# Patient Record
Sex: FEMALE | Race: WHITE | NOT HISPANIC OR LATINO | Employment: OTHER | URBAN - METROPOLITAN AREA
[De-identification: names, ages, dates, MRNs, and addresses within clinical notes are randomized per-mention and may not be internally consistent; named-entity substitution may affect disease eponyms.]

---

## 2017-01-26 DIAGNOSIS — Z23 NEED FOR VACCINATION: Primary | ICD-10-CM

## 2017-01-26 PROCEDURE — 90732 PPSV23 VACC 2 YRS+ SUBQ/IM: CPT | Performed by: FAMILY MEDICINE

## 2017-01-26 PROCEDURE — G0009 ADMIN PNEUMOCOCCAL VACCINE: HCPCS | Performed by: FAMILY MEDICINE

## 2017-01-28 DIAGNOSIS — E78.5 HYPERLIPIDEMIA: ICD-10-CM

## 2017-01-28 DIAGNOSIS — F41.9 ANXIETY: ICD-10-CM

## 2017-01-30 RX ORDER — ATORVASTATIN CALCIUM 10 MG/1
TABLET, FILM COATED ORAL
Qty: 90 TABLET | Refills: 0 | Status: SHIPPED | OUTPATIENT
Start: 2017-01-30 | End: 2017-04-28 | Stop reason: SDUPTHER

## 2017-01-30 RX ORDER — VENLAFAXINE HYDROCHLORIDE 150 MG/1
CAPSULE, EXTENDED RELEASE ORAL
Qty: 90 CAPSULE | Refills: 0 | Status: SHIPPED | OUTPATIENT
Start: 2017-01-30 | End: 2017-04-10 | Stop reason: SDUPTHER

## 2017-02-01 ENCOUNTER — TELEPHONE (OUTPATIENT)
Dept: PAIN MEDICINE | Facility: CLINIC | Age: 67
End: 2017-02-01

## 2017-02-01 NOTE — TELEPHONE ENCOUNTER
"-02/01/2017 @1726.  Spoke to Mrs. Churchill.  She will be here at 0730 tomorrow morning.          ---- Message from Sukumar Mcwilliams MD sent at 2/1/2017  3:27 PM EST -----  Read the report from Dr. RENE. I will be happy to assess and confirm what he recommended. Tell her that I have 5 minutes to see her because we are overbooked.   Could you put this on a quick note (Telephone)? If she agrees, She can stop by tomorrow at 07:30 AM (preferred) or 1 PM (FOR A VERY QUICK VISIT). Otherwise, let me know and will look at next week's schedule    ----- Message -----     From: Dinah Jorge RN     Sent: 2/1/2017   3:02 PM       To: Sukumar Mcwilliams MD    Mrs. Churchill called. She had bilat lumbar rhizotomy on 10/31/2016.  She had done very well, went  to PT, followed care plan until she fell approx 2 weeks ago.  She landed on her left hip, but has right hip pain since then.  Pain at first was intermittent, now is continuous, lateral right hip.    Yesterday she went to see Dr. Johnson, who did xray of pelvis in his office.  I was able to get the office note which is scanned in 'Media\".  He thinks the pain is SI, and GTB related, not a new injury, and recommended injections.  Dr. RENE's office said they do not do written reports of x-rays, but they would ask Dr. RENE  To fax us his interpretation.    Mrs. Churchill has an appointment 3/21 but does not want to wait that long to see you and be scheduled for procedure.          "

## 2017-02-02 ENCOUNTER — OFFICE VISIT (OUTPATIENT)
Dept: PAIN MEDICINE | Facility: CLINIC | Age: 67
End: 2017-02-02

## 2017-02-02 VITALS
OXYGEN SATURATION: 96 % | WEIGHT: 227 LBS | BODY MASS INDEX: 37.82 KG/M2 | RESPIRATION RATE: 20 BRPM | DIASTOLIC BLOOD PRESSURE: 76 MMHG | SYSTOLIC BLOOD PRESSURE: 111 MMHG | HEART RATE: 87 BPM | HEIGHT: 65 IN | TEMPERATURE: 97.2 F

## 2017-02-02 DIAGNOSIS — F41.9 ANXIETY AND DEPRESSION: ICD-10-CM

## 2017-02-02 DIAGNOSIS — M47.816 SPONDYLOSIS OF LUMBAR REGION WITHOUT MYELOPATHY OR RADICULOPATHY: ICD-10-CM

## 2017-02-02 DIAGNOSIS — G47.33 OBSTRUCTIVE SLEEP APNEA SYNDROME: ICD-10-CM

## 2017-02-02 DIAGNOSIS — M19.012 OSTEOARTHRITIS, LOCALIZED, SHOULDER, LEFT: ICD-10-CM

## 2017-02-02 DIAGNOSIS — M53.3 SACROILIAC JOINT DYSFUNCTION OF RIGHT SIDE: ICD-10-CM

## 2017-02-02 DIAGNOSIS — M70.61 GREATER TROCHANTERIC BURSITIS OF RIGHT HIP: ICD-10-CM

## 2017-02-02 DIAGNOSIS — M17.0 PRIMARY OSTEOARTHRITIS OF BOTH KNEES: ICD-10-CM

## 2017-02-02 DIAGNOSIS — M51.26 DISPLACEMENT OF LUMBAR INTERVERTEBRAL DISC: ICD-10-CM

## 2017-02-02 DIAGNOSIS — M81.0 OSTEOPOROSIS: ICD-10-CM

## 2017-02-02 DIAGNOSIS — M48.061 STENOSIS OF LATERAL RECESS OF LUMBAR SPINE: ICD-10-CM

## 2017-02-02 DIAGNOSIS — E66.01 MORBID OBESITY DUE TO EXCESS CALORIES (HCC): ICD-10-CM

## 2017-02-02 DIAGNOSIS — R53.81 PHYSICAL DECONDITIONING: ICD-10-CM

## 2017-02-02 DIAGNOSIS — M75.82 ROTATOR CUFF TENDONITIS, LEFT: ICD-10-CM

## 2017-02-02 DIAGNOSIS — F32.A ANXIETY AND DEPRESSION: ICD-10-CM

## 2017-02-02 DIAGNOSIS — E66.01 MORBID OBESITY DUE TO EXCESS CALORIES (HCC): Primary | ICD-10-CM

## 2017-02-02 DIAGNOSIS — M76.31 ILIOTIBIAL BAND SYNDROME OF RIGHT SIDE: ICD-10-CM

## 2017-02-02 PROBLEM — M19.019 OSTEOARTHRITIS, LOCALIZED, SHOULDER: Status: ACTIVE | Noted: 2017-02-02

## 2017-02-02 PROBLEM — M75.80 ROTATOR CUFF TENDONITIS: Status: ACTIVE | Noted: 2017-02-02

## 2017-02-02 PROCEDURE — 99214 OFFICE O/P EST MOD 30 MIN: CPT | Performed by: ANESTHESIOLOGY

## 2017-02-02 NOTE — PROGRESS NOTES
"Chief Complaint: \"My lower back pain went away after my rhizotomies.  I continued having some right hip pain. Two weeks ago, I fell and my right hip pain got even worse.   I saw Dr. Leung yesterday, who recommended that I follow up with you for an injection.\"    History of Present Illness: Ms. Laura Churchill is a 66 y.o. Female, who was originally referred by Dr. Richie Fisher in consultation for intractable chronic lower back pain. 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 Her mechanical lower back pain resolved after lumbar medial branch rhizotomies performed on October 31, 2016.  Patient experiences significant pain relief and functional improvement.  Patient continues experiencing right hip pain.  Unfortunately, 2 weeks ago, after getting out of bed she tripped on something and fell injuring her right hip.  Patient underwent orthopedic consultation with Dr. Willett, who recommended a follow with me and possible injection for treatment of her unrelenting pain.    Problem #1: Right hip pain  Pain pattern: Patient complains of constant pain with intermittent exacerbation, described as aching, burning and sharp sensation.   Pain radiation: The pain radiates from her right gluteal region into the right hip and the lateral aspect of her right thigh up to the knee level.    Pain level: 4/10 (right hip), lower back (0/10).   Pain level ranges from 2/10 to 10/10.   Pain increases with standing after sitting for 20-25 minutes, standing longer than 30 minutes, ambulating more than 20 minutes or 1 mile.   Pain decreases with sitting, lying.   Patient reports intermittent tingling in the right lower extremity.  Patient denies any new bladder or bowel problems.     Problem #2: Left shoulder pain  Patient presents with a secondary problem of left shoulder pain.    Pain level: 5/10.    Pain level ranges from " 0/10-10/10  Pain increases with use of the left arm and in the range of motion of the left shoulder.  Pain decreases with rest.  Patient denies any numbness or weakness in the upper extremities.    Laura Churchill has already failed the following measures, including:  Conservative measures: oral analgesics, massage, physical therapy, ice and TENs   Interventional: Two epidural injections by Dr. Guilherme Shaw, last was in June. Patient only experienced relief for about 2-3 weeks after each injection.  Lumbar medial branch rhizotomies with excellent analgesia can functional outcome, as referenced above  Surgical: No previous lumbar spine surgery or right hip surgery.    Laura Churchill underwent neurosurgical consultation with Dr. Fisher on 09/21/2016, and was found not to be a surgical candidate.   Other consultation: Dr. Higuera, as referenced above.  Patient was found not to be a surgical candidate.  Laura Churchill presents with depression and anxiety, engaged in treatment; and obesity.  In terms of current analgesics, Laura Churchill takes: Extra Strength Tylenol.   I have reviewed [facility]IA Report #[id number], consistent to medication reconciliation.    Review of New Diagnostic Studies:   Bilateral hip x-rays 01/31/2017, revealed no significant degenerative changes of the right hip.  Spurs on the right greater trochanter.  Narrowing of the bilateral sacroiliac joint spaces.  Degenerative changes of the lumbosacral spine.  3 pins noted on the left hip.  Report is obtained from Dr. Kalpesh Edwards's office note.    Review of previous diagnostic studies:  MRI LUMBAR SPINE WITHOUT CONTRAST 09/01/2016:  lumbar vertebral bodies are normally aligned. Normal marrow signal. L3-L4 broad-based disc protrusion, slightly greater on the right than on the left. Associated facet and ligamentous hypertrophy reducing the AP dimension of the neural canal to 7 mm.  L4-L5 broad-based disc protrusion producing effacement of the lateral  "recesses but without high-grade stenosis. L5-S1 disc is intact without stenosis or \"lateralization\". The conus medullaris is normal.  X-Ray SPINE LUMBAR FLEXION AND EXTENSION 09/21/2016: Flexion and extension views in the upright position were performed of the lumbar spine. Mild anterolisthesis of L3 on L4 which is slightly more pronounced in flexion imaging than extension imaging. Degenerative changes identified at the L4/L5 and L5/S1 level where there is mild disc space narrowing and some sclerosis of the endplates. Retrolisthesis of L1 on L2. Retrolisthesis is slightly more pronounced in extension imaging than when compared to flexion imaging.  DUAL-ENERGY X-RAY ABSORPTIOMETRY (DXA), 07/12/2016:  The patient's average bone mineral density is within normal limits.    Review of Systems   Musculoskeletal: Positive for arthralgias and gait problem.   Hematological: Bruises/bleeds easily.   Psychiatric/Behavioral: Positive for sleep disturbance. The patient is nervous/anxious (depression).    All other systems reviewed and are negative.     Patient Active Problem List   Diagnosis   • Gastroesophageal reflux disease   • Essential hypertension   • Hyperlipidemia   • Obstructive sleep apnea syndrome   • Osteoarthritis of knee   • Osteoporosis   • Lumbar spondylosis without myelopathy/Lumbar facet arthropathy   • Displacement of lumbar intervertebral disc   • Stenosis of lateral recess of lumbar spine   • Physical deconditioning   • Morbid obesity due to excess calories   • Anxiety and depression   • Sacroiliac joint dysfunction of right side   • Greater trochanteric bursitis of right hip   • Osteoarthritis, localized, shoulder   • Rotator cuff tendonitis   • Iliotibial band syndrome of right side       Past Medical History   Diagnosis Date   • Arthritis    • Back problem    • Chronic pain disorder    • Extremity pain    • Joint pain 2016     lt shoulder   • Low back pain    • Lumbosacral disc disease    • Migraine " "headache    • Osteoarthritis    • Spinal stenosis          Past Surgical History   Procedure Laterality Date   • Oophorectomy Left      one ovary removed age 35   • Hip surgery Left      3 pins   • Cholecystectomy     • Gastric sleeve laparoscopic  2011         Family History   Problem Relation Age of Onset   • Ovarian cancer Maternal Aunt 75   • Alzheimer's disease Mother    • Breast cancer Neg Hx          Social History     Social History   • Marital status:      Spouse name: N/A   • Number of children: N/A   • Years of education: N/A     Social History Main Topics   • Smoking status: Never Smoker   • Smokeless tobacco: Never Used   • Alcohol use Yes      Comment: Rarely   • Drug use: No   • Sexual activity: Not Asked     Other Topics Concern   • None     Social History Narrative        Current Outpatient Prescriptions:   •  aspirin 81 MG EC tablet, Take 81 mg by mouth daily., Disp: , Rfl:   •  atorvastatin (LIPITOR) 10 MG tablet, TAKE 1 TABLET DAILY, Disp: 90 tablet, Rfl: 0  •  diclofenac (VOLTAREN) 75 MG EC tablet, TAKE 1 TABLET TWICE A DAY AS NEEDED, Disp: 180 tablet, Rfl: 0  •  tiZANidine (ZANAFLEX) 4 MG tablet, TAKE 1 TABLET DAILY AS NEEDED FOR MUSCLE SPASMS, Disp: 90 tablet, Rfl: 0  •  venlafaxine XR (EFFEXOR-XR) 150 MG 24 hr capsule, TAKE 1 CAPSULE DAILY, Disp: 90 capsule, Rfl: 0      Allergies   Allergen Reactions   • Codeine    • Hydrocodone-Acetaminophen    • Sulfa Antibiotics          Visit Vitals   • /76 (BP Location: Left arm, Patient Position: Sitting)   • Pulse 87   • Temp 97.2 °F (36.2 °C) (Oral)   • Resp 20   • Ht 65\" (165.1 cm)   • Wt 227 lb (103 kg)   • SpO2 96%   • BMI 37.77 kg/m2      Physical Exam   Neurologic Exam  Constitutional: Patient is oriented to person, place, and time. Vital signs are normal. Patient appears well-developed and well-nourished.   HENT: Head: Normocephalic and atraumatic. Eyes: Conjunctivae and lids are normal. Pupils: Equal, round, reactive to light. "   Neck: Trachea normal. Neck supple. No JVD present.   Pulmonary Respiratory effort: No increased work of breathing or signs of respiratory distress. Auscultation of lungs: Clear to auscultation.   Cardiovascular Auscultation of heart: Normal rate and rhythm, normal S1 and S2, no murmurs. Peripheral vascular exam: Normal. No edema.   Abdomen: The abdomen was soft and nontender. Bowel sounds were normal.   Musculoskeletal   Gait and station: Gait evaluation demonstrated a normal gait.   Examination of the left shoulder reveals decreased range of motion globally.  Palpation of the left supraspinatus tendon reproduces pain.  Significant crepitus upon mobilization of the left shoulder joint.  Rotator cuff strength globally 4 over 5.  Lumbar spine: The range of motion of the lumbar spine is remarkably improved at this time. Lumbar facet joint loading maneuvers are negative.  Carlos and Gaenslen's tests are negative on the left, positive on the right.   Piriformis maneuvers are negative.   Palpation of the bilateral greater trochanter are negative on the left, positive on the right.  Examination of the Iliotibial band: are negative on the left, positive on the right.  Hip joints: The range of motion of the hip joints is full and without pain.    Examination of the right knee reveals significant crepitus and pain with flexion of her 100°.  Neurological:   Patient is alert and oriented to person, place, and time. Speech: speech is normal. Cortical function: Normal mental status.   Cranial nerves: Cranial nerves 2-12 intact.   Reflex Scores:  Right brachioradialis: 2+  Left brachioradialis: 2+  Right biceps: 2+  Left biceps: 2+  Right triceps: 2+  Left triceps: 2+  Right patellar: 2+  Left patellar: 2+  Right achilles: 2+  Left achilles: 2+  Motor strength: 5/5  Motor Tone: normal tone.   Involuntary movements: none.   Superficial/Primitive Reflexes: primitive reflexes were absent.   Right Sneed: absent  Left Sneed:  absent  Right ankle clonus: absent  Left ankle clonus: absent   Spurling sign is negative. Lhermitte sign is negative. Negative long tract signs. Straight leg raising test is negative. Femoral stretch sign is negative.   Sensation: No sensory loss. Sensory exam: intact to light touch, intact pain and temperature sensation, intact vibration sensation and normal proprioception.   Coordination: Normal finger to nose and heel to shin. Normal balance and negative. Romberg's sign negative.   Skin and subcutaneous tissue: Skin is warm and intact. No rash noted. No cyanosis.   Psychiatric:   Judgment and insight: Normal.   Orientation to person, place and time: Normal.   Recent and remote memory: Intact.   Mood and affect: Normal.    ASSESSMENT:   1. Sacroiliac joint dysfunction of right side    2. Greater trochanteric bursitis of right hip    3. Iliotibial band syndrome of right side    4. Osteoarthritis, localized, shoulder, left    5. Rotator cuff tendonitis, left    6. Lumbar spondylosis without myelopathy/Lumbar facet arthropathy    7. Displacement of lumbar intervertebral disc    8. Stenosis of lateral recess of lumbar spine    9. Primary osteoarthritis of both knees    10. Osteoporosis    11. Morbid obesity due to excess calories    12. Obstructive sleep apnea syndrome    13. Physical deconditioning    14. Anxiety and depression        PLAN: I had a lengthy conversation with Ms. Laura CAMACHO Churchill regarding her recurrent chronic pain condition and potential therapeutic options. Patient has failed to obtain pain relief with conservative measures.  Patient has experienced complete relief of her previously severe mechanical lower back pain since lumbar medial branch rhizotomies.  Patient has a history of right sacroiliac joint dysfunction and right greater trochanteric bursitis, which got significantly worse after a recent fall.  In addition, she presents with some surreptitious symptoms from underlying lumbar spinal  stenosis. Her pain is multifactorial, and as a result, I have recommended a multidisciplinary approach to better address her needs.  Therefore, I have proposed the following plan:  1. Interventional pain management measures:   A. For treatment of chronic right sacroiliac joint pain and right greater trochanteric bursitis/IT band syndrome: Patient will be scheduled for diagnostic and therapeutic right sacroiliac joint injection and right greater trochanteric bursa injection with local anesthesia and steroids.  Otherwise, we will proceed with diagnostic and therapeutic right L3-L4 transforaminal epidural steroid injection. We may repeat another epidural depending on patient's outcome.  Patient will follow-up with Dr. Fisher thereafter.  B. For treatment of her chronic left shoulder pain/osteoarthritis, I have discussed the possibility of left glenohumeral joint injection with Monovisc or Synvisc one combined with right supraspinatus tendon sheath injection with steroids.  In the meantime, I will request medical records from Dr. Miller  2. Long-term rehabilitation efforts:  A. Patient will start a comprehensive physical therapy program for water therapy, core strengthening, neurodynamics, ASTYM, E-STIM, myofascial release, cupping and dry needling  B. Start an exercise program such as yoga, water therapy and daily walks for fitness  C. Referral to [facility]  3. Pharmacological measures: Continue diclofenac and tizanidine as currently prescribed  4. The patient has been instructed to contact my office with any questions or difficulties. The patient understands the plan and agrees to proceed accordingly.       Patient Care Team:  Shiva Capone MD as PCP - General  Richie Fisher MD as Surgeon (Neurosurgery)  Sukumar Mcwilliams MD as Consulting Physician (Anesthesiology)       No orders of the defined types were placed in this encounter.          Future Appointments  Date Time  Provider Department Center   3/29/2017 1:45 PM Glenn Clayton MD [facility] None         Sukumar Mcwilliams MD

## 2017-02-06 ENCOUNTER — OUTSIDE FACILITY SERVICE (OUTPATIENT)
Dept: PAIN MEDICINE | Facility: CLINIC | Age: 67
End: 2017-02-06

## 2017-02-06 PROCEDURE — 20610 DRAIN/INJ JOINT/BURSA W/O US: CPT | Performed by: ANESTHESIOLOGY

## 2017-02-06 PROCEDURE — 27096 INJECT SACROILIAC JOINT: CPT | Performed by: ANESTHESIOLOGY

## 2017-02-06 PROCEDURE — 99152 MOD SED SAME PHYS/QHP 5/>YRS: CPT | Performed by: ANESTHESIOLOGY

## 2017-02-20 ENCOUNTER — TELEPHONE (OUTPATIENT)
Dept: NEUROSURGERY | Facility: CLINIC | Age: 67
End: 2017-02-20

## 2017-02-20 NOTE — TELEPHONE ENCOUNTER
Can schedule follow up with me.  If patient has celso incontinence of bladder she needs to go to emergency room

## 2017-02-20 NOTE — TELEPHONE ENCOUNTER
Provider:  Phillip  Caller:   Laura  Phone #:  [phone]  Surgery:  N/a  Surgery Date:  N/a  Last visit:   9/21/16    KIA:         Reason for call:       Pt called stating that she is still having pain, has had rhizotomy and injections from [facility], and these treatments have not helped. Pt states that she is has developed some incontinence, not to the point where she needs to wear depends, but issue is has gradually gotten worse, requests return call

## 2017-02-27 ENCOUNTER — TELEPHONE (OUTPATIENT)
Dept: FAMILY MEDICINE CLINIC | Facility: CLINIC | Age: 67
End: 2017-02-27

## 2017-02-27 RX ORDER — DICLOFENAC SODIUM 75 MG/1
75 TABLET, DELAYED RELEASE ORAL 2 TIMES DAILY
Qty: 180 TABLET | Refills: 0 | Status: SHIPPED | OUTPATIENT
Start: 2017-02-27 | End: 2017-02-27 | Stop reason: SDUPTHER

## 2017-02-27 RX ORDER — DICLOFENAC SODIUM 75 MG/1
75 TABLET, DELAYED RELEASE ORAL 2 TIMES DAILY
Qty: 180 TABLET | Refills: 0 | Status: SHIPPED | OUTPATIENT
Start: 2017-02-27 | End: 2017-07-11 | Stop reason: HOSPADM

## 2017-02-27 NOTE — TELEPHONE ENCOUNTER
----- Message from Josesito Qureshi sent at 2/27/2017 11:34 AM EST -----  Regarding: med refill  Contact: [phone]  diclofenac (VOLTAREN) 75 MG EC tablet [facility]

## 2017-02-27 NOTE — TELEPHONE ENCOUNTER
----- Message from Erna Bright MA sent at 2/27/2017  3:09 PM EST -----  Regarding: RE: Medication Refill  Resent Rx to [facility] mail order.    ----- Message -----     From: Mercedes Jerome MA     Sent: 2/27/2017   2:37 PM       To: Erna Bright MA  Subject: FW: Medication Refill                                ----- Message -----     From: Anupama Diez     Sent: 2/27/2017   1:53 PM       To: Mercedes Jerome MA  Subject: Medication Refill                                PT DICLOFENAC WAS SUPPOSED TO GO TO MAIL ORDER PHARM[facility]Y.

## 2017-03-22 ENCOUNTER — OUTSIDE FACILITY SERVICE (OUTPATIENT)
Dept: PAIN MEDICINE | Facility: CLINIC | Age: 67
End: 2017-03-22

## 2017-03-22 PROCEDURE — 99152 MOD SED SAME PHYS/QHP 5/>YRS: CPT | Performed by: ANESTHESIOLOGY

## 2017-03-22 PROCEDURE — 64483 NJX AA&/STRD TFRM EPI L/S 1: CPT | Performed by: ANESTHESIOLOGY

## 2017-03-30 ENCOUNTER — OFFICE VISIT (OUTPATIENT)
Dept: OBSTETRICS AND GYNECOLOGY | Facility: CLINIC | Age: 67
End: 2017-03-30

## 2017-03-30 VITALS
HEIGHT: 65 IN | BODY MASS INDEX: 35.45 KG/M2 | WEIGHT: 212.8 LBS | DIASTOLIC BLOOD PRESSURE: 80 MMHG | SYSTOLIC BLOOD PRESSURE: 116 MMHG

## 2017-03-30 DIAGNOSIS — N94.11 SUPERFICIAL DYSPAREUNIA: ICD-10-CM

## 2017-03-30 DIAGNOSIS — Z01.419 ENCOUNTER FOR GYNECOLOGICAL EXAMINATION WITHOUT ABNORMAL FINDING: ICD-10-CM

## 2017-03-30 DIAGNOSIS — N95.2 VAGINAL ATROPHY: ICD-10-CM

## 2017-03-30 DIAGNOSIS — Z78.0 MENOPAUSE: Primary | ICD-10-CM

## 2017-03-30 PROCEDURE — 99397 PER PM REEVAL EST PAT 65+ YR: CPT | Performed by: OBSTETRICS & GYNECOLOGY

## 2017-03-30 NOTE — PROGRESS NOTES
"   Chief Complaint   Patient presents with   • Gynecologic Exam     **weight loss**       Laura Churchill is a 66 y.o. year old  presenting to be seen for her annual exam.  This patient has previously had a laparoscopic left salpingo-oophorectomy.  She has had laparoscopic ablation of endometriosis in the past.  She is not on estrogen/progestin therapy.  She has complaints of vaginal dryness and superficial dyspareunia.  She cannot afford estrogen cream    SCREENING TESTS    Year 2012   Age                         PAP    Neg.                     HPV high risk                         Mammogram    benign benign                    LUCRECIA score                         Breast MRI                         Lipids                         Vitamin D                         Colonoscopy                         DEXA  Frax (hip/any)                         Ovarian Screen    normal normal                      Enter the month test was performed.  If month not known, enter \"X'  · Black numbers = normal results  · Red numbers = abnormal results  · Black X = patient reported normal  · Red X - patient reported abnormal      Referred by:    Profession:    Other info:         History   Sexual Activity   • Sexual activity: Yes   • Partners: Male   • Birth control/ protection: Post-menopausal    She would not like to be screened for STD's at today's exam.     She exercises regularly: no.  She wears her seat belt: yes.  She has concerns about domestic violence: no.  She has noticed changes in height: no    GYN screening history:  · Last mammogram: was done on approximately 2016 and the result was: Birads I (Normal)..    No Additional Complaints Reported    The following portions of the patient's history were reviewed and updated as appropriate:vital signs and   She  does not have any pertinent problems on file.  She  has a past " "surgical history that includes Oophorectomy (Left); Hip surgery (Left); Cholecystectomy; and Gastric Sleeve (2011).  Her family history includes Alzheimer's disease in her mother; Bone cancer in her mother; Melanoma in her father; Ovarian cancer (age of onset: 75) in her maternal aunt; Prostate cancer in her father. There is no history of Breast cancer.  She  reports that she has never smoked. She has never used smokeless tobacco. She reports that she drinks alcohol. She reports that she does not use illicit drugs.  Current Outpatient Prescriptions   Medication Sig Dispense Refill   • aspirin 81 MG EC tablet Take 81 mg by mouth daily.     • atorvastatin (LIPITOR) 10 MG tablet TAKE 1 TABLET DAILY 90 tablet 0   • diclofenac (VOLTAREN) 75 MG EC tablet Take 1 tablet by mouth 2 (Two) Times a Day. 180 tablet 0   • Ospemifene 60 MG tablet Take 60 mg by mouth Daily. 90 tablet 3   • tiZANidine (ZANAFLEX) 4 MG tablet TAKE 1 TABLET DAILY AS NEEDED FOR MUSCLE SPASMS 90 tablet 0   • venlafaxine XR (EFFEXOR-XR) 150 MG 24 hr capsule TAKE 1 CAPSULE DAILY 90 capsule 0     No current facility-administered medications for this visit.      She is allergic to codeine; hydrocodone-acetaminophen; and sulfa antibiotics..    Review of Systems  A comprehensive review of systems was negative.  Constitutional: negative for fever, chills, activity change, appetite change, fatigue and unexpected weight change.  Respiratory: negative  Cardiovascular: negative  Gastrointestinal: negative  Genitourinary:negative  Musculoskeletal:negative  Behavioral/Psych: negative       /80  Ht 65\" (165.1 cm)  Wt 212 lb 12.8 oz (96.5 kg)  BMI 35.41 kg/m2    Physical Exam    General:  alert; cooperative; well developed; well nourished  obese - Body mass index is 35.41 kg/(m^2).   Skin:  No suspicious lesions seen   Thyroid: normal to inspection and palpation   Lungs:  clear to auscultation bilaterally   Heart:  regular rate and rhythm, S1, S2 normal, no " murmur, click, rub or gallop   Breasts:  Examined in supine position  Symmetric without masses or skin dimpling  Nipples normal without inversion, lesions or discharge  There are no palpable axillary nodes   Abdomen: soft, non-tender; no masses  no umbilical or inginual hernias are present  no hepato-splenomegaly  obese   Pelvis: Clinical staff was present for exam  External genitalia:  normal appearance of the external genitalia including Bartholin's and [place]le Mill's glands.  Vaginal:  atrophic mucosal changes are present;  Cervix:  normal appearance.  Uterus:  normal size, shape and consistency. anteverted;  Adnexa:  absent, left.  Rectal:  anus visually normal appearing. recto-vaginal exam unremarkable and confirms findings;     Lab Review   No data reviewed    Imaging  Mammogram results- benign         ASSESSMENT  Problems Addressed this Visit        Genitourinary    Menopause - Primary    Vaginal atrophy      Other Visit Diagnoses     Superficial dyspareunia        Relevant Medications    Ospemifene 60 MG tablet    Encounter for gynecological examination without abnormal finding              PLAN    Medications prescribed this encounter:    New Medications Ordered This Visit   Medications   • Ospemifene 60 MG tablet     Sig: Take 60 mg by mouth Daily.     Dispense:  90 tablet     Refill:  3   ·   · Calcium, 600 mg/ Vit. D, 400 IU daily; regular weight-bearing exercise  · Follow up: 12 month(s)  *Please note that portions of this documentation may have been completed with a voice recognition program.  Efforts were made to edit this dictation, but occasional words may have been mistranscribed.       This note was electronically signed.    LJ Clayton MD  March 30, 2017  11:44 AM

## 2017-04-10 DIAGNOSIS — F41.9 ANXIETY: ICD-10-CM

## 2017-04-10 RX ORDER — VENLAFAXINE HYDROCHLORIDE 150 MG/1
CAPSULE, EXTENDED RELEASE ORAL
Qty: 90 CAPSULE | Refills: 0 | Status: ON HOLD | OUTPATIENT
Start: 2017-04-10 | End: 2017-07-09 | Stop reason: SDUPTHER

## 2017-04-28 DIAGNOSIS — E78.5 HYPERLIPIDEMIA: ICD-10-CM

## 2017-04-28 RX ORDER — ATORVASTATIN CALCIUM 10 MG/1
TABLET, FILM COATED ORAL
Qty: 90 TABLET | Refills: 0 | Status: SHIPPED | OUTPATIENT
Start: 2017-04-28 | End: 2017-07-27 | Stop reason: SDUPTHER

## 2017-05-15 ENCOUNTER — OUTSIDE FACILITY SERVICE (OUTPATIENT)
Dept: PAIN MEDICINE | Facility: CLINIC | Age: 67
End: 2017-05-15

## 2017-05-15 PROCEDURE — 64483 NJX AA&/STRD TFRM EPI L/S 1: CPT | Performed by: ANESTHESIOLOGY

## 2017-05-15 PROCEDURE — 99152 MOD SED SAME PHYS/QHP 5/>YRS: CPT | Performed by: ANESTHESIOLOGY

## 2017-06-05 ENCOUNTER — TRANSCRIBE ORDERS (OUTPATIENT)
Dept: ADMINISTRATIVE | Facility: HOSPITAL | Age: 67
End: 2017-06-05

## 2017-06-05 DIAGNOSIS — Z12.31 VISIT FOR SCREENING MAMMOGRAM: Primary | ICD-10-CM

## 2017-06-30 ENCOUNTER — HOSPITAL ENCOUNTER (OUTPATIENT)
Dept: GENERAL RADIOLOGY | Facility: HOSPITAL | Age: 67
Discharge: HOME | End: 2017-06-30
Admitting: ORTHOPAEDIC SURGERY

## 2017-06-30 ENCOUNTER — APPOINTMENT (OUTPATIENT)
Dept: PREADMISSION TESTING | Facility: HOSPITAL | Age: 67
End: 2017-06-30

## 2017-06-30 VITALS — BODY MASS INDEX: 34.27 KG/M2 | WEIGHT: 205.69 LBS | HEIGHT: 65 IN

## 2017-06-30 LAB
ANION GAP SERPL CALCULATED.3IONS-SCNC: 6 MMOL/L (ref 3–11)
BUN BLD-MCNC: 17 MG/DL (ref 9–23)
BUN/CREAT SERPL: 18.9 (ref 7–25)
CALCIUM SPEC-SCNC: 9.3 MG/DL (ref 8.7–10.4)
CHLORIDE SERPL-SCNC: 109 MMOL/L (ref 99–109)
CO2 SERPL-SCNC: 27 MMOL/L (ref 20–31)
CREAT BLD-MCNC: 0.9 MG/DL (ref 0.6–1.3)
DEPRECATED RDW RBC AUTO: 46.6 FL (ref 37–54)
ERYTHROCYTE [DISTWIDTH] IN BLOOD BY AUTOMATED COUNT: 13.1 % (ref 11.3–14.5)
GFR SERPL CREATININE-BSD FRML MDRD: 63 ML/MIN/1.73
GLUCOSE BLD-MCNC: 89 MG/DL (ref 70–100)
HBA1C MFR BLD: 5.4 % (ref 4.8–5.6)
HCT VFR BLD AUTO: 38.8 % (ref 34.5–44)
HGB BLD-MCNC: 12.3 G/DL (ref 11.5–15.5)
MCH RBC QN AUTO: 30.8 PG (ref 27–31)
MCHC RBC AUTO-ENTMCNC: 31.7 G/DL (ref 32–36)
MCV RBC AUTO: 97.2 FL (ref 80–99)
PLATELET # BLD AUTO: 210 10*3/MM3 (ref 150–450)
PMV BLD AUTO: 9.6 FL (ref 6–12)
POTASSIUM BLD-SCNC: 4 MMOL/L (ref 3.5–5.5)
RBC # BLD AUTO: 3.99 10*6/MM3 (ref 3.89–5.14)
SODIUM BLD-SCNC: 142 MMOL/L (ref 132–146)
WBC NRBC COR # BLD: 7.13 10*3/MM3 (ref 3.5–10.8)

## 2017-06-30 PROCEDURE — 36415 COLL VENOUS BLD VENIPUNCTURE: CPT

## 2017-06-30 PROCEDURE — 85027 COMPLETE CBC AUTOMATED: CPT | Performed by: ORTHOPAEDIC SURGERY

## 2017-06-30 PROCEDURE — 71020 HC CHEST PA AND LATERAL: CPT

## 2017-06-30 PROCEDURE — 80048 BASIC METABOLIC PNL TOTAL CA: CPT | Performed by: ORTHOPAEDIC SURGERY

## 2017-06-30 PROCEDURE — 93010 ELECTROCARDIOGRAM REPORT: CPT | Performed by: INTERNAL MEDICINE

## 2017-06-30 PROCEDURE — 93005 ELECTROCARDIOGRAM TRACING: CPT

## 2017-06-30 PROCEDURE — 83036 HEMOGLOBIN GLYCOSYLATED A1C: CPT | Performed by: ORTHOPAEDIC SURGERY

## 2017-07-09 DIAGNOSIS — F41.9 ANXIETY: ICD-10-CM

## 2017-07-10 ENCOUNTER — ANESTHESIA EVENT (OUTPATIENT)
Dept: PERIOP | Facility: HOSPITAL | Age: 67
End: 2017-07-10

## 2017-07-10 ENCOUNTER — APPOINTMENT (OUTPATIENT)
Dept: GENERAL RADIOLOGY | Facility: HOSPITAL | Age: 67
End: 2017-07-10

## 2017-07-10 ENCOUNTER — ANESTHESIA (OUTPATIENT)
Dept: PERIOP | Facility: HOSPITAL | Age: 67
End: 2017-07-10

## 2017-07-10 ENCOUNTER — HOSPITAL ENCOUNTER (INPATIENT)
Facility: HOSPITAL | Age: 67
LOS: 1 days | Discharge: HOME | End: 2017-07-11
Attending: ORTHOPAEDIC SURGERY | Admitting: ORTHOPAEDIC SURGERY

## 2017-07-10 DIAGNOSIS — Z74.09 IMPAIRED MOBILITY AND ADLS: Primary | ICD-10-CM

## 2017-07-10 DIAGNOSIS — Z78.9 IMPAIRED MOBILITY AND ADLS: Primary | ICD-10-CM

## 2017-07-10 PROBLEM — M19.019 PRIMARY LOCALIZED OSTEOARTHROSIS OF SHOULDER REGION: Status: ACTIVE | Noted: 2017-07-10

## 2017-07-10 PROBLEM — Z96.619 STATUS POST TOTAL SHOULDER ARTHROPLASTY: Status: ACTIVE | Noted: 2017-07-10

## 2017-07-10 PROCEDURE — C1713 ANCHOR/SCREW BN/BN,TIS/BN: HCPCS | Performed by: ORTHOPAEDIC SURGERY

## 2017-07-10 PROCEDURE — 25010000003 CEFAZOLIN IN DEXTROSE 2-4 GM/100ML-% SOLUTION: Performed by: ORTHOPAEDIC SURGERY

## 2017-07-10 PROCEDURE — 25010000002 MIDAZOLAM PER 1 MG: Performed by: NURSE ANESTHETIST, CERTIFIED REGISTERED

## 2017-07-10 PROCEDURE — 99222 1ST HOSP IP/OBS MODERATE 55: CPT | Performed by: HOSPITALIST

## 2017-07-10 PROCEDURE — C1776 JOINT DEVICE (IMPLANTABLE): HCPCS | Performed by: ORTHOPAEDIC SURGERY

## 2017-07-10 PROCEDURE — 25010000002 DEXAMETHASONE PER 1 MG: Performed by: NURSE ANESTHETIST, CERTIFIED REGISTERED

## 2017-07-10 PROCEDURE — 25010000002 FENTANYL CITRATE (PF) 100 MCG/2ML SOLUTION: Performed by: NURSE ANESTHETIST, CERTIFIED REGISTERED

## 2017-07-10 PROCEDURE — 0LS40ZZ REPOSITION LEFT UPPER ARM TENDON, OPEN APPROACH: ICD-10-PCS | Performed by: ORTHOPAEDIC SURGERY

## 2017-07-10 PROCEDURE — 25010000002 MEPERIDINE PER 100 MG: Performed by: ANESTHESIOLOGY

## 2017-07-10 PROCEDURE — 73030 X-RAY EXAM OF SHOULDER: CPT

## 2017-07-10 PROCEDURE — 25010000002 NEOSTIGMINE PER 0.5 MG: Performed by: NURSE ANESTHETIST, CERTIFIED REGISTERED

## 2017-07-10 PROCEDURE — 25010000002 ONDANSETRON PER 1 MG: Performed by: NURSE ANESTHETIST, CERTIFIED REGISTERED

## 2017-07-10 PROCEDURE — 0RRK0JZ REPLACEMENT OF LEFT SHOULDER JOINT WITH SYNTHETIC SUBSTITUTE, OPEN APPROACH: ICD-10-PCS | Performed by: ORTHOPAEDIC SURGERY

## 2017-07-10 PROCEDURE — 25010000002 ROPIVACAINE PER 1 MG: Performed by: NURSE ANESTHETIST, CERTIFIED REGISTERED

## 2017-07-10 PROCEDURE — 25010000002 VANCOMYCIN: Performed by: ORTHOPAEDIC SURGERY

## 2017-07-10 PROCEDURE — 25010000002 PROPOFOL 10 MG/ML EMULSION: Performed by: NURSE ANESTHETIST, CERTIFIED REGISTERED

## 2017-07-10 DEVICE — SMARTSET HIGH PERFORMANCE MV MEDIUM VISCOSITY BONE CEMENT 40G
Type: IMPLANTABLE DEVICE | Site: SHOULDER | Status: FUNCTIONAL
Brand: SMARTSET

## 2017-07-10 DEVICE — IMPLANTABLE DEVICE: Type: IMPLANTABLE DEVICE | Site: SHOULDER | Status: FUNCTIONAL

## 2017-07-10 DEVICE — TOTL SHLDR ARTHROPLASTY SYS: Type: IMPLANTABLE DEVICE | Site: SHOULDER | Status: FUNCTIONAL

## 2017-07-10 DEVICE — SCRW EQUINOXE TORQ DEFINE KT SQ: Type: IMPLANTABLE DEVICE | Site: SHOULDER | Status: FUNCTIONAL

## 2017-07-10 DEVICE — STEM HUM PRI EQUINX PF 11MM: Type: IMPLANTABLE DEVICE | Site: SHOULDER | Status: FUNCTIONAL

## 2017-07-10 DEVICE — HD HUM EQUINOXE SHT 41MM: Type: IMPLANTABLE DEVICE | Site: SHOULDER | Status: FUNCTIONAL

## 2017-07-10 DEVICE — PLT HUM EQUINOXE REPLICAT OFFST 4.5: Type: IMPLANTABLE DEVICE | Site: SHOULDER | Status: FUNCTIONAL

## 2017-07-10 RX ORDER — HYDROMORPHONE HYDROCHLORIDE 1 MG/ML
0.5 INJECTION, SOLUTION INTRAMUSCULAR; INTRAVENOUS; SUBCUTANEOUS
Status: DISCONTINUED | OUTPATIENT
Start: 2017-07-10 | End: 2017-07-11 | Stop reason: HOSPADM

## 2017-07-10 RX ORDER — MEPERIDINE HYDROCHLORIDE 25 MG/ML
25 INJECTION INTRAMUSCULAR; INTRAVENOUS; SUBCUTANEOUS ONCE
Status: COMPLETED | OUTPATIENT
Start: 2017-07-10 | End: 2017-07-10

## 2017-07-10 RX ORDER — TIZANIDINE 4 MG/1
4 TABLET ORAL DAILY PRN
Status: DISCONTINUED | OUTPATIENT
Start: 2017-07-10 | End: 2017-07-11 | Stop reason: HOSPADM

## 2017-07-10 RX ORDER — SODIUM CHLORIDE 0.9 % (FLUSH) 0.9 %
1-10 SYRINGE (ML) INJECTION AS NEEDED
Status: DISCONTINUED | OUTPATIENT
Start: 2017-07-10 | End: 2017-07-10 | Stop reason: HOSPADM

## 2017-07-10 RX ORDER — GLYCOPYRROLATE 0.2 MG/ML
INJECTION INTRAMUSCULAR; INTRAVENOUS AS NEEDED
Status: DISCONTINUED | OUTPATIENT
Start: 2017-07-10 | End: 2017-07-10 | Stop reason: SURG

## 2017-07-10 RX ORDER — LIDOCAINE HYDROCHLORIDE 10 MG/ML
0.5 INJECTION, SOLUTION EPIDURAL; INFILTRATION; INTRACAUDAL; PERINEURAL ONCE AS NEEDED
Status: COMPLETED | OUTPATIENT
Start: 2017-07-10 | End: 2017-07-10

## 2017-07-10 RX ORDER — ONDANSETRON 2 MG/ML
4 INJECTION INTRAMUSCULAR; INTRAVENOUS ONCE AS NEEDED
Status: DISCONTINUED | OUTPATIENT
Start: 2017-07-10 | End: 2017-07-10 | Stop reason: HOSPADM

## 2017-07-10 RX ORDER — BUPIVACAINE HYDROCHLORIDE 2.5 MG/ML
INJECTION, SOLUTION EPIDURAL; INFILTRATION; INTRACAUDAL AS NEEDED
Status: DISCONTINUED | OUTPATIENT
Start: 2017-07-10 | End: 2017-07-10 | Stop reason: SURG

## 2017-07-10 RX ORDER — PROPOFOL 10 MG/ML
VIAL (ML) INTRAVENOUS AS NEEDED
Status: DISCONTINUED | OUTPATIENT
Start: 2017-07-10 | End: 2017-07-10 | Stop reason: SURG

## 2017-07-10 RX ORDER — LABETALOL HYDROCHLORIDE 5 MG/ML
INJECTION, SOLUTION INTRAVENOUS AS NEEDED
Status: DISCONTINUED | OUTPATIENT
Start: 2017-07-10 | End: 2017-07-10 | Stop reason: SURG

## 2017-07-10 RX ORDER — FENTANYL CITRATE 50 UG/ML
50 INJECTION, SOLUTION INTRAMUSCULAR; INTRAVENOUS
Status: DISCONTINUED | OUTPATIENT
Start: 2017-07-10 | End: 2017-07-10 | Stop reason: HOSPADM

## 2017-07-10 RX ORDER — OXYCODONE AND ACETAMINOPHEN 10; 325 MG/1; MG/1
1 TABLET ORAL EVERY 4 HOURS PRN
Status: DISCONTINUED | OUTPATIENT
Start: 2017-07-10 | End: 2017-07-11 | Stop reason: HOSPADM

## 2017-07-10 RX ORDER — BISACODYL 5 MG/1
10 TABLET, DELAYED RELEASE ORAL DAILY PRN
Status: DISCONTINUED | OUTPATIENT
Start: 2017-07-10 | End: 2017-07-11 | Stop reason: HOSPADM

## 2017-07-10 RX ORDER — FAMOTIDINE 10 MG/ML
20 INJECTION, SOLUTION INTRAVENOUS ONCE
Status: CANCELLED | OUTPATIENT
Start: 2017-07-10 | End: 2017-07-10

## 2017-07-10 RX ORDER — NALOXONE HCL 0.4 MG/ML
0.1 VIAL (ML) INJECTION
Status: DISCONTINUED | OUTPATIENT
Start: 2017-07-10 | End: 2017-07-11 | Stop reason: HOSPADM

## 2017-07-10 RX ORDER — ROPIVACAINE HYDROCHLORIDE 2 MG/ML
6 INJECTION, SOLUTION EPIDURAL; INFILTRATION CONTINUOUS
Status: DISCONTINUED | OUTPATIENT
Start: 2017-07-10 | End: 2017-07-11 | Stop reason: HOSPADM

## 2017-07-10 RX ORDER — MIDAZOLAM HYDROCHLORIDE 1 MG/ML
INJECTION INTRAMUSCULAR; INTRAVENOUS AS NEEDED
Status: DISCONTINUED | OUTPATIENT
Start: 2017-07-10 | End: 2017-07-10 | Stop reason: SURG

## 2017-07-10 RX ORDER — ATRACURIUM BESYLATE 10 MG/ML
INJECTION, SOLUTION INTRAVENOUS AS NEEDED
Status: DISCONTINUED | OUTPATIENT
Start: 2017-07-10 | End: 2017-07-10 | Stop reason: SURG

## 2017-07-10 RX ORDER — SODIUM CHLORIDE 450 MG/100ML
50 INJECTION, SOLUTION INTRAVENOUS CONTINUOUS
Status: DISCONTINUED | OUTPATIENT
Start: 2017-07-10 | End: 2017-07-11 | Stop reason: HOSPADM

## 2017-07-10 RX ORDER — MAGNESIUM HYDROXIDE 1200 MG/15ML
LIQUID ORAL AS NEEDED
Status: DISCONTINUED | OUTPATIENT
Start: 2017-07-10 | End: 2017-07-10 | Stop reason: HOSPADM

## 2017-07-10 RX ORDER — FAMOTIDINE 20 MG/1
20 TABLET, FILM COATED ORAL ONCE
Status: COMPLETED | OUTPATIENT
Start: 2017-07-10 | End: 2017-07-10

## 2017-07-10 RX ORDER — ONDANSETRON 2 MG/ML
4 INJECTION INTRAMUSCULAR; INTRAVENOUS EVERY 6 HOURS PRN
Status: DISCONTINUED | OUTPATIENT
Start: 2017-07-10 | End: 2017-07-11 | Stop reason: HOSPADM

## 2017-07-10 RX ORDER — SODIUM CHLORIDE, SODIUM LACTATE, POTASSIUM CHLORIDE, CALCIUM CHLORIDE 600; 310; 30; 20 MG/100ML; MG/100ML; MG/100ML; MG/100ML
9 INJECTION, SOLUTION INTRAVENOUS CONTINUOUS
Status: DISCONTINUED | OUTPATIENT
Start: 2017-07-10 | End: 2017-07-10 | Stop reason: SDUPTHER

## 2017-07-10 RX ORDER — ONDANSETRON 2 MG/ML
INJECTION INTRAMUSCULAR; INTRAVENOUS AS NEEDED
Status: DISCONTINUED | OUTPATIENT
Start: 2017-07-10 | End: 2017-07-10 | Stop reason: SURG

## 2017-07-10 RX ORDER — ATORVASTATIN CALCIUM 10 MG/1
10 TABLET, FILM COATED ORAL DAILY
Status: DISCONTINUED | OUTPATIENT
Start: 2017-07-10 | End: 2017-07-11 | Stop reason: HOSPADM

## 2017-07-10 RX ORDER — FENTANYL CITRATE 50 UG/ML
INJECTION, SOLUTION INTRAMUSCULAR; INTRAVENOUS AS NEEDED
Status: DISCONTINUED | OUTPATIENT
Start: 2017-07-10 | End: 2017-07-10 | Stop reason: SURG

## 2017-07-10 RX ORDER — SENNA AND DOCUSATE SODIUM 50; 8.6 MG/1; MG/1
2 TABLET, FILM COATED ORAL 2 TIMES DAILY
Status: DISCONTINUED | OUTPATIENT
Start: 2017-07-10 | End: 2017-07-11 | Stop reason: HOSPADM

## 2017-07-10 RX ORDER — LIDOCAINE HYDROCHLORIDE 10 MG/ML
INJECTION, SOLUTION INFILTRATION; PERINEURAL AS NEEDED
Status: DISCONTINUED | OUTPATIENT
Start: 2017-07-10 | End: 2017-07-10 | Stop reason: SURG

## 2017-07-10 RX ORDER — VENLAFAXINE HYDROCHLORIDE 75 MG/1
150 CAPSULE, EXTENDED RELEASE ORAL
Status: DISCONTINUED | OUTPATIENT
Start: 2017-07-11 | End: 2017-07-11 | Stop reason: HOSPADM

## 2017-07-10 RX ORDER — PROPOFOL 10 MG/ML
VIAL (ML) INTRAVENOUS CONTINUOUS PRN
Status: DISCONTINUED | OUTPATIENT
Start: 2017-07-10 | End: 2017-07-10 | Stop reason: SURG

## 2017-07-10 RX ORDER — DEXAMETHASONE SODIUM PHOSPHATE 4 MG/ML
INJECTION, SOLUTION INTRA-ARTICULAR; INTRALESIONAL; INTRAMUSCULAR; INTRAVENOUS; SOFT TISSUE AS NEEDED
Status: DISCONTINUED | OUTPATIENT
Start: 2017-07-10 | End: 2017-07-10 | Stop reason: SURG

## 2017-07-10 RX ORDER — CEFAZOLIN SODIUM 2 G/100ML
2 INJECTION, SOLUTION INTRAVENOUS ONCE
Status: COMPLETED | OUTPATIENT
Start: 2017-07-10 | End: 2017-07-10

## 2017-07-10 RX ORDER — VENLAFAXINE HYDROCHLORIDE 150 MG/1
CAPSULE, EXTENDED RELEASE ORAL
Qty: 90 CAPSULE | Refills: 0 | Status: SHIPPED | OUTPATIENT
Start: 2017-07-10 | End: 2017-10-08 | Stop reason: SDUPTHER

## 2017-07-10 RX ORDER — DOCUSATE SODIUM 100 MG/1
100 CAPSULE, LIQUID FILLED ORAL 2 TIMES DAILY PRN
Status: DISCONTINUED | OUTPATIENT
Start: 2017-07-10 | End: 2017-07-11 | Stop reason: HOSPADM

## 2017-07-10 RX ORDER — ONDANSETRON 4 MG/1
4 TABLET, FILM COATED ORAL EVERY 6 HOURS PRN
Status: DISCONTINUED | OUTPATIENT
Start: 2017-07-10 | End: 2017-07-11 | Stop reason: HOSPADM

## 2017-07-10 RX ADMIN — PROPOFOL 122 MCG/KG/MIN: 10 INJECTION, EMULSION INTRAVENOUS at 11:56

## 2017-07-10 RX ADMIN — PROPOFOL 150 MG: 10 INJECTION, EMULSION INTRAVENOUS at 11:56

## 2017-07-10 RX ADMIN — Medication 2 TABLET: at 17:35

## 2017-07-10 RX ADMIN — ATRACURIUM BESYLATE 40 MG: 10 INJECTION, SOLUTION INTRAVENOUS at 11:56

## 2017-07-10 RX ADMIN — LIDOCAINE HYDROCHLORIDE 0.5 ML: 10 INJECTION, SOLUTION EPIDURAL; INFILTRATION; INTRACAUDAL; PERINEURAL at 09:02

## 2017-07-10 RX ADMIN — PROPOFOL 50 MG: 10 INJECTION, EMULSION INTRAVENOUS at 12:02

## 2017-07-10 RX ADMIN — DEXAMETHASONE SODIUM PHOSPHATE 8 MG: 4 INJECTION, SOLUTION INTRAMUSCULAR; INTRAVENOUS at 12:12

## 2017-07-10 RX ADMIN — LIDOCAINE HYDROCHLORIDE 50 MG: 10 INJECTION, SOLUTION INFILTRATION; PERINEURAL at 11:56

## 2017-07-10 RX ADMIN — SODIUM CHLORIDE, POTASSIUM CHLORIDE, SODIUM LACTATE AND CALCIUM CHLORIDE: 600; 310; 30; 20 INJECTION, SOLUTION INTRAVENOUS at 13:30

## 2017-07-10 RX ADMIN — VANCOMYCIN HYDROCHLORIDE 1500 MG: 1 INJECTION, POWDER, LYOPHILIZED, FOR SOLUTION INTRAVENOUS at 21:13

## 2017-07-10 RX ADMIN — BUPIVACAINE HYDROCHLORIDE 15 ML: 2.5 INJECTION, SOLUTION EPIDURAL; INFILTRATION; INTRACAUDAL; PERINEURAL at 09:30

## 2017-07-10 RX ADMIN — Medication 6 ML/HR: at 13:48

## 2017-07-10 RX ADMIN — LABETALOL HYDROCHLORIDE 5 MG: 5 INJECTION, SOLUTION INTRAVENOUS at 12:34

## 2017-07-10 RX ADMIN — OXYCODONE HYDROCHLORIDE AND ACETAMINOPHEN 1 TABLET: 10; 325 TABLET ORAL at 21:55

## 2017-07-10 RX ADMIN — MEPERIDINE HYDROCHLORIDE 25 MG: 25 INJECTION, SOLUTION INTRAMUSCULAR; INTRAVENOUS; SUBCUTANEOUS at 14:50

## 2017-07-10 RX ADMIN — Medication 3 MG: at 13:46

## 2017-07-10 RX ADMIN — FENTANYL CITRATE 100 MCG: 50 INJECTION, SOLUTION INTRAMUSCULAR; INTRAVENOUS at 09:23

## 2017-07-10 RX ADMIN — MIDAZOLAM HYDROCHLORIDE 2 MG: 1 INJECTION, SOLUTION INTRAMUSCULAR; INTRAVENOUS at 09:22

## 2017-07-10 RX ADMIN — ROBINUL 0.4 MG: 0.2 INJECTION INTRAMUSCULAR; INTRAVENOUS at 13:46

## 2017-07-10 RX ADMIN — FAMOTIDINE 20 MG: 20 TABLET ORAL at 09:01

## 2017-07-10 RX ADMIN — ATORVASTATIN CALCIUM 10 MG: 10 TABLET, FILM COATED ORAL at 17:35

## 2017-07-10 RX ADMIN — SODIUM CHLORIDE, POTASSIUM CHLORIDE, SODIUM LACTATE AND CALCIUM CHLORIDE 9 ML/HR: 600; 310; 30; 20 INJECTION, SOLUTION INTRAVENOUS at 09:02

## 2017-07-10 RX ADMIN — ONDANSETRON 4 MG: 2 INJECTION INTRAMUSCULAR; INTRAVENOUS at 13:46

## 2017-07-10 RX ADMIN — LABETALOL HYDROCHLORIDE 5 MG: 5 INJECTION, SOLUTION INTRAVENOUS at 12:45

## 2017-07-10 RX ADMIN — SODIUM CHLORIDE 50 ML/HR: 4.5 INJECTION, SOLUTION INTRAVENOUS at 16:26

## 2017-07-10 RX ADMIN — CEFAZOLIN SODIUM 2 G: 2 INJECTION, SOLUTION INTRAVENOUS at 11:24

## 2017-07-10 RX ADMIN — SODIUM CHLORIDE, POTASSIUM CHLORIDE, SODIUM LACTATE AND CALCIUM CHLORIDE: 600; 310; 30; 20 INJECTION, SOLUTION INTRAVENOUS at 11:50

## 2017-07-10 NOTE — OP NOTE
DATE OF OPERATION: 07/10/17  PREOPERATIVE DIAGNOSIS: Left shoulder degenerative joint disease with pain and limitation of function and motion.    POSTOPERATIVE DIAGNOSES:  1. Left shoulder degenerative joint disease with pain and limitation of function and motion.    2. Biceps tenosynovitis.    PROCEDURES PERFORMED:  1. Left total shoulder arthroplasty.    2. Left biceps tenodesis.    SURGEON: Almas Miller MD  ASSISTANTS:  1. Robbin Benton MD, PGY-6 Sports Fellow  2. Lauri Wang MD, PGY-6 Sports Fellow  3. Louis Page MD, PGY-5   ANESTHESIA: General plus block.    ESTIMATED BLOOD LOSS:150mL.    COMPLICATIONS: None.    DISPOSITION: Recovery room in stable condition.    IMPLANTS: [employer] Equinoxe total shoulder system, 11 mm stem press-fit, 4.5 replicator plate, 41 short humeral head, and medium alpha cage glenoid peripherally cemented.    INDICATIONS: This is a 66-year-old female with left shoulder pain and limited function and motion secondary to DJD. They have failed conservative treatment and after a discussion of risks, benefits, and alternatives, wished to proceed with shoulder arthroplasty.  DESCRIPTION OF PROCEDURE: On the day of surgery, she identified the left shoulder as the correct operative extremity. This was initialed by the surgeon with the patients's acknowledgment. The patient underwent placement of an interscalene block and was taken to the operating room and placed in the supine position. Upon induction of adequate anesthesia, the patient was brought up to the beach chair position and the shoulder and upper extremity were prepped and draped in the usual sterile fashion. Timeout confirmed the correct patient and operative extremity as well as that antibiotics were on board. A standard deltopectoral approach to the shoulder was carried out. It was carried sharply through the skin and subcutaneous tissue. Medial and lateral flaps were developed over the deltopectoral fascia. The cephalic vein  was identified and mobilized medially with the deltoid. The subdeltoid and subpectoral spaces were mobilized and a blunt retractor was placed deep to this. The clavipectoral fascia was opened on the lateral edge of the conjoined tendon and the retractor was moved deep to this. The leading edge of the pectoralis was released exposing the long head of the biceps. This was tenosynovitic. It was tenodesed to the pectoralis and released proximal to this. The clavipectoral fascia was opened on the lateral edge of conjoined tendon and the retractor moved deep to this. The 3 sisters were identified and coagulated. A subscapularis tenotomy was performed 1 cm medial to the lesser tuberosity and rotator interval was released to the glenoid exposing the humeral head. The inferior capsule was released directly off the humerus to allow greater than 90° of external rotation. A large inferior osteophyte was present which was removed with rongeur. The anatomic neck was exposed and the humeral head osteotomy was performed in approximately 30° of retroversion. The remainder of the osteophytes were removed. The canal was then entered, reamed, and broached. The final stem impacted in in approximately 30° of retroversion. A head protector was placed. The humerus was subluxed posteriorly. The glenoid exposed. Circumferential labral excision and capsular release were performed. A 270° mobilization of the subscapularis was carried out as well.  A centering hole was drilled. The glenoid was gently reamed and peripheral holes were drilled and trialing was carried out. The appropriate size was chosen. Thrombin-soaked Gelfoam was inserted into the holes to obtain hemostasis. Once the cement was prepared, Gelfoam was removed. Cement was inserted into the 3 peripheral holes and pressurized twice prior to impaction of the caged glenoid. Excellent fit was achieved with no rocking or instability. No excess cement was identified. The humerus was  carefully subluxed back anteriorly. A 4.5 replicator plate was chosen and trialing was carried out. The appropriate head size and position were chosen and allowed approximately 50% posterior subluxation with spontaneous reduction, 25% inferior subluxation with spontaneous reduction, and full passive range of motion. The joint was copiously irrigated with orthopedic irrigation mixed with Betadine after the final implants were assembled and locked into place. The shoulder was reduced. The subscapularis was meticulously repaired with #2 FiberWire as was the rotator interval. This was stable to approximately 30° of external rotation, but will be limited to 30° in the perioperative period. The deltopectoral interval was approximated with 0 Vicryl, the subcutaneous tissue with 2-0 Vicryl, and the skin with Monocryl and Dermabond. A sterile dressing was placed. Anesthesia was reversed and the patient was taken to the recovery room in stable condition. All instrument, needle, and sponge counts were correct.      Almas Miller MD*

## 2017-07-10 NOTE — ANESTHESIA PREPROCEDURE EVALUATION
Anesthesia Evaluation     Patient summary reviewed and Nursing notes reviewed   history of anesthetic complications: PONV         Airway   Mallampati: II  TM distance: >3 FB  Neck ROM: full  no difficulty expected  Dental - normal exam     Pulmonary - normal exam   (+) sleep apnea,   Cardiovascular - normal exam    (+) hypertension, hyperlipidemia      Neuro/Psych- negative ROS  GI/Hepatic/Renal/Endo    (+) morbid obesity, GERD,     Musculoskeletal     Abdominal  - normal exam    Bowel sounds: normal.   Substance History - negative use     OB/GYN negative ob/gyn ROS         Other   (+) arthritis                                     Anesthesia Plan    ASA 3     general   (Peripheral nerve block for post op pain relief)  intravenous induction   Anesthetic plan and risks discussed with patient.    Plan discussed with CRNA.

## 2017-07-10 NOTE — BRIEF OP NOTE
TOTAL SHOULDER ARTHROPLASTY  Procedure Note    Laura Churchill  7/10/2017    Pre-op Diagnosis:   * No pre-op diagnosis entered *    Post-op Diagnosis:     Post-Op Diagnosis Codes:     * Primary localized osteoarthrosis of shoulder region, left [M19.012]     * Left bicipital tenosynovitis [M75.22]    Procedure/CPT® Codes:  HI RECONSTR TOTAL SHOULDER IMPLANT [61819]  HI REPAIR BICEPS LONG TENDON [30092]    Procedure(s):  LEFT TOTAL SHOULDER ARTHROPLASTY     Surgeon(s):  MD Robbin Zapien MD, PGY-6 Sports Fellow  Lauri Wang MD, PGY-6 Sports Fellow  Louis Page MD, PGY-5     Anesthesia: General with Block    Staff:   Circulator: Susan Guan RN; Meron Caballero RN  Scrub Person: Gelacio Del Rosario  Vendor Representative: Giovani Carroll  Nursing Assistant: Quentin Nguyen    Estimated Blood Loss: *No blood loss documented*  Urine Voided: * No values recorded between 7/10/2017 11:50 AM and 7/10/2017  1:54 PM *    Specimens:                * No specimens in log *      Drains:           Findings: per dictation    Complications: none      Almas Miller MD     Date: 7/10/2017  Time: 1:54 PM

## 2017-07-10 NOTE — NURSING NOTE
Acute Pain Service:  On-Q teaching completed with patient and spouse, Hollis.  Video demonstration, handout and bracelet provided with [facility] on call central phone number.  Instructed to call with any questions or concerns.  Hollis verbalized understanding.  Service will continue to follow until catheter DC'd.  Please contact Hollis at [phone] if needed.

## 2017-07-10 NOTE — H&P
Pre-Op H&P    Chief complaint: Left shoulder pain    HPI:    Patient is a 66 y.o.female presents with osteoarthritis of left shoulder and here today for left total shoulder arthroplasty, possible reverse     Review of Systems:  General ROS: negative for chills, fever or skin lesions;  No changes since last office visit  Cardiovascular ROS: no chest pain or dyspnea on exertion  Respiratory ROS: no cough, shortness of breath, or wheezing    Allergies:   Allergies   Allergen Reactions   • Codeine Hives   • Hydrocodone-Acetaminophen Hives   • Sulfa Antibiotics Itching       Home Meds    Prescriptions Prior to Admission   Medication Sig Dispense Refill Last Dose   • atorvastatin (LIPITOR) 10 MG tablet TAKE 1 TABLET DAILY 90 tablet 0 7/9/2017   • diclofenac (VOLTAREN) 75 MG EC tablet Take 1 tablet by mouth 2 (Two) Times a Day. (Patient taking differently: Take 75 mg by mouth Daily.) 180 tablet 0 7/9/2017   • Ospemifene 60 MG tablet Take 60 mg by mouth Daily. 90 tablet 3 7/9/2017   • tiZANidine (ZANAFLEX) 4 MG tablet TAKE 1 TABLET DAILY AS NEEDED FOR MUSCLE SPASMS 90 tablet 0 Past Month   • aspirin 81 MG EC tablet Take 81 mg by mouth daily.   2 WEEKS       PMH:   Past Medical History:   Diagnosis Date   • Anxiety    • Atrophic vaginitis    • Back problem    • Chronic pain disorder    • Extremity pain    • Hypertension    • Joint pain 2016    lt shoulder   • Low back pain    • Lumbar stenosis     L4-5    • Lumbosacral disc disease    • Menopause    • Migraine headache    • Mixed hyperlipidemia    • Muscle spasm    • Obesity due to excess calories    • MOOKIE (obstructive sleep apnea)     NO CPAP USE   • Osteoarthritis    • PONV (postoperative nausea and vomiting)    • Spinal stenosis    • Wears glasses      PSH:    Past Surgical History:   Procedure Laterality Date   • CHOLECYSTECTOMY     • COLONOSCOPY     • GASTRIC SLEEVE LAPAROSCOPIC  2011   • HIP SURGERY Left     3 pins   • LAPAROSCOPIC GASTRIC BANDING     • OOPHORECTOMY  "Left     one ovary removed age 35   • STOMACH SURGERY      REMOVED LAP BAND       Immunization History:  Influenza: yes 2016  Pneumococcal: yes 2017  Tetanus: no    Social History:   Tobacco:   History   Smoking Status   • Never Smoker   Smokeless Tobacco   • Never Used      Alcohol:   History   Alcohol Use   • Yes     Comment: Rarely       Physical Exam:/89 (BP Location: Right arm, Patient Position: Lying)  Pulse 77  Temp 97.2 °F (36.2 °C) (Temporal Artery )   Resp 20  Ht 65\" (165.1 cm)  Wt 205 lb 11 oz (93.3 kg)  SpO2 95%  BMI 34.23 kg/m2    General Appearance:    Drowsy (post sedation for block), cooperative, no distress, appears stated age   Head:    Normocephalic, without obvious abnormality, atraumatic   Lungs:     Clear to auscultation bilaterally, respirations unlabored    Heart:   Regular rate and rhythm, S1 and S2 normal, no murmur, rub    or gallop    Abdomen:    Soft, non-tender.  +bowel sounds   Breast Exam:    deferred   Genitalia:    deferred   Extremities:   Extremities normal, atraumatic, no cyanosis or edema   Skin:   Skin color, texture, turgor normal, no rashes or lesions   Neurologic:   Grossly intact   Results Review  I reviewed the patient's new clinical results.    Cancer Staging (if applicable)  Cancer Patient: __ yes _x_no __unknown; If yes, clinical stage T:__ N:__M:__, stage group or __N/A    Impression: Osteoarthritis of left shoulder    Plan: Left total shoulder arthroplasty, possible reverse    Destini Wiley, APRN 7/10/2017 9:27 AM  "

## 2017-07-10 NOTE — ANESTHESIA PROCEDURE NOTES
Peripheral Block    Patient location during procedure: pre-op  Start time: 7/10/2017 9:20 AM  Stop time: 7/10/2017 9:31 AM  Reason for block: at surgeon's request and post-op pain management  Performed by  CRNA: YUMIKO TORRES  Assisted by: JUSTIN HAMM  Preanesthetic Checklist  Completed: patient identified, site marked, surgical consent, pre-op evaluation, timeout performed, IV checked, risks and benefits discussed and monitors and equipment checked  Peripheral Block Prep:  Sterile barriers:cap, gloves, mask and sterile barriers  Prep: ChloraPrep  Patient monitoring: blood pressure monitoring, continuous pulse oximetry and EKG  Peripheral Procedure  Sedation:yes  Guidance:ultrasound guided  Images:still images obtained    Laterality:left  Block Type:interscalene  Injection Technique:catheter  Needle Type:Tuohy  Needle Gauge:18 G  Catheter Size:20 G (20g)ULTRASOUND INTERPRETATION. Using ultrasound guidance a gauge needle was placed in close proximity to the brachial plexus nerve, at which point, under ultrasound guidance anesthetic was injected in the area of the nerve and spread of the anesthesia was seen on ultrasound in close proximity thereto.  There were no abnormalities seen on ultrasound; a digital image was taken; and the patient tolerated the procedure with no complications.   Medications  Local Injected:bupivacaine 0.25% Local Amount Injected:15mL  Post Assessment  Injection Assessment: negative aspiration for heme, no paresthesia on injection and incremental injection  Patient Tolerance:comfortable throughout block  Complications:no  Additional Notes  Procedure:                Catheter at skin-6                                      Anesthesia was provide by IV Sedation( see meds)      The pt was placed in semifowlers position with a slight tilt of the thorax contralateral to the insertion site.  The Insertion Site was prepped and draped in sterile fashion.  The skin was anesthetized with Lidocaine 1%  1ml injection utilizing a 25g needle.  Utilizing ultrasound guidance, a BBraun 2 inch 18 g Contiplex echogenic touhy needle was advanced in-plane.  Hydro dissection of tissue was achieved with Normal saline. Major vessels(carotid and Internal Jugular) where visualized as the brachial plexus was approached at the approximate level of C-7/ T-1.  Cervical 5 and Branches of Cervical 6 nerve roots where visualized and the needle tip was placed posterior at the level of C-6 roots.  LA spread was visualized and injection was made incrementally every 5 mls with aspiration. Injection pressure was normal or little, there was no intraneural injection, no vascular injection.      The BBraun 20 g wire stylet  catheter was then placed under US guidance on the posterior aspect of the Brachial Plexus.  Location of catheter was confirmed with NS injection visualized with US . The tuohy was then removed and the skin was sealed with Skin AFix at catheter insertion site.  Skin was prepped with mastisol and the labeled catheter  was secured with steristrips and a CHG tegaderm. Thank You.

## 2017-07-10 NOTE — ANESTHESIA PROCEDURE NOTES
Airway  Urgency: elective    Airway not difficult    General Information and Staff    Patient location during procedure: OR  CRNA: YUMIKO TORRES    Indications and Patient Condition  Indications for airway management: airway protection    Preoxygenated: yes  MILS not maintained throughout  Mask difficulty assessment: 1 - vent by mask    Final Airway Details  Final airway type: endotracheal airway      Successful airway: ETT  Cuffed: yes   Successful intubation technique: direct laryngoscopy  Endotracheal tube insertion site: oral  Blade: Caceres  Blade size: #2  ETT size: 7.0 mm  Cormack-Lehane Classification: grade I - full view of glottis  Placement verified by: chest auscultation and capnometry   Measured from: lips  ETT to lips (cm): 20  Number of attempts at approach: 1    Additional Comments  Negative epigastric sounds, Breath sound equal bilaterally with symmetric chest rise and fall.  Teeth intact, atraumatic

## 2017-07-10 NOTE — PLAN OF CARE
Problem: Pain, Acute (Adult)  Goal: Identify Related Risk Factors and Signs and Symptoms  Outcome: Outcome(s) achieved Date Met:  07/10/17    07/10/17 1556   Pain, Acute   Related Risk Factors (Acute Pain) knowledge deficit;patient perception;surgery   Signs and Symptoms (Acute Pain) BADLs/IADLs reluctance/inability to perform;fatigue/weakness;questions meaning of pain;moaning;sleep pattern alteration;verbalization of pain descriptors       Goal: Acceptable Pain Control/Comfort Level  Outcome: Ongoing (interventions implemented as appropriate)    07/10/17 1556   Pain, Acute (Adult)   Acceptable Pain Control/Comfort Level making progress toward outcome         Problem: Fall Risk (Adult)  Goal: Identify Related Risk Factors and Signs and Symptoms  Outcome: Outcome(s) achieved Date Met:  07/10/17    07/10/17 1556   Fall Risk   Fall Risk: Related Risk Factors age-related changes;history of falls;sensory deficits       Goal: Absence of Falls    07/10/17 1556   Fall Risk (Adult)   Absence of Falls making progress toward outcome

## 2017-07-10 NOTE — ANESTHESIA POSTPROCEDURE EVALUATION
Patient: Laura Churchill    Procedure Summary     Date Anesthesia Start Anesthesia Stop Room / Location    07/10/17 1150 1405 [facility] OR 14 / [facility]       Procedure Diagnosis Surgeon Provider    LEFT TOTAL SHOULDER ARTHROPLASTY  (Left Shoulder) Primary localized osteoarthrosis of shoulder region, left; Left bicipital tenosynovitis MD Aurelio Zapien MD          Anesthesia Type: general  Last vitals  BP      Temp      Pulse     Resp      SpO2        Post Anesthesia Care and Evaluation    Patient location during evaluation: PACU  Patient participation: complete - patient participated  Level of consciousness: sleepy but conscious  Pain score: 0  Pain management: adequate  Airway patency: patent  Anesthetic complications: No anesthetic complications  PONV Status: none  Cardiovascular status: hemodynamically stable and acceptable  Respiratory status: nonlabored ventilation, acceptable, nasal cannula and oral airway  Hydration status: acceptable

## 2017-07-10 NOTE — CONSULTS
Hospital Medicine Consult    Date of Admission: 7/10/2017  Date of Consult: 07/10/17    Primary Care Physician: Shiva Capone MD  Referring Physician: Almas Miller MD    Chief complaint/Reason for consultation: medical management    Subjective     History of Present Illness  65 yo F with hx of HTN, HLD, anxiety, MOOKIE not on CPAP, osteoarthritis, and chronic pain presents for scheduled left total shoulder arthroplasty with Dr. Miller. Patient seen after surgery, doing well, ambulating slowly back from the restroom. Nerve catheter is helping with pain, has no complaints at this time. No SOA, chest pain, nausea, abdominal pain.    Review of Systems   Otherwise complete ROS is negative except as mentioned above.    Past Medical History:  has a past medical history of Anxiety; Atrophic vaginitis; Back problem; Chronic pain disorder; Extremity pain; Hypertension; Joint pain (2016); Low back pain; Lumbar stenosis; Lumbosacral disc disease; Menopause; Migraine headache; Mixed hyperlipidemia; Muscle spasm; Obesity due to excess calories; MOOKIE (obstructive sleep apnea); Osteoarthritis; PONV (postoperative nausea and vomiting); Spinal stenosis; and Wears glasses.    Past Surgical History:  has a past surgical history that includes Oophorectomy (Left); Hip surgery (Left); Cholecystectomy; Gastric Sleeve (2011); Colonoscopy; Laparoscopic gastric banding; and Stomach surgery.    Family History: family history includes Alzheimer's disease in her mother; Bone cancer in her mother; Melanoma in her father; Ovarian cancer (age of onset: 75) in her maternal aunt; Prostate cancer in her father. There is no history of Breast cancer.    Social History:  reports that she has never smoked. She has never used smokeless tobacco. She reports that she drinks alcohol. She reports that she does not use illicit drugs.    Medications: Scheduled Meds:  atorvastatin 10 mg Oral Daily   sennosides-docusate sodium 2 tablet Oral BID   vancomycin 1,500  mg Intravenous Once   vancomycin 15 mg/kg Intravenous Once   [START ON 7/11/2017] venlafaxine  mg Oral Daily With Breakfast     Continuous Infusions:  ropivacaine 6 mL/hr Last Rate: 6 mL/hr (07/10/17 1348)   sodium chloride 50 mL/hr      PRN Meds:.bisacodyl  •  docusate sodium  •  HYDROmorphone **AND** naloxone  •  magnesium hydroxide  •  ondansetron **OR** ondansetron  •  oxyCODONE-acetaminophen  •  tiZANidine  Allergies   Allergen Reactions   • Codeine Hives   • Hydrocodone-Acetaminophen Hives   • Sulfa Antibiotics Itching       Objective    Physical Exam:   Vital Signs have been reviewed  Physical Exam  Temp:  [97.2 °F (36.2 °C)-98.2 °F (36.8 °C)] 98.2 °F (36.8 °C)  Heart Rate:  [50-77] 50  Resp:  [14-20] 16  BP: (132-179)/(60-89) 145/84  Constitutional: no acute distress, awake, alert  Eyes: PERRLA, sclerae anicteric, no conjunctival injection  Neck: supple, no thyromegaly, trachea midline  Respiratory: Clear to auscultation bilaterally, nonlabored respirations   Cardiovascular: RRR, no murmurs, rubs, or gallops, palpable pedal pulses bilaterally  Gastrointestinal: Positive bowel sounds, soft, nontender, nondistended  Musculoskeletal: No bilateral ankle edema, no clubbing or cyanosis to bilateral lower extremities; LUE in sling  Psychiatric: oriented x 3, appropriate affect, cooperative  Neurologic: Strength symmetric in all extremities, Cranial Nerves grossly intact to confrontation       Results Reviewed:  I have personally reviewed current lab, radiology, and data and agree with results.                Principal Problem:    Primary localized osteoarthrosis of shoulder region  Active Problems:    Status post total left shoulder arthroplasty        Assessment/Plan   Assessment & Plan  67 yo F with hx of HTN, HLD, MOOKIE not on CPAP, osteoarthritis, and chronic pain presents for scheduled left total shoulder arthroplasty.     PLAN:  --Resume home meds.   --Pain management per Dr. Miller.  --PT/OT.  --Plan d/c  home in AM if doing well.     I discussed the patients findings and my recommendations with: patient,     Adriana Stringer MD  07/10/17  4:21 PM    Consults

## 2017-07-11 VITALS
BODY MASS INDEX: 34.27 KG/M2 | HEART RATE: 94 BPM | HEIGHT: 65 IN | OXYGEN SATURATION: 95 % | RESPIRATION RATE: 16 BRPM | WEIGHT: 205.69 LBS | DIASTOLIC BLOOD PRESSURE: 75 MMHG | SYSTOLIC BLOOD PRESSURE: 113 MMHG | TEMPERATURE: 98.6 F

## 2017-07-11 LAB
ANION GAP SERPL CALCULATED.3IONS-SCNC: 5 MMOL/L (ref 3–11)
BASOPHILS # BLD AUTO: 0.01 10*3/MM3 (ref 0–0.2)
BASOPHILS NFR BLD AUTO: 0.1 % (ref 0–1)
BUN BLD-MCNC: 16 MG/DL (ref 9–23)
BUN/CREAT SERPL: 20 (ref 7–25)
CALCIUM SPEC-SCNC: 8.9 MG/DL (ref 8.7–10.4)
CHLORIDE SERPL-SCNC: 110 MMOL/L (ref 99–109)
CO2 SERPL-SCNC: 25 MMOL/L (ref 20–31)
CREAT BLD-MCNC: 0.8 MG/DL (ref 0.6–1.3)
DEPRECATED RDW RBC AUTO: 47.5 FL (ref 37–54)
EOSINOPHIL # BLD AUTO: 0.01 10*3/MM3 (ref 0–0.3)
EOSINOPHIL NFR BLD AUTO: 0.1 % (ref 0–3)
ERYTHROCYTE [DISTWIDTH] IN BLOOD BY AUTOMATED COUNT: 13 % (ref 11.3–14.5)
GFR SERPL CREATININE-BSD FRML MDRD: 72 ML/MIN/1.73
GLUCOSE BLD-MCNC: 122 MG/DL (ref 70–100)
HCT VFR BLD AUTO: 36.2 % (ref 34.5–44)
HGB BLD-MCNC: 11.1 G/DL (ref 11.5–15.5)
IMM GRANULOCYTES # BLD: 0.03 10*3/MM3 (ref 0–0.03)
IMM GRANULOCYTES NFR BLD: 0.2 % (ref 0–0.6)
LYMPHOCYTES # BLD AUTO: 1.32 10*3/MM3 (ref 0.6–4.8)
LYMPHOCYTES NFR BLD AUTO: 10.1 % (ref 24–44)
MCH RBC QN AUTO: 30.2 PG (ref 27–31)
MCHC RBC AUTO-ENTMCNC: 30.7 G/DL (ref 32–36)
MCV RBC AUTO: 98.6 FL (ref 80–99)
MONOCYTES # BLD AUTO: 1.39 10*3/MM3 (ref 0–1)
MONOCYTES NFR BLD AUTO: 10.7 % (ref 0–12)
NEUTROPHILS # BLD AUTO: 10.29 10*3/MM3 (ref 1.5–8.3)
NEUTROPHILS NFR BLD AUTO: 78.8 % (ref 41–71)
PLATELET # BLD AUTO: 217 10*3/MM3 (ref 150–450)
PMV BLD AUTO: 10 FL (ref 6–12)
POTASSIUM BLD-SCNC: 4.1 MMOL/L (ref 3.5–5.5)
RBC # BLD AUTO: 3.67 10*6/MM3 (ref 3.89–5.14)
SODIUM BLD-SCNC: 140 MMOL/L (ref 132–146)
WBC NRBC COR # BLD: 13.05 10*3/MM3 (ref 3.5–10.8)

## 2017-07-11 PROCEDURE — 85025 COMPLETE CBC W/AUTO DIFF WBC: CPT | Performed by: ORTHOPAEDIC SURGERY

## 2017-07-11 PROCEDURE — 80048 BASIC METABOLIC PNL TOTAL CA: CPT | Performed by: ORTHOPAEDIC SURGERY

## 2017-07-11 PROCEDURE — 94799 UNLISTED PULMONARY SVC/PX: CPT

## 2017-07-11 PROCEDURE — 97166 OT EVAL MOD COMPLEX 45 MIN: CPT | Performed by: OCCUPATIONAL THERAPIST

## 2017-07-11 PROCEDURE — 97530 THERAPEUTIC ACTIVITIES: CPT | Performed by: OCCUPATIONAL THERAPIST

## 2017-07-11 PROCEDURE — 99239 HOSP IP/OBS DSCHRG MGMT >30: CPT | Performed by: NURSE PRACTITIONER

## 2017-07-11 RX ORDER — PSEUDOEPHEDRINE HCL 30 MG
100 TABLET ORAL 2 TIMES DAILY
Refills: 0
Start: 2017-07-11 | End: 2018-04-05

## 2017-07-11 RX ORDER — OXYCODONE AND ACETAMINOPHEN 10; 325 MG/1; MG/1
1 TABLET ORAL EVERY 4 HOURS PRN
Qty: 50 TABLET | Refills: 0 | Status: SHIPPED | OUTPATIENT
Start: 2017-07-11 | End: 2017-07-20

## 2017-07-11 RX ADMIN — OXYCODONE HYDROCHLORIDE AND ACETAMINOPHEN 1 TABLET: 10; 325 TABLET ORAL at 04:49

## 2017-07-11 RX ADMIN — TIZANIDINE 4 MG: 4 TABLET ORAL at 01:00

## 2017-07-11 RX ADMIN — OXYCODONE HYDROCHLORIDE AND ACETAMINOPHEN 1 TABLET: 10; 325 TABLET ORAL at 11:01

## 2017-07-11 RX ADMIN — SODIUM CHLORIDE 500 ML: 9 INJECTION, SOLUTION INTRAVENOUS at 09:10

## 2017-07-11 RX ADMIN — Medication 2 TABLET: at 09:08

## 2017-07-11 RX ADMIN — ATORVASTATIN CALCIUM 10 MG: 10 TABLET, FILM COATED ORAL at 09:08

## 2017-07-11 RX ADMIN — VENLAFAXINE HYDROCHLORIDE 150 MG: 75 CAPSULE, EXTENDED RELEASE ORAL at 09:08

## 2017-07-11 NOTE — DISCHARGE INSTRUCTIONS
YOU HAVE AN ON-Q PUMP AT 6 ML/HR FOR USE AT HOME.  IF YOU SHOULD HAVE ANY QUESTIONS REGARDING YOUR PUMP OR CATHETER, PLEASE CALL ANESTHESIA AT THE NUMBER LISTED ON YOUR BLUE WRIST BAND.

## 2017-07-11 NOTE — PLAN OF CARE
Problem: Patient Care Overview (Adult)  Goal: Plan of Care Review  Outcome: Outcome(s) achieved Date Met:  07/11/17 07/11/17 1126   Coping/Psychosocial Response Interventions   Plan Of Care Reviewed With spouse;patient   Outcome Evaluation   Outcome Summary/Follow up Plan Pt with deficits in self-care s/p TSA; plan to d/c home with family and assist. Family education completed & pt/family with appropriate teach back and understanding with donning/doffing sling, shoulder precautions and HEP.          Problem: Inpatient Occupational Therapy  Goal: Patient Education Goal LTG- OT  Outcome: Outcome(s) achieved Date Met:  07/11/17 07/11/17 1126   Patient Education OT LTG   Patient Education OT LTG, Time to Achieve 2 days   Patient Education OT LTG, Education Type HEP;precautions per surgeon;brace use/care;positioning;wilber/doff brace   Patient Education OT LTG, Education Understanding demonstrates adequately;verbalizes understanding   Patient Education OT LTG Outcome goal met

## 2017-07-11 NOTE — PROGRESS NOTES
ALAN Baez    Acute pain service Inpatient Progress Note    Patient Name: Laura Churchill  :  1950  MRN:  [id number]        Acute Pain  Service Inpatient Progress Note:    Analgesia:Fair  Pain Score:5/10  LOC: alert and awake  Resp Status: room air  Cardiac: VS stable  Side Effects:None  Catheter Site:clean and dressing intact  Cath type: peripheral nerve cath with ON Q  Infusion rate: 6ml/hr  Catheter Plan:Catheter to remain Insitu and Infusion rate changed to  Comments: 2 hour bolus, pt will turn down at 0900

## 2017-07-11 NOTE — PROGRESS NOTES
Orthopedic Daily Progress Note      CC: s/p left anatomic total shoulder arthroplasty    Pain well controlled  General: no fevers, chills  Abdomen: denies nausea, vomiting, or diarrhea    No other complaints    Physical Exam:  I have reviewed the vital signs.  Temp:  [96.9 °F (36.1 °C)-98.2 °F (36.8 °C)] 97.3 °F (36.3 °C)  Heart Rate:  [] 94  Resp:  [14-20] 16  BP: ()/(60-89) 95/61    Objective  General Appearance:    Alert, cooperative, no distress  Extremities: No clubbing, cyanosis, or edema to lower extremities, distal motor function preserved despite nerve block / on-Q pump  Pulses:  2+ in distal surgical extremity  Skin: Dressing Clean/dry/intact      Results Review:    I have reviewed the labs, radiology results and diagnostic studies: AF, mildly hypotensive, no tachycardia      Results from last 7 days  Lab Units 07/11/17  0538   WBC 10*3/mm3 13.05*   HEMOGLOBIN g/dL 11.1*   PLATELETS 10*3/mm3 217       Results from last 7 days  Lab Units 07/11/17  0538   SODIUM mmol/L 140   POTASSIUM mmol/L 4.1   CO2 mmol/L 25.0   CREATININE mg/dL 0.80   GLUCOSE mg/dL 122*       I have reviewed the medications.    Assessment/Problem List  POD# 1 S/p left anatomic total shoulder arthroplasty    Plan  Continue PT today.  Anticipate discharge home today pending progress.      Discharge Planning: I expect patient to be discharged to 1 in 2 days.    Louis Page MD  07/11/17  8:08 AM

## 2017-07-11 NOTE — THERAPY DISCHARGE NOTE
Acute Care - Occupational Therapy Initial Eval/Discharge  [facility]     Patient Name: Laura Churchill  : 1950  MRN: [id number]  Today's Date: 2017  Onset of Illness/Injury or Date of Surgery Date: 07/10/17  Date of Referral to OT: 17  Referring Physician: MD Paul      Admit Date: 7/10/2017       ICD-10-CM ICD-9-CM   1. Impaired mobility and ADLs Z74.09 799.89     Patient Active Problem List   Diagnosis   • Gastroesophageal reflux disease   • Essential hypertension   • Hyperlipidemia   • Obstructive sleep apnea syndrome   • Osteoarthritis of knee   • Osteoporosis   • Lumbar spondylosis without myelopathy/Lumbar facet arthropathy   • Displacement of lumbar intervertebral disc   • Stenosis of lateral recess of lumbar spine   • Physical deconditioning   • Morbid obesity due to excess calories   • Anxiety and depression   • Sacroiliac joint dysfunction of right side   • Greater trochanteric bursitis of right hip   • Osteoarthritis, localized, shoulder   • Rotator cuff tendonitis   • Iliotibial band syndrome of right side   • Spinal stenosis   • Osteoarthritis   • Obesity due to excess calories   • Migraine headache   • Menopause   • Mixed hyperlipidemia   • Lumbosacral disc disease   • Low back pain   • Joint pain   • Extremity pain   • Chronic pain disorder   • Back problem   • Vaginal atrophy   • Primary localized osteoarthrosis of shoulder region   • Status post total left shoulder arthroplasty     Past Medical History:   Diagnosis Date   • Anxiety    • Atrophic vaginitis    • Back problem    • Chronic pain disorder    • Extremity pain    • Hypertension    • Joint pain 2016    lt shoulder   • Low back pain    • Lumbar stenosis     L4-5    • Lumbosacral disc disease    • Menopause    • Migraine headache    • Mixed hyperlipidemia    • Muscle spasm    • Obesity due to excess calories    • MOOKIE (obstructive sleep apnea)     NO CPAP USE   • Osteoarthritis    • PONV (postoperative nausea and  vomiting)    • Spinal stenosis    • Wears glasses      Past Surgical History:   Procedure Laterality Date   • CHOLECYSTECTOMY     • COLONOSCOPY     • GASTRIC SLEEVE LAPAROSCOPIC  2011   • HIP SURGERY Left     3 pins   • LAPAROSCOPIC GASTRIC BANDING     • OOPHORECTOMY Left     one ovary removed age 35   • DC RECONSTR TOTAL SHOULDER IMPLANT Left 7/10/2017    Procedure: LEFT TOTAL SHOULDER ARTHROPLASTY ;  Surgeon: Almas Miller MD;  Location: [facility];  Service: Orthopedics   • STOMACH SURGERY      REMOVED LAP BAND          OT ASSESSMENT FLOWSHEET (last 72 hours)      OT Evaluation       07/11/17 1522 07/11/17 1126 07/11/17 1104 07/11/17 1056 07/10/17 1547    Rehab Evaluation    Document Type  evaluation;discharge summary;therapy note (daily note)  -ST       Subjective Information  no complaints;agree to therapy  -ST       Patient Effort, Rehab Treatment  good  -ST       Symptoms Noted During/After Treatment  increased pain  -ST       Symptoms Noted Comment  RN aware; pre-medicated   OT required to return at 1126 d/t low BP (RN gave bolus)  -ST       General Information    Patient Profile Review  yes  -ST       Onset of Illness/Injury or Date of Surgery Date  07/10/17  -ST       Referring Physician  MD Paul  -ST       General Observations  supine upon arrival; nerve cath intact; sling L UE  -ST       Pertinent History Of Current Problem  pt presents for sx management of L shoulder pain and dysfunction impacting ADL performance; s/p L TSA  -ST       Precautions/Limitations  other (see comments)   sling; shoulder ROM precautions, interscalene cath  -ST       Prior Level of Function  independent:;all household mobility;community mobility;feeding;grooming;min assist:;dressing;bathing   difficulty with UBD/UBB d/t pain/decreased ROM  -ST       Equipment Currently Used at Home  none  -ST none  -RS      Plans/Goals Discussed With  patient and family;agreed upon  -ST       Risks Reviewed  patient and  family:;LOB;nausea/vomiting;dizziness;increased discomfort;change in vital signs  -ST       Benefits Reviewed  patient and family:;improve function;increase independence;increase strength;increase balance;decrease pain;increase knowledge  -ST       Barriers to Rehab  previous functional deficit  -ST       Living Environment    Lives With  spouse  -ST spouse  -RS      Living Arrangements  house  -ST house  -RS      Home Accessibility  bed and bath are not on the first floor;tub/shower is not walk in  -ST bed and bath on same level;stairs to enter home  -RS      Number of Stairs to Enter Home  2  -ST 2  -RS      Living Environment Comment  someone available 24/7 to assist   -ST       Clinical Impression    Date of Referral to OT  07/11/17  -       OT Diagnosis  impaired ADLs, OT to complete: PROM FE no limits, ER to 30, ER to chest, AROM scapular retractions, hand, wrist, elbow  -ST       Prognosis  good  -ST       Patient/Family Goals Statement  return home, decrease pain  -ST       Criteria for Skilled Therapeutic Interventions Met  yes  -ST       Rehab Potential  good, to achieve stated therapy goals  -ST       Therapy Frequency  evaluation only  -ST       Anticipated Equipment Needs At Discharge  --   TBD  -ST       Anticipated Discharge Disposition home with assist  -ST home with assist  -ST       Functional Level Prior    Ambulation    0-->independent  -RS 0-->independent  -MK    Transferring    0-->independent  -RS 0-->independent  -MK    Toileting    0-->independent  -RS 0-->independent  -MK    Bathing    0-->independent  -RS 0-->independent  -MK    Dressing    0-->independent  -RS 0-->independent  -MK    Eating    0-->independent  -RS 0-->independent  -MK    Communication    0-->understands/communicates without difficulty  -RS 0-->understands/communicates without difficulty  -MK    Swallowing    0-->swallows foods/liquids without difficulty  -RS 0-->swallows foods/liquids without difficulty  -MK    Pain  Assessment    Pain Assessment  0-10  -ST       Pain Score  7  -ST       Post Pain Score  9  -ST       Pain Type  Acute pain  -ST       Pain Location  Shoulder  -ST       Pain Orientation  Left  -ST       Pain Intervention(s)  --   pre-med; nerve cath  -       Vision Assessment/Intervention    Visual Impairment  WFL with corrective lenses  -       Cognitive Assessment/Intervention    Current Cognitive/Communication Assessment  functional  -ST       Orientation Status  oriented x 4  -ST       Follows Commands/Answers Questions  100% of the time  -       Personal Safety  WNL/WFL  -       Personal Safety Interventions  fall prevention program maintained;gait belt;nonskid shoes/slippers when out of bed  -ST       ROM (Range of Motion)    General ROM Detail  R shoulder WFL; L UE n/t d/t sx precautions  -ST       MMT (Manual Muscle Testing)    General MMT Assessment Detail  R shoulder WFL; LUE n/t d/t sx precautions  -ST       Bed Mobility, Assessment/Treatment    Bed Mob, Supine to Sit, [place]  minimum assist (75% patient effort)  -       Transfer Assessment/Treatment    Transfers, Sit-Stand [place]  contact guard assist  -ST       Transfers, Stand-Sit [place]  contact guard assist  -       Toilet Transfer, [place]  contact guard assist  -       Functional Mobility    Functional Mobility- Ind. Level  supervision required  -       Functional Mobility-Distance (Feet)  12  -ST       Functional Mobility- Comment  from bed to bathroom/commode and back to bed   -       Upper Body Bathing Assessment/Training    UB Bathing Assess/Train, Comment  educated on axillary care   -       Upper Body Dressing Assessment/Training    UB Dressing Assess/Train, Clothing Type  doffing:;donning:;hospital gown;pull over;t-shirt   shirt with buttons down 1/2; zipper front duster style gown  -       UB Dressing Assess/Train, Position  sitting  -ST       UB Dressing Assess/Train, [place]  moderate  assist (50% patient effort)  -ST       UB Dressing Assess/Train, Impairments  --   shoulder ROM precautions  -ST       UB Dressing Assess/Train, Comment  OT completed donning/doffing sling trial 1st then pt's  for carry over at home-thalia's good understanding with adjustment and fit; pt's  assisted OT with donning pt's shirt for education  -ST       Toileting Assessment/Training    Toileting Assess/Train, Position  sitting  -       Toileting Assess/Train, Indepen Level  supervision required  -       Grooming Assessment/Training    Grooming Assess/Train, Comment  SUA for hand hygiene SS   -ST       Therapy Exercises    Left Upper Extremity  --   AROM scapular retractions, digit, wrist, elbow F/E, PROM....  -ST       LUE Resistance  --   IR to chest, ER to 30 and FE to ~110 d/t pain...  -ST       Bilateral Upper Extremity  --   OT completed 1 set, pt's  completed 2nd set for carry  -ST       BUE Resistance  --   over at home; thalia's good understanding and technique   -       Sensory Assessment/Intervention    Light Touch  LUE  -ST       LUE Light Touch  mild impairment   d/t nerve cath  -ST       Positioning and Restraints    Pre-Treatment Position  in bed  -ST       Post Treatment Position  bed  -ST       In Bed  notified nsg;supine;call light within reach;encouraged to call for assist;with family/caregiver   sx arm in sling and supported by pillow  -ST         07/10/17 0859                General Information    Equipment Currently Used at Home none  -MY        Living Environment    Lives With spouse  -MY        Living Arrangements house  -MY        Home Accessibility no concerns  -MY        Number of Stairs to Enter Home 2  -MY        Type of Financial/Environmental Concern none  -MY        Transportation Available car;family or friend will provide  -MY        Functional Level Prior    Ambulation 0-->independent  -MY        Transferring 0-->independent  -MY        Toileting 0-->independent   -MY        Bathing 0-->independent  -MY        Dressing 0-->independent  -MY        Eating 0-->independent  -MY        Communication 0-->understands/communicates without difficulty  -MY        Swallowing 0-->swallows foods/liquids without difficulty  -MY          User Key  (r) = Recorded By, (t) = Taken By, (c) = Cosigned By    Initials Name Effective Dates    ST Denita Martinez, OTR 02/20/17 -     RS Joie Wang V 05/02/16 -     MY Shira Pickering, BOB 06/16/16 -     ALEX Mott, BOB 06/16/16 -           Occupational Therapy Education     Title: PT OT SLP Therapies (Done)     Topic: Occupational Therapy (Done)     Point: ADL training (Done)    Description: Instruct learner(s) on proper safety adaptation and remediation techniques during self care or transfers.   Instruct in proper use of assistive devices.    Learning Progress Summary    Learner Readiness Method Response Comment Documented by Status   Patient Acceptance E,VITALY RIVERA,H RIDGE ENRIQUE role of OT, HEP per MD protocol, shoulder precautions, axillary care, sling wear schedule, donning/doffing, adjusting, activity mods ST 07/11/17 1518 Done   Family Acceptance EMIGUEL DH RIDGE ENRIQUE role of OT, HEP per MD protocol, shoulder precautions, axillary care, sling wear schedule, donning/doffing, adjusting, activity mods ST 07/11/17 1518 Done               Point: Home exercise program (Done)    Description: Instruct learner(s) on appropriate technique for monitoring, assisting and/or progressing therapeutic exercises/activities.    Learning Progress Summary    Learner Readiness Method Response Comment Documented by Status   Patient Acceptance E,TBVITALY,H RIDGE ENRIQUE role of OT, HEP per MD protocol, shoulder precautions, axillary care, sling wear schedule, donning/doffing, adjusting, activity mods ST 07/11/17 1518 Done   Family Acceptance E,VITALY RIVERA,H KLAUSDU role of OT, HEP per MD protocol, shoulder precautions, axillary care, sling wear schedule, donning/doffing, adjusting, activity  mods ST 07/11/17 1518 Done               Point: Precautions (Done)    Description: Instruct learner(s) on prescribed precautions during self-care and functional transfers.    Learning Progress Summary    Learner Readiness Method Response Comment Documented by Status   Patient Acceptance E,TB,D,H VU,DU role of OT, HEP per MD protocol, shoulder precautions, axillary care, sling wear schedule, donning/doffing, adjusting, activity mods ST 07/11/17 1518 Done   Family Acceptance E,TB,D,H VU,DU role of OT, HEP per MD protocol, shoulder precautions, axillary care, sling wear schedule, donning/doffing, adjusting, activity mods ST 07/11/17 1518 Done               Point: Body mechanics (Done)    Description: Instruct learner(s) on proper positioning and spine alignment during self-care, functional mobility activities and/or exercises.    Learning Progress Summary    Learner Readiness Method Response Comment Documented by Status   Patient Acceptance E,TB,D,H VU,DU role of OT, HEP per MD protocol, shoulder precautions, axillary care, sling wear schedule, donning/doffing, adjusting, activity mods ST 07/11/17 1518 Done   Family Acceptance E,TB,D,H VU,DU role of OT, HEP per MD protocol, shoulder precautions, axillary care, sling wear schedule, donning/doffing, adjusting, activity mods ST 07/11/17 1518 Done                      User Key     Initials Effective Dates Name Provider Type Discipline    ST 02/20/17 -  Denita Martinez OTR Occupational Therapist OT                OT Recommendation and Plan  Anticipated Equipment Needs At Discharge:  (TBD)  Anticipated Discharge Disposition: home with assist  Therapy Frequency: evaluation only  Plan of Care Review  Plan Of Care Reviewed With: spouse, patient  Outcome Summary/Follow up Plan: Pt with deficits in self-care s/p TSA; plan to d/c home with family and assist. Family education completed & pt/family with appropriate teach back and understanding with donning/doffing sling, shoulder  precautions and HEP.            OT Goals       07/11/17 1126          Patient Education OT LTG    Patient Education OT LTG, Time to Achieve 2 days  -      Patient Education OT LTG, Education Type HEP;precautions per surgeon;brace use/care;positioning;wilber/doff brace  -      Patient Education OT LTG, Education Understanding demonstrates adequately;verbalizes understanding  -      Patient Education OT LTG Outcome goal met  -ST        User Key  (r) = Recorded By, (t) = Taken By, (c) = Cosigned By    Initials Name Provider Type    RENALDO Pina Occupational Therapist                Outcome Measures       07/11/17 1126          How much help from another is currently needed...    Putting on and taking off regular lower body clothing? 2  -ST      Bathing (including washing, rinsing, and drying) 3  -ST      Toileting (which includes using toilet bed pan or urinal) 3  -ST      Putting on and taking off regular upper body clothing 2  -ST      Taking care of personal grooming (such as brushing teeth) 3  -ST      Eating meals 3  -ST      Score 16  -ST      Functional Assessment    Outcome Measure Options AM-PAC 6 Clicks Daily Activity (OT)  -        User Key  (r) = Recorded By, (t) = Taken By, (c) = Cosigned By    Initials Name Provider Type    RENALDO Pina Occupational Therapist          Time Calculation:         Time Calculation- OT       07/11/17 1522          Time Calculation- OT    OT Start Time 1126  -ST      Total Timed Code Minutes- OT 35 minute(s)  -      OT Received On 07/11/17  -        User Key  (r) = Recorded By, (t) = Taken By, (c) = Cosigned By    Initials Name Provider Type    RENALDO Pina Occupational Therapist          Therapy Charges for Today     Code Description Service Date Service Provider Modifiers Qty    [id number]  OT THERAPEUTIC ACT EA 15 MIN 7/11/2017 RENALDO Wise GO 2    [id number]  OT EVAL MOD COMPLEXITY 3 7/11/2017 Denita Martinez  OTR GO 1               OT Discharge Summary  Anticipated Discharge Disposition: home with assist  Reason for Discharge: Discharge from facility  Outcomes Achieved: Refer to plan of care for updates on goals achieved  Discharge Destination: Home with assist    Denita Martinez OTR  7/11/2017

## 2017-07-11 NOTE — PROGRESS NOTES
Discharge Planning Assessment  [facility]     Patient Name: Laura Churchill  MRN: [id number]  Today's Date: 7/11/2017    Admit Date: 7/10/2017          Discharge Needs Assessment       07/11/17 1104    Living Environment    Lives With spouse    Living Arrangements house    Home Accessibility bed and bath on same level;stairs to enter home    Number of Stairs to Enter Home 2    Living Environment    Quality Of Family Relationships supportive    Able to Return to Prior Living Arrangements yes    Living Arrangement Comments Ms. Churchill lives with her  in a one story home in [facility].  She stated that her  can assist her at home as needed.    Discharge Needs Assessment    Readmission Within The Last 30 Days no previous admission in last 30 days    Equipment Currently Used at Home none    Equipment Needed After Discharge commode    Discharge Disposition home or self-care    Discharge Contact Information if Applicable  [place], yeimi [phone]            Discharge Plan       07/11/17 1108    Case Management/Social Work Plan    Plan Home    Patient/Family In Agreement With Plan yes    Additional Comments Met with Ms. Churchill at the bedside to discuss discharge planning.  Ms. Churchill's  will be assisting her at home after discharge.  Ms. Churchill requested a bedside commode for home and had no preference for provider.  [facility] and they will be delivering the bsw to the hospital room prior to discharge.  No home health in the home.  Discharge plan is home with family assistance.  Ms. Churchill's  will be transporting.  CM will continue to follow.        Discharge Placement     No information found        Expected Discharge Date and Time     Expected Discharge Date Expected Discharge Time    Jul 11, 2017               Demographic Summary       07/11/17 1054    Referral Information    Admission Type inpatient    Arrived From home or self-care    Reason For Consult discharge  planning    Record Reviewed clinical discipline documentation;history and physical;medical record    Contact Information    Permission Granted to Share Information With     Primary Care Physician Information    Name Juvencio Liborio PICHARDO            Functional Status       07/11/17 1056    Functional Status Prior    Ambulation 0-->independent    Transferring 0-->independent    Toileting 0-->independent    Bathing 0-->independent    Dressing 0-->independent    Eating 0-->independent    Communication 0-->understands/communicates without difficulty    Swallowing 0-->swallows foods/liquids without difficulty    IADL    Medications independent    Meal Preparation independent    Housekeeping independent    Laundry independent    Shopping independent    Oral Care independent    Activity Tolerance    Usual Activity Tolerance good    Cognitive/Perceptual/Developmental    Current Mental Status/Cognitive Functioning no deficits noted    Recent Changes in Mental Status/Cognitive Functioning no changes    Employment/Financial    Employment/Finance Comments Mr. Churchill has prescription drug coverage with [facility].  Verified insurance with patient.  She fills scripts at [facility] or [facility]            Psychosocial     None            Abuse/Neglect     None            Legal       07/11/17 1104    Legal    Legal Comments No advance directives.            Substance Abuse       07/11/17 1104    Substance Use, Patient    Substance Use current alcohol use    Reported Level Of Alcohol Use social    Problems Related to Alcohol Use no            Patient Forms     None          Joie Wang

## 2017-07-11 NOTE — DISCHARGE SUMMARY
[facility] Medicine Services  DISCHARGE SUMMARY       Date of Admission: 7/10/2017  Date of Discharge:  7/11/2017  Primary Care Physician: Shiva Capone MD  Consulting Physician(s)     Provider Relationship    Jimy Fuentes MD Consulting Physician          Discharge Diagnoses:  Active Hospital Problems (** Indicates Principal Problem)    Diagnosis Date Noted   • **Primary localized osteoarthrosis of shoulder region [M19.019] 07/10/2017   • Status post total left shoulder arthroplasty [Z96.619] 07/10/2017      Resolved Hospital Problems    Diagnosis Date Noted Date Resolved   No resolved problems to display.       Presenting Problem/History of Present Illness  Primary localized osteoarthrosis of shoulder region, left [M19.012]     Chief Complaint on Day of Discharge: left shoulder pain    History of Present Illness on Day of Discharge:   Patient is sitting up in bed without any new complaints or issues.   at bedside.  Pain well controlled with nerve catheter and PRN pain medications.  BP 88/59 this morning and patient was asymptomatic.  She received 500 ml NS bolus and /75.  Patient feels ready to go home after she works with PT.  Requesting a bedside commode for home.    Hospital Course  Patient is a 66 y.o. female with past medical history of hypertension, hyperlipidemia, anxiety, MOOKIE not on CPAP, osteoarthritis, and chronic pain.  She presented to [facility] for scheduled left total shoulder arthroplasty with Dr. Miller on 7/10/17.  Patient tolerated procedure well without any issues.  Pain is well-controlled with nerve catheter and when necessary pain medication.  Patient has been seen by Dr. Miller today and he has cleared her for discharge home.  Patient is ambulating in room without difficulties.  She will be discharged home today after being seen by physical therapy.  She will go home with nerve catheter and a prescription for Percocet.  Patient instructed to wear  "sling at all times except when showering.  She is able to shower after nerve catheter has been removed.  Discharge plans and instructions were reviewed with patient and  and they both verbalize understanding.  Patient will follow-up with Dr. Miller in 1 week.    Procedures Performed  Procedure(s):  LEFT TOTAL SHOULDER ARTHROPLASTY        Consults:   Consults     Date and Time Order Name Status Description    7/10/2017 1529 Inpatient Consult to Hospitalist Completed         Pertinent Test Results:    Results from last 7 days  Lab Units 07/11/17  0538   WBC 10*3/mm3 13.05*   HEMOGLOBIN g/dL 11.1*   HEMATOCRIT % 36.2   PLATELETS 10*3/mm3 217       Results from last 7 days  Lab Units 07/11/17  0538   SODIUM mmol/L 140   POTASSIUM mmol/L 4.1   CHLORIDE mmol/L 110*   CO2 mmol/L 25.0   BUN mg/dL 16   CREATININE mg/dL 0.80   GLUCOSE mg/dL 122*   CALCIUM mg/dL 8.9     Imaging Results (last 72 hours)     Procedure Component Value Units Date/Time    XR Shoulder 2+ View Left [[id number]] Updated:  07/10/17 1437        Condition on Discharge:  stable    Physical Exam on Discharge:/75 Comment: nurse notified  Pulse 94  Temp 98.6 °F (37 °C) (Temporal Artery )   Resp 16  Ht 65\" (165.1 cm)  Wt 205 lb 11 oz (93.3 kg)  SpO2 95%  BMI 34.23 kg/m2  Physical Exam  Gen-no acute distress  CV-RRR, S1 S2 normal, no m/r/g  Resp-CTAB, no wheezes  Abd-soft, NT, ND, +BS  Ext-no edema, left arm in sling and nerve catheter in place  Skin-Dressing on left shoulder c/d/i  Neuro-A&Ox3, no focal deficits  Psych-appropriate mood    Discharge Disposition  Home or Self Care    Discharge Medications   Laura Churchill   Home Medication Instructions TREVOR:[id number]    Printed on:07/11/17 6564   Medication Information                      aspirin 81 MG EC tablet  Take 81 mg by mouth daily.             atorvastatin (LIPITOR) 10 MG tablet  TAKE 1 TABLET DAILY             docusate sodium 100 MG capsule  Take 100 mg by mouth 2 (Two) Times a " Day.             oxyCODONE-acetaminophen (PERCOCET)  MG per tablet  Take 1 tablet by mouth Every 4 (Four) Hours As Needed (pain) for up to 9 days.             tiZANidine (ZANAFLEX) 4 MG tablet  TAKE 1 TABLET DAILY AS NEEDED FOR MUSCLE SPASMS             venlafaxine XR (EFFEXOR-XR) 150 MG 24 hr capsule  TAKE 1 CAPSULE DAILY                 Discharge Diet:   Diet Instructions     Diet: Regular; Thin Liquids, No Restrictions       Discharge Diet:  Regular   Fluid Consistency:  Thin Liquids, No Restrictions               REGULAR DIET                  Discharge Care Plan / Instructions:    Activity at Discharge:   Activity Instructions     Activity as Tolerated               Additional Activity Instructions:      YOU SHOULD WEAR SLING AT ALL TIMES EXCEPT WHEN SHOWERING                  Follow-up Appointments  Future Appointments  Date Time Provider Department Center   4/5/2018 10:30 AM Glenn Clayton MD [facility] None     Additional Instructions for the Follow-ups that You Need to Schedule     Discharge Follow-Up With Specified Provider    As directed    To:  Dr. Miller   Follow Up Details:  7/18/17       Discharge Follow-up with PCP    As directed    Follow Up Details:  within 1 week for hospital follow up       Discharge Follow-up with Specified Provider    As directed    Follow Up:  1 Week   Has the patient’s follow-up appointment been scheduled and documented in the discharge navigator?:  Yes, documented in the appointment section                 Test Results Pending at Discharge       REGINO Cline 07/11/17 9:43 AM    Time: Discharge 35 min    Please note that portions of this note may have been completed with a voice recognition program. Efforts were made to edit the dictations, but occasionally words are mistranscribed.

## 2017-07-12 NOTE — PROGRESS NOTES
[place]    Nerve Cath Post Op Call    Patient Name: Laura Churchill  :  1950  MRN:  [id number]  Date of Discharge: 2017    Treatment Plan      Patient called no voicemail available, will continue to follow up

## 2017-07-13 ENCOUNTER — TRANSITIONAL CARE MANAGEMENT TELEPHONE ENCOUNTER (OUTPATIENT)
Dept: FAMILY MEDICINE CLINIC | Facility: CLINIC | Age: 67
End: 2017-07-13

## 2017-07-13 ENCOUNTER — APPOINTMENT (OUTPATIENT)
Dept: MAMMOGRAPHY | Facility: HOSPITAL | Age: 67
End: 2017-07-13
Attending: OBSTETRICS & GYNECOLOGY

## 2017-07-13 NOTE — PROGRESS NOTES
ALAN [facility]ae[facility]    Nerve Cath Post Op Call    Patient Name: Laura Churchill  :  1950  MRN:  [id number]  Date of Discharge: 2017    Nerve Cath Post Op Call:    Catheter Plan:Patient called/No answer/Message left to call [facility] pain service for any questions or complaints

## 2017-07-14 PROBLEM — F32.A DEPRESSION: Status: ACTIVE | Noted: 2017-07-14

## 2017-07-14 PROBLEM — M54.30 SCIATICA: Status: ACTIVE | Noted: 2017-07-14

## 2017-07-14 PROBLEM — F41.9 ANXIETY: Status: ACTIVE | Noted: 2017-07-14

## 2017-07-14 NOTE — PROGRESS NOTES
AL[facility] Bae[facility]    Nerve Cath Post Op Call    Patient Name: Laura Churchill  :  1950  MRN:  [id number]  Date of Discharge: 2017    Nerve Cath Post Op Call:    Catheter Plan:Patient/Family member report nerve catheter previously discontinued, tip intact

## 2017-07-17 ENCOUNTER — OFFICE VISIT (OUTPATIENT)
Dept: FAMILY MEDICINE CLINIC | Facility: CLINIC | Age: 67
End: 2017-07-17

## 2017-07-17 VITALS
OXYGEN SATURATION: 96 % | SYSTOLIC BLOOD PRESSURE: 128 MMHG | HEIGHT: 65 IN | BODY MASS INDEX: 33.82 KG/M2 | HEART RATE: 86 BPM | DIASTOLIC BLOOD PRESSURE: 78 MMHG | WEIGHT: 203 LBS | TEMPERATURE: 99.1 F

## 2017-07-17 DIAGNOSIS — Z96.612 STATUS POST TOTAL SHOULDER ARTHROPLASTY, LEFT: Primary | ICD-10-CM

## 2017-07-17 PROCEDURE — 99496 TRANSJ CARE MGMT HIGH F2F 7D: CPT | Performed by: FAMILY MEDICINE

## 2017-07-17 NOTE — PROGRESS NOTES
Transitional Care Follow Up Visit  Subjective     Laura Churchill is a 66 y.o. female who presents for a transitional care management visit.    Within 48 business hours after discharge our office contacted her via telephone to coordinate her care and needs.      I reviewed and discussed the details of that call along with the discharge summary, hospital problems, inpatient lab results, inpatient diagnostic studies, and consultation reports with Laura.    Date of TCM Phone Call 7/13/2017   [facility]   Date of Admission 7/10/2017   Date of Discharge 7/11/2017   Discharge Disposition Home or Self Care       History of Present Illness   Course During Hospital Stay:   Patient is a 66 y.o. female with past medical history of hypertension, hyperlipidemia, anxiety, MOOKIE not on CPAP, osteoarthritis, and chronic pain. She presented to [facility] for scheduled left total shoulder arthroplasty with Dr. Miller on 7/10/17. Patient tolerated procedure well without any issues. Pain is well-controlled with nerve catheter and when necessary pain medication. Patient has been seen by Dr. Miller today and he has cleared her for discharge home. Patient is ambulating in room without difficulties. She will be discharged home today after being seen by physical therapy. She will go home with nerve catheter and a prescription for Percocet. Patient instructed to wear sling at all times except when showering. She is able to shower after nerve catheter has been removed. Discharge plans and instructions were reviewed with patient and  and they both verbalize understanding. Patient will follow-up with Dr. Miller in 1 week.   The following portions of the patient's history were reviewed and updated as appropriate: allergies, current medications, past family history, past medical history, past social history, past surgical history and problem list.    Review of Systems    Objective   Physical Exam   Constitutional: She appears  well-developed and well-nourished.   Cardiovascular: Normal rate, regular rhythm and normal heart sounds.    Pulmonary/Chest: Effort normal and breath sounds normal.   Musculoskeletal:   Examination of the left upper extremity reveals no edema.  Distal pulses are intact.  She has normal movement and sensation of the left hand.   Nursing note and vitals reviewed.      Assessment/Plan   Problems Addressed this Visit        Other    Status post total left shoulder arthroplasty - Primary                Drink plenty fluids.    Continue medications as doing.    See Dr Miller tomorrow as scheduled.She is advised to discuss with him her current analgesic.  Oxycodone is causing her to feel some nausea and disorientation.  I recommended cutting the current tablet in half until she sees him about this.    Follow up as needed.                  Scribed for Dr Shiva Capone by Sol Mcmahon CMA.            I, Shiva Capone MD, personally performed the services described in this documentation, as scribed by Sol Mcmahon in my presence, and is both accurate and complete.

## 2017-07-27 DIAGNOSIS — E78.5 HYPERLIPIDEMIA: ICD-10-CM

## 2017-07-27 RX ORDER — ATORVASTATIN CALCIUM 10 MG/1
TABLET, FILM COATED ORAL
Qty: 90 TABLET | Refills: 1 | Status: SHIPPED | OUTPATIENT
Start: 2017-07-27 | End: 2018-01-23 | Stop reason: SDUPTHER

## 2017-09-05 ENCOUNTER — OFFICE VISIT (OUTPATIENT)
Dept: FAMILY MEDICINE CLINIC | Facility: CLINIC | Age: 67
End: 2017-09-05

## 2017-09-05 VITALS
OXYGEN SATURATION: 97 % | BODY MASS INDEX: 33.99 KG/M2 | SYSTOLIC BLOOD PRESSURE: 129 MMHG | WEIGHT: 204 LBS | HEIGHT: 65 IN | DIASTOLIC BLOOD PRESSURE: 80 MMHG | TEMPERATURE: 98.1 F | HEART RATE: 74 BPM

## 2017-09-05 DIAGNOSIS — L23.7 POISON IVY DERMATITIS: Primary | ICD-10-CM

## 2017-09-05 PROBLEM — F32.A DEPRESSION: Status: RESOLVED | Noted: 2017-07-14 | Resolved: 2017-09-05

## 2017-09-05 PROBLEM — F41.9 ANXIETY: Status: RESOLVED | Noted: 2017-07-14 | Resolved: 2017-09-05

## 2017-09-05 PROCEDURE — 99213 OFFICE O/P EST LOW 20 MIN: CPT | Performed by: NURSE PRACTITIONER

## 2017-09-05 RX ORDER — PREDNISONE 10 MG/1
TABLET ORAL
Qty: 33 TABLET | Refills: 0 | Status: SHIPPED | OUTPATIENT
Start: 2017-09-05 | End: 2017-11-13

## 2017-09-05 RX ORDER — METHYLPREDNISOLONE 4 MG/1
TABLET ORAL
COMMUNITY
Start: 2017-09-01 | End: 2017-11-13

## 2017-09-05 RX ORDER — HYDROXYZINE HYDROCHLORIDE 25 MG/1
25 TABLET, FILM COATED ORAL 3 TIMES DAILY PRN
Qty: 60 TABLET | Refills: 0 | Status: SHIPPED | OUTPATIENT
Start: 2017-09-05 | End: 2017-11-13

## 2017-09-05 NOTE — PROGRESS NOTES
Subjective   Laura Churchill is a 66 y.o. female.     History of Present Illness Patient is here with resistant poison ivy. Initially began to itch last week. Seen at her sister's office who is a pediatrician and diagnosed with poison ivy. Treated with cortisone shot and medrol dose pack. This has not helped. She has used multiple OTC meds without relief. She is wearing gloves to keep from itching. Her  does not have any lesions.  She does have 3 dogs and 1 cat. No other symptoms.    The following portions of the patient's history were reviewed and updated as appropriate: allergies, current medications, past family history, past medical history, past social history, past surgical history and problem list.    Review of Systems   Constitutional: Negative for fatigue, fever and unexpected weight change.   HENT: Negative for congestion, hearing loss, nosebleeds, rhinorrhea, sore throat, trouble swallowing and voice change.    Eyes: Negative for pain, discharge, redness and visual disturbance.   Respiratory: Negative for cough, chest tightness, shortness of breath and wheezing.    Cardiovascular: Negative for chest pain, palpitations and leg swelling.   Gastrointestinal: Negative for abdominal distention, abdominal pain, anal bleeding, blood in stool, constipation, diarrhea, nausea and vomiting.   Endocrine: Negative for cold intolerance, heat intolerance, polydipsia, polyphagia and polyuria.   Genitourinary: Negative for dysuria, flank pain, frequency and hematuria.   Musculoskeletal: Negative for arthralgias, gait problem, joint swelling and myalgias.   Skin: Negative for color change and rash.   Neurological: Negative for dizziness, tremors, seizures, syncope, speech difficulty, weakness, numbness and headaches.   Hematological: Negative.    Psychiatric/Behavioral: Negative.        Objective   Physical Exam   Constitutional: She is oriented to person, place, and time. She appears well-developed and  well-nourished. No distress.   HENT:   Head: Normocephalic and atraumatic.   Right Ear: External ear normal.   Left Ear: External ear normal.   Nose: Nose normal.   Eyes: Conjunctivae are normal. Pupils are equal, round, and reactive to light. Right eye exhibits no discharge. Left eye exhibits no discharge. No scleral icterus.   Neck: Neck supple.   Cardiovascular: Normal rate and regular rhythm.    Pulmonary/Chest: Effort normal.   Musculoskeletal: She exhibits no edema or deformity.   Neurological: She is alert and oriented to person, place, and time. She exhibits normal muscle tone. Coordination normal.   Skin: Skin is warm and dry. Rash noted.   Arms, legs, chest with tiny vesicles, some scabbed on erythematous base. Some in lines and clusters.   Psychiatric: She has a normal mood and affect. Her behavior is normal. Judgment and thought content normal.   Nursing note and vitals reviewed.      Assessment/Plan   Laura was seen today for rash and poison ivy.    Diagnoses and all orders for this visit:    Poison ivy dermatitis  -     hydrOXYzine (ATARAX) 25 MG tablet; Take 1 tablet by mouth 3 (Three) Times a Day As Needed for Itching.  -     predniSONE (DELTASONE) 10 MG tablet; 2 tabs tid x 2d, 2 tabs bid x2 d, 1 tab tid x 2d, 1 tab bid x2 d, 1 tab daily x2 d, 1/2 tab daily x 2 d      Continue OTC steroid cream. Follow up symptoms persist or worsen.

## 2017-09-26 ENCOUNTER — HOSPITAL ENCOUNTER (OUTPATIENT)
Dept: MAMMOGRAPHY | Facility: HOSPITAL | Age: 67
Discharge: HOME | End: 2017-09-26
Attending: OBSTETRICS & GYNECOLOGY | Admitting: OBSTETRICS & GYNECOLOGY

## 2017-09-26 DIAGNOSIS — Z12.31 VISIT FOR SCREENING MAMMOGRAM: ICD-10-CM

## 2017-09-26 PROCEDURE — 77063 BREAST TOMOSYNTHESIS BI: CPT

## 2017-09-26 PROCEDURE — G0202 SCR MAMMO BI INCL CAD: HCPCS

## 2017-09-28 PROCEDURE — G0202 SCR MAMMO BI INCL CAD: HCPCS | Performed by: RADIOLOGY

## 2017-09-28 PROCEDURE — 77063 BREAST TOMOSYNTHESIS BI: CPT | Performed by: RADIOLOGY

## 2017-10-06 RX ORDER — DICLOFENAC SODIUM 75 MG/1
TABLET, DELAYED RELEASE ORAL
Qty: 180 TABLET | Refills: 0 | Status: SHIPPED | OUTPATIENT
Start: 2017-10-06 | End: 2018-05-15 | Stop reason: SDUPTHER

## 2017-10-08 DIAGNOSIS — F41.9 ANXIETY: ICD-10-CM

## 2017-10-09 RX ORDER — VENLAFAXINE HYDROCHLORIDE 150 MG/1
CAPSULE, EXTENDED RELEASE ORAL
Qty: 90 CAPSULE | Refills: 2 | Status: SHIPPED | OUTPATIENT
Start: 2017-10-09 | End: 2018-06-21 | Stop reason: SDUPTHER

## 2017-10-23 ENCOUNTER — FLU SHOT (OUTPATIENT)
Dept: FAMILY MEDICINE CLINIC | Facility: CLINIC | Age: 67
End: 2017-10-23

## 2017-10-23 DIAGNOSIS — Z23 NEED FOR IMMUNIZATION AGAINST INFLUENZA: ICD-10-CM

## 2017-10-23 PROCEDURE — 90662 IIV NO PRSV INCREASED AG IM: CPT | Performed by: FAMILY MEDICINE

## 2017-10-23 PROCEDURE — G0008 ADMIN INFLUENZA VIRUS VAC: HCPCS | Performed by: FAMILY MEDICINE

## 2017-11-13 ENCOUNTER — OFFICE VISIT (OUTPATIENT)
Dept: FAMILY MEDICINE CLINIC | Facility: CLINIC | Age: 67
End: 2017-11-13

## 2017-11-13 VITALS
TEMPERATURE: 99.1 F | SYSTOLIC BLOOD PRESSURE: 152 MMHG | WEIGHT: 211 LBS | HEIGHT: 65 IN | HEART RATE: 76 BPM | BODY MASS INDEX: 35.16 KG/M2 | DIASTOLIC BLOOD PRESSURE: 88 MMHG | OXYGEN SATURATION: 99 %

## 2017-11-13 DIAGNOSIS — H81.13 BENIGN PAROXYSMAL POSITIONAL VERTIGO DUE TO BILATERAL VESTIBULAR DISORDER: Primary | ICD-10-CM

## 2017-11-13 DIAGNOSIS — I10 ESSENTIAL HYPERTENSION: ICD-10-CM

## 2017-11-13 PROCEDURE — 99213 OFFICE O/P EST LOW 20 MIN: CPT | Performed by: FAMILY MEDICINE

## 2017-11-13 RX ORDER — MECLIZINE HCL 12.5 MG/1
12.5 TABLET ORAL 3 TIMES DAILY PRN
Qty: 30 TABLET | Refills: 1 | Status: SHIPPED | OUTPATIENT
Start: 2017-11-13 | End: 2017-12-06 | Stop reason: SDUPTHER

## 2017-11-13 RX ORDER — LOSARTAN POTASSIUM 25 MG/1
25 TABLET ORAL DAILY
Qty: 30 TABLET | Refills: 2 | Status: SHIPPED | OUTPATIENT
Start: 2017-11-13 | End: 2017-12-06 | Stop reason: SDUPTHER

## 2017-11-13 NOTE — PROGRESS NOTES
"Subjective   Laura Churchill is a 67 y.o. female.     Dizziness   This is a new problem. The current episode started 1 to 4 weeks ago (about 2 weeks). The problem occurs intermittently (worse with looking up). The problem has been unchanged. Pertinent negatives include no chest pain, chills, fever or vertigo. The symptoms are aggravated by bending (looking up). She has tried position changes for the symptoms. The treatment provided mild relief.                The following portions of the patient's history were reviewed and updated as appropriate: allergies, current medications, past social history and problem list.    Review of Systems   Constitutional: Negative for chills and fever.   HENT: Positive for sneezing.    Respiratory: Negative for shortness of breath.    Cardiovascular: Negative for chest pain.   Neurological: Positive for dizziness. Negative for vertigo.       Objective   /88  Pulse 76  Temp 99.1 °F (37.3 °C)  Ht 65\" (165.1 cm)  Wt 211 lb (95.7 kg)  SpO2 99%  BMI 35.11 kg/m2  Physical Exam   Constitutional: She is oriented to person, place, and time. She appears well-developed and well-nourished.   HENT:   Right Ear: External ear normal.   Left Ear: External ear normal.   Mouth/Throat: Oropharynx is clear and moist.   Eyes:   There is an increase in nystagmus on lateral gaze with head tilt.   Cardiovascular: Normal rate, regular rhythm and normal heart sounds.    Pulmonary/Chest: Effort normal and breath sounds normal.   Neurological: She is alert and oriented to person, place, and time. She has normal reflexes. No cranial nerve deficit.   Nursing note and vitals reviewed.      Assessment/Plan   Problem List Items Addressed This Visit        Cardiovascular and Mediastinum    Essential hypertension      Other Visit Diagnoses     Benign paroxysmal positional vertigo due to bilateral vestibular disorder    -  Primary              Drink plenty fluids.    Rx for Meclizine 12.5 mg three times a " day as needed. #30+1.  Rx for Losartan 25 mg daily #30+5.    Follow up 4 weeks.            Scribed for Dr Shiav Capone by Sol Mcmahon CMA.          I, Shiva Capone MD, personally performed the services described in this documentation, as scribed by Sol Mcmahon in my presence, and is both accurate and complete.

## 2017-12-06 ENCOUNTER — OFFICE VISIT (OUTPATIENT)
Dept: FAMILY MEDICINE CLINIC | Facility: CLINIC | Age: 67
End: 2017-12-06

## 2017-12-06 VITALS
BODY MASS INDEX: 34.82 KG/M2 | TEMPERATURE: 98.1 F | HEART RATE: 102 BPM | WEIGHT: 209 LBS | SYSTOLIC BLOOD PRESSURE: 108 MMHG | DIASTOLIC BLOOD PRESSURE: 60 MMHG | HEIGHT: 65 IN | OXYGEN SATURATION: 94 %

## 2017-12-06 DIAGNOSIS — H81.13 BENIGN PAROXYSMAL POSITIONAL VERTIGO DUE TO BILATERAL VESTIBULAR DISORDER: ICD-10-CM

## 2017-12-06 DIAGNOSIS — I10 ESSENTIAL HYPERTENSION: Primary | ICD-10-CM

## 2017-12-06 PROCEDURE — 99213 OFFICE O/P EST LOW 20 MIN: CPT | Performed by: FAMILY MEDICINE

## 2017-12-06 RX ORDER — LOSARTAN POTASSIUM 25 MG/1
25 TABLET ORAL DAILY
Qty: 90 TABLET | Refills: 1 | Status: SHIPPED | OUTPATIENT
Start: 2017-12-06 | End: 2017-12-08 | Stop reason: SDUPTHER

## 2017-12-06 RX ORDER — MECLIZINE HCL 12.5 MG/1
12.5 TABLET ORAL DAILY
Qty: 90 TABLET | Refills: 0 | Status: SHIPPED | OUTPATIENT
Start: 2017-12-06 | End: 2018-04-05

## 2017-12-06 NOTE — PROGRESS NOTES
"Subjective   Laura Churchill is a 67 y.o. female.     Hypertension   This is a new problem. The current episode started more than 1 month ago. The problem is unchanged. The problem is controlled. Pertinent negatives include no anxiety, blurred vision, chest pain, headaches, malaise/fatigue, neck pain, orthopnea, palpitations, peripheral edema, PND, shortness of breath or sweats. There are no associated agents to hypertension. Risk factors for coronary artery disease include dyslipidemia and obesity. Treatments tried: Taking Losartan 25 mg daily. Compliance problems include exercise.       Also states the dizziness is some better but not completely.  Taking the Meclizine as needed.      The following portions of the patient's history were reviewed and updated as appropriate: allergies, current medications, past social history and problem list.    Review of Systems   Constitutional: Negative for malaise/fatigue.   Eyes: Negative for blurred vision.   Respiratory: Negative for shortness of breath.    Cardiovascular: Negative for chest pain, palpitations, orthopnea and PND.   Musculoskeletal: Negative for neck pain.   Neurological: Positive for dizziness (spinning). Negative for headaches.       Objective   /60  Pulse 102  Temp 98.1 °F (36.7 °C)  Ht 165.1 cm (65\")  Wt 94.8 kg (209 lb)  SpO2 94%  BMI 34.78 kg/m2  Physical Exam   Constitutional: She is oriented to person, place, and time. She appears well-developed and well-nourished.   Eyes:   Positional increase in nystagmus with head tilt   Cardiovascular: Normal rate and regular rhythm.    Pulmonary/Chest: Effort normal and breath sounds normal.   Neurological: She is alert and oriented to person, place, and time. She has normal reflexes. No cranial nerve deficit.   Nursing note and vitals reviewed.      Assessment/Plan   Problem List Items Addressed This Visit        Cardiovascular and Mediastinum    Essential hypertension - Primary      Other Visit " Diagnoses     Benign paroxysmal positional vertigo due to bilateral vestibular disorder        Relevant Medications    meclizine (ANTIVERT) 12.5 MG tablet              Drink plenty fluids.    Use the Meclizine each evening at bedtime for 30 days. #90+0.    Continue other medications as doing.    Refill Losartan 25 mg daily #90+1.    Follow up in 6 months. Sooner if needed.            Scribed for Dr Shiva Capone by Sol Mcmahon CMA.          I, Shiva Capone MD, personally performed the services described in this documentation, as scribed by Sol Mcmahon in my presence, and is both accurate and complete.

## 2017-12-08 DIAGNOSIS — I10 ESSENTIAL HYPERTENSION: ICD-10-CM

## 2017-12-08 RX ORDER — LOSARTAN POTASSIUM 25 MG/1
25 TABLET ORAL DAILY
Qty: 30 TABLET | Refills: 0 | Status: SHIPPED | OUTPATIENT
Start: 2017-12-08 | End: 2018-06-21 | Stop reason: SDUPTHER

## 2017-12-22 ENCOUNTER — OFFICE VISIT (OUTPATIENT)
Dept: FAMILY MEDICINE CLINIC | Facility: CLINIC | Age: 67
End: 2017-12-22

## 2017-12-22 VITALS
HEART RATE: 86 BPM | HEIGHT: 65 IN | TEMPERATURE: 98 F | BODY MASS INDEX: 35.65 KG/M2 | OXYGEN SATURATION: 97 % | WEIGHT: 214 LBS

## 2017-12-22 DIAGNOSIS — J06.9 UPPER RESPIRATORY TRACT INFECTION, UNSPECIFIED TYPE: Primary | ICD-10-CM

## 2017-12-22 LAB
EXPIRATION DATE: NORMAL
FLUAV AG NPH QL: NEGATIVE
FLUBV AG NPH QL: NEGATIVE
INTERNAL CONTROL: NORMAL
Lab: NORMAL

## 2017-12-22 PROCEDURE — 99213 OFFICE O/P EST LOW 20 MIN: CPT | Performed by: NURSE PRACTITIONER

## 2017-12-22 PROCEDURE — 87804 INFLUENZA ASSAY W/OPTIC: CPT | Performed by: NURSE PRACTITIONER

## 2017-12-22 RX ORDER — BENZONATATE 100 MG/1
100 CAPSULE ORAL 3 TIMES DAILY PRN
Qty: 30 CAPSULE | Refills: 0 | Status: SHIPPED | OUTPATIENT
Start: 2017-12-22 | End: 2018-01-01

## 2017-12-22 NOTE — PROGRESS NOTES
"Subjective   Laura Churchill is a 67 y.o. female.   Chief Complaint   Patient presents with   • Cough    Acute Visit Only.   Patient has been on vacation in [place] - has been home x 2 days.   Cough   The current episode started in the past 7 days. The problem has been unchanged. The cough is productive of sputum (thick green sputum ). Associated symptoms include chills, headaches, myalgias, nasal congestion, postnasal drip and rhinorrhea. Pertinent negatives include no chest pain, ear pain, fever, heartburn, hemoptysis, rash, sore throat, shortness of breath, sweats, weight loss or wheezing. Associated symptoms comments: Sneezing  . The symptoms are aggravated by lying down. Risk factors for lung disease include travel. Treatments tried: tylenol and ibuprofen, tessalon perles  The treatment provided no relief. There is no history of asthma, bronchiectasis, bronchitis, COPD, emphysema, environmental allergies or pneumonia.        The following portions of the patient's history were reviewed and updated as appropriate: allergies, current medications, past family history, past medical history, past social history, past surgical history and problem list.    Review of Systems   Constitutional: Positive for chills. Negative for fever and weight loss.   HENT: Positive for postnasal drip and rhinorrhea. Negative for ear pain and sore throat.    Respiratory: Positive for cough. Negative for hemoptysis, shortness of breath and wheezing.    Cardiovascular: Negative for chest pain.   Gastrointestinal: Negative for heartburn.   Musculoskeletal: Positive for myalgias.   Skin: Negative for rash.   Allergic/Immunologic: Negative for environmental allergies.   Neurological: Positive for headaches.       Objective   Allergies   Allergen Reactions   • Codeine Hives   • Hydrocodone-Acetaminophen Hives     And itching     • Sulfa Antibiotics Itching     Visit Vitals   • Pulse 86   • Temp 98 °F (36.7 °C) (Oral)   • Ht 165.1 cm (65\")   • " Wt 97.1 kg (214 lb)   • SpO2 97%   • BMI 35.61 kg/m2       Physical Exam   Constitutional: Vital signs are normal. She appears well-developed and well-nourished. She is cooperative. She appears ill.   HENT:   Right Ear: Hearing, tympanic membrane, external ear and ear canal normal.   Left Ear: Hearing, tympanic membrane, external ear and ear canal normal.   Nose: Rhinorrhea present. Right sinus exhibits no maxillary sinus tenderness and no frontal sinus tenderness. Left sinus exhibits no maxillary sinus tenderness and no frontal sinus tenderness.   Mouth/Throat: Uvula is midline and mucous membranes are normal. No uvula swelling. Posterior oropharyngeal erythema present. No oropharyngeal exudate or posterior oropharyngeal edema.   Clear nasal drainage.   Eyes: Pupils are equal, round, and reactive to light. Right eye exhibits no discharge. Left eye exhibits no discharge.   Cardiovascular: Normal rate and regular rhythm.    Pulmonary/Chest: Effort normal and breath sounds normal.   Abdominal: Soft. Bowel sounds are normal.   Neurological: She is alert.   Skin: Skin is warm, dry and intact.   Vitals reviewed.      Assessment/Plan   Laura was seen today for cough.    Diagnoses and all orders for this visit:    Upper respiratory tract infection, unspecified type  -     POCT Influenza A/B  -     Chlorcyclizine-Pseudoephed (STAHIST AD) 25-60 MG tablet; Take 1 tablet by mouth Every 12 (Twelve) Hours As Needed (congestion) for up to 21 days.  -     benzonatate (TESSALON PERLES) 100 MG capsule; Take 1 capsule by mouth 3 (Three) Times a Day As Needed for Cough for up to 10 days.    Patient Negative for Influenza A/B  See patient instructions for URI.     Follow up with Dr. Capone if you fail to improve or you worsen.          Ileana Stark, APRN

## 2017-12-22 NOTE — PATIENT INSTRUCTIONS
"Upper Respiratory Infection, Adult  Most upper respiratory infections (URIs) are a viral infection of the air passages leading to the lungs. A URI affects the nose, throat, and upper air passages. The most common type of URI is nasopharyngitis and is typically referred to as \"the common cold.\"  URIs run their course and usually go away on their own. Most of the time, a URI does not require medical attention, but sometimes a bacterial infection in the upper airways can follow a viral infection. This is called a secondary infection. Sinus and middle ear infections are common types of secondary upper respiratory infections.  Bacterial pneumonia can also complicate a URI. A URI can worsen asthma and chronic obstructive pulmonary disease (COPD). Sometimes, these complications can require emergency medical care and may be life threatening.   CAUSES  Almost all URIs are caused by viruses. A virus is a type of germ and can spread from one person to another.   RISKS FACTORS  You may be at risk for a URI if:   · You smoke.    · You have chronic heart or lung disease.  · You have a weakened defense (immune) system.    · You are very young or very old.    · You have nasal allergies or asthma.  · You work in crowded or poorly ventilated areas.  · You work in health care facilities or schools.  SIGNS AND SYMPTOMS   Symptoms typically develop 2-3 days after you come in contact with a cold virus. Most viral URIs last 7-10 days. However, viral URIs from the influenza virus (flu virus) can last 14-18 days and are typically more severe. Symptoms may include:   · Runny or stuffy (congested) nose.    · Sneezing.    · Cough.    · Sore throat.    · Headache.    · Fatigue.    · Fever.    · Loss of appetite.    · Pain in your forehead, behind your eyes, and over your cheekbones (sinus pain).  · Muscle aches.    DIAGNOSIS   Your health care provider may diagnose a URI by:  · Physical exam.  · Tests to check that your symptoms are not due to " another condition such as:  ¨ Strep throat.  ¨ Sinusitis.  ¨ Pneumonia.  ¨ Asthma.  TREATMENT   A URI goes away on its own with time. It cannot be cured with medicines, but medicines may be prescribed or recommended to relieve symptoms. Medicines may help:  · Reduce your fever.  · Reduce your cough.  · Relieve nasal congestion.  HOME CARE INSTRUCTIONS   · Take medicines only as directed by your health care provider.    · Gargle warm saltwater or take cough drops to comfort your throat as directed by your health care provider.  · Use a warm mist humidifier or inhale steam from a shower to increase air moisture. This may make it easier to breathe.  · Drink enough fluid to keep your urine clear or pale yellow.    · Eat soups and other clear broths and maintain good nutrition.    · Rest as needed.    · Return to work when your temperature has returned to normal or as your health care provider advises. You may need to stay home longer to avoid infecting others. You can also use a face mask and careful hand washing to prevent spread of the virus.  · Increase the usage of your inhaler if you have asthma.    · Do not use any tobacco products, including cigarettes, chewing tobacco, or electronic cigarettes. If you need help quitting, ask your health care provider.  PREVENTION   The best way to protect yourself from getting a cold is to practice good hygiene.   · Avoid oral or hand contact with people with cold symptoms.    · Wash your hands often if contact occurs.    There is no clear evidence that vitamin C, vitamin E, echinacea, or exercise reduces the chance of developing a cold. However, it is always recommended to get plenty of rest, exercise, and practice good nutrition.   SEEK MEDICAL CARE IF:   · You are getting worse rather than better.    · Your symptoms are not controlled by medicine.    · You have chills.  · You have worsening shortness of breath.  · You have brown or red mucus.  · You have yellow or brown nasal  discharge.  · You have pain in your face, especially when you bend forward.  · You have a fever.  · You have swollen neck glands.  · You have pain while swallowing.  · You have white areas in the back of your throat.  SEEK IMMEDIATE MEDICAL CARE IF:   · You have severe or persistent:    Headache.    Ear pain.    Sinus pain.    Chest pain.  · You have chronic lung disease and any of the following:    Wheezing.    Prolonged cough.    Coughing up blood.    A change in your usual mucus.  · You have a stiff neck.  · You have changes in your:    Vision.    Hearing.    Thinking.    Mood.  MAKE SURE YOU:   · Understand these instructions.  · Will watch your condition.  · Will get help right away if you are not doing well or get worse.     This information is not intended to replace advice given to you by your health care provider. Make sure you discuss any questions you have with your health care provider.     Document Released: 06/13/2002 Document Revised: 05/03/2016 Document Reviewed: 03/25/2015  [employer] Patient Education ©2017 [employer]

## 2018-01-23 DIAGNOSIS — E78.5 HYPERLIPIDEMIA: ICD-10-CM

## 2018-01-23 RX ORDER — ATORVASTATIN CALCIUM 10 MG/1
TABLET, FILM COATED ORAL
Qty: 90 TABLET | Refills: 0 | Status: SHIPPED | OUTPATIENT
Start: 2018-01-23 | End: 2018-04-23 | Stop reason: SDUPTHER

## 2018-04-05 ENCOUNTER — OFFICE VISIT (OUTPATIENT)
Dept: OBSTETRICS AND GYNECOLOGY | Facility: CLINIC | Age: 68
End: 2018-04-05

## 2018-04-05 VITALS
WEIGHT: 216.6 LBS | HEIGHT: 65 IN | DIASTOLIC BLOOD PRESSURE: 80 MMHG | SYSTOLIC BLOOD PRESSURE: 120 MMHG | BODY MASS INDEX: 36.09 KG/M2

## 2018-04-05 DIAGNOSIS — N95.2 VAGINAL ATROPHY: ICD-10-CM

## 2018-04-05 DIAGNOSIS — Z78.0 MENOPAUSE: Primary | ICD-10-CM

## 2018-04-05 DIAGNOSIS — Z01.419 ENCOUNTER FOR GYNECOLOGICAL EXAMINATION WITHOUT ABNORMAL FINDING: ICD-10-CM

## 2018-04-05 PROCEDURE — 99397 PER PM REEVAL EST PAT 65+ YR: CPT | Performed by: OBSTETRICS & GYNECOLOGY

## 2018-04-05 NOTE — PROGRESS NOTES
Chief Complaint   Patient presents with   • Gynecologic Exam       Laura Churchill is a 67 y.o. year old  presenting to be seen for her annual exam.This patient is menopausal and has no postmenopausal bleeding.  She denies menopausal symptoms.  She has previously had a left oophorectomy.  She has had a cholecystectomy.  She had a lap band procedure which was then taken down and she had a gastric sleeve procedure.    SCREENING TESTS    Year 2012   Age                         PAP    Neg.                     HPV high risk                         Mammogram      benign                   LUCRECIA score                         Breast MRI                         Lipids                         Vitamin D                         Colonoscopy                         DEXA  Frax (hip/any)     normal                    Ovarian Screen     normal normal                       She exercises regularly: yes.  She wears her seat belt: yes.  She has concerns about domestic violence: no.  She has noticed changes in height: no    GYN screening history:  · Last mammogram: was done on approximately 2017 and the result was: Birads I (Normal).  · Last DEXA: was done on approximately 2016 and the results were: normal.    No Additional Complaints Reported    The following portions of the patient's history were reviewed and updated as appropriate:vital signs and   She  does not have any pertinent problems on file.  She  has a past surgical history that includes Oophorectomy (Left); Hip surgery (Left); Cholecystectomy; Gastric Sleeve (); Colonoscopy; Laparoscopic gastric banding; Stomach surgery; and pr reconstr total shoulder implant (Left, 7/10/2017).  Her family history includes Alzheimer's disease in her mother; Bone cancer in her mother; Diabetes in her father; Heart failure in her father; Melanoma in her father; Ovarian  "cancer (age of onset: 75) in her maternal aunt; Prostate cancer in her father.  She  reports that she has never smoked. She has never used smokeless tobacco. She reports that she drinks alcohol. She reports that she does not use drugs.  Current Outpatient Prescriptions   Medication Sig Dispense Refill   • aspirin 81 MG EC tablet Take 81 mg by mouth daily.     • atorvastatin (LIPITOR) 10 MG tablet TAKE 1 TABLET DAILY 90 tablet 0   • diclofenac (VOLTAREN) 75 MG EC tablet TAKE 1 TABLET TWICE A  tablet 0   • losartan (COZAAR) 25 MG tablet Take 1 tablet by mouth Daily. 30 tablet 0   • venlafaxine XR (EFFEXOR-XR) 150 MG 24 hr capsule TAKE 1 CAPSULE DAILY 90 capsule 2     No current facility-administered medications for this visit.      She is allergic to codeine; hydrocodone-acetaminophen; and sulfa antibiotics..    Review of Systems  A comprehensive review of systems was taken.  Constitutional: negative for fever, chills, activity change, appetite change, fatigue and unexpected weight change.  Respiratory: negative  Cardiovascular: negative  Gastrointestinal: negative  Genitourinary:negative  Musculoskeletal:negative  Behavioral/Psych: negative       /80   Ht 165.1 cm (65\")   Wt 98.2 kg (216 lb 9.6 oz)   BMI 36.04 kg/m²     Physical Exam    General:  alert; cooperative; well developed; well nourished  obese - Body mass index is 36.04 kg/m².   Skin:  No suspicious lesions seen   Thyroid: normal to inspection and palpation   Lungs:  clear to auscultation bilaterally   Heart:  regular rate and rhythm, S1, S2 normal, no murmur, click, rub or gallop   Breasts:  Examined in supine position  Symmetric without masses or skin dimpling  Nipples normal without inversion, lesions or discharge  There are no palpable axillary nodes   Abdomen: soft, non-tender; no masses  no umbilical or inginual hernias are present  no hepato-splenomegaly   Pelvis: Clinical staff was present for exam  External genitalia:  normal " appearance of the external genitalia including Bartholin's and [place]'s glands.  Vaginal:  atrophic mucosal changes are present;  Cervix:  normal appearance.  Uterus:  normal size, shape and consistency. anteverted;  Adnexa:  Absent left adnexa, no masses on the right  Rectal:  anus visually normal appearing. recto-vaginal exam unremarkable and confirms findings;     Lab Review   No data reviewed    Imaging  Mammogram results- Birads I, and DEXA- normal         ASSESSMENT  Problems Addressed this Visit        Genitourinary    Menopause - Primary    Vaginal atrophy      Other Visit Diagnoses     Encounter for gynecological examination without abnormal finding        Relevant Orders    Liquid-based Pap Smear, Screening          PLAN    · Medications prescribed this encounter:  No orders of the defined types were placed in this encounter.  · Monthly self breast assessment and annual breast imaging  · Calcium, 600 mg/ Vit. D, 400 IU daily; regular weight-bearing exercise  · Follow up: 12 month(s)  *Please note that portions of this documentation may have been completed with a voice recognition program.  Efforts were made to edit this dictation, but occasional words may have been mistranscribed.       This note was electronically signed.    LJ Clayton MD  April 5, 2018  10:55 AM

## 2018-04-23 DIAGNOSIS — E78.5 HYPERLIPIDEMIA: ICD-10-CM

## 2018-04-23 RX ORDER — ATORVASTATIN CALCIUM 10 MG/1
TABLET, FILM COATED ORAL
Qty: 90 TABLET | Refills: 0 | Status: SHIPPED | OUTPATIENT
Start: 2018-04-23 | End: 2018-06-21 | Stop reason: SDUPTHER

## 2018-05-15 DIAGNOSIS — I10 ESSENTIAL HYPERTENSION: ICD-10-CM

## 2018-05-16 RX ORDER — DICLOFENAC SODIUM 75 MG/1
TABLET, DELAYED RELEASE ORAL
Qty: 180 TABLET | Refills: 0 | Status: SHIPPED | OUTPATIENT
Start: 2018-05-16 | End: 2018-06-21 | Stop reason: SDUPTHER

## 2018-05-16 RX ORDER — LOSARTAN POTASSIUM 25 MG/1
TABLET ORAL
Qty: 90 TABLET | Refills: 1 | Status: SHIPPED | OUTPATIENT
Start: 2018-05-16 | End: 2018-06-06 | Stop reason: SDUPTHER

## 2018-06-06 ENCOUNTER — OFFICE VISIT (OUTPATIENT)
Dept: FAMILY MEDICINE CLINIC | Facility: CLINIC | Age: 68
End: 2018-06-06

## 2018-06-06 ENCOUNTER — TELEPHONE (OUTPATIENT)
Dept: FAMILY MEDICINE CLINIC | Facility: CLINIC | Age: 68
End: 2018-06-06

## 2018-06-06 VITALS
WEIGHT: 217 LBS | BODY MASS INDEX: 36.15 KG/M2 | DIASTOLIC BLOOD PRESSURE: 64 MMHG | HEIGHT: 65 IN | HEART RATE: 91 BPM | SYSTOLIC BLOOD PRESSURE: 106 MMHG | OXYGEN SATURATION: 97 % | RESPIRATION RATE: 16 BRPM | TEMPERATURE: 97.5 F

## 2018-06-06 DIAGNOSIS — L23.9 ALLERGIC CONTACT DERMATITIS, UNSPECIFIED TRIGGER: ICD-10-CM

## 2018-06-06 DIAGNOSIS — L23.9 ALLERGIC CONTACT DERMATITIS, UNSPECIFIED TRIGGER: Primary | ICD-10-CM

## 2018-06-06 PROCEDURE — 99213 OFFICE O/P EST LOW 20 MIN: CPT | Performed by: FAMILY MEDICINE

## 2018-06-06 RX ORDER — BETAMETHASONE DIPROPIONATE 0.5 MG/G
CREAM TOPICAL 2 TIMES DAILY
Qty: 45 G | Refills: 0 | Status: SHIPPED | OUTPATIENT
Start: 2018-06-06 | End: 2018-06-11 | Stop reason: SDUPTHER

## 2018-06-06 RX ORDER — BETAMETHASONE DIPROPIONATE 0.5 MG/G
CREAM TOPICAL 2 TIMES DAILY
Qty: 45 G | Refills: 0 | Status: SHIPPED | OUTPATIENT
Start: 2018-06-06 | End: 2018-06-06

## 2018-06-06 RX ORDER — METHYLPREDNISOLONE 4 MG/1
TABLET ORAL
COMMUNITY
Start: 2018-05-29 | End: 2018-06-06

## 2018-06-06 NOTE — TELEPHONE ENCOUNTER
Changes have been made.     ----- Message from Lety Campa sent at 6/6/2018  5:54 PM EDT -----  Regarding: CHANGE RX TO GO TO [facility]  Contact: [phone]  CHANGE betamethasone dipropionate (DIPROLENE) 0.05 % cream   TO GO TO [facility] INSTEAD OF [facility]

## 2018-06-06 NOTE — PROGRESS NOTES
"Subjective   Laura Churchill is a 67 y.o. female.     History of Present Illness   The patient presents with c/o rash on the right knee.    States she first noticed this yesterday some swelling and red bumps on the right knee. States she was out side yesterday weed eating.  Denies any chest pain or shortness of breath.    The following portions of the patient's history were reviewed and updated as appropriate: allergies, current medications, past social history and problem list.    Review of Systems   Respiratory: Negative for shortness of breath.    Cardiovascular: Negative for chest pain.       Objective   /64   Pulse 91   Temp 97.5 °F (36.4 °C)   Resp 16   Ht 165.1 cm (65\")   Wt 98.4 kg (217 lb)   LMP  (LMP Unknown)   SpO2 97%   BMI 36.11 kg/m²   Physical Exam   Constitutional: She appears well-developed and well-nourished.   Skin: Rash (right knee) noted.   Nursing note and vitals reviewed.      Assessment/Plan   Problem List Items Addressed This Visit     None      Visit Diagnoses     Allergic contact dermatitis, unspecified trigger    -  Primary              Drink plenty fluids.    Rx for Betamethasone Dipropionate 0.05% cream to apply twice daily #45 GM +0.    Follow up as needed.                    Scribed for Dr Shiva Capone by Sol Mcmahon CMA.          I, Shiva Capone MD, personally performed the services described in this documentation, as scribed by Sol Mcmahon in my presence, and is both accurate and complete.      "

## 2018-06-11 DIAGNOSIS — L23.9 ALLERGIC CONTACT DERMATITIS, UNSPECIFIED TRIGGER: ICD-10-CM

## 2018-06-11 RX ORDER — BETAMETHASONE DIPROPIONATE 0.5 MG/G
CREAM TOPICAL 2 TIMES DAILY
Qty: 45 G | Refills: 2 | Status: SHIPPED | OUTPATIENT
Start: 2018-06-11 | End: 2018-08-07

## 2018-06-21 DIAGNOSIS — F41.9 ANXIETY: ICD-10-CM

## 2018-06-21 DIAGNOSIS — E78.00 PURE HYPERCHOLESTEROLEMIA: ICD-10-CM

## 2018-06-21 DIAGNOSIS — E78.5 HYPERLIPIDEMIA: ICD-10-CM

## 2018-06-21 DIAGNOSIS — I10 ESSENTIAL HYPERTENSION: Primary | ICD-10-CM

## 2018-06-21 DIAGNOSIS — M15.9 PRIMARY OSTEOARTHRITIS INVOLVING MULTIPLE JOINTS: ICD-10-CM

## 2018-06-21 RX ORDER — ATORVASTATIN CALCIUM 10 MG/1
10 TABLET, FILM COATED ORAL DAILY
Qty: 90 TABLET | Refills: 3 | Status: SHIPPED | OUTPATIENT
Start: 2018-06-21 | End: 2019-06-17 | Stop reason: SDUPTHER

## 2018-06-21 RX ORDER — VENLAFAXINE HYDROCHLORIDE 150 MG/1
CAPSULE, EXTENDED RELEASE ORAL
Qty: 90 CAPSULE | Refills: 2 | Status: CANCELLED | OUTPATIENT
Start: 2018-06-21

## 2018-06-21 RX ORDER — LOSARTAN POTASSIUM 25 MG/1
25 TABLET ORAL DAILY
Qty: 90 TABLET | Refills: 3 | Status: SHIPPED | OUTPATIENT
Start: 2018-06-21 | End: 2018-12-14 | Stop reason: SDUPTHER

## 2018-06-21 RX ORDER — VENLAFAXINE HYDROCHLORIDE 150 MG/1
150 CAPSULE, EXTENDED RELEASE ORAL DAILY
Qty: 90 CAPSULE | Refills: 3 | Status: SHIPPED | OUTPATIENT
Start: 2018-06-21 | End: 2019-06-17 | Stop reason: SDUPTHER

## 2018-06-21 RX ORDER — DICLOFENAC SODIUM 75 MG/1
75 TABLET, DELAYED RELEASE ORAL 2 TIMES DAILY
Qty: 180 TABLET | Refills: 3 | Status: SHIPPED | OUTPATIENT
Start: 2018-06-21 | End: 2018-11-21 | Stop reason: HOSPADM

## 2018-06-21 RX ORDER — ATORVASTATIN CALCIUM 10 MG/1
TABLET, FILM COATED ORAL
Qty: 90 TABLET | Refills: 0 | Status: CANCELLED | OUTPATIENT
Start: 2018-06-21

## 2018-06-21 NOTE — TELEPHONE ENCOUNTER
Please send to [facility]    losartan (COZAAR) 25 MG tablet       diclofenac (VOLTAREN) 75 MG EC tablet

## 2018-06-25 ENCOUNTER — TELEPHONE (OUTPATIENT)
Dept: PAIN MEDICINE | Facility: CLINIC | Age: 68
End: 2018-06-25

## 2018-08-02 NOTE — PROGRESS NOTES
"Chief Complaint: \"The lower back pain went away after my rhizotomies. The pain came back 6 weeks ago.\"    History of Present Illness: Ms. Laura Churchill is a 67 y.o. female who was originally referred by Dr. Richie Fisher in consultation for intractable chronic lower back pain. 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 Her mechanical lower back pain resolved after lumbar medial branch rhizotomies performed on October 31, 2016. Patient experienced significant pain relief and functional improvement. She was last seen on 05/15/2017, when she underwent right L3-L4 transforaminal epidural steroid injection, from which she experienced 100% relief for approximatly 1 year. Patient did attend PT after procedure. Patient called on 06/25/2018 to make an appointment for evaluation of recurrent lower back pain. She couldn't come in sooner for evaluation because she has been out of town.    Problem #1: Lower back and right hip pain  Patient continues experiencing right hip pain.  Unfortunately, several weeks ago, after getting out of bed she tripped on something and fell injuring her right hip. Patient underwent orthopedic consultation with Dr. Leung, who recommended injections for treatment of her unrelenting pain.    Pain pattern: Patient complains of constant pain with intermittent exacerbation, described as electric shocks, aching, burning and sharp sensation.   Pain radiation: The pain radiates from the lower back into the right hip and the lateral aspect of her right thigh up to the knee level.    Pain level: 7/10 (right hip), 7/10 lower back   Pain level ranges from 2/10 to 10/10.   Pain increases with standing after sitting for 20-25 minutes, standing longer than 30 minutes, ambulating more than 20 minutes or 1 mile.   Pain decreases with sitting, lying down.   Patient denies numbness or weakness in her lower " extremities.  Patient denies any new bladder or bowel problems.   Patient reports difficulties with her balance    Problem #2: Left shoulder pain: resolved after left shoulder replacement surgery    Review of previous therapies and additional medical records:  Laura Churchill has already failed the following measures, including:  Conservative measures: oral analgesics, massage, physical therapy, ice and TENs   Interventional: Two epidural injections by Dr. Guilherme Shaw, last was in June. Patient only experienced relief for about 2-3 weeks after each injection.  Lumbar medial branch rhizotomies with excellent analgesic and functional outcome, as referenced above  Surgical: No previous lumbar spine surgery or right hip surgery.    Laura Churchill underwent neurosurgical consultation with Dr. Fisher on 09/21/2016, and was found not to be a surgical candidate.   Other consultation: Dr. Higuera, as referenced above.  Patient was found not to be a surgical candidate.  Laura Churchill presents with depression and anxiety, engaged in treatment; and obesity.  In terms of current analgesics, Laura Churchill takes: Extra Strength Tylenol.   I have reviewed [facility] Report #[id number], consistent to medication reconciliation.    Review of Diagnostic Studies:   Bilateral hip x-rays 01/31/2017, revealed no significant degenerative changes of the right hip.  Spurs on the right greater trochanter.  Narrowing of the bilateral sacroiliac joint spaces.  Degenerative changes of the lumbosacral spine.  3 pins noted on the left hip.  Report was obtained from Dr. Leung's office note.  MRI LUMBAR SPINE WITHOUT CONTRAST 09/01/2016:  lumbar vertebral bodies are normally aligned. Normal marrow signal. L3-L4 broad-based disc protrusion, slightly greater on the right than on the left. Associated facet and ligamentous hypertrophy reducing the AP dimension of the neural canal to 7 mm.  L4-L5 broad-based disc protrusion producing effacement of  "the lateral recesses but without high-grade stenosis. L5-S1 disc is intact without stenosis or \"lateralization\". The conus medullaris is normal.  X-Ray SPINE LUMBAR FLEXION AND EXTENSION 09/21/2016: Flexion and extension views in the upright position were performed of the lumbar spine. Mild anterolisthesis of L3 on L4 which is slightly more pronounced in flexion imaging than extension imaging. Degenerative changes identified at the L4/L5 and L5/S1 level where there is mild disc space narrowing and some sclerosis of the endplates. Retrolisthesis of L1 on L2. Retrolisthesis is slightly more pronounced in extension imaging than when compared to flexion imaging.  DUAL-ENERGY X-RAY ABSORPTIOMETRY (DXA), 07/12/2016:  The patient's average bone mineral density is within normal limits.    Review of Systems   Musculoskeletal: Positive for arthralgias and gait problem.   Hematological: Bruises/bleeds easily.   Psychiatric/Behavioral: Positive for sleep disturbance. The patient is nervous/anxious (depression).    All other systems reviewed and are negative.     Patient Active Problem List   Diagnosis   • Essential hypertension   • Hyperlipidemia   • Obstructive sleep apnea syndrome   • Osteoarthritis of knee   • Osteoporosis   • Lumbar spondylosis without myelopathy/Lumbar facet arthropathy   • Displacement of lumbar intervertebral disc   • Stenosis of lateral recess of lumbar spine   • Physical deconditioning   • Morbid obesity due to excess calories (CMS/HCC)   • Anxiety and depression   • Sacroiliac joint dysfunction of right side   • Greater trochanteric bursitis of right hip   • Iliotibial band syndrome of right side   • Spinal stenosis   • Osteoarthritis   • Obesity due to excess calories   • Menopause   • Mixed hyperlipidemia   • Lumbosacral disc disease   • Low back pain   • Joint pain   • Extremity pain   • Chronic pain disorder   • Back problem   • Vaginal atrophy   • Primary localized osteoarthrosis of shoulder " region   • Status post total left shoulder arthroplasty   • Sciatica       Past Medical History:   Diagnosis Date   • Anxiety    • Atrophic vaginitis    • Back problem    • Chronic pain disorder    • Extremity pain    • Hypertension    • Immunization due    • Joint pain 2016    lt shoulder   • Knee pain, right    • Low back pain    • Lumbar stenosis     L4-5    • Lumbosacral disc disease    • Menopause    • Migraine headache    • Mixed hyperlipidemia    • Muscle spasm    • Obesity due to excess calories    • MOOKIE (obstructive sleep apnea)     NO CPAP USE   • Osteoarthritis    • PONV (postoperative nausea and vomiting)    • Spinal stenosis    • Tendinitis of right shoulder    • Wears glasses          Past Surgical History:   Procedure Laterality Date   • CHOLECYSTECTOMY     • COLONOSCOPY     • GASTRIC SLEEVE LAPAROSCOPIC  2011   • HIP SURGERY Left     3 pins   • LAPAROSCOPIC GASTRIC BANDING     • OOPHORECTOMY Left     one ovary removed age 35   • NE RECONSTR TOTAL SHOULDER IMPLANT Left 7/10/2017    Procedure: LEFT TOTAL SHOULDER ARTHROPLASTY ;  Surgeon: Almas Miller MD;  Location: [facility];  Service: Orthopedics   • STOMACH SURGERY      REMOVED LAP BAND         Family History   Problem Relation Age of Onset   • Ovarian cancer Maternal Aunt 75   • Alzheimer's disease Mother    • Bone cancer Mother    • Melanoma Father    • Prostate cancer Father    • Diabetes Father    • Heart failure Father         congestive   • Breast cancer Neg Hx          Social History     Social History   • Marital status:      Social History Main Topics   • Smoking status: Never Smoker   • Smokeless tobacco: Never Used   • Alcohol use Yes      Comment: Rarely   • Drug use: No   • Sexual activity: Yes     Partners: Male     Birth control/ protection: Post-menopausal     Other Topics Concern   • Not on file        Current Outpatient Prescriptions:   •  aspirin 81 MG EC tablet, Take 81 mg by mouth daily., Disp: , Rfl:   •   "atorvastatin (LIPITOR) 10 MG tablet, Take 1 tablet by mouth Daily., Disp: 90 tablet, Rfl: 3  •  diclofenac (VOLTAREN) 75 MG EC tablet, Take 1 tablet by mouth 2 (Two) Times a Day., Disp: 180 tablet, Rfl: 3  •  losartan (COZAAR) 25 MG tablet, Take 1 tablet by mouth Daily., Disp: 90 tablet, Rfl: 3  •  venlafaxine XR (EFFEXOR-XR) 150 MG 24 hr capsule, Take 1 capsule by mouth Daily., Disp: 90 capsule, Rfl: 3      Allergies   Allergen Reactions   • Codeine Hives   • Hydrocodone-Acetaminophen Hives     And itching     • Sulfa Antibiotics Itching         /82   Pulse 72   Temp 97.6 °F (36.4 °C) (Temporal Artery )   Resp 18   Ht 165.1 cm (65\")   Wt 99.3 kg (219 lb)   LMP  (LMP Unknown)   SpO2 98%   BMI 36.44 kg/m²      Physical Exam   Neurologic Exam  Constitutional: Patient is oriented to person, place, and time. Vital signs are normal. Patient appears well-developed and well-nourished.   HENT: Head: Normocephalic and atraumatic. Eyes: Conjunctivae and lids are normal. Pupils: Equal, round, reactive to light.   Neck: Trachea normal. Neck supple. No JVD present.   Pulmonary Respiratory effort: No increased work of breathing or signs of respiratory distress. Auscultation of lungs: Clear to auscultation.   Cardiovascular Auscultation of heart: Normal rate and rhythm, normal S1 and S2, no murmurs. Peripheral vascular exam: Normal. No edema.   Abdomen: The abdomen was soft and nontender. Bowel sounds were normal.   Musculoskeletal   Gait and station: Gait evaluation demonstrated a normal gait.   Examination of the left shoulder reveals decreased range of motion globally.  Palpation of the left supraspinatus tendon reproduces pain.  Significant crepitus upon mobilization of the left shoulder joint.  Rotator cuff strength globally 4 over 5.  Lumbar spine: The range of motion of the lumbar spine is remarkably improved at this time. Lumbar facet joint loading maneuvers are negative.  Carlos and Gaenslen's tests are " negative on the left, positive on the right.   Piriformis maneuvers are negative.   Palpation of the bilateral greater trochanter are negative on the left, positive on the right.  Examination of the Iliotibial band: are negative on the left, positive on the right.  Hip joints: The range of motion of the hip joints is full and without pain.    Examination of the right knee reveals significant crepitus and pain with flexion of her 100°.  Neurological:   Patient is alert and oriented to person, place, and time. Speech: speech is normal. Cortical function: Normal mental status.   Cranial nerves: Cranial nerves 2-12 intact.   Reflex Scores:  Right brachioradialis: 2+  Left brachioradialis: 2+  Right biceps: 2+  Left biceps: 2+  Right triceps: 2+  Left triceps: 2+  Right patellar: 2+  Left patellar: 2+  Right achilles: 2+  Left achilles: 2+  Motor strength: 5/5  Motor Tone: normal tone.   Involuntary movements: none.   Superficial/Primitive Reflexes: primitive reflexes were absent.   Right Sneed: absent  Left Sneed: absent  Right ankle clonus: absent  Left ankle clonus: absent   Spurling sign is negative. Lhermitte sign is negative. Negative long tract signs. Straight leg raising test is negative. Femoral stretch sign is negative.   Sensation: No sensory loss. Sensory exam: intact to light touch, intact pain and temperature sensation, intact vibration sensation and normal proprioception.   Coordination: Normal finger to nose and heel to shin. Normal balance and negative. Romberg's sign negative.   Skin and subcutaneous tissue: Skin is warm and intact. No rash noted. No cyanosis.   Psychiatric:   Judgment and insight: Normal.   Orientation to person, place and time: Normal.   Recent and remote memory: Intact.   Mood and affect: Normal.    ASSESSMENT:   1. Lumbar spondylosis without myelopathy/Lumbar facet arthropathy    2. Displacement of lumbar intervertebral disc    3. Stenosis of lateral recess of lumbar spine    4.  Sacroiliac joint dysfunction of right side    5. Greater trochanteric bursitis of right hip    6. Iliotibial band syndrome of right side    7. Primary osteoarthritis of both knees    8. Osteoporosis, unspecified osteoporosis type, unspecified pathological fracture presence    9. Morbid obesity due to excess calories (CMS/HCC)    10. Obstructive sleep apnea syndrome    11. Anxiety and depression    12. Physical deconditioning      PLAN: I had a lengthy conversation with Ms. Laura Churchill regarding her recurrent chronic pain condition and potential therapeutic options. Patient has failed to obtain pain relief with conservative measures. Patient has experienced complete relief of her previously severe mechanical lower back pain since lumbar medial branch rhizotomies. Her lower back pain is multifactorial, and as a result, I have recommended to continue a multidisciplinary approach to better address her needs. Therefore, I have proposed the following plan:  1. Interventional pain management measures: Patient will be scheduled for one set of diagnostic bilateral lumbar medial branch blocks at L3, L4, L5; for bilateral lumbar facet joints at L4-L5, L5-S1 to confirm the origin of chronic refractory mechanical lower back pain. If patient experiences more than 80% relief along with significant improvement the range of motion of the lumbar spine, then, patient will be scheduled for bilateral  lumbar medial branch rhizotomies. Otherwise, we may repeat right L3-L4 transforaminal epidural steroid injection. Patient will follow-up with Dr. Fisher thereafter.  2. Long-term rehabilitation efforts:  A. Patient will start a comprehensive physical therapy program for water therapy, core strengthening, neurodynamics, ASTYM, E-STIM, myofascial release, cupping and dry needling  B. Start an exercise program such as yoga, water therapy and daily walks for fitness  C. Referral to [facility]  3.   Pharmacological measures: Continue diclofenac and tizanidine as currently prescribed  4. The patient has been instructed to contact my office with any questions or difficulties. The patient understands the plan and agrees to proceed accordingly.       Patient Care Team:  Shiva Capone MD as PCP - General  Richie Fisher MD as Surgeon (Neurosurgery)  Sukumar Mcwilliams MD as Consulting Physician (Anesthesiology)  Glenn Clayton MD as Consulting Physician (Gynecology)       No orders of the defined types were placed in this encounter.          Future Appointments  Date Time Provider Department Center   4/9/2019 10:30 AM Glenn Clayton MD [facility] None         Sukumar Mcwilliams MD

## 2018-08-06 ENCOUNTER — TELEPHONE (OUTPATIENT)
Dept: PAIN MEDICINE | Facility: CLINIC | Age: 68
End: 2018-08-06

## 2018-08-07 ENCOUNTER — OFFICE VISIT (OUTPATIENT)
Dept: PAIN MEDICINE | Facility: CLINIC | Age: 68
End: 2018-08-07

## 2018-08-07 VITALS
HEART RATE: 72 BPM | WEIGHT: 219 LBS | RESPIRATION RATE: 18 BRPM | BODY MASS INDEX: 36.49 KG/M2 | TEMPERATURE: 97.6 F | DIASTOLIC BLOOD PRESSURE: 82 MMHG | OXYGEN SATURATION: 98 % | HEIGHT: 65 IN | SYSTOLIC BLOOD PRESSURE: 120 MMHG

## 2018-08-07 DIAGNOSIS — E66.01 MORBID OBESITY DUE TO EXCESS CALORIES (HCC): ICD-10-CM

## 2018-08-07 DIAGNOSIS — G47.33 OBSTRUCTIVE SLEEP APNEA SYNDROME: ICD-10-CM

## 2018-08-07 DIAGNOSIS — M53.3 SACROILIAC JOINT DYSFUNCTION OF RIGHT SIDE: ICD-10-CM

## 2018-08-07 DIAGNOSIS — M70.61 GREATER TROCHANTERIC BURSITIS OF RIGHT HIP: ICD-10-CM

## 2018-08-07 DIAGNOSIS — F41.9 ANXIETY AND DEPRESSION: ICD-10-CM

## 2018-08-07 DIAGNOSIS — M76.31 ILIOTIBIAL BAND SYNDROME OF RIGHT SIDE: ICD-10-CM

## 2018-08-07 DIAGNOSIS — M48.061 STENOSIS OF LATERAL RECESS OF LUMBAR SPINE: ICD-10-CM

## 2018-08-07 DIAGNOSIS — E66.01 MORBID OBESITY DUE TO EXCESS CALORIES (HCC): Primary | ICD-10-CM

## 2018-08-07 DIAGNOSIS — M81.0 OSTEOPOROSIS, UNSPECIFIED OSTEOPOROSIS TYPE, UNSPECIFIED PATHOLOGICAL FRACTURE PRESENCE: ICD-10-CM

## 2018-08-07 DIAGNOSIS — M17.0 PRIMARY OSTEOARTHRITIS OF BOTH KNEES: ICD-10-CM

## 2018-08-07 DIAGNOSIS — M47.816 SPONDYLOSIS OF LUMBAR REGION WITHOUT MYELOPATHY OR RADICULOPATHY: ICD-10-CM

## 2018-08-07 DIAGNOSIS — F32.A ANXIETY AND DEPRESSION: ICD-10-CM

## 2018-08-07 DIAGNOSIS — R53.81 PHYSICAL DECONDITIONING: ICD-10-CM

## 2018-08-07 DIAGNOSIS — M51.26 DISPLACEMENT OF LUMBAR INTERVERTEBRAL DISC: ICD-10-CM

## 2018-08-07 PROBLEM — M19.019 OSTEOARTHRITIS, LOCALIZED, SHOULDER: Status: RESOLVED | Noted: 2017-02-02 | Resolved: 2018-08-07

## 2018-08-07 PROBLEM — M75.80 ROTATOR CUFF TENDONITIS: Status: RESOLVED | Noted: 2017-02-02 | Resolved: 2018-08-07

## 2018-08-07 PROCEDURE — 99213 OFFICE O/P EST LOW 20 MIN: CPT | Performed by: ANESTHESIOLOGY

## 2018-08-15 ENCOUNTER — OUTSIDE FACILITY SERVICE (OUTPATIENT)
Dept: PAIN MEDICINE | Facility: CLINIC | Age: 68
End: 2018-08-15

## 2018-08-15 PROCEDURE — 99152 MOD SED SAME PHYS/QHP 5/>YRS: CPT | Performed by: ANESTHESIOLOGY

## 2018-08-15 PROCEDURE — 64494 INJ PARAVERT F JNT L/S 2 LEV: CPT | Performed by: ANESTHESIOLOGY

## 2018-08-15 PROCEDURE — 64493 INJ PARAVERT F JNT L/S 1 LEV: CPT | Performed by: ANESTHESIOLOGY

## 2018-08-16 ENCOUNTER — TELEPHONE (OUTPATIENT)
Dept: PAIN MEDICINE | Facility: CLINIC | Age: 68
End: 2018-08-16

## 2018-08-16 NOTE — TELEPHONE ENCOUNTER
Patient had 1st set dx bilateral lumbar medial branch blocks on 08/15/2018. Patient reports that she had 100% pain relief until about 7 pm. She is scheduled to return for 2nd set of dx bilateral medial branch blocks on 08/27/2018.

## 2018-08-16 NOTE — TELEPHONE ENCOUNTER
Patient had 1st set dx bilateral lumbar medial branch blocks on 08/15/2018. Called to f/u with patient post procedure. Reached voicemail. Left message for return call.

## 2018-08-27 ENCOUNTER — OUTSIDE FACILITY SERVICE (OUTPATIENT)
Dept: PAIN MEDICINE | Facility: CLINIC | Age: 68
End: 2018-08-27

## 2018-08-27 PROCEDURE — 64636 DESTROY L/S FACET JNT ADDL: CPT | Performed by: ANESTHESIOLOGY

## 2018-08-27 PROCEDURE — 99152 MOD SED SAME PHYS/QHP 5/>YRS: CPT | Performed by: ANESTHESIOLOGY

## 2018-08-27 PROCEDURE — 64635 DESTROY LUMB/SAC FACET JNT: CPT | Performed by: ANESTHESIOLOGY

## 2018-10-03 ENCOUNTER — OFFICE VISIT (OUTPATIENT)
Dept: FAMILY MEDICINE CLINIC | Facility: CLINIC | Age: 68
End: 2018-10-03

## 2018-10-03 VITALS
DIASTOLIC BLOOD PRESSURE: 76 MMHG | RESPIRATION RATE: 20 BRPM | HEART RATE: 87 BPM | HEIGHT: 65 IN | OXYGEN SATURATION: 97 % | BODY MASS INDEX: 36.65 KG/M2 | TEMPERATURE: 98.8 F | SYSTOLIC BLOOD PRESSURE: 112 MMHG | WEIGHT: 220 LBS

## 2018-10-03 DIAGNOSIS — M51.26 DISPLACEMENT OF LUMBAR INTERVERTEBRAL DISC: ICD-10-CM

## 2018-10-03 DIAGNOSIS — Z23 NEED FOR IMMUNIZATION AGAINST INFLUENZA: ICD-10-CM

## 2018-10-03 DIAGNOSIS — M47.816 SPONDYLOSIS OF LUMBAR REGION WITHOUT MYELOPATHY OR RADICULOPATHY: Primary | ICD-10-CM

## 2018-10-03 PROCEDURE — G0008 ADMIN INFLUENZA VIRUS VAC: HCPCS | Performed by: FAMILY MEDICINE

## 2018-10-03 PROCEDURE — 99214 OFFICE O/P EST MOD 30 MIN: CPT | Performed by: FAMILY MEDICINE

## 2018-10-03 PROCEDURE — 90662 IIV NO PRSV INCREASED AG IM: CPT | Performed by: FAMILY MEDICINE

## 2018-10-03 RX ORDER — IPRATROPIUM BROMIDE 42 UG/1
1 SPRAY, METERED NASAL AS NEEDED
COMMUNITY
End: 2018-12-27

## 2018-10-03 RX ORDER — GABAPENTIN 300 MG/1
300 CAPSULE ORAL DAILY
COMMUNITY
End: 2018-10-22

## 2018-10-03 RX ORDER — TIZANIDINE 4 MG/1
4 TABLET ORAL 2 TIMES DAILY
Qty: 180 TABLET | Refills: 0 | Status: SHIPPED | OUTPATIENT
Start: 2018-10-03 | End: 2018-11-21 | Stop reason: HOSPADM

## 2018-10-03 NOTE — PROGRESS NOTES
Subjective   Laura Churchill is a 67 y.o. female.     Neurologic Problem   The patient's primary symptoms include focal sensory loss, focal weakness, a loss of balance and weakness. The patient's pertinent negatives include no altered mental status, clumsiness, memory loss, near-syncope, slurred speech, syncope or visual change. This is a chronic problem. The current episode started more than 1 month ago. The neurological problem developed gradually. The problem has been rapidly worsening since onset. There was right-sided and lower extremity focality noted. Associated symptoms include back pain. Pertinent negatives include no abdominal pain, auditory change, aura, bladder incontinence, bowel incontinence, chest pain, confusion, diaphoresis, dizziness, fatigue, fever, headaches, light-headedness, neck pain, palpitations, shortness of breath, vertigo or vomiting. Past treatments include acetaminophen and position change. The treatment provided no relief.      Seeing Dr Mcwilliams and states the dry needling is not helping.  Wanting a referral ti Dr Kwong and an MRI of the lumbar spine.    The following portions of the patient's history were reviewed and updated as appropriate: allergies, current medications, past social history and problem list.    Review of Systems   Constitutional: Negative for diaphoresis, fatigue and fever.   Respiratory: Negative for shortness of breath.    Cardiovascular: Negative for chest pain, palpitations and near-syncope.   Gastrointestinal: Negative for abdominal pain, bowel incontinence and vomiting.   Genitourinary: Negative for bladder incontinence.   Musculoskeletal: Positive for back pain. Negative for neck pain.   Neurological: Positive for focal weakness, weakness and loss of balance. Negative for dizziness, vertigo, syncope, light-headedness and headaches.   Psychiatric/Behavioral: Negative for confusion and memory loss.       Objective   /76   Pulse 87   Temp 98.8 °F (37.1  "°C) (Tympanic)   Resp 20   Ht 165.1 cm (65\")   Wt 99.8 kg (220 lb)   LMP  (LMP Unknown)   SpO2 97%   BMI 36.61 kg/m²   Physical Exam   Constitutional: She appears well-developed and well-nourished.   Cardiovascular: Normal rate and regular rhythm.    Pulmonary/Chest: Effort normal and breath sounds normal.   Neurological:   The patient is alert and oriented.  She walks with a stooped gait.  There is some pain with straight leg raising on both sides.  Her deep tendon reflexes are diminished at the knees.   Nursing note and vitals reviewed.      Assessment/Plan   Problem List Items Addressed This Visit        Musculoskeletal and Integument    Lumbar spondylosis without myelopathy/Lumbar facet arthropathy - Primary    Displacement of lumbar intervertebral disc      Other Visit Diagnoses     Need for immunization against influenza            Asian is been managed for her lumbar spondylosis with myelopathy.  She has a history of a displaced lumbar disc.  She is been treated with the previous rhizotomy and has failed to obtain any relief with that.  Therefore we will refer her to neurosurgery.  We will set her up for an MRI in order to verify her need.  We also need to send Poppy's note from August 2018.  He has been providing her pain management.      Drink plenty fluids.    Continue medications as doing.    Refer to Dr Kwong with neurosurgery.  Check an MRI of the Lumbar spine.  Given a high dose flu vaccine.  I also recommended that she obtain a new shingles vaccine through the pharmacy.    Follow up as needed.                Scribed for Dr Shiva Capone by Sol Mcmahon CMA.          I, Shiva Capone MD, personally performed the services described in this documentation, as scribed by Sol Mcmahon in my presence, and is both accurate and complete.    "

## 2018-10-10 ENCOUNTER — HOSPITAL ENCOUNTER (OUTPATIENT)
Dept: MRI IMAGING | Facility: HOSPITAL | Age: 68
Discharge: HOME | End: 2018-10-10
Attending: FAMILY MEDICINE | Admitting: FAMILY MEDICINE

## 2018-10-10 DIAGNOSIS — M51.26 DISPLACEMENT OF LUMBAR INTERVERTEBRAL DISC: ICD-10-CM

## 2018-10-10 DIAGNOSIS — M47.816 SPONDYLOSIS OF LUMBAR REGION WITHOUT MYELOPATHY OR RADICULOPATHY: ICD-10-CM

## 2018-10-10 PROCEDURE — 72148 MRI LUMBAR SPINE W/O DYE: CPT

## 2018-10-22 ENCOUNTER — OFFICE VISIT (OUTPATIENT)
Dept: NEUROSURGERY | Facility: CLINIC | Age: 68
End: 2018-10-22

## 2018-10-22 ENCOUNTER — HOSPITAL ENCOUNTER (OUTPATIENT)
Dept: GENERAL RADIOLOGY | Facility: HOSPITAL | Age: 68
Discharge: HOME | End: 2018-10-22
Attending: NEUROLOGICAL SURGERY | Admitting: NEUROLOGICAL SURGERY

## 2018-10-22 VITALS
WEIGHT: 224.6 LBS | TEMPERATURE: 97.1 F | HEIGHT: 65 IN | DIASTOLIC BLOOD PRESSURE: 72 MMHG | SYSTOLIC BLOOD PRESSURE: 136 MMHG | BODY MASS INDEX: 37.42 KG/M2

## 2018-10-22 DIAGNOSIS — M48.062 SPINAL STENOSIS, LUMBAR REGION, WITH NEUROGENIC CLAUDICATION: ICD-10-CM

## 2018-10-22 DIAGNOSIS — F41.9 ANXIETY AND DEPRESSION: ICD-10-CM

## 2018-10-22 DIAGNOSIS — E66.01 CLASS 2 SEVERE OBESITY DUE TO EXCESS CALORIES WITH SERIOUS COMORBIDITY AND BODY MASS INDEX (BMI) OF 37.0 TO 37.9 IN ADULT (HCC): Primary | ICD-10-CM

## 2018-10-22 DIAGNOSIS — F32.A ANXIETY AND DEPRESSION: ICD-10-CM

## 2018-10-22 PROCEDURE — 72114 X-RAY EXAM L-S SPINE BENDING: CPT

## 2018-10-22 PROCEDURE — 99214 OFFICE O/P EST MOD 30 MIN: CPT | Performed by: NEUROLOGICAL SURGERY

## 2018-10-22 NOTE — PROGRESS NOTES
Subjective     Chief Complaint: Low back pain, bilateral, right greater than left leg pain    Patient ID: Laura Churchill is a 68 y.o. female is here today for follow-up.    Back Pain   This is a chronic problem. The current episode started more than 1 year ago. The problem occurs constantly. The problem has been gradually worsening since onset. The pain is present in the gluteal, lumbar spine and sacro-iliac. The quality of the pain is described as aching. The pain radiates to the right foot and left foot. The pain is at a severity of 7/10. The pain is moderate. The pain is worse during the day. The symptoms are aggravated by coughing and position. Stiffness is present in the morning. Associated symptoms include leg pain and numbness (RLE). Pertinent negatives include no abdominal pain, bladder incontinence, bowel incontinence, chest pain, dysuria, fever, headaches or weakness. Risk factors include obesity. She has tried analgesics and home exercises for the symptoms. The treatment provided no relief.   Leg Pain    Associated symptoms include numbness (RLE).   Hip Pain    Associated symptoms include numbness (RLE).       This is a 68-year-old woman who saw Dr. Graham in 2016 for axial low back pain.  He referred her for pain management and physical therapy.  She really presents today to discuss worsening low back pain and radiating pain into her legs, right greater than left.  She is still in pain management and still undergoing physical therapy.  Her symptoms are not relieved with either of these treatment modalities.    The following portions of the patient's history were reviewed and updated as appropriate: allergies, current medications, past family history, past medical history, past social history, past surgical history and problem list.    Family history:   Family History   Problem Relation Age of Onset   • Ovarian cancer Maternal Aunt 75   • Alzheimer's disease Mother    • Bone cancer Mother    • Melanoma  Father    • Prostate cancer Father    • Diabetes Father    • Heart failure Father         congestive   • Breast cancer Neg Hx        Social history:   Social History     Social History   • Marital status:      Spouse name: N/A   • Number of children: N/A   • Years of education: N/A     Occupational History   • Not on file.     Social History Main Topics   • Smoking status: Never Smoker   • Smokeless tobacco: Never Used   • Alcohol use Yes      Comment: Rarely   • Drug use: No   • Sexual activity: Yes     Partners: Male     Birth control/ protection: Post-menopausal     Other Topics Concern   • Not on file     Social History Narrative   • No narrative on file       Review of Systems   Constitutional: Positive for diaphoresis. Negative for activity change, appetite change, chills, fatigue, fever and unexpected weight change.   HENT: Negative for congestion, dental problem, drooling, ear discharge, ear pain, facial swelling, hearing loss, mouth sores, nosebleeds, postnasal drip, rhinorrhea, sinus pressure, sneezing, sore throat, tinnitus, trouble swallowing and voice change.    Eyes: Negative for photophobia, pain, discharge, redness, itching and visual disturbance.   Respiratory: Positive for apnea. Negative for cough, choking, chest tightness, shortness of breath, wheezing and stridor.    Cardiovascular: Negative for chest pain, palpitations and leg swelling.   Gastrointestinal: Negative for abdominal distention, abdominal pain, anal bleeding, blood in stool, bowel incontinence, constipation, diarrhea, nausea, rectal pain and vomiting.   Endocrine: Negative for cold intolerance, heat intolerance, polydipsia, polyphagia and polyuria.   Genitourinary: Negative for bladder incontinence, decreased urine volume, difficulty urinating, dysuria, enuresis, flank pain, frequency, genital sores, hematuria and urgency.   Musculoskeletal: Positive for arthralgias, back pain and myalgias. Negative for gait problem, joint  "swelling, neck pain and neck stiffness.        BLE radicular pain   Skin: Negative for color change, pallor, rash and wound.   Allergic/Immunologic: Negative for environmental allergies, food allergies and immunocompromised state.   Neurological: Positive for numbness (RLE). Negative for dizziness, tremors, seizures, syncope, facial asymmetry, speech difficulty, weakness, light-headedness and headaches.   Hematological: Negative for adenopathy. Does not bruise/bleed easily.   Psychiatric/Behavioral: Negative for agitation, behavioral problems, confusion, decreased concentration, dysphoric mood, hallucinations, self-injury, sleep disturbance and suicidal ideas. The patient is not nervous/anxious and is not hyperactive.    All other systems reviewed and are negative.      Objective   Blood pressure 136/72, temperature 97.1 °F (36.2 °C), temperature source Temporal Artery , height 165.1 cm (65\"), weight 102 kg (224 lb 9.6 oz), not currently breastfeeding.  Body mass index is 37.38 kg/m².    Physical Exam   Constitutional: She is oriented to person, place, and time. She appears well-developed.  Non-toxic appearance.   HENT:   Head: Normocephalic and atraumatic.   Right Ear: Hearing normal.   Left Ear: Hearing normal.   Nose: Nose normal.   Eyes: Pupils are equal, round, and reactive to light. Conjunctivae, EOM and lids are normal.   Neck: Normal range of motion. No JVD present.   Cardiovascular: Normal rate and regular rhythm.    Pulses:       Radial pulses are 2+ on the right side, and 2+ on the left side.   Pulmonary/Chest: Effort normal. No stridor. No respiratory distress. She has no wheezes.   Musculoskeletal:        Right hip: She exhibits decreased range of motion.        Left hip: She exhibits decreased range of motion and tenderness.   Neurological: She is alert and oriented to person, place, and time. She has normal reflexes. She displays normal reflexes. No cranial nerve deficit. She exhibits normal muscle " tone. She displays a negative Romberg sign. Gait abnormal. Coordination normal. GCS eye subscore is 4. GCS verbal subscore is 5. GCS motor subscore is 6.   Reflex Scores:       Patellar reflexes are 2+ on the right side and 2+ on the left side.       Achilles reflexes are 2+ on the right side and 2+ on the left side.  Antalgic gait   Skin: Skin is warm and dry. No rash noted. No erythema.   Psychiatric: She has a normal mood and affect. Her behavior is normal. Judgment and thought content normal.   Nursing note and vitals reviewed.        Assessment/Plan     Independent Review of Radiographic Studies:      Available for my review is a MRI of the lumbar spine performed on 10/10/18.  A comparison MRI of the lumbar spine is available from 2016.  She has multilevel degenerative disc disease which is more or less unchanged in comparison to her MRI from 2016 with the exception of slight worsening of a high-grade stenosis seen at L3 4, which is slightly worse on the right side as compared to the left.    Medical Decision Making:      This is a 68-year-old woman with obesity, depression, and extensive degenerative changes of the lumbar spine.  She does have some complaints which might be consistent with neurogenic claudication, however she has multiple, found during comorbidities which could easily be explaining her symptoms as well.  Nonetheless, she has failed conservative treatment, and it is possible that a decompression will help her with her leg pain.  I've ordered a flexion-extension series of her lumbar spine.  Assuming that there is no evidence of mobile spondylolisthesis, then I would offer her an L3 4 laminectomy.  I would like to follow up with her after her x-rays of been performed.    Laura was seen today for back pain, leg pain, numbness and hip pain.    Diagnoses and all orders for this visit:    Class 2 severe obesity due to excess calories with serious comorbidity and body mass index (BMI) of 37.0 to 37.9  in adult (CMS/HCC)    Anxiety and depression        No Follow-up on file.           This document signed by EBENEZER Rider MD October 22, 2018 10:42 AM

## 2018-10-29 ENCOUNTER — OFFICE VISIT (OUTPATIENT)
Dept: NEUROSURGERY | Facility: CLINIC | Age: 68
End: 2018-10-29

## 2018-10-29 VITALS
TEMPERATURE: 97.2 F | WEIGHT: 221.2 LBS | SYSTOLIC BLOOD PRESSURE: 110 MMHG | HEIGHT: 65 IN | BODY MASS INDEX: 36.85 KG/M2 | DIASTOLIC BLOOD PRESSURE: 82 MMHG

## 2018-10-29 DIAGNOSIS — M48.062 SPINAL STENOSIS, LUMBAR REGION, WITH NEUROGENIC CLAUDICATION: ICD-10-CM

## 2018-10-29 DIAGNOSIS — M43.10 ACQUIRED SPONDYLOLISTHESIS: Primary | ICD-10-CM

## 2018-10-29 PROCEDURE — 99214 OFFICE O/P EST MOD 30 MIN: CPT | Performed by: NEUROLOGICAL SURGERY

## 2018-10-29 RX ORDER — CHLORHEXIDINE GLUCONATE 4 G/100ML
SOLUTION TOPICAL
Qty: 120 ML | Refills: 0 | Status: ON HOLD | OUTPATIENT
Start: 2018-10-29 | End: 2018-11-20

## 2018-10-29 RX ORDER — PREGABALIN 75 MG/1
150 CAPSULE ORAL ONCE
Status: CANCELLED | OUTPATIENT
Start: 2018-10-29 | End: 2018-10-29

## 2018-10-29 RX ORDER — IBUPROFEN 200 MG
800 TABLET ORAL ONCE
Status: CANCELLED | OUTPATIENT
Start: 2018-10-29 | End: 2018-10-29

## 2018-10-29 RX ORDER — SODIUM CHLORIDE 0.9 % (FLUSH) 0.9 %
3 SYRINGE (ML) INJECTION EVERY 12 HOURS SCHEDULED
Status: CANCELLED | OUTPATIENT
Start: 2018-10-29

## 2018-10-29 RX ORDER — SODIUM CHLORIDE 0.9 % (FLUSH) 0.9 %
3-10 SYRINGE (ML) INJECTION AS NEEDED
Status: CANCELLED | OUTPATIENT
Start: 2018-10-29

## 2018-10-29 RX ORDER — OXYCODONE HCL 10 MG/1
10 TABLET, FILM COATED, EXTENDED RELEASE ORAL ONCE
Status: CANCELLED | OUTPATIENT
Start: 2018-10-29 | End: 2018-10-29

## 2018-10-29 RX ORDER — ACETAMINOPHEN 325 MG/1
1000 TABLET ORAL ONCE
Status: CANCELLED | OUTPATIENT
Start: 2018-10-29 | End: 2018-10-29

## 2018-10-29 NOTE — PROGRESS NOTES
Subjective     Chief Complaint: Low back pain, bilateral leg pain right greater than left    Patient ID: Laura Churchill is a 68 y.o. female is here today for follow-up.    Back Pain   This is a chronic problem. The current episode started more than 1 year ago. The problem occurs constantly. The problem has been gradually worsening since onset. The pain is present in the gluteal, lumbar spine and sacro-iliac. The quality of the pain is described as aching. The pain radiates to the right foot and left foot. The pain is at a severity of 7/10. The pain is moderate. The pain is worse during the day. The symptoms are aggravated by coughing and position. Stiffness is present in the morning. Associated symptoms include leg pain. Pertinent negatives include no abdominal pain, bladder incontinence, bowel incontinence, chest pain, dysuria, fever, headaches, numbness or weakness. Risk factors include obesity. She has tried analgesics and home exercises for the symptoms. The treatment provided no relief.   Leg Pain    Pertinent negatives include no numbness.   Hip Pain    Pertinent negatives include no numbness.       This is a 68-year-old woman who I saw in consultation last week for chronic low back pain and bilateral leg pain.  I ordered flexion and extension films of her lumbar spine because she had a spondylolisthesis and severe central canal stenosis and her MRI.  She presents today to discuss the results of her flexion-extension films.    The following portions of the patient's history were reviewed and updated as appropriate: allergies, current medications, past family history, past medical history, past social history, past surgical history and problem list.    Family history:   Family History   Problem Relation Age of Onset   • Ovarian cancer Maternal Aunt 75   • Alzheimer's disease Mother    • Bone cancer Mother    • Melanoma Father    • Prostate cancer Father    • Diabetes Father    • Heart failure Father          congestive   • Breast cancer Neg Hx        Social history:   Social History     Social History   • Marital status:      Spouse name: N/A   • Number of children: N/A   • Years of education: N/A     Occupational History   • Not on file.     Social History Main Topics   • Smoking status: Never Smoker   • Smokeless tobacco: Never Used   • Alcohol use Yes      Comment: Rarely   • Drug use: No   • Sexual activity: Yes     Partners: Male     Birth control/ protection: Post-menopausal     Other Topics Concern   • Not on file     Social History Narrative   • No narrative on file       Review of Systems   Constitutional: Negative for activity change, appetite change, chills, diaphoresis, fatigue, fever and unexpected weight change.   HENT: Negative for congestion, dental problem, drooling, ear discharge, ear pain, facial swelling, hearing loss, mouth sores, nosebleeds, postnasal drip, rhinorrhea, sinus pressure, sneezing, sore throat, tinnitus, trouble swallowing and voice change.    Eyes: Negative for photophobia, pain, discharge, redness, itching and visual disturbance.   Respiratory: Negative for apnea, cough, choking, chest tightness, shortness of breath, wheezing and stridor.    Cardiovascular: Negative for chest pain, palpitations and leg swelling.   Gastrointestinal: Negative for abdominal distention, abdominal pain, anal bleeding, blood in stool, bowel incontinence, constipation, diarrhea, nausea, rectal pain and vomiting.   Endocrine: Negative for cold intolerance, heat intolerance, polydipsia, polyphagia and polyuria.   Genitourinary: Negative for bladder incontinence, decreased urine volume, difficulty urinating, dysuria, enuresis, flank pain, frequency, genital sores, hematuria and urgency.   Musculoskeletal: Positive for back pain. Negative for arthralgias, gait problem, joint swelling, myalgias, neck pain and neck stiffness.   Skin: Negative for color change, pallor, rash and wound.  "  Allergic/Immunologic: Negative for environmental allergies, food allergies and immunocompromised state.   Neurological: Negative for dizziness, tremors, seizures, syncope, facial asymmetry, speech difficulty, weakness, light-headedness, numbness and headaches.   Hematological: Negative for adenopathy. Does not bruise/bleed easily.   Psychiatric/Behavioral: Negative for agitation, behavioral problems, confusion, decreased concentration, dysphoric mood, hallucinations, self-injury, sleep disturbance and suicidal ideas. The patient is not nervous/anxious and is not hyperactive.        Objective   Blood pressure 110/82, temperature 97.2 °F (36.2 °C), temperature source Temporal Artery , height 165.1 cm (65\"), weight 100 kg (221 lb 3.2 oz), not currently breastfeeding.  Body mass index is 36.81 kg/m².    Physical Exam   Constitutional: She is oriented to person, place, and time. She appears well-developed.  Non-toxic appearance.   HENT:   Head: Normocephalic and atraumatic.   Right Ear: Hearing normal.   Left Ear: Hearing normal.   Nose: Nose normal.   Eyes: Pupils are equal, round, and reactive to light. Conjunctivae, EOM and lids are normal.   Neck: Normal range of motion. No JVD present.   Cardiovascular: Normal rate and regular rhythm.    Pulses:       Radial pulses are 2+ on the right side, and 2+ on the left side.   Pulmonary/Chest: Effort normal. No stridor. No respiratory distress. She has no wheezes.   Musculoskeletal:        Right hip: She exhibits decreased range of motion.        Left hip: She exhibits decreased range of motion and tenderness.   Neurological: She is alert and oriented to person, place, and time. She has normal reflexes. She displays normal reflexes. No cranial nerve deficit. She exhibits normal muscle tone. She displays a negative Romberg sign. Gait abnormal. Coordination normal. GCS eye subscore is 4. GCS verbal subscore is 5. GCS motor subscore is 6.   Reflex Scores:       Patellar " reflexes are 2+ on the right side and 2+ on the left side.       Achilles reflexes are 2+ on the right side and 2+ on the left side.  Antalgic gait   Skin: Skin is warm and dry. No rash noted. No erythema.   Psychiatric: She has a normal mood and affect. Her behavior is normal. Judgment and thought content normal.   Nursing note and vitals reviewed.        Assessment/Plan     Independent Review of Radiographic Studies:      She has a mobile spondylolisthesis of L3 on L4.  She has autofusion with severe degenerative changes at L4 5 and L5-S1.    Medical Decision Making:      This is a 68-year-old woman with a mobile spondylolisthesis of L3 on L4 with severe central canal stenosis secondary to spondylolisthesis and a broad-based disc herniation.  She is a candidate for an L3 4 PLIF.  Informed consent for this procedure was obtained from the patient.  Her  was present in the room.  They acknowledge a risk of nerve root injury, paralysis, weakness, loss sensation, permanent neurologic disability, spinal fluid leak, infection, bleeding, iatrogenic instability, instrumentation failure, failure of benefit of the operation, or need for additional procedures.  All questions were answered.  No guarantees were given or implied.  The patient elects to proceed with surgery.  We will schedule her on an elective basis for an L3 4 PLIF in the near future.    Laura was seen today for back pain.    Diagnoses and all orders for this visit:    Acquired spondylolisthesis    Spinal stenosis, lumbar region, with neurogenic claudication        No Follow-up on file.           This document signed by EBENEZER Rider MD October 29, 2018 10:24 AM    Subjective     Chief Complaint: Low back pain, bilateral leg pain right greater than left    Patient ID: Laura Churchill is a 68 y.o. female is here today for follow-up.    Back Pain   This is a chronic problem. The current episode started more than 1 year ago. The problem occurs  constantly. The problem has been gradually worsening since onset. The pain is present in the gluteal, lumbar spine and sacro-iliac. The quality of the pain is described as aching. The pain radiates to the right foot and left foot. The pain is at a severity of 7/10. The pain is moderate. The pain is worse during the day. The symptoms are aggravated by coughing and position. Stiffness is present in the morning. Associated symptoms include leg pain. Pertinent negatives include no abdominal pain, bladder incontinence, bowel incontinence, chest pain, dysuria, fever, headaches, numbness or weakness. Risk factors include obesity. She has tried analgesics and home exercises for the symptoms. The treatment provided no relief.   Leg Pain    Pertinent negatives include no numbness.   Hip Pain    Pertinent negatives include no numbness.       This is a 68-year-old woman who I saw in consultation last week for chronic low back pain and bilateral leg pain.  I ordered flexion and extension films of her lumbar spine because she had a spondylolisthesis and severe central canal stenosis and her MRI.  She presents today to discuss the results of her flexion-extension films.    The following portions of the patient's history were reviewed and updated as appropriate: allergies, current medications, past family history, past medical history, past social history, past surgical history and problem list.    Family history:   Family History   Problem Relation Age of Onset   • Ovarian cancer Maternal Aunt 75   • Alzheimer's disease Mother    • Bone cancer Mother    • Melanoma Father    • Prostate cancer Father    • Diabetes Father    • Heart failure Father         congestive   • Breast cancer Neg Hx        Social history:   Social History     Social History   • Marital status:      Spouse name: N/A   • Number of children: N/A   • Years of education: N/A     Occupational History   • Not on file.     Social History Main Topics   •  Smoking status: Never Smoker   • Smokeless tobacco: Never Used   • Alcohol use Yes      Comment: Rarely   • Drug use: No   • Sexual activity: Yes     Partners: Male     Birth control/ protection: Post-menopausal     Other Topics Concern   • Not on file     Social History Narrative   • No narrative on file       Review of Systems   Constitutional: Negative for activity change, appetite change, chills, diaphoresis, fatigue, fever and unexpected weight change.   HENT: Negative for congestion, dental problem, drooling, ear discharge, ear pain, facial swelling, hearing loss, mouth sores, nosebleeds, postnasal drip, rhinorrhea, sinus pressure, sneezing, sore throat, tinnitus, trouble swallowing and voice change.    Eyes: Negative for photophobia, pain, discharge, redness, itching and visual disturbance.   Respiratory: Negative for apnea, cough, choking, chest tightness, shortness of breath, wheezing and stridor.    Cardiovascular: Negative for chest pain, palpitations and leg swelling.   Gastrointestinal: Negative for abdominal distention, abdominal pain, anal bleeding, blood in stool, bowel incontinence, constipation, diarrhea, nausea, rectal pain and vomiting.   Endocrine: Negative for cold intolerance, heat intolerance, polydipsia, polyphagia and polyuria.   Genitourinary: Negative for bladder incontinence, decreased urine volume, difficulty urinating, dysuria, enuresis, flank pain, frequency, genital sores, hematuria and urgency.   Musculoskeletal: Positive for back pain. Negative for arthralgias, gait problem, joint swelling, myalgias, neck pain and neck stiffness.   Skin: Negative for color change, pallor, rash and wound.   Allergic/Immunologic: Negative for environmental allergies, food allergies and immunocompromised state.   Neurological: Negative for dizziness, tremors, seizures, syncope, facial asymmetry, speech difficulty, weakness, light-headedness, numbness and headaches.   Hematological: Negative for  "adenopathy. Does not bruise/bleed easily.   Psychiatric/Behavioral: Negative for agitation, behavioral problems, confusion, decreased concentration, dysphoric mood, hallucinations, self-injury, sleep disturbance and suicidal ideas. The patient is not nervous/anxious and is not hyperactive.        Objective   Blood pressure 110/82, temperature 97.2 °F (36.2 °C), temperature source Temporal Artery , height 165.1 cm (65\"), weight 100 kg (221 lb 3.2 oz), not currently breastfeeding.  Body mass index is 36.81 kg/m².    Physical Exam   Constitutional: She is oriented to person, place, and time. She appears well-developed.  Non-toxic appearance.   HENT:   Head: Normocephalic and atraumatic.   Right Ear: Hearing normal.   Left Ear: Hearing normal.   Nose: Nose normal.   Eyes: Pupils are equal, round, and reactive to light. Conjunctivae, EOM and lids are normal.   Neck: Normal range of motion. No JVD present.   Cardiovascular: Normal rate and regular rhythm.    Pulses:       Radial pulses are 2+ on the right side, and 2+ on the left side.   Pulmonary/Chest: Effort normal. No stridor. No respiratory distress. She has no wheezes.   Musculoskeletal:        Right hip: She exhibits decreased range of motion.        Left hip: She exhibits decreased range of motion and tenderness.   Neurological: She is alert and oriented to person, place, and time. She has normal reflexes. She displays normal reflexes. No cranial nerve deficit. She exhibits normal muscle tone. She displays a negative Romberg sign. Gait abnormal. Coordination normal. GCS eye subscore is 4. GCS verbal subscore is 5. GCS motor subscore is 6.   Reflex Scores:       Patellar reflexes are 2+ on the right side and 2+ on the left side.       Achilles reflexes are 2+ on the right side and 2+ on the left side.  Antalgic gait   Skin: Skin is warm and dry. No rash noted. No erythema.   Psychiatric: She has a normal mood and affect. Her behavior is normal. Judgment and thought " content normal.   Nursing note and vitals reviewed.        Assessment/Plan     Independent Review of Radiographic Studies:      She has a mobile spondylolisthesis of L3 on L4.  She has autofusion with severe degenerative changes at L4 5 and L5-S1.    Medical Decision Making:      This is a 68-year-old woman with a mobile spondylolisthesis of L3 on L4 with severe central canal stenosis secondary to spondylolisthesis and a broad-based disc herniation.  She is a candidate for an L3 4 PLIF.  Informed consent for this procedure was obtained from the patient.  Her  was present in the room.  They acknowledge a risk of nerve root injury, paralysis, weakness, loss sensation, permanent neurologic disability, spinal fluid leak, infection, bleeding, iatrogenic instability, instrumentation failure, failure of benefit of the operation, or need for additional procedures.  All questions were answered.  No guarantees were given or implied.  The patient elects to proceed with surgery.  We will schedule her on an elective basis for an L3 4 PLIF in the near future.    Laura was seen today for back pain.    Diagnoses and all orders for this visit:    Acquired spondylolisthesis    Spinal stenosis, lumbar region, with neurogenic claudication        No Follow-up on file.           This document signed by EBENEZER Rider MD October 29, 2018 10:24 AM

## 2018-10-29 NOTE — H&P
Subjective     Chief Complaint: Low back pain, bilateral leg pain right greater than left    Patient ID: Laura Churchill is a 68 y.o. female is here today for follow-up.    Back Pain   This is a chronic problem. The current episode started more than 1 year ago. The problem occurs constantly. The problem has been gradually worsening since onset. The pain is present in the gluteal, lumbar spine and sacro-iliac. The quality of the pain is described as aching. The pain radiates to the right foot and left foot. The pain is at a severity of 7/10. The pain is moderate. The pain is worse during the day. The symptoms are aggravated by coughing and position. Stiffness is present in the morning. Associated symptoms include leg pain. Pertinent negatives include no abdominal pain, bladder incontinence, bowel incontinence, chest pain, dysuria, fever, headaches, numbness or weakness. Risk factors include obesity. She has tried analgesics and home exercises for the symptoms. The treatment provided no relief.   Leg Pain    Pertinent negatives include no numbness.   Hip Pain    Pertinent negatives include no numbness.       This is a 68-year-old woman who I saw in consultation last week for chronic low back pain and bilateral leg pain.  I ordered flexion and extension films of her lumbar spine because she had a spondylolisthesis and severe central canal stenosis and her MRI.  She presents today to discuss the results of her flexion-extension films.    The following portions of the patient's history were reviewed and updated as appropriate: allergies, current medications, past family history, past medical history, past social history, past surgical history and problem list.    Family history:   Family History   Problem Relation Age of Onset   • Ovarian cancer Maternal Aunt 75   • Alzheimer's disease Mother    • Bone cancer Mother    • Melanoma Father    • Prostate cancer Father    • Diabetes Father    • Heart failure Father          congestive   • Breast cancer Neg Hx        Social history:   Social History     Social History   • Marital status:      Spouse name: N/A   • Number of children: N/A   • Years of education: N/A     Occupational History   • Not on file.     Social History Main Topics   • Smoking status: Never Smoker   • Smokeless tobacco: Never Used   • Alcohol use Yes      Comment: Rarely   • Drug use: No   • Sexual activity: Yes     Partners: Male     Birth control/ protection: Post-menopausal     Other Topics Concern   • Not on file     Social History Narrative   • No narrative on file       Review of Systems   Constitutional: Negative for activity change, appetite change, chills, diaphoresis, fatigue, fever and unexpected weight change.   HENT: Negative for congestion, dental problem, drooling, ear discharge, ear pain, facial swelling, hearing loss, mouth sores, nosebleeds, postnasal drip, rhinorrhea, sinus pressure, sneezing, sore throat, tinnitus, trouble swallowing and voice change.    Eyes: Negative for photophobia, pain, discharge, redness, itching and visual disturbance.   Respiratory: Negative for apnea, cough, choking, chest tightness, shortness of breath, wheezing and stridor.    Cardiovascular: Negative for chest pain, palpitations and leg swelling.   Gastrointestinal: Negative for abdominal distention, abdominal pain, anal bleeding, blood in stool, bowel incontinence, constipation, diarrhea, nausea, rectal pain and vomiting.   Endocrine: Negative for cold intolerance, heat intolerance, polydipsia, polyphagia and polyuria.   Genitourinary: Negative for bladder incontinence, decreased urine volume, difficulty urinating, dysuria, enuresis, flank pain, frequency, genital sores, hematuria and urgency.   Musculoskeletal: Positive for back pain. Negative for arthralgias, gait problem, joint swelling, myalgias, neck pain and neck stiffness.   Skin: Negative for color change, pallor, rash and wound.  "  Allergic/Immunologic: Negative for environmental allergies, food allergies and immunocompromised state.   Neurological: Negative for dizziness, tremors, seizures, syncope, facial asymmetry, speech difficulty, weakness, light-headedness, numbness and headaches.   Hematological: Negative for adenopathy. Does not bruise/bleed easily.   Psychiatric/Behavioral: Negative for agitation, behavioral problems, confusion, decreased concentration, dysphoric mood, hallucinations, self-injury, sleep disturbance and suicidal ideas. The patient is not nervous/anxious and is not hyperactive.        Objective   Blood pressure 110/82, temperature 97.2 °F (36.2 °C), temperature source Temporal Artery , height 165.1 cm (65\"), weight 100 kg (221 lb 3.2 oz), not currently breastfeeding.  Body mass index is 36.81 kg/m².    Physical Exam   Constitutional: She is oriented to person, place, and time. She appears well-developed.  Non-toxic appearance.   HENT:   Head: Normocephalic and atraumatic.   Right Ear: Hearing normal.   Left Ear: Hearing normal.   Nose: Nose normal.   Eyes: Pupils are equal, round, and reactive to light. Conjunctivae, EOM and lids are normal.   Neck: Normal range of motion. No JVD present.   Cardiovascular: Normal rate and regular rhythm.    Pulses:       Radial pulses are 2+ on the right side, and 2+ on the left side.   Pulmonary/Chest: Effort normal. No stridor. No respiratory distress. She has no wheezes.   Musculoskeletal:        Right hip: She exhibits decreased range of motion.        Left hip: She exhibits decreased range of motion and tenderness.   Neurological: She is alert and oriented to person, place, and time. She has normal reflexes. She displays normal reflexes. No cranial nerve deficit. She exhibits normal muscle tone. She displays a negative Romberg sign. Gait abnormal. Coordination normal. GCS eye subscore is 4. GCS verbal subscore is 5. GCS motor subscore is 6.   Reflex Scores:       Patellar " reflexes are 2+ on the right side and 2+ on the left side.       Achilles reflexes are 2+ on the right side and 2+ on the left side.  Antalgic gait   Skin: Skin is warm and dry. No rash noted. No erythema.   Psychiatric: She has a normal mood and affect. Her behavior is normal. Judgment and thought content normal.   Nursing note and vitals reviewed.        Assessment/Plan     Independent Review of Radiographic Studies:      She has a mobile spondylolisthesis of L3 on L4.  She has autofusion with severe degenerative changes at L4 5 and L5-S1.    Medical Decision Making:      This is a 68-year-old woman with a mobile spondylolisthesis of L3 on L4 with severe central canal stenosis secondary to spondylolisthesis and a broad-based disc herniation.  She is a candidate for an L3 4 PLIF.  Informed consent for this procedure was obtained from the patient.  Her  was present in the room.  They acknowledge a risk of nerve root injury, paralysis, weakness, loss sensation, permanent neurologic disability, spinal fluid leak, infection, bleeding, iatrogenic instability, instrumentation failure, failure of benefit of the operation, or need for additional procedures.  All questions were answered.  No guarantees were given or implied.  The patient elects to proceed with surgery.  We will schedule her on an elective basis for an L3 4 PLIF in the near future.    Laura was seen today for back pain.    Diagnoses and all orders for this visit:    Acquired spondylolisthesis    Spinal stenosis, lumbar region, with neurogenic claudication        No Follow-up on file.           This document signed by EBENEZER Rider MD October 29, 2018 10:24 AM

## 2018-10-30 PROBLEM — M43.10 ACQUIRED SPONDYLOLISTHESIS: Status: ACTIVE | Noted: 2018-10-30

## 2018-10-30 PROBLEM — M48.062 SPINAL STENOSIS, LUMBAR REGION, WITH NEUROGENIC CLAUDICATION: Status: ACTIVE | Noted: 2018-10-30

## 2018-11-12 ENCOUNTER — APPOINTMENT (OUTPATIENT)
Dept: PREADMISSION TESTING | Facility: HOSPITAL | Age: 68
End: 2018-11-12

## 2018-11-13 ENCOUNTER — APPOINTMENT (OUTPATIENT)
Dept: PREADMISSION TESTING | Facility: HOSPITAL | Age: 68
End: 2018-11-13

## 2018-11-13 VITALS — WEIGHT: 225 LBS | HEIGHT: 65 IN | BODY MASS INDEX: 37.49 KG/M2

## 2018-11-13 DIAGNOSIS — M43.10 ACQUIRED SPONDYLOLISTHESIS: ICD-10-CM

## 2018-11-13 DIAGNOSIS — M48.062 SPINAL STENOSIS, LUMBAR REGION, WITH NEUROGENIC CLAUDICATION: ICD-10-CM

## 2018-11-13 LAB
ANION GAP SERPL CALCULATED.3IONS-SCNC: 4 MMOL/L (ref 3–11)
BACTERIA UR QL AUTO: ABNORMAL /HPF
BILIRUB UR QL STRIP: NEGATIVE
BUN BLD-MCNC: 20 MG/DL (ref 9–23)
BUN/CREAT SERPL: 24.4 (ref 7–25)
CALCIUM SPEC-SCNC: 9 MG/DL (ref 8.7–10.4)
CHLORIDE SERPL-SCNC: 107 MMOL/L (ref 99–109)
CLARITY UR: CLEAR
CO2 SERPL-SCNC: 27 MMOL/L (ref 20–31)
COLOR UR: YELLOW
CREAT BLD-MCNC: 0.82 MG/DL (ref 0.6–1.3)
DEPRECATED RDW RBC AUTO: 43 FL (ref 37–54)
ERYTHROCYTE [DISTWIDTH] IN BLOOD BY AUTOMATED COUNT: 12.7 % (ref 11.3–14.5)
GFR SERPL CREATININE-BSD FRML MDRD: 69 ML/MIN/1.73
GLUCOSE BLD-MCNC: 84 MG/DL (ref 70–100)
GLUCOSE UR STRIP-MCNC: NEGATIVE MG/DL
HBA1C MFR BLD: 6 % (ref 4.8–5.6)
HCT VFR BLD AUTO: 40 % (ref 34.5–44)
HGB BLD-MCNC: 13 G/DL (ref 11.5–15.5)
HGB UR QL STRIP.AUTO: NEGATIVE
HYALINE CASTS UR QL AUTO: ABNORMAL /LPF
KETONES UR QL STRIP: NEGATIVE
LEUKOCYTE ESTERASE UR QL STRIP.AUTO: ABNORMAL
MCH RBC QN AUTO: 30.4 PG (ref 27–31)
MCHC RBC AUTO-ENTMCNC: 32.5 G/DL (ref 32–36)
MCV RBC AUTO: 93.7 FL (ref 80–99)
NITRITE UR QL STRIP: NEGATIVE
PH UR STRIP.AUTO: 7 [PH] (ref 5–8)
PLATELET # BLD AUTO: 230 10*3/MM3 (ref 150–450)
PMV BLD AUTO: 9.4 FL (ref 6–12)
POTASSIUM BLD-SCNC: 4.7 MMOL/L (ref 3.5–5.5)
PROT UR QL STRIP: NEGATIVE
RBC # BLD AUTO: 4.27 10*6/MM3 (ref 3.89–5.14)
RBC # UR: ABNORMAL /HPF
REF LAB TEST METHOD: ABNORMAL
SODIUM BLD-SCNC: 138 MMOL/L (ref 132–146)
SP GR UR STRIP: 1.02 (ref 1–1.03)
SQUAMOUS #/AREA URNS HPF: ABNORMAL /HPF
UROBILINOGEN UR QL STRIP: ABNORMAL
WBC NRBC COR # BLD: 7.02 10*3/MM3 (ref 3.5–10.8)
WBC UR QL AUTO: ABNORMAL /HPF

## 2018-11-13 PROCEDURE — 87081 CULTURE SCREEN ONLY: CPT | Performed by: ANESTHESIOLOGY

## 2018-11-13 PROCEDURE — 81001 URINALYSIS AUTO W/SCOPE: CPT | Performed by: NEUROLOGICAL SURGERY

## 2018-11-13 PROCEDURE — 80048 BASIC METABOLIC PNL TOTAL CA: CPT | Performed by: NEUROLOGICAL SURGERY

## 2018-11-13 PROCEDURE — 93005 ELECTROCARDIOGRAM TRACING: CPT

## 2018-11-13 PROCEDURE — 36415 COLL VENOUS BLD VENIPUNCTURE: CPT

## 2018-11-13 PROCEDURE — 85027 COMPLETE CBC AUTOMATED: CPT | Performed by: NEUROLOGICAL SURGERY

## 2018-11-13 PROCEDURE — 83036 HEMOGLOBIN GLYCOSYLATED A1C: CPT | Performed by: ANESTHESIOLOGY

## 2018-11-13 PROCEDURE — 93010 ELECTROCARDIOGRAM REPORT: CPT | Performed by: INTERNAL MEDICINE

## 2018-11-13 NOTE — PAT
Patient to apply Chlorhexadine wipes  to surgical area (as instructed) the night before procedure and the AM of procedure. Wipes provided.    Chlorhexidine Prescription given during PAT visit, as well as written and verbal instructions given to patient during PAT visit.

## 2018-11-15 LAB — MRSA SPEC QL CULT: NORMAL

## 2018-11-19 ENCOUNTER — ANESTHESIA EVENT (OUTPATIENT)
Dept: PERIOP | Facility: HOSPITAL | Age: 68
End: 2018-11-19

## 2018-11-19 RX ORDER — SODIUM CHLORIDE 0.9 % (FLUSH) 0.9 %
1-10 SYRINGE (ML) INJECTION AS NEEDED
Status: CANCELLED | OUTPATIENT
Start: 2018-11-19

## 2018-11-19 RX ORDER — SODIUM CHLORIDE 0.9 % (FLUSH) 0.9 %
3 SYRINGE (ML) INJECTION EVERY 12 HOURS SCHEDULED
Status: CANCELLED | OUTPATIENT
Start: 2018-11-19

## 2018-11-20 ENCOUNTER — APPOINTMENT (OUTPATIENT)
Dept: GENERAL RADIOLOGY | Facility: HOSPITAL | Age: 68
End: 2018-11-20

## 2018-11-20 ENCOUNTER — ANESTHESIA (OUTPATIENT)
Dept: PERIOP | Facility: HOSPITAL | Age: 68
End: 2018-11-20

## 2018-11-20 ENCOUNTER — HOSPITAL ENCOUNTER (INPATIENT)
Facility: HOSPITAL | Age: 68
LOS: 1 days | Discharge: HOME | End: 2018-11-21
Attending: NEUROLOGICAL SURGERY | Admitting: NEUROLOGICAL SURGERY

## 2018-11-20 DIAGNOSIS — M43.10 ACQUIRED SPONDYLOLISTHESIS: ICD-10-CM

## 2018-11-20 DIAGNOSIS — M48.062 SPINAL STENOSIS, LUMBAR REGION, WITH NEUROGENIC CLAUDICATION: ICD-10-CM

## 2018-11-20 DIAGNOSIS — Z74.09 IMPAIRED FUNCTIONAL MOBILITY, BALANCE, GAIT, AND ENDURANCE: Primary | ICD-10-CM

## 2018-11-20 LAB
HCT VFR BLD AUTO: 36.4 % (ref 34.5–44)
HGB BLD-MCNC: 11.8 G/DL (ref 11.5–15.5)
POTASSIUM BLDA-SCNC: 4.1 MMOL/L (ref 3.5–5.3)

## 2018-11-20 PROCEDURE — 22853 INSJ BIOMECHANICAL DEVICE: CPT | Performed by: PHYSICIAN ASSISTANT

## 2018-11-20 PROCEDURE — 22853 INSJ BIOMECHANICAL DEVICE: CPT | Performed by: NEUROLOGICAL SURGERY

## 2018-11-20 PROCEDURE — 22633 ARTHRD CMBN 1NTRSPC LUMBAR: CPT | Performed by: PHYSICIAN ASSISTANT

## 2018-11-20 PROCEDURE — 25010000002 ONDANSETRON PER 1 MG: Performed by: NURSE ANESTHETIST, CERTIFIED REGISTERED

## 2018-11-20 PROCEDURE — 25010000002 DEXAMETHASONE PER 1 MG: Performed by: NEUROLOGICAL SURGERY

## 2018-11-20 PROCEDURE — 0ST20ZZ RESECTION OF LUMBAR VERTEBRAL DISC, OPEN APPROACH: ICD-10-PCS | Performed by: NEUROLOGICAL SURGERY

## 2018-11-20 PROCEDURE — 61783 SCAN PROC SPINAL: CPT | Performed by: NEUROLOGICAL SURGERY

## 2018-11-20 PROCEDURE — 85018 HEMOGLOBIN: CPT | Performed by: NEUROLOGICAL SURGERY

## 2018-11-20 PROCEDURE — 85014 HEMATOCRIT: CPT | Performed by: NEUROLOGICAL SURGERY

## 2018-11-20 PROCEDURE — 22840 INSERT SPINE FIXATION DEVICE: CPT | Performed by: PHYSICIAN ASSISTANT

## 2018-11-20 PROCEDURE — C1713 ANCHOR/SCREW BN/BN,TIS/BN: HCPCS | Performed by: NEUROLOGICAL SURGERY

## 2018-11-20 PROCEDURE — 25010000002 PROMETHAZINE PER 50 MG: Performed by: NURSE ANESTHETIST, CERTIFIED REGISTERED

## 2018-11-20 PROCEDURE — 25010000003 CEFAZOLIN IN DEXTROSE 2-4 GM/100ML-% SOLUTION: Performed by: NEUROLOGICAL SURGERY

## 2018-11-20 PROCEDURE — 25010000002 PROPOFOL 10 MG/ML EMULSION: Performed by: NURSE ANESTHETIST, CERTIFIED REGISTERED

## 2018-11-20 PROCEDURE — 22840 INSERT SPINE FIXATION DEVICE: CPT | Performed by: NEUROLOGICAL SURGERY

## 2018-11-20 PROCEDURE — 25010000002 NEOSTIGMINE 10 MG/10ML SOLUTION: Performed by: NURSE ANESTHETIST, CERTIFIED REGISTERED

## 2018-11-20 PROCEDURE — 84132 ASSAY OF SERUM POTASSIUM: CPT | Performed by: NEUROLOGICAL SURGERY

## 2018-11-20 PROCEDURE — 22633 ARTHRD CMBN 1NTRSPC LUMBAR: CPT | Performed by: NEUROLOGICAL SURGERY

## 2018-11-20 PROCEDURE — 0SG00AJ FUSION OF LUMBAR VERTEBRAL JOINT WITH INTERBODY FUSION DEVICE, POSTERIOR APPROACH, ANTERIOR COLUMN, OPEN APPROACH: ICD-10-PCS | Performed by: NEUROLOGICAL SURGERY

## 2018-11-20 PROCEDURE — 25010000002 FENTANYL CITRATE (PF) 100 MCG/2ML SOLUTION: Performed by: NURSE ANESTHETIST, CERTIFIED REGISTERED

## 2018-11-20 PROCEDURE — 76000 FLUOROSCOPY <1 HR PHYS/QHP: CPT

## 2018-11-20 PROCEDURE — P9612 CATHETERIZE FOR URINE SPEC: HCPCS

## 2018-11-20 DEVICE — SET SCREW 5440030 4.75 TI NS BRK OFF
Type: IMPLANTABLE DEVICE | Status: FUNCTIONAL
Brand: CD HORIZON® SPINAL SYSTEM

## 2018-11-20 DEVICE — ROD 1475501050 4.75 CCM NS CURV 50MM
Type: IMPLANTABLE DEVICE | Site: SPINE LUMBAR | Status: FUNCTIONAL
Brand: CD HORIZON® SPINAL SYSTEM

## 2018-11-20 DEVICE — WAX BONE HEMO AESCULAP 2.5GM: Type: IMPLANTABLE DEVICE | Status: FUNCTIONAL

## 2018-11-20 DEVICE — DBM T42200 2.5CMX10CM 2 EACH GRAFTON MAT
Type: IMPLANTABLE DEVICE | Status: FUNCTIONAL
Brand: GRAFTON®AND GRAFTON PLUS®DEMINERALIZED BONE MATRIX (DBM)

## 2018-11-20 DEVICE — SPACER 7770823 ELEVATE STD 23X8MM
Type: IMPLANTABLE DEVICE | Site: SPINE LUMBAR | Status: FUNCTIONAL
Brand: ELEVATE™ SPINAL SYSTEM

## 2018-11-20 DEVICE — SCREW 54840016550 CN MAS 6.5X50 CC
Type: IMPLANTABLE DEVICE | Status: FUNCTIONAL
Brand: CD HORIZON® SPINAL SYSTEM

## 2018-11-20 DEVICE — SCREW 54840015550 CN MAS 5.5X50 CC
Type: IMPLANTABLE DEVICE | Status: FUNCTIONAL
Brand: CD HORIZON® SPINAL SYSTEM

## 2018-11-20 DEVICE — SCREW 54840016555 CN MAS 6.5X55 CC
Type: IMPLANTABLE DEVICE | Status: FUNCTIONAL
Brand: CD HORIZON® SPINAL SYSTEM

## 2018-11-20 RX ORDER — LIDOCAINE HYDROCHLORIDE 10 MG/ML
INJECTION, SOLUTION EPIDURAL; INFILTRATION; INTRACAUDAL; PERINEURAL AS NEEDED
Status: DISCONTINUED | OUTPATIENT
Start: 2018-11-20 | End: 2018-11-20 | Stop reason: SURG

## 2018-11-20 RX ORDER — PROMETHAZINE HYDROCHLORIDE 25 MG/1
25 TABLET ORAL ONCE AS NEEDED
Status: COMPLETED | OUTPATIENT
Start: 2018-11-20 | End: 2018-11-20

## 2018-11-20 RX ORDER — SODIUM CHLORIDE 0.9 % (FLUSH) 0.9 %
3 SYRINGE (ML) INJECTION EVERY 12 HOURS SCHEDULED
Status: DISCONTINUED | OUTPATIENT
Start: 2018-11-20 | End: 2018-11-21 | Stop reason: HOSPADM

## 2018-11-20 RX ORDER — NEOSTIGMINE METHYLSULFATE 1 MG/ML
INJECTION, SOLUTION INTRAVENOUS AS NEEDED
Status: DISCONTINUED | OUTPATIENT
Start: 2018-11-20 | End: 2018-11-20 | Stop reason: SURG

## 2018-11-20 RX ORDER — OXYCODONE AND ACETAMINOPHEN 7.5; 325 MG/1; MG/1
1 TABLET ORAL EVERY 4 HOURS PRN
Status: DISCONTINUED | OUTPATIENT
Start: 2018-11-20 | End: 2018-11-21

## 2018-11-20 RX ORDER — LOSARTAN POTASSIUM 25 MG/1
25 TABLET ORAL DAILY
Status: DISCONTINUED | OUTPATIENT
Start: 2018-11-21 | End: 2018-11-21 | Stop reason: HOSPADM

## 2018-11-20 RX ORDER — FAMOTIDINE 20 MG/1
20 TABLET, FILM COATED ORAL
Status: DISCONTINUED | OUTPATIENT
Start: 2018-11-20 | End: 2018-11-20 | Stop reason: HOSPADM

## 2018-11-20 RX ORDER — ONDANSETRON 2 MG/ML
4 INJECTION INTRAMUSCULAR; INTRAVENOUS ONCE AS NEEDED
Status: DISCONTINUED | OUTPATIENT
Start: 2018-11-20 | End: 2018-11-20 | Stop reason: HOSPADM

## 2018-11-20 RX ORDER — OXYCODONE HYDROCHLORIDE 15 MG/1
15 TABLET, FILM COATED, EXTENDED RELEASE ORAL EVERY 12 HOURS SCHEDULED
Status: DISCONTINUED | OUTPATIENT
Start: 2018-11-20 | End: 2018-11-21 | Stop reason: HOSPADM

## 2018-11-20 RX ORDER — PROPOFOL 10 MG/ML
VIAL (ML) INTRAVENOUS CONTINUOUS PRN
Status: DISCONTINUED | OUTPATIENT
Start: 2018-11-20 | End: 2018-11-20 | Stop reason: SURG

## 2018-11-20 RX ORDER — BISACODYL 10 MG
10 SUPPOSITORY, RECTAL RECTAL DAILY PRN
Status: DISCONTINUED | OUTPATIENT
Start: 2018-11-20 | End: 2018-11-21 | Stop reason: HOSPADM

## 2018-11-20 RX ORDER — SENNA AND DOCUSATE SODIUM 50; 8.6 MG/1; MG/1
1 TABLET, FILM COATED ORAL NIGHTLY PRN
Status: DISCONTINUED | OUTPATIENT
Start: 2018-11-20 | End: 2018-11-21 | Stop reason: HOSPADM

## 2018-11-20 RX ORDER — HYDROMORPHONE HYDROCHLORIDE 1 MG/ML
0.5 INJECTION, SOLUTION INTRAMUSCULAR; INTRAVENOUS; SUBCUTANEOUS
Status: DISCONTINUED | OUTPATIENT
Start: 2018-11-20 | End: 2018-11-21 | Stop reason: HOSPADM

## 2018-11-20 RX ORDER — PROPOFOL 10 MG/ML
VIAL (ML) INTRAVENOUS AS NEEDED
Status: DISCONTINUED | OUTPATIENT
Start: 2018-11-20 | End: 2018-11-20 | Stop reason: SURG

## 2018-11-20 RX ORDER — ATORVASTATIN CALCIUM 10 MG/1
10 TABLET, FILM COATED ORAL DAILY
Status: DISCONTINUED | OUTPATIENT
Start: 2018-11-21 | End: 2018-11-21 | Stop reason: HOSPADM

## 2018-11-20 RX ORDER — CARISOPRODOL 350 MG/1
350 TABLET ORAL 4 TIMES DAILY PRN
Status: DISCONTINUED | OUTPATIENT
Start: 2018-11-20 | End: 2018-11-21

## 2018-11-20 RX ORDER — SODIUM CHLORIDE, SODIUM LACTATE, POTASSIUM CHLORIDE, CALCIUM CHLORIDE 600; 310; 30; 20 MG/100ML; MG/100ML; MG/100ML; MG/100ML
INJECTION, SOLUTION INTRAVENOUS CONTINUOUS PRN
Status: DISCONTINUED | OUTPATIENT
Start: 2018-11-20 | End: 2018-11-20 | Stop reason: SURG

## 2018-11-20 RX ORDER — ONDANSETRON 4 MG/1
4 TABLET, FILM COATED ORAL EVERY 6 HOURS PRN
Status: DISCONTINUED | OUTPATIENT
Start: 2018-11-20 | End: 2018-11-21 | Stop reason: HOSPADM

## 2018-11-20 RX ORDER — CEFAZOLIN SODIUM 2 G/100ML
2 INJECTION, SOLUTION INTRAVENOUS EVERY 8 HOURS
Status: COMPLETED | OUTPATIENT
Start: 2018-11-20 | End: 2018-11-21

## 2018-11-20 RX ORDER — CEFAZOLIN SODIUM 2 G/100ML
2 INJECTION, SOLUTION INTRAVENOUS ONCE
Status: DISCONTINUED | OUTPATIENT
Start: 2018-11-20 | End: 2018-11-20 | Stop reason: HOSPADM

## 2018-11-20 RX ORDER — LIDOCAINE HYDROCHLORIDE AND EPINEPHRINE 5; 5 MG/ML; UG/ML
INJECTION, SOLUTION INFILTRATION; PERINEURAL AS NEEDED
Status: DISCONTINUED | OUTPATIENT
Start: 2018-11-20 | End: 2018-11-20 | Stop reason: HOSPADM

## 2018-11-20 RX ORDER — LIDOCAINE HYDROCHLORIDE 10 MG/ML
0.5 INJECTION, SOLUTION EPIDURAL; INFILTRATION; INTRACAUDAL; PERINEURAL ONCE AS NEEDED
Status: COMPLETED | OUTPATIENT
Start: 2018-11-20 | End: 2018-11-20

## 2018-11-20 RX ORDER — OXYCODONE HCL 10 MG/1
10 TABLET, FILM COATED, EXTENDED RELEASE ORAL ONCE
Status: COMPLETED | OUTPATIENT
Start: 2018-11-20 | End: 2018-11-20

## 2018-11-20 RX ORDER — GLYCOPYRROLATE 0.2 MG/ML
INJECTION INTRAMUSCULAR; INTRAVENOUS AS NEEDED
Status: DISCONTINUED | OUTPATIENT
Start: 2018-11-20 | End: 2018-11-20 | Stop reason: SURG

## 2018-11-20 RX ORDER — PROMETHAZINE HYDROCHLORIDE 25 MG/ML
6.25 INJECTION, SOLUTION INTRAMUSCULAR; INTRAVENOUS ONCE AS NEEDED
Status: COMPLETED | OUTPATIENT
Start: 2018-11-20 | End: 2018-11-20

## 2018-11-20 RX ORDER — IBUPROFEN 800 MG/1
800 TABLET ORAL ONCE
Status: COMPLETED | OUTPATIENT
Start: 2018-11-20 | End: 2018-11-20

## 2018-11-20 RX ORDER — ONDANSETRON 2 MG/ML
INJECTION INTRAMUSCULAR; INTRAVENOUS AS NEEDED
Status: DISCONTINUED | OUTPATIENT
Start: 2018-11-20 | End: 2018-11-20 | Stop reason: SURG

## 2018-11-20 RX ORDER — ONDANSETRON 2 MG/ML
4 INJECTION INTRAMUSCULAR; INTRAVENOUS EVERY 6 HOURS PRN
Status: DISCONTINUED | OUTPATIENT
Start: 2018-11-20 | End: 2018-11-21 | Stop reason: HOSPADM

## 2018-11-20 RX ORDER — ACETAMINOPHEN 500 MG
1000 TABLET ORAL ONCE
Status: COMPLETED | OUTPATIENT
Start: 2018-11-20 | End: 2018-11-20

## 2018-11-20 RX ORDER — FENTANYL CITRATE 50 UG/ML
INJECTION, SOLUTION INTRAMUSCULAR; INTRAVENOUS AS NEEDED
Status: DISCONTINUED | OUTPATIENT
Start: 2018-11-20 | End: 2018-11-20 | Stop reason: SURG

## 2018-11-20 RX ORDER — NALOXONE HCL 0.4 MG/ML
0.4 VIAL (ML) INJECTION
Status: DISCONTINUED | OUTPATIENT
Start: 2018-11-20 | End: 2018-11-21 | Stop reason: HOSPADM

## 2018-11-20 RX ORDER — SODIUM CHLORIDE 0.9 % (FLUSH) 0.9 %
3-10 SYRINGE (ML) INJECTION AS NEEDED
Status: DISCONTINUED | OUTPATIENT
Start: 2018-11-20 | End: 2018-11-21 | Stop reason: HOSPADM

## 2018-11-20 RX ORDER — HYDROMORPHONE HYDROCHLORIDE 1 MG/ML
0.5 INJECTION, SOLUTION INTRAMUSCULAR; INTRAVENOUS; SUBCUTANEOUS
Status: DISCONTINUED | OUTPATIENT
Start: 2018-11-20 | End: 2018-11-20 | Stop reason: HOSPADM

## 2018-11-20 RX ORDER — DOCUSATE SODIUM 100 MG/1
100 CAPSULE, LIQUID FILLED ORAL 2 TIMES DAILY PRN
Status: DISCONTINUED | OUTPATIENT
Start: 2018-11-20 | End: 2018-11-21 | Stop reason: HOSPADM

## 2018-11-20 RX ORDER — ROCURONIUM BROMIDE 10 MG/ML
INJECTION, SOLUTION INTRAVENOUS AS NEEDED
Status: DISCONTINUED | OUTPATIENT
Start: 2018-11-20 | End: 2018-11-20 | Stop reason: SURG

## 2018-11-20 RX ORDER — PROMETHAZINE HYDROCHLORIDE 25 MG/1
25 SUPPOSITORY RECTAL ONCE AS NEEDED
Status: COMPLETED | OUTPATIENT
Start: 2018-11-20 | End: 2018-11-20

## 2018-11-20 RX ORDER — VENLAFAXINE HYDROCHLORIDE 75 MG/1
150 CAPSULE, EXTENDED RELEASE ORAL DAILY
Status: DISCONTINUED | OUTPATIENT
Start: 2018-11-21 | End: 2018-11-21 | Stop reason: HOSPADM

## 2018-11-20 RX ORDER — FENTANYL CITRATE 50 UG/ML
50 INJECTION, SOLUTION INTRAMUSCULAR; INTRAVENOUS
Status: DISCONTINUED | OUTPATIENT
Start: 2018-11-20 | End: 2018-11-20 | Stop reason: HOSPADM

## 2018-11-20 RX ORDER — PREGABALIN 150 MG/1
150 CAPSULE ORAL ONCE
Status: COMPLETED | OUTPATIENT
Start: 2018-11-20 | End: 2018-11-20

## 2018-11-20 RX ORDER — SODIUM CHLORIDE, SODIUM LACTATE, POTASSIUM CHLORIDE, CALCIUM CHLORIDE 600; 310; 30; 20 MG/100ML; MG/100ML; MG/100ML; MG/100ML
9 INJECTION, SOLUTION INTRAVENOUS CONTINUOUS PRN
Status: DISCONTINUED | OUTPATIENT
Start: 2018-11-20 | End: 2018-11-21 | Stop reason: HOSPADM

## 2018-11-20 RX ORDER — MAGNESIUM HYDROXIDE 1200 MG/15ML
LIQUID ORAL AS NEEDED
Status: DISCONTINUED | OUTPATIENT
Start: 2018-11-20 | End: 2018-11-20 | Stop reason: HOSPADM

## 2018-11-20 RX ADMIN — DEXAMETHASONE SODIUM PHOSPHATE 10 MG: 4 INJECTION, SOLUTION INTRAMUSCULAR; INTRAVENOUS at 13:19

## 2018-11-20 RX ADMIN — LIDOCAINE HYDROCHLORIDE 0.5 ML: 10 INJECTION, SOLUTION EPIDURAL; INFILTRATION; INTRACAUDAL; PERINEURAL at 10:10

## 2018-11-20 RX ADMIN — FENTANYL CITRATE 50 MCG: 50 INJECTION, SOLUTION INTRAMUSCULAR; INTRAVENOUS at 17:40

## 2018-11-20 RX ADMIN — SODIUM CHLORIDE, POTASSIUM CHLORIDE, SODIUM LACTATE AND CALCIUM CHLORIDE: 600; 310; 30; 20 INJECTION, SOLUTION INTRAVENOUS at 16:15

## 2018-11-20 RX ADMIN — NEOSTIGMINE METHYLSULFATE 3 MG: 1 INJECTION, SOLUTION INTRAVENOUS at 16:17

## 2018-11-20 RX ADMIN — PROPOFOL 25 MCG/KG/MIN: 10 INJECTION, EMULSION INTRAVENOUS at 13:20

## 2018-11-20 RX ADMIN — ONDANSETRON 4 MG: 2 INJECTION INTRAMUSCULAR; INTRAVENOUS at 15:40

## 2018-11-20 RX ADMIN — GLYCOPYRROLATE 0.4 MG: 0.2 INJECTION, SOLUTION INTRAMUSCULAR; INTRAVENOUS at 16:17

## 2018-11-20 RX ADMIN — FENTANYL CITRATE 100 MCG: 50 INJECTION, SOLUTION INTRAMUSCULAR; INTRAVENOUS at 13:07

## 2018-11-20 RX ADMIN — OXYCODONE HYDROCHLORIDE 15 MG: 15 TABLET, FILM COATED, EXTENDED RELEASE ORAL at 21:12

## 2018-11-20 RX ADMIN — CEFAZOLIN SODIUM 2 G: 2 INJECTION, SOLUTION INTRAVENOUS at 21:12

## 2018-11-20 RX ADMIN — PROMETHAZINE HYDROCHLORIDE 6.25 MG: 25 INJECTION INTRAMUSCULAR; INTRAVENOUS at 16:35

## 2018-11-20 RX ADMIN — OXYCODONE HYDROCHLORIDE 10 MG: 10 TABLET, FILM COATED, EXTENDED RELEASE ORAL at 10:10

## 2018-11-20 RX ADMIN — SODIUM CHLORIDE, PRESERVATIVE FREE 3 ML: 5 INJECTION INTRAVENOUS at 21:23

## 2018-11-20 RX ADMIN — FENTANYL CITRATE 100 MCG: 50 INJECTION, SOLUTION INTRAMUSCULAR; INTRAVENOUS at 16:26

## 2018-11-20 RX ADMIN — ROCURONIUM BROMIDE 40 MG: 10 SOLUTION INTRAVENOUS at 13:07

## 2018-11-20 RX ADMIN — SODIUM CHLORIDE, POTASSIUM CHLORIDE, SODIUM LACTATE AND CALCIUM CHLORIDE: 600; 310; 30; 20 INJECTION, SOLUTION INTRAVENOUS at 10:28

## 2018-11-20 RX ADMIN — IBUPROFEN 800 MG: 800 TABLET ORAL at 10:10

## 2018-11-20 RX ADMIN — SODIUM CHLORIDE, POTASSIUM CHLORIDE, SODIUM LACTATE AND CALCIUM CHLORIDE: 600; 310; 30; 20 INJECTION, SOLUTION INTRAVENOUS at 14:09

## 2018-11-20 RX ADMIN — LIDOCAINE HYDROCHLORIDE 50 MG: 10 INJECTION, SOLUTION EPIDURAL; INFILTRATION; INTRACAUDAL; PERINEURAL at 13:07

## 2018-11-20 RX ADMIN — FAMOTIDINE 20 MG: 20 TABLET ORAL at 10:09

## 2018-11-20 RX ADMIN — PROPOFOL 150 MG: 10 INJECTION, EMULSION INTRAVENOUS at 13:07

## 2018-11-20 RX ADMIN — PREGABALIN 150 MG: 150 CAPSULE ORAL at 10:10

## 2018-11-20 RX ADMIN — SODIUM CHLORIDE, POTASSIUM CHLORIDE, SODIUM LACTATE AND CALCIUM CHLORIDE 9 ML/HR: 600; 310; 30; 20 INJECTION, SOLUTION INTRAVENOUS at 10:10

## 2018-11-20 RX ADMIN — ACETAMINOPHEN 1000 MG: 500 TABLET, FILM COATED ORAL at 10:10

## 2018-11-20 NOTE — ANESTHESIA PREPROCEDURE EVALUATION
Anesthesia Evaluation     Patient summary reviewed and Nursing notes reviewed   history of anesthetic complications: PONV  NPO Solid Status: > 8 hours  NPO Liquid Status: > 8 hours           Airway   Mallampati: II  TM distance: >3 FB  Neck ROM: full  No difficulty expected  Dental      Pulmonary    (+) sleep apnea,   (-) COPD (MDI), asthma, shortness of breath, recent URISmoker: remote  Lung cancer: denies MDI   Cardiovascular     (+) hypertension, hyperlipidemia,   (-) past MI, CAD, dysrhythmias, angina, cardiac stents      Neuro/Psych  (-) seizures, TIA    ROS Comment: Spinal stenosis and spondy   Neuro claudic  GI/Hepatic/Renal/Endo    (+) obesity,  GERD,    (-) morbid obesity    Musculoskeletal     Abdominal    Substance History      OB/GYN          Other            Phys Exam Other: Grade 1 view (with Caceres 2) 2017                 Anesthesia Plan    ASA 3     general   (Propofol Infusion as part of Anti PONV tech )  intravenous induction   Anesthetic plan, all risks, benefits, and alternatives have been provided, discussed and informed consent has been obtained with: patient.    Plan discussed with CRNA.

## 2018-11-20 NOTE — ANESTHESIA PROCEDURE NOTES
ANESTHESIA INTUBATION  Urgency: elective      General Information and Staff    Patient location during procedure: OR  CRNA: Zay Kraft CRNA    Indications and Patient Condition  Indications for airway management: airway protection    Preoxygenated: yes  MILS not maintained throughout  Mask difficulty assessment: 0 - not attempted    Final Airway Details  Final airway type: endotracheal airway      Successful airway: ETT  Cuffed: yes   Successful intubation technique: direct laryngoscopy  Endotracheal tube insertion site: oral  Blade: Caceres  Blade size: 2  ETT size (mm): 7.0  Cormack-Lehane Classification: grade I - full view of glottis  Placement verified by: chest auscultation and capnometry   Cuff volume (mL): 8  Measured from: lips  ETT to lips (cm): 21  Number of attempts at approach: 1

## 2018-11-20 NOTE — ANESTHESIA POSTPROCEDURE EVALUATION
Patient: Laura Churchill    Procedure Summary     Date:  11/20/18 Room / Location:   [facility] OR  /  [facility] OR    Anesthesia Start:  1302 Anesthesia Stop:      Procedure:  L3-4 LUMBAR LAMINECTOMY POSTERIOR LUMBAR INTERBODY FUSION PILF (N/A Spine Lumbar) Diagnosis:       Acquired spondylolisthesis      Spinal stenosis, lumbar region, with neurogenic claudication      (Acquired spondylolisthesis [M43.10])      (Spinal stenosis, lumbar region, with neurogenic claudication [M48.062])    Surgeon:  David Rider MD Provider:  Magdy Jaime MD    Anesthesia Type:  general ASA Status:  3          Anesthesia Type: general  Last vitals  BP   145/92 (11/20/18 1002)140/94   Temp   98.5 °F (36.9 °C) (11/20/18 1002)97.6   Pulse   65 (11/20/18 1002)93   Resp   18 (11/20/18 1002)  18   SpO2   97 % (11/20/18 1002)92     Post Anesthesia Care and Evaluation    Patient location during evaluation: PACU  Patient participation: complete - patient participated  Level of consciousness: awake and alert  Pain score: 0  Pain management: adequate  Airway patency: patent  Anesthetic complications: No anesthetic complications  PONV Status: none  Cardiovascular status: hemodynamically stable and acceptable  Respiratory status: nonlabored ventilation, acceptable and nasal cannula  Hydration status: acceptable

## 2018-11-20 NOTE — OP NOTE
Preoperative diagnosis: Lumbosacral radiculopathy, mobile spondylolisthesis at L3 4 with severe central canal stenosis  Postoperative diagnosis: Same    Indication for procedure: This is a 68-year-old woman who presented to my office with chief complaints of bilateral leg pain and severe low back pain.  She had failed conservative treatment.  Preoperative imaging demonstrated a grade 1 spondylolisthesis with severe lateral recess stenosis at L3 4.  Flexion-extension imaging demonstrated a mobile spondylolisthesis at L3 4.  The patient is a candidate for decompression and fusion.  She acknowledges that she is at increased risk of periprocedural complications due to her morbid obesity.    Informed consent for this procedure was obtained from the patient.  She acknowledges risk of nerve root injury, paralysis, weakness, loss of sensation, permanent neurologic disability, bleeding, infection, iatrogenic instability, adjacent level disease, failure of benefit of the operation, spinal fluid leak, instrumentation failure, or need for additional procedures.  All questions were answered.  No guarantees were given or implied.  The patient elects to proceed with surgery.    Procedure in detail: The patient was identified in the preoperative holding area and brought to the operating suite where she underwent the uneventful induction general, endotracheal anesthesia.  Venodyne's were placed by the nursing staff.  The patient was gently rotated to the prone position on chest rolls.  Pressure points were inspected and a properly padded.  The patient's lumbar spine was then prepped and draped in usual, sterile fashion.    A timeout was performed.  Intravenous antibiotics were administered.  The spinal needle was then used to localize the junction between the transverse process and the facet joints at L3 and L4 bilaterally.  Approximately 5 cc of Xylocaine with epinephrine was then infused into the paraspinous musculature.  I was  not able to use the normal neurosurgical cocktail due to the patient's allergy list, which includes buprenorphine.  I then administered an additional 10 cc of local anesthetic with the planned midline, vertically oriented skin incision.  A #15 blade was used to make the skin incision.  An unusual amount of 10 and adipose bleeding was encountered.  Meticulous hemostasis was achieved using bipolar and monopolar cautery.  The fascia was opened using the Bovie and subperiosteal muscular dissection was carried out using the Welch elevators.  This dissection was tedious due to the patient's body habitus, and an unusual amount of oozing which was seen to be coming from the soft tissue.    Ultimately, with difficulty, I was able to expose the lateral masses at L3 and L4.  The reference array was affixed to the inferior aspect of the L2 spinous process.  A 3-dimensional spin was obtained.  Using stereotactic spinal navigation, and using the Medtronic Solera system, the following screws were placed under stereotactic guidance:      L  R  L3  5.5x50  6.5x50  L4  6.5x50  6.5x50  A 40mm millimeter ashley was used on the right and a 35 mm ashley was used on the left.    A complete L3 laminectomy was then performed, providing an adequate central decompression.  The facet joints on the left side were then drilled out removing the superior articulating facet at L4.  I retracted the thecal sac medially exposing the left wrist back of the L3 4 annulus.  The annulus was coagulated and a #11 blade was used to make the annulotomy.  A complete discectomy was carried on the left side using a variety of instruments including the Juliane curettes and pituitary rongeurs.  After trialing, an 8 mm Medtronic elevate cage was packed with harvested allograft and Devan DBM.  The interbody device was inserted into the disc space, and lateral fluoroscopy demonstrated satisfactory position of the implanted device.  The cage was then expanded to its nominal  height of 12 mm.    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  Given the amount of copious bleeding at artery encountered during this case, elected not to proceed with exposing the disc space on the right side.  I decompressed the exiting nerve root as well as the descending nerve root and completed the laminectomy on the right side.  The wound was copiously irrigated with bacitracin saline.  The facet joints were decorticated bilaterally.  The remainder of the allograft/autograft slurry was then packed into the lateral recesses and inserted into the disc space which was still exposed on the left side.    The rods were inserted into the polyaxial heads and tightened according to the 's specifications.  Lateral fluoroscopy demonstrated satisfactory position of the implanted hardware and confirmed the operative level.    A 10 flat SOY drain was left in the subfascial space and tunneled out through separate stab incision.  The fascia and skin were then closed in layers.  Glue and a sterile dressing were then applied.  Sponge, instrument, and needle counts were all correct at the conclusion of the case.  I performed this procedure with the assistance of Lilly Leon, physician assistant

## 2018-11-20 NOTE — INTERVAL H&P NOTE
"Pre-Op H&P (See Recent Office Note Attached for Full H&P)    Chief complaint: Low back pain, bilateral leg plain, right greater than left    Review of Systems:  General ROS:  no fever, chills, rashes, No change since last office visit  Cardiovascular ROS: no chest pain or dyspnea on exertion  Respiratory ROS: no cough, shortness of breath, or wheezing    Meds:    No current facility-administered medications on file prior to encounter.      Current Outpatient Medications on File Prior to Encounter   Medication Sig Dispense Refill   • atorvastatin (LIPITOR) 10 MG tablet Take 1 tablet by mouth Daily. 90 tablet 3   • diclofenac (VOLTAREN) 75 MG EC tablet Take 1 tablet by mouth 2 (Two) Times a Day. 180 tablet 3   • ipratropium (ATROVENT) 0.06 % nasal spray 1 spray into the nostril(s) as directed by provider As Needed.     • losartan (COZAAR) 25 MG tablet Take 1 tablet by mouth Daily. 90 tablet 3   • tiZANidine (ZANAFLEX) 4 MG tablet Take 1 tablet by mouth 2 (Two) Times a Day. (Patient taking differently: Take 4 mg by mouth 2 (Two) Times a Day As Needed.) 180 tablet 0   • venlafaxine XR (EFFEXOR-XR) 150 MG 24 hr capsule Take 1 capsule by mouth Daily. 90 capsule 3   • [DISCONTINUED] chlorhexidine (HIBICLENS) 4 % external liquid Shower each day with solution for 5 days beginning 5 days before surgery. 120 mL 0       Vital Signs:  /92 (BP Location: Right arm, Patient Position: Lying)   Pulse 65   Temp 98.5 °F (36.9 °C) (Tympanic)   Resp 18   Ht 165.1 cm (65\")   Wt 102 kg (225 lb)   LMP  (LMP Unknown)   SpO2 97%   BMI 37.44 kg/m²     Physical Exam:    CV:  S1S2 regular rate and rhythm, no murmur               Resp:  Clear to auscultation; respirations regular, even and unlabored    Results Review:    I reviewed the patient's new clinical results.    Cancer Staging (if applicable)  Cancer Patient: __ yes _x_no __unknown; If yes, clinical stage T:__ N:__M:__, stage group or __N/A    Assessment/Plan:    This is a " 68-year-old woman with a mobile spondylolisthesis of L3 on L4 with severe central canal stenosis secondary to spondylolisthesis and a broad-based disc herniation.  She is a candidate for an L3 4 PLIF.  Informed consent for this procedure was obtained from the patient.  Her  was present in the room.  They acknowledge a risk of nerve root injury, paralysis, weakness, loss sensation, permanent neurologic disability, spinal fluid leak, infection, bleeding, iatrogenic instability, instrumentation failure, failure of benefit of the operation, or need for additional procedures.  All questions were answered.  No guarantees were given or implied.  The patient elects to proceed with surgery.  We will schedule her on an elective basis for an L3 4 PLIF in the near future.    Destini Wiley, REGINO  11/20/2018   10:25 AM

## 2018-11-20 NOTE — BRIEF OP NOTE
LUMBAR DISCECTOMY POSTERIOR WITH FUSION INSTRUMENTATION  Progress Note    Laura Churchill  11/20/2018    Pre-op Diagnosis:   Acquired spondylolisthesis [M43.10]  Spinal stenosis, lumbar region, with neurogenic claudication [M48.062]       Post-Op Diagnosis Codes:     * Acquired spondylolisthesis [M43.10]     * Spinal stenosis, lumbar region, with neurogenic claudication [M48.062]    Procedure/CPT® Codes:      Procedure(s):  L3-4 LUMBAR LAMINECTOMY POSTERIOR LUMBAR INTERBODY FUSION PILF    Surgeon(s):  David Rider MD    Anesthesia: General    Staff:   Circulator: Starla Duffy RN; Isatu Anne RN  Radiology Technologist: Richie Wu RT  Scrub Person: Jenny Potts; Josh Serrano  Vendor Representative: Liu Flood  Nursing Assistant: Renetta Childs  Assistant: Allie Miller PA-C    Estimated Blood Loss: 150 mL    Urine Voided: * No values recorded between 11/20/2018  1:02 PM and 11/20/2018  4:27 PM *    Specimens:                None      Drains:      Findings: Uncomplicated L3 4 posterior lumbar interbody fusion with posterior instrumentation.  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

## 2018-11-21 VITALS
HEART RATE: 75 BPM | HEIGHT: 65 IN | BODY MASS INDEX: 37.49 KG/M2 | SYSTOLIC BLOOD PRESSURE: 103 MMHG | TEMPERATURE: 98.4 F | OXYGEN SATURATION: 90 % | RESPIRATION RATE: 16 BRPM | WEIGHT: 225 LBS | DIASTOLIC BLOOD PRESSURE: 52 MMHG

## 2018-11-21 LAB
HCT VFR BLD AUTO: 39.5 % (ref 34.5–44)
HGB BLD-MCNC: 12.6 G/DL (ref 11.5–15.5)

## 2018-11-21 PROCEDURE — 99024 POSTOP FOLLOW-UP VISIT: CPT | Performed by: NEUROLOGICAL SURGERY

## 2018-11-21 PROCEDURE — 85018 HEMOGLOBIN: CPT | Performed by: NEUROLOGICAL SURGERY

## 2018-11-21 PROCEDURE — 97161 PT EVAL LOW COMPLEX 20 MIN: CPT

## 2018-11-21 PROCEDURE — 25010000003 CEFAZOLIN IN DEXTROSE 2-4 GM/100ML-% SOLUTION: Performed by: NEUROLOGICAL SURGERY

## 2018-11-21 PROCEDURE — 85014 HEMATOCRIT: CPT | Performed by: NEUROLOGICAL SURGERY

## 2018-11-21 PROCEDURE — 97110 THERAPEUTIC EXERCISES: CPT

## 2018-11-21 RX ORDER — OXYCODONE AND ACETAMINOPHEN 7.5; 325 MG/1; MG/1
1 TABLET ORAL
Status: DISCONTINUED | OUTPATIENT
Start: 2018-11-21 | End: 2018-11-21 | Stop reason: HOSPADM

## 2018-11-21 RX ORDER — CARISOPRODOL 350 MG/1
350 TABLET ORAL EVERY 6 HOURS
Status: DISCONTINUED | OUTPATIENT
Start: 2018-11-21 | End: 2018-11-21 | Stop reason: HOSPADM

## 2018-11-21 RX ORDER — OXYCODONE AND ACETAMINOPHEN 7.5; 325 MG/1; MG/1
1 TABLET ORAL
Qty: 45 TABLET | Refills: 0 | Status: SHIPPED | OUTPATIENT
Start: 2018-11-21 | End: 2018-12-27

## 2018-11-21 RX ORDER — OXYCODONE HCL 10 MG/1
10 TABLET, FILM COATED, EXTENDED RELEASE ORAL EVERY 12 HOURS SCHEDULED
Qty: 28 TABLET | Refills: 0 | Status: SHIPPED | OUTPATIENT
Start: 2018-11-21 | End: 2018-12-27

## 2018-11-21 RX ORDER — CARISOPRODOL 350 MG/1
350 TABLET ORAL EVERY 6 HOURS
Qty: 60 TABLET | Refills: 0 | Status: SHIPPED | OUTPATIENT
Start: 2018-11-21 | End: 2018-12-27

## 2018-11-21 RX ADMIN — CARISOPRODOL 350 MG: 350 TABLET ORAL at 09:15

## 2018-11-21 RX ADMIN — OXYCODONE HYDROCHLORIDE AND ACETAMINOPHEN 1 TABLET: 7.5; 325 TABLET ORAL at 04:40

## 2018-11-21 RX ADMIN — ATORVASTATIN CALCIUM 10 MG: 10 TABLET, FILM COATED ORAL at 08:04

## 2018-11-21 RX ADMIN — CARISOPRODOL 350 MG: 350 TABLET ORAL at 15:17

## 2018-11-21 RX ADMIN — VENLAFAXINE HYDROCHLORIDE 150 MG: 75 CAPSULE, EXTENDED RELEASE ORAL at 08:04

## 2018-11-21 RX ADMIN — OXYCODONE HYDROCHLORIDE AND ACETAMINOPHEN 1 TABLET: 7.5; 325 TABLET ORAL at 10:10

## 2018-11-21 RX ADMIN — OXYCODONE HYDROCHLORIDE AND ACETAMINOPHEN 1 TABLET: 7.5; 325 TABLET ORAL at 13:09

## 2018-11-21 RX ADMIN — CEFAZOLIN SODIUM 2 G: 2 INJECTION, SOLUTION INTRAVENOUS at 04:37

## 2018-11-21 RX ADMIN — LOSARTAN POTASSIUM 25 MG: 25 TABLET, FILM COATED ORAL at 08:04

## 2018-11-21 RX ADMIN — OXYCODONE HYDROCHLORIDE 15 MG: 15 TABLET, FILM COATED, EXTENDED RELEASE ORAL at 08:04

## 2018-11-21 NOTE — PLAN OF CARE
Problem: Patient Care Overview  Goal: Plan of Care Review  Outcome: Ongoing (interventions implemented as appropriate)   11/21/18 8465   Coping/Psychosocial   Plan of Care Reviewed With patient   Plan of Care Review   Progress improving   OTHER   Outcome Summary Pt ambualtes 160 ft and up/down 10 steps with 1 HR. For first 80 ft pt had tendency to furniture walk. Pt is switched to walker and ambulates much more safely/confidently. Pt Recommended to have RWx to return home. Pt performs ther ex well, verbalizes understanding of precautions and performs log roll with CGA.

## 2018-11-21 NOTE — THERAPY EVALUATION
Acute Care - Physical Therapy Initial Evaluation  [facility]     Patient Name: Laura Churchill  : 1950  MRN: [id number]  Today's Date: 2018   Onset of Illness/Injury or Date of Surgery: 18  Date of Referral to PT: 18  Referring Physician: MD Tereso      Admit Date: 2018    Visit Dx:     ICD-10-CM ICD-9-CM   1. Impaired functional mobility, balance, gait, and endurance Z74.09 V49.89   2. Acquired spondylolisthesis M43.10 738.4   3. Spinal stenosis, lumbar region, with neurogenic claudication M48.062 724.03     Patient Active Problem List   Diagnosis   • Essential hypertension   • Hyperlipidemia   • Obstructive sleep apnea syndrome   • Osteoarthritis of knee   • Osteoporosis   • Lumbar spondylosis without myelopathy/Lumbar facet arthropathy   • Displacement of lumbar intervertebral disc   • Stenosis of lateral recess of lumbar spine   • Physical deconditioning   • Morbid obesity due to excess calories (CMS/HCC)   • Anxiety and depression   • Sacroiliac joint dysfunction of right side   • Greater trochanteric bursitis of right hip   • Iliotibial band syndrome of right side   • Spinal stenosis   • Osteoarthritis   • Obesity due to excess calories   • Menopause   • Mixed hyperlipidemia   • Lumbosacral disc disease   • Low back pain   • Joint pain   • Extremity pain   • Chronic pain disorder   • Back problem   • Vaginal atrophy   • Primary localized osteoarthrosis of shoulder region   • Status post total left shoulder arthroplasty   • Sciatica   • Acquired spondylolisthesis   • Spinal stenosis, lumbar region, with neurogenic claudication     Past Medical History:   Diagnosis Date   • Anxiety    • Atrophic vaginitis    • Chronic pain disorder    • Gallstones    • GERD (gastroesophageal reflux disease)    • Hypertension    • Joint pain 2016    lt shoulder   • Knee pain, right    • Low back pain    • Lumbar stenosis     L4-5    • Lumbosacral disc disease    • Menopause    • Migraine  headache    • Mixed hyperlipidemia    • Muscle spasm    • Obesity due to excess calories    • MOOKIE (obstructive sleep apnea)     NO CPAP USE   • Osteoarthritis    • PONV (postoperative nausea and vomiting)    • Spinal stenosis    • Tendinitis of right shoulder    • Wears glasses      Past Surgical History:   Procedure Laterality Date   • CHOLECYSTECTOMY     • COLONOSCOPY     • ENDOSCOPY     • GASTRIC BANDING REMOVAL     • GASTRIC SLEEVE LAPAROSCOPIC  2011   • HIP PINNING Left     3 pins   • LAPAROSCOPIC GASTRIC BANDING     • LAPAROTOMY OOPHERECTOMY Right         PT ASSESSMENT (last 12 hours)      Physical Therapy Evaluation     Row Name 11/21/18 0925          PT Evaluation Time/Intention    Subjective Information  no complaints  -EJ     Document Type  evaluation  -EJ     Mode of Treatment  physical therapy  -EJ     Patient Effort  excellent  -EJ     Symptoms Noted During/After Treatment  increased pain  -EJ     Row Name 11/21/18 0961          General Information    Patient Profile Reviewed?  yes  -EJ     Onset of Illness/Injury or Date of Surgery  11/20/18  -EJ     Referring Physician  MD Tereso  -EJ     Patient Observations  alert;cooperative;agree to therapy  -EJ     Patient/Family Observations   enters after  mobility portion  -EJ     General Observations of Patient  pt supine in bed, SOY drain intact. Reports increased pain during sitting prior after walking with RN. Easing with medication and lying in bed  -EJ     Prior Level of Function  max assist:;home management;cleaning;independent:;driving;all household mobility;ADL's walking, IADLs, limited by numbness in LEs and pain in back  -EJ     Equipment Currently Used at Home  none  -EJ     Pertinent History of Current Functional Problem  Pt presented with mobile spondylolisthesis of L 3 on L4 with severe canal stenosis 2/2 spondylolisthesis and broad bsed disc herniation. She is now s/p L3-4 lumbarlaminectomy with PLIF.  -EJ     Existing  Precautions/Restrictions  spinal SOY drain  -EJ     Risks Reviewed  patient:;LOB;dizziness;increased discomfort  -EJ     Row Name 11/21/18 0925          Relationship/Environment    Lives With  spouse  -     Row Name 11/21/18 0925          Resource/Environmental Concerns    Current Living Arrangements  home/apartment/condo  -     Row Name 11/21/18 0925          Home Main Entrance    Number of Stairs, Main Entrance  two  -     Row Name 11/21/18 0925          Stairs Within Home, Primary    Stairs, Within Home, Primary  12  -EJ     Stair Railings, Within Home, Primary  railing on right side (ascending)  -EJ     Stairs Comment, Within Home, Primary  to bed and bath  -EJ     Row Name 11/21/18 0925          Cognitive Assessment/Intervention- PT/OT    Orientation Status (Cognition)  oriented x 4  -EJ     Follows Commands (Cognition)  WNL  -EJ     Safety Deficit (Cognitive)  insight into deficits/self awareness;safety precautions awareness;safety precautions follow-through/compliance;mild deficit  -     Row Name 11/21/18 0925          Safety Issues, Functional Mobility    Safety Issues Affecting Function (Mobility)  safety precautions follow-through/compliance;safety precaution awareness;positioning of assistive device;insight into deficits/self awareness  -     Row Name 11/21/18 0925          Bed Mobility Assessment/Treatment    Bed Mobility Assessment/Treatment  rolling right;sidelying-sit;sit-sidelying  -EJ     Rolling Right [place] (Bed Mobility)  contact guard  -EJ     Sidelying-Sit [place] (Bed Mobility)  contact guard  -EJ     Sit-Sidelying [place] (Bed Mobility)  minimum assist (75% patient effort)  -EJ     Comment (Bed Mobility)  BR used for exiting bed, MIN A given with pt feet into bed, with cueing for seuqneicng.  -     Row Name 11/21/18 0925          Transfer Assessment/Treatment    Transfer Assessment/Treatment  sit-stand transfer;stand-sit transfer  -EJ     Sit-Stand [place]  (Transfers)  contact guard  -EJ     Stand-Sit [place] (Transfers)  supervision  -     Row Name 11/21/18 0925          Gait/Stairs Assessment/Training    [place] Level (Gait)  contact guard  -EJ     Assistive Device (Gait)  other (see comments) no AD initially, but grabs to hand rail/objects; RWx 2nd 1/2  -EJ     Distance in Feet (Gait)  160  -EJ     Pattern (Gait)  step-through  -EJ     Deviations/Abnormal Patterns (Gait)  stride length decreased slow careful, symmetrical gait  -EJ     [place] Level (Stairs)  contact guard  -EJ     Handrail Location (Stairs)  right side (ascending)  -EJ     Number of Steps (Stairs)  10  -EJ     Ascending Technique (Stairs)  step-over-step  -EJ     Descending Technique (Stairs)  step-over-step  -EJ     Maintains Weight-bearing Status (Stairs)  able to maintain  -EJ     Comment (Gait/Stairs)  Pt ambulates with small step through gait pattern, initially attempts furniture walking in room, progresses to walking with UE support but reports increased pain and takes smaller steps. Pt uses HR when able in barrios. Pt then ambulates stairs with 1 HR, returns to room using RWx for increased fluidity and comfort. Recommended to d/c home with walker.   -     Row Name 11/21/18 0925          General ROM    GENERAL ROM COMMENTS  BLE WNL  -     Row Name 11/21/18 0925          MMT (Manual Muscle Testing)    General MMT Comments  functionally 4/5  -     Row Name 11/21/18 0925          Motor Assessment/Intervention    Additional Documentation  Therapeutic Exercise (Group);Therapeutic Exercise Interventions (Group)  -     Row Name 11/21/18 0925          Therapeutic Exercise    44828 - PT Therapeutic Exercise Minutes  15  -     Row Name 11/21/18 0925          Therapeutic Exercise    Lower Extremity (Therapeutic Exercise)  heel slides, bilateral;gluteal sets;quad sets, bilateral  -     Lower Extremity Range of Motion (Therapeutic Exercise)  ankle dorsiflexion/plantar flexion,  bilateral;hip internal/external rotation, bilateral  -EJ     Core Strength (Therapeutic Exercise)  abdominal bracing BKFO, Arms overhead.  -EJ     Comment (Therapeutic Exercise)  Pt performs day one ther ex program x10 reps and exercises of day two 2 reps for demonstration due to expected d/c.  -EJ     Row Name 11/21/18 0925          Sensory Assessment/Intervention    Sensory General Assessment  no sensation deficits identified  -EJ     Row Name 11/21/18 0925          Pain Assessment    Additional Documentation  Pain Scale: Numbers Pre/Post-Treatment (Group)  -EJ     Row Name 11/21/18 0925          Pain Scale: Numbers Pre/Post-Treatment    Pain Scale: Numbers, Pretreatment  6/10  -EJ     Pain Scale: Numbers, Post-Treatment  8/10  -EJ     Pain Location  back  -EJ     Row Name             Wound 11/20/18 1344 Bilateral back incision    Wound - Properties Group Date first assessed: 11/20/18  -TV Time first assessed: 1344  -TV Side: Bilateral  -TV Location: back  -TV Type: incision  -TV    Row Name 11/21/18 0925          Coping    Observed Emotional State  accepting;cooperative;calm  -EJ     Verbalized Emotional State  acceptance  -EJ     Row Name 11/21/18 0925          Plan of Care Review    Plan of Care Reviewed With  patient  -EJ     Row Name 11/21/18 0925          Physical Therapy Clinical Impression    Date of Referral to PT  11/20/18  -EJ     PT Diagnosis (PT Clinical Impression)  decreased independence with mobility after back sx with precautions.   -EJ     Criteria for Skilled Interventions Met (PT Clinical Impression)  yes;treatment indicated  -EJ     Rehab Potential (PT Clinical Summary)  good, to achieve stated therapy goals  -EJ     Patient/Family Concerns, Anticipated Discharge Disposition (PT)  pt reports wanting RWx for increased safety at home.  -     Row Name 11/21/18 0925          Physical Therapy Goals    Bed Mobility Goal Selection (PT)  bed mobility, PT goal 1  -EJ     Gait Training Goal Selection  (PT)  gait training, PT goal 1  -EJ     Row Name 11/21/18 0925          Bed Mobility Goal 1 (PT)    Activity/Assistive Device (Bed Mobility Goal 1, PT)  sit to supine/supine to sit  -EJ     Mobile Level/Cues Needed (Bed Mobility Goal 1, PT)  independent  -EJ     Time Frame (Bed Mobility Goal 1, PT)  long term goal (LTG);2 days  -EJ     Row Name 11/21/18 0925          Gait Training Goal 1 (PT)    Activity/Assistive Device (Gait Training Goal 1, PT)  gait (walking locomotion)  -EJ     Mobile Level (Gait Training Goal 1, PT)  independent  -EJ     Time Frame (Gait Training Goal 1, PT)  5 days  -EJ     Row Name 11/21/18 0925          Patient Education Goal (PT)    Activity (Patient Education Goal, PT)  precautions, HEP  -EJ     Mobile/Cues/Accuracy (Memory Goal 2, PT)  verbalizes understanding  -EJ     Time Frame (Patient Education Goal, PT)  short term goal (STG);1 day  -EJ     Progress/Outcome (Patient Education Goal, PT)  goal met  -EJ     Row Name 11/21/18 0925          Positioning and Restraints    Pre-Treatment Position  in bed  -EJ     Post Treatment Position  bed  -EJ     In Bed  call light within reach;encouraged to call for assist;with family/caregiver;supine  -EJ     Row Name 11/21/18 0925          Living Environment    Home Accessibility  stairs to enter home;stairs within home  -EJ       User Key  (r) = Recorded By, (t) = Taken By, (c) = Cosigned By    Initials Name Provider Type    Ledy Sethi PT Physical Therapist    Isatu Bustillos RN Registered Nurse          PT Recommendation and Plan  Anticipated Discharge Disposition (PT): home with assist  Planned Therapy Interventions (PT Eval): balance training, bed mobility training, gait training, home exercise program, transfer training, stretching, strengthening, stair training, ROM (range of motion), neuromuscular re-education, patient/family education  Therapy Frequency (PT Clinical Impression): evaluation only(unless has  continued stay)  Outcome Summary/Treatment Plan (PT)  Anticipated Discharge Disposition (PT): home with assist  Patient/Family Concerns, Anticipated Discharge Disposition (PT): pt reports wanting RWx for increased safety at home.  Plan of Care Reviewed With: patient  Progress: improving  Outcome Summary: Pt ambualtes 160 ft and up/down 10 steps with 1 HR. For first 80 ft pt had tendency to furniture walk. Pt is switched to walker and ambulates much more safely/confidently. Pt Recommended to have RWx to return home. Pt performs ther ex well, verbalizes understanding of precautions and performs log roll with CGA.   Outcome Measures     Row Name 11/21/18 1100 11/21/18 0925          How much help from another person do you currently need...    Turning from your back to your side while in flat bed without using bedrails?  --  3  -EJ     Moving from lying on back to sitting on the side of a flat bed without bedrails?  --  3  -EJ     Moving to and from a bed to a chair (including a wheelchair)?  --  4  -EJ     Standing up from a chair using your arms (e.g., wheelchair, bedside chair)?  --  4  -EJ     Climbing 3-5 steps with a railing?  --  4  -EJ     To walk in hospital room?  --  4 with AD  -     AM-PAC 6 Clicks Score  --  22  -EJ        Functional Assessment    Outcome Measure Options  --  -EJ  AM-PAC 6 Clicks Basic Mobility (PT)  -EJ       User Key  (r) = Recorded By, (t) = Taken By, (c) = Cosigned By    Initials Name Provider Type    Ledy Sethi PT Physical Therapist         Time Calculation:   PT Charges     Row Name 11/21/18 0925             Time Calculation    Start Time  0925  -EJ      PT Received On  11/21/18  -      PT Goal Re-Cert Due Date  12/01/18  -         Time Calculation- PT    Total Timed Code Minutes- PT  15 minute(s)  -EJ         Timed Charges    09805 - PT Therapeutic Exercise Minutes  15  -EJ        User Key  (r) = Recorded By, (t) = Taken By, (c) = Cosigned By    Initials Name  Provider Type    EJ Ledy Rodas, PT Physical Therapist        Therapy Suggested Charges     Code   Minutes Charges    02529 (CPT®) Hc Pt Neuromusc Re Education Ea 15 Min      71374 (CPT®) Hc Pt Ther Proc Ea 15 Min 15 1    05737 (CPT®) Hc Gait Training Ea 15 Min      92485 (CPT®) Hc Pt Therapeutic Act Ea 15 Min      63115 (CPT®) Hc Pt Manual Therapy Ea 15 Min      76116 (CPT®) Hc Pt Iontophoresis Ea 15 Min      50337 (CPT®) Hc Pt Elec Stim Ea-Per 15 Min      08809 (CPT®) Hc Pt Ultrasound Ea 15 Min      97157 (CPT®) Hc Pt Self Care/Mgmt/Train Ea 15 Min      12181 (CPT®) Hc Pt Prosthetic (S) Train Initial Encounter, Each 15 Min      23887 (CPT®) Hc Pt Orthotic(S)/Prosthetic(S) Encounter, Each 15 Min      05490 (CPT®) Hc Orthotic(S) Mgmt/Train Initial Encounter, Each 15min      Total  15 1        Therapy Charges for Today     Code Description Service Date Service Provider Modifiers Qty    52490936197 HC PT THER PROC EA 15 MIN 11/21/2018 Ledy Rodas, PT GP 1    15631310892 HC PT EVAL LOW COMPLEXITY 4 11/21/2018 Ledy Rodas, PT GP 1    31337814405 HC PT THER SUPP EA 15 MIN 11/21/2018 Ledy Rodas, PT GP 1          PT G-Codes  Outcome Measure Options: AM-PAC 6 Clicks Basic Mobility (PT)  AM-PAC 6 Clicks Score: 22      Ledy Man, PT  11/21/2018

## 2018-11-21 NOTE — PLAN OF CARE
Problem: Patient Care Overview  Goal: Plan of Care Review  Outcome: Ongoing (interventions implemented as appropriate)   11/21/18 0500   Coping/Psychosocial   Plan of Care Reviewed With patient   Plan of Care Review   Progress no change   OTHER   Outcome Summary Pt admitted this evening for lami. Pt was verty drowsy during beginning of shift.  states it is d/t phenergan that she received in recovery. Pt VSS throughout shift, and pain has been somewhat controlled w PO pain meds. D/t pt drowsiness, we have stuck to PO pain meds. Pt has gotten up to BSC once, and had little UOP. Pt was I&O cathed, and 600 ml's emptied. Covaderm has small drainage noted on it, and SOY draining. Will continue to monitor.        Problem: Laminectomy/Foraminotomy/Discectomy (Adult)  Goal: Signs and Symptoms of Listed Potential Problems Will be Absent, Minimized or Managed (Laminectomy/Foraminotomy/Discectomy)  Outcome: Ongoing (interventions implemented as appropriate)      Problem: Fall Risk (Adult)  Goal: Identify Related Risk Factors and Signs and Symptoms  Outcome: Ongoing (interventions implemented as appropriate)    Goal: Absence of Fall  Outcome: Ongoing (interventions implemented as appropriate)

## 2018-11-21 NOTE — DISCHARGE PLACEMENT REQUEST
"Laura Churchill (68 y.o. Female)     BOB Chance Case Manager, [phone]      [address] [address]  Dept. Phone:  [phone]  Dept. Fax:   Date Ordered: 2018         Patient:  Laura Churchill MRN:  [id number]   [address] [facility] [address] :  1950  SSN:    Phone: [phone] Sex:  F     Weight: 102 kg (225 lb)         Ht Readings from Last 1 Encounters:   18 165.1 cm (65\")         Walker               (Order ID: [id number])    Diagnosis:  Impaired functional mobility, balance, gait, and endurance (Z74.09 [ICD-10-CM] V49.89 [ICD-9-CM])   Quantity:  1     Equipment:  Walker Folding with Wheels  Length of Need (99 Months = Lifetime): 99 Months = Lifetime        Authorizing Provider's Phone: [phone]   Verbal Order Mode: Per protocol: cosign required  Authorizing Provider: David Rider MD  Authorizing Provider's NPI: [id number]     Order Entered By: Meron Montalvo RN 2018  3:22 PM                    Date of Birth Social Security Number Address Home Phone MRN    1950  [address] [facility] [place] [address] [phone] [id number]    Gnosticism Marital Status          Moravian        Admission Date Admission Type Admitting Provider Attending Provider Department, Room/Bed    18 Elective David Rider MD Newman, Charles Benjamin, MD [address], S378/1    Discharge Date Discharge Disposition Discharge Destination         Home or Self Care              Attending Provider:  David Rider MD    Allergies:  Codeine, Hydrocodone, Sulfa Antibiotics    Isolation:  None   Infection:  None   Code Status:  CPR    Ht:  165.1 cm (65\")   Wt:  102 kg (225 lb)    Admission Cmt:  None   Principal Problem:  None                Active Insurance as of 2018     Primary Coverage     Payor Plan Insurance Group Employer/Plan Group    UNITED " HEALTHCARE MEDICARE REPLACEMENT [facility] [id number]     Payor Plan Address Payor Plan Phone Number Payor Plan Fax Number Effective Dates    [address] [address]   1/1/2016 - None Entered    [facility] [id number]       Subscriber Name Subscriber Birth Date Member ID       LAURA WAITE 1950 [id number]                 Emergency Contacts      (Rel.) Home Phone Work Phone Mobile Phone    Hollis Waite (Spouse) [phone] -- [phone]            Insurance Information                [facility] MEDICARE REPLACEMENT/[employer] Phone:     Subscriber: Laura Waite Subscriber#: [id number]    Group#: [id number] Precert#:              History & Physical      Destini Wiley APRN at 11/20/2018 10:24 AM          Pre-Op H&P (See Recent Office Note Attached for Full H&P)    Chief complaint: Low back pain, bilateral leg plain, right greater than left    Review of Systems:  General ROS:  no fever, chills, rashes, No change since last office visit  Cardiovascular ROS: no chest pain or dyspnea on exertion  Respiratory ROS: no cough, shortness of breath, or wheezing    Meds:    No current facility-administered medications on file prior to encounter.      Current Outpatient Medications on File Prior to Encounter   Medication Sig Dispense Refill   • atorvastatin (LIPITOR) 10 MG tablet Take 1 tablet by mouth Daily. 90 tablet 3   • diclofenac (VOLTAREN) 75 MG EC tablet Take 1 tablet by mouth 2 (Two) Times a Day. 180 tablet 3   • ipratropium (ATROVENT) 0.06 % nasal spray 1 spray into the nostril(s) as directed by provider As Needed.     • losartan (COZAAR) 25 MG tablet Take 1 tablet by mouth Daily. 90 tablet 3   • tiZANidine (ZANAFLEX) 4 MG tablet Take 1 tablet by mouth 2 (Two) Times a Day. (Patient taking differently: Take 4 mg by mouth 2 (Two) Times a Day As Needed.) 180 tablet 0   • venlafaxine XR (EFFEXOR-XR) 150 MG 24 hr capsule Take 1 capsule by mouth Daily. 90 capsule 3   • [DISCONTINUED]  "chlorhexidine (HIBICLENS) 4 % external liquid Shower each day with solution for 5 days beginning 5 days before surgery. 120 mL 0       Vital Signs:  /92 (BP Location: Right arm, Patient Position: Lying)   Pulse 65   Temp 98.5 °F (36.9 °C) (Tympanic)   Resp 18   Ht 165.1 cm (65\")   Wt 102 kg (225 lb)   LMP  (LMP Unknown)   SpO2 97%   BMI 37.44 kg/m²      Physical Exam:    CV:  S1S2 regular rate and rhythm, no murmur               Resp:  Clear to auscultation; respirations regular, even and unlabored    Results Review:    I reviewed the patient's new clinical results.    Cancer Staging (if applicable)  Cancer Patient: __ yes _x_no __unknown; If yes, clinical stage T:__ N:__M:__, stage group or __N/A    Assessment/Plan:    This is a 68-year-old woman with a mobile spondylolisthesis of L3 on L4 with severe central canal stenosis secondary to spondylolisthesis and a broad-based disc herniation.  She is a candidate for an L3 4 PLIF.  Informed consent for this procedure was obtained from the patient.  Her  was present in the room.  They acknowledge a risk of nerve root injury, paralysis, weakness, loss sensation, permanent neurologic disability, spinal fluid leak, infection, bleeding, iatrogenic instability, instrumentation failure, failure of benefit of the operation, or need for additional procedures.  All questions were answered.  No guarantees were given or implied.  The patient elects to proceed with surgery.  We will schedule her on an elective basis for an L3 4 PLIF in the near future.    Destini Wiley, REGINO  11/20/2018   10:25 AM      Electronically signed by David Rider MD at 11/20/2018  4:02 PM   Source Note             Subjective     Chief Complaint: Low back pain, bilateral leg pain right greater than left    Patient ID: Laura Churchill is a 68 y.o. female is here today for follow-up.    Back Pain   This is a chronic problem. The current episode started more than 1 year " ago. The problem occurs constantly. The problem has been gradually worsening since onset. The pain is present in the gluteal, lumbar spine and sacro-iliac. The quality of the pain is described as aching. The pain radiates to the right foot and left foot. The pain is at a severity of 7/10. The pain is moderate. The pain is worse during the day. The symptoms are aggravated by coughing and position. Stiffness is present in the morning. Associated symptoms include leg pain. Pertinent negatives include no abdominal pain, bladder incontinence, bowel incontinence, chest pain, dysuria, fever, headaches, numbness or weakness. Risk factors include obesity. She has tried analgesics and home exercises for the symptoms. The treatment provided no relief.   Leg Pain    Pertinent negatives include no numbness.   Hip Pain    Pertinent negatives include no numbness.       This is a 68-year-old woman who I saw in consultation last week for chronic low back pain and bilateral leg pain.  I ordered flexion and extension films of her lumbar spine because she had a spondylolisthesis and severe central canal stenosis and her MRI.  She presents today to discuss the results of her flexion-extension films.    The following portions of the patient's history were reviewed and updated as appropriate: allergies, current medications, past family history, past medical history, past social history, past surgical history and problem list.    Family history:   Family History   Problem Relation Age of Onset   • Ovarian cancer Maternal Aunt 75   • Alzheimer's disease Mother    • Bone cancer Mother    • Melanoma Father    • Prostate cancer Father    • Diabetes Father    • Heart failure Father         congestive   • Breast cancer Neg Hx        Social history:   Social History     Social History   • Marital status:      Spouse name: N/A   • Number of children: N/A   • Years of education: N/A     Occupational History   • Not on file.     Social  History Main Topics   • Smoking status: Never Smoker   • Smokeless tobacco: Never Used   • Alcohol use Yes      Comment: Rarely   • Drug use: No   • Sexual activity: Yes     Partners: Male     Birth control/ protection: Post-menopausal     Other Topics Concern   • Not on file     Social History Narrative   • No narrative on file       Review of Systems   Constitutional: Negative for activity change, appetite change, chills, diaphoresis, fatigue, fever and unexpected weight change.   HENT: Negative for congestion, dental problem, drooling, ear discharge, ear pain, facial swelling, hearing loss, mouth sores, nosebleeds, postnasal drip, rhinorrhea, sinus pressure, sneezing, sore throat, tinnitus, trouble swallowing and voice change.    Eyes: Negative for photophobia, pain, discharge, redness, itching and visual disturbance.   Respiratory: Negative for apnea, cough, choking, chest tightness, shortness of breath, wheezing and stridor.    Cardiovascular: Negative for chest pain, palpitations and leg swelling.   Gastrointestinal: Negative for abdominal distention, abdominal pain, anal bleeding, blood in stool, bowel incontinence, constipation, diarrhea, nausea, rectal pain and vomiting.   Endocrine: Negative for cold intolerance, heat intolerance, polydipsia, polyphagia and polyuria.   Genitourinary: Negative for bladder incontinence, decreased urine volume, difficulty urinating, dysuria, enuresis, flank pain, frequency, genital sores, hematuria and urgency.   Musculoskeletal: Positive for back pain. Negative for arthralgias, gait problem, joint swelling, myalgias, neck pain and neck stiffness.   Skin: Negative for color change, pallor, rash and wound.   Allergic/Immunologic: Negative for environmental allergies, food allergies and immunocompromised state.   Neurological: Negative for dizziness, tremors, seizures, syncope, facial asymmetry, speech difficulty, weakness, light-headedness, numbness and headaches.  "  Hematological: Negative for adenopathy. Does not bruise/bleed easily.   Psychiatric/Behavioral: Negative for agitation, behavioral problems, confusion, decreased concentration, dysphoric mood, hallucinations, self-injury, sleep disturbance and suicidal ideas. The patient is not nervous/anxious and is not hyperactive.        Objective   Blood pressure 110/82, temperature 97.2 °F (36.2 °C), temperature source Temporal Artery , height 165.1 cm (65\"), weight 100 kg (221 lb 3.2 oz), not currently breastfeeding.  Body mass index is 36.81 kg/m².    Physical Exam   Constitutional: She is oriented to person, place, and time. She appears well-developed.  Non-toxic appearance.   HENT:   Head: Normocephalic and atraumatic.   Right Ear: Hearing normal.   Left Ear: Hearing normal.   Nose: Nose normal.   Eyes: Pupils are equal, round, and reactive to light. Conjunctivae, EOM and lids are normal.   Neck: Normal range of motion. No JVD present.   Cardiovascular: Normal rate and regular rhythm.    Pulses:       Radial pulses are 2+ on the right side, and 2+ on the left side.   Pulmonary/Chest: Effort normal. No stridor. No respiratory distress. She has no wheezes.   Musculoskeletal:        Right hip: She exhibits decreased range of motion.        Left hip: She exhibits decreased range of motion and tenderness.   Neurological: She is alert and oriented to person, place, and time. She has normal reflexes. She displays normal reflexes. No cranial nerve deficit. She exhibits normal muscle tone. She displays a negative Romberg sign. Gait abnormal. Coordination normal. GCS eye subscore is 4. GCS verbal subscore is 5. GCS motor subscore is 6.   Reflex Scores:       Patellar reflexes are 2+ on the right side and 2+ on the left side.       Achilles reflexes are 2+ on the right side and 2+ on the left side.  Antalgic gait   Skin: Skin is warm and dry. No rash noted. No erythema.   Psychiatric: She has a normal mood and affect. Her behavior " is normal. Judgment and thought content normal.   Nursing note and vitals reviewed.        Assessment/Plan     Independent Review of Radiographic Studies:      She has a mobile spondylolisthesis of L3 on L4.  She has autofusion with severe degenerative changes at L4 5 and L5-S1.    Medical Decision Making:      This is a 68-year-old woman with a mobile spondylolisthesis of L3 on L4 with severe central canal stenosis secondary to spondylolisthesis and a broad-based disc herniation.  She is a candidate for an L3 4 PLIF.  Informed consent for this procedure was obtained from the patient.  Her  was present in the room.  They acknowledge a risk of nerve root injury, paralysis, weakness, loss sensation, permanent neurologic disability, spinal fluid leak, infection, bleeding, iatrogenic instability, instrumentation failure, failure of benefit of the operation, or need for additional procedures.  All questions were answered.  No guarantees were given or implied.  The patient elects to proceed with surgery.  We will schedule her on an elective basis for an L3 4 PLIF in the near future.    Laura was seen today for back pain.    Diagnoses and all orders for this visit:    Acquired spondylolisthesis    Spinal stenosis, lumbar region, with neurogenic claudication        No Follow-up on file.           This document signed by EBENEZER Rider MD October 29, 2018 10:24 AM           Electronically signed by David Rider MD at 10/29/2018 10:26 AM             David Rider MD at 10/29/2018 10:00 AM          Subjective     Chief Complaint: Low back pain, bilateral leg pain right greater than left    Patient ID: Laura Churchill is a 68 y.o. female is here today for follow-up.    Back Pain   This is a chronic problem. The current episode started more than 1 year ago. The problem occurs constantly. The problem has been gradually worsening since onset. The pain is present in the gluteal, lumbar spine and  sacro-iliac. The quality of the pain is described as aching. The pain radiates to the right foot and left foot. The pain is at a severity of 7/10. The pain is moderate. The pain is worse during the day. The symptoms are aggravated by coughing and position. Stiffness is present in the morning. Associated symptoms include leg pain. Pertinent negatives include no abdominal pain, bladder incontinence, bowel incontinence, chest pain, dysuria, fever, headaches, numbness or weakness. Risk factors include obesity. She has tried analgesics and home exercises for the symptoms. The treatment provided no relief.   Leg Pain    Pertinent negatives include no numbness.   Hip Pain    Pertinent negatives include no numbness.       This is a 68-year-old woman who I saw in consultation last week for chronic low back pain and bilateral leg pain.  I ordered flexion and extension films of her lumbar spine because she had a spondylolisthesis and severe central canal stenosis and her MRI.  She presents today to discuss the results of her flexion-extension films.    The following portions of the patient's history were reviewed and updated as appropriate: allergies, current medications, past family history, past medical history, past social history, past surgical history and problem list.    Family history:   Family History   Problem Relation Age of Onset   • Ovarian cancer Maternal Aunt 75   • Alzheimer's disease Mother    • Bone cancer Mother    • Melanoma Father    • Prostate cancer Father    • Diabetes Father    • Heart failure Father         congestive   • Breast cancer Neg Hx        Social history:   Social History     Social History   • Marital status:      Spouse name: N/A   • Number of children: N/A   • Years of education: N/A     Occupational History   • Not on file.     Social History Main Topics   • Smoking status: Never Smoker   • Smokeless tobacco: Never Used   • Alcohol use Yes      Comment: Rarely   • Drug use: No   •  Sexual activity: Yes     Partners: Male     Birth control/ protection: Post-menopausal     Other Topics Concern   • Not on file     Social History Narrative   • No narrative on file       Review of Systems   Constitutional: Negative for activity change, appetite change, chills, diaphoresis, fatigue, fever and unexpected weight change.   HENT: Negative for congestion, dental problem, drooling, ear discharge, ear pain, facial swelling, hearing loss, mouth sores, nosebleeds, postnasal drip, rhinorrhea, sinus pressure, sneezing, sore throat, tinnitus, trouble swallowing and voice change.    Eyes: Negative for photophobia, pain, discharge, redness, itching and visual disturbance.   Respiratory: Negative for apnea, cough, choking, chest tightness, shortness of breath, wheezing and stridor.    Cardiovascular: Negative for chest pain, palpitations and leg swelling.   Gastrointestinal: Negative for abdominal distention, abdominal pain, anal bleeding, blood in stool, bowel incontinence, constipation, diarrhea, nausea, rectal pain and vomiting.   Endocrine: Negative for cold intolerance, heat intolerance, polydipsia, polyphagia and polyuria.   Genitourinary: Negative for bladder incontinence, decreased urine volume, difficulty urinating, dysuria, enuresis, flank pain, frequency, genital sores, hematuria and urgency.   Musculoskeletal: Positive for back pain. Negative for arthralgias, gait problem, joint swelling, myalgias, neck pain and neck stiffness.   Skin: Negative for color change, pallor, rash and wound.   Allergic/Immunologic: Negative for environmental allergies, food allergies and immunocompromised state.   Neurological: Negative for dizziness, tremors, seizures, syncope, facial asymmetry, speech difficulty, weakness, light-headedness, numbness and headaches.   Hematological: Negative for adenopathy. Does not bruise/bleed easily.   Psychiatric/Behavioral: Negative for agitation, behavioral problems, confusion,  "decreased concentration, dysphoric mood, hallucinations, self-injury, sleep disturbance and suicidal ideas. The patient is not nervous/anxious and is not hyperactive.        Objective   Blood pressure 110/82, temperature 97.2 °F (36.2 °C), temperature source Temporal Artery , height 165.1 cm (65\"), weight 100 kg (221 lb 3.2 oz), not currently breastfeeding.  Body mass index is 36.81 kg/m².    Physical Exam   Constitutional: She is oriented to person, place, and time. She appears well-developed.  Non-toxic appearance.   HENT:   Head: Normocephalic and atraumatic.   Right Ear: Hearing normal.   Left Ear: Hearing normal.   Nose: Nose normal.   Eyes: Pupils are equal, round, and reactive to light. Conjunctivae, EOM and lids are normal.   Neck: Normal range of motion. No JVD present.   Cardiovascular: Normal rate and regular rhythm.    Pulses:       Radial pulses are 2+ on the right side, and 2+ on the left side.   Pulmonary/Chest: Effort normal. No stridor. No respiratory distress. She has no wheezes.   Musculoskeletal:        Right hip: She exhibits decreased range of motion.        Left hip: She exhibits decreased range of motion and tenderness.   Neurological: She is alert and oriented to person, place, and time. She has normal reflexes. She displays normal reflexes. No cranial nerve deficit. She exhibits normal muscle tone. She displays a negative Romberg sign. Gait abnormal. Coordination normal. GCS eye subscore is 4. GCS verbal subscore is 5. GCS motor subscore is 6.   Reflex Scores:       Patellar reflexes are 2+ on the right side and 2+ on the left side.       Achilles reflexes are 2+ on the right side and 2+ on the left side.  Antalgic gait   Skin: Skin is warm and dry. No rash noted. No erythema.   Psychiatric: She has a normal mood and affect. Her behavior is normal. Judgment and thought content normal.   Nursing note and vitals reviewed.        Assessment/Plan     Independent Review of Radiographic Studies:  "     She has a mobile spondylolisthesis of L3 on L4.  She has autofusion with severe degenerative changes at L4 5 and L5-S1.    Medical Decision Making:      This is a 68-year-old woman with a mobile spondylolisthesis of L3 on L4 with severe central canal stenosis secondary to spondylolisthesis and a broad-based disc herniation.  She is a candidate for an L3 4 PLIF.  Informed consent for this procedure was obtained from the patient.  Her  was present in the room.  They acknowledge a risk of nerve root injury, paralysis, weakness, loss sensation, permanent neurologic disability, spinal fluid leak, infection, bleeding, iatrogenic instability, instrumentation failure, failure of benefit of the operation, or need for additional procedures.  All questions were answered.  No guarantees were given or implied.  The patient elects to proceed with surgery.  We will schedule her on an elective basis for an L3 4 PLIF in the near future.    Laura was seen today for back pain.    Diagnoses and all orders for this visit:    Acquired spondylolisthesis    Spinal stenosis, lumbar region, with neurogenic claudication        No Follow-up on file.           This document signed by EBENEZER Rider MD October 29, 2018 10:24 AM          Electronically signed by David Rider MD at 10/29/2018 10:26 AM

## 2018-11-21 NOTE — PROGRESS NOTES
Continued Stay Note  [facility]     Patient Name: Laura Churchill  MRN: [id number]  Today's Date: 11/21/2018    Admit Date: 11/20/2018    Discharge Plan     Row Name 11/21/18 1523       Plan    Plan Comments  Referral made to Marely Brooke for a RW. RW will be delivered to patient today at bedside. Marely aware that patient has been discharged, RW will be delivered STAT. CM following.     Final Discharge Disposition Code  01 - home or self-care        Discharge Codes    No documentation.       Expected Discharge Date and Time     Expected Discharge Date Expected Discharge Time    Nov 21, 2018             Meron Montalvo RN

## 2018-11-21 NOTE — PROGRESS NOTES
Discharge Planning Assessment  [facility]     Patient Name: Laura Churchill  MRN: [id number]  Today's Date: 11/21/2018    Admit Date: 11/20/2018    Discharge Needs Assessment     Row Name 11/21/18 0938       Living Environment    Lives With  spouse;sibling(s)    Current Living Arrangements  home/apartment/condo    Primary Care Provided by  self    Provides Primary Care For  no one    Family Caregiver if Needed  spouse;sibling(s)    Able to Return to Prior Arrangements  yes       Transition Planning    Patient/Family Anticipates Transition to  home    Transportation Anticipated  family or friend will provide       Discharge Needs Assessment    Equipment Currently Used at Home  none        Discharge Plan     Row Name 11/21/18 0941       Plan    Plan  Home    Patient/Family in Agreement with Plan  yes    Plan Comments  Spoke with patient at bedside. She lives with her  and sister in a two story house in [facility] [place] she was independent with ADL's and mobility. She denies any needs at this time. Plan is home.     Final Discharge Disposition Code  01 - home or self-care        Destination      No service coordination in this encounter.      Durable Medical Equipment      No service coordination in this encounter.      Dialysis/Infusion      No service coordination in this encounter.      Home Medical Care      No service coordination in this encounter.      Community Resources      No service coordination in this encounter.        Expected Discharge Date and Time     Expected Discharge Date Expected Discharge Time    Nov 23, 2018         Demographic Summary     Row Name 11/21/18 0936       General Information    Arrived From  home    Reason for Consult  discharge planning        Functional Status     Row Name 11/21/18 0937       Functional Status    Usual Activity Tolerance  good    Current Activity Tolerance  moderate        Psychosocial    No documentation.       Abuse/Neglect    No documentation.        Legal    No documentation.       Substance Abuse    No documentation.       Patient Forms    No documentation.           Meron Montalvo RN

## 2018-11-21 NOTE — PROGRESS NOTES
Subjective: Postoperative day 1 status post L3 4 PLIF.  Reports some back pain with ambulation and sitting in a chair that for the moment is inadequately controlled with oral medications.  She reports that her leg pain is improved since surgery    Objective:    Vitals:    18 0817   BP: 114/64   Pulse: 70   Resp: 18   Temp: 97.5 °F (36.4 °C)   SpO2: 100%     Pulse  Av.5  Min: 59  Max: 101  Systolic (24hrs), Av , Min:114 , Max:185     Diastolic (24hrs), Av, Min:61, Max:97    Temp (24hrs), Av.7 °F (36.5 °C), Min:96.9 °F (36.1 °C), Max:98.5 °F (36.9 °C)      She is sitting in bed.  She is in a mild degree of distress secondary to low back pain.  Her surgical incision is clean, dry, and intact.  She moves her bilateral lower extremities with full motor strength proximally and distally.    Her SOY drain has put out 25 cc in the last shift.    Lab Results   Component Value Date     2018       A/P:   Ambulate, PT/OT  DC SOY drain  Possible discharge home later today if she is able to ambulate and her pain is controlled.

## 2018-11-27 NOTE — DISCHARGE SUMMARY
DISCHARGE SUMMARY  PATIENT:  FLORIN WAITE  YOB: 1950  VISIT ID:  [id number]  PRIMARY CARE:  Shiva Capone MD  ADMITTING PHYSICIAN:  David Rider MD  DATE OF ADMISSION:  11/20/2018  DATE OF DISCHARGE:  11/21/2018      ADMITTING DIAGNOSIS:  Acquired spondylolisthesis [M43.10]  Spinal stenosis, lumbar region, with neurogenic claudication [M48.062    DISCHARGE DIAGNOSIS:  Acquired spondylolisthesis [M43.10]  Spinal stenosis, lumbar region, with neurogenic claudication [M48.062      Past Medical History:   Diagnosis Date   • Anxiety    • Atrophic vaginitis    • Chronic pain disorder    • Gallstones    • GERD (gastroesophageal reflux disease)    • Hypertension    • Joint pain 2016    lt shoulder   • Knee pain, right    • Low back pain    • Lumbar stenosis     L4-5    • Lumbosacral disc disease    • Menopause    • Migraine headache    • Mixed hyperlipidemia    • Muscle spasm    • Obesity due to excess calories    • MOOKIE (obstructive sleep apnea)     NO CPAP USE   • Osteoarthritis    • PONV (postoperative nausea and vomiting)    • Spinal stenosis    • Tendinitis of right shoulder    • Wears glasses          PROCEDURES:  L3-4 LUMBAR LAMINECTOMY POSTERIOR LUMBAR INTERBODY FUSION PILF    BRIEF HOSPITAL COURSE:  Ms. Waite is a 68 y.o. female who presented to our service with chronic low back and bilateral leg pain.  Preoperative imaging demonstrated a mobile spondylolisthesis of L3 on L4 with severe central canal stenosis.  As such, the patient underwent an uncomplicated L3 4 lumbar laminectomy posterior lumbar interbody fusion on 11/20/2018. The patient stayed overnight and was discharged later in the day on POD 1. A SOY drain was left in place and removed prior to discharge.     Over the course of her hospital stay, vitals remained stable and her post-op dressing was clean, dry and intact.  Motor and sensory function were found to be intact throughout.  She was ambulatory with the  assistance of a walker and was discharged home with a rolling walker. She required I&O cath in the early morning on POD 1, however prior to discharge she was voiding independently. She tolerated PO without associated nausea or vomiting. Initially, her back pain was inadequately controlled with oral medication, but her leg pain was well controlled. The back pain improved and was minimal and well controlled with oral medications at the time of discharge on 11/21/2018.       DISCHARGE MEDICATIONS:     Discharge Medications      New Medications      Instructions Start Date   carisoprodol 350 MG tablet  Commonly known as:  SOMA   350 mg, Oral, Every 6 Hours      oxyCODONE 10 MG 12 hr tablet  Commonly known as:  oxyCONTIN   10 mg, Oral, Every 12 Hours Scheduled      oxyCODONE-acetaminophen 7.5-325 MG per tablet  Commonly known as:  PERCOCET   1 tablet, Oral, Every 3 Hours PRN         Continue These Medications      Instructions Start Date   atorvastatin 10 MG tablet  Commonly known as:  LIPITOR   10 mg, Oral, Daily      ipratropium 0.06 % nasal spray  Commonly known as:  ATROVENT   1 spray, Nasal, As Needed      losartan 25 MG tablet  Commonly known as:  COZAAR   25 mg, Oral, Daily      MULTIVITAMIN ADULTS PO   1 tablet, Oral, Daily      venlafaxine  MG 24 hr capsule  Commonly known as:  EFFEXOR-XR   150 mg, Oral, Daily         Stop These Medications    diclofenac 75 MG EC tablet  Commonly known as:  VOLTAREN     tiZANidine 4 MG tablet  Commonly known as:  ZANAFLEX            Condition on Discharge:  Stable   Discharge to: Home    PATIENT SPECIFIC EDUCATION/PLAN:  1. Follow-up with Neurosurgery in 2 weeks  2. No driving until follow-up.  3. No lifting greater than 10-15 lbs  4. May shower but no tub bathing or swimming until follow-up    ACTIVITY:  Use rolling walker   As tolerated    DIET:  As tolerated    FOLLOW UP:  Two weeks with neurosurgery PA     Follow-up Information     Shiva Capone MD Follow up.     Specialty:  Family Medicine  Contact information:  [address]  [address]  [facility] [address] [phone]             Allie Miller PA-C Follow up in 2 week(s).    Specialty:  Neurosurgery  Contact information:  [address]  [facility] [address] [phone]

## 2018-12-05 ENCOUNTER — OFFICE VISIT (OUTPATIENT)
Dept: NEUROSURGERY | Facility: CLINIC | Age: 68
End: 2018-12-05

## 2018-12-05 VITALS
BODY MASS INDEX: 36.49 KG/M2 | HEIGHT: 65 IN | WEIGHT: 219 LBS | TEMPERATURE: 96.7 F | DIASTOLIC BLOOD PRESSURE: 60 MMHG | SYSTOLIC BLOOD PRESSURE: 120 MMHG

## 2018-12-05 DIAGNOSIS — Z98.1 S/P LUMBAR FUSION: Primary | ICD-10-CM

## 2018-12-05 DIAGNOSIS — M48.061 STENOSIS OF LATERAL RECESS OF LUMBAR SPINE: ICD-10-CM

## 2018-12-05 DIAGNOSIS — M43.10 ACQUIRED SPONDYLOLISTHESIS: ICD-10-CM

## 2018-12-05 PROCEDURE — 99024 POSTOP FOLLOW-UP VISIT: CPT | Performed by: PHYSICIAN ASSISTANT

## 2018-12-05 RX ORDER — CEPHALEXIN 500 MG/1
500 CAPSULE ORAL 2 TIMES DAILY
Qty: 20 CAPSULE | Refills: 0 | Status: SHIPPED | OUTPATIENT
Start: 2018-12-05 | End: 2018-12-15

## 2018-12-05 NOTE — PROGRESS NOTES
Subjective     Chief Complaint: follow up s/p lumbar fusion     Patient ID: Laura Churchill is a 68 y.o. female is here today for follow-up.    History of Present Illness    Ms. Churchill is a retired 68 y.o. female who presented to our service with chronic low back and bilateral leg pain.  Preoperative imaging demonstrated a mobile spondylolisthesis of L3 on L4 with severe central canal stenosis.  As such, the patient underwent an uncomplicated L3 4 lumbar laminectomy posterior lumbar interbody fusion on 11/20/2018.    Presently, the patient is doing well.  She has had complete resolution of her preoperative leg pain.  She states she experienced some back pain 1 week ago after she was washing and cleaning her dog.  However, this has improved.  She has some occasional left-sided incisional spasm-like pain.  She is taking tizanidine at bedtime.  She does not require pain medication as she experienced severe constipation while taking them.     She states she had some incisional swelling at the superior aspect that has since resolved.  She denies incisional drainage or tenderness.  She denies fevers or chills.    The following portions of the patient's history were reviewed and updated as appropriate: allergies, current medications, past family history, past medical history, past social history, past surgical history and problem list.    Family history:   Family History   Problem Relation Age of Onset   • Ovarian cancer Maternal Aunt 75   • Alzheimer's disease Mother    • Bone cancer Mother    • Melanoma Father    • Prostate cancer Father    • Diabetes Father    • Heart failure Father         congestive   • Breast cancer Neg Hx        Social history:   Social History     Socioeconomic History   • Marital status:      Spouse name: Not on file   • Number of children: Not on file   • Years of education: Not on file   • Highest education level: Not on file   Social Needs   • Financial resource strain: Not on file   •  "Food insecurity - worry: Not on file   • Food insecurity - inability: Not on file   • Transportation needs - medical: Not on file   • Transportation needs - non-medical: Not on file   Occupational History   • Not on file   Tobacco Use   • Smoking status: Former Smoker     Start date: 1975     Last attempt to quit: 1978     Years since quittin.0   • Smokeless tobacco: Never Used   Substance and Sexual Activity   • Alcohol use: Yes     Comment: Rarely   • Drug use: No   • Sexual activity: Yes     Partners: Male     Birth control/protection: Post-menopausal   Other Topics Concern   • Not on file   Social History Narrative   • Not on file       Review of Systems   Musculoskeletal: Positive for back pain and myalgias.   All other systems reviewed and are negative.      Objective   Blood pressure 120/60, temperature 96.7 °F (35.9 °C), temperature source Temporal, height 165.1 cm (65\"), weight 99.3 kg (219 lb), not currently breastfeeding.  Body mass index is 36.44 kg/m².    Physical Exam   Constitutional: She is oriented to person, place, and time. She appears well-developed and well-nourished. No distress.   Pulmonary/Chest: Effort normal.   Musculoskeletal: Normal range of motion. She exhibits no tenderness.   Neurological: She is alert and oriented to person, place, and time. No sensory deficit.   Skin:   Lumbar incision is dry and intact with moderate amount of surrounding erythema. Small area of induration at the superior aspect of the incision. Eschar along medial portion of incision.   No drainage able to be expressed. No tenderness.          Assessment/Plan     Ms Churchill is very pleased with her postoperative results and relief of preoperative leg pain.  She is no longer taking pain medication but continues taking muscle relaxer at night.  Her incision is erythematous on exam today, without drainage or tenderness.  It is dry and intact.  I have prescribed Keflex 500 mg BID x 10 days and would " like for her to follow up with our office in 1 week for wound recheck.  I have given a referral for physical therapy. The patient will follow up again in 1 month with x-rays.    Laura was seen today for post-op.    Diagnoses and all orders for this visit:    S/P lumbar fusion    Stenosis of lateral recess of lumbar spine    Acquired spondylolisthesis        Return in about 1 week (around 12/12/2018) for Recheck wound.

## 2018-12-12 ENCOUNTER — OFFICE VISIT (OUTPATIENT)
Dept: NEUROSURGERY | Facility: CLINIC | Age: 68
End: 2018-12-12

## 2018-12-12 VITALS
BODY MASS INDEX: 36.65 KG/M2 | DIASTOLIC BLOOD PRESSURE: 82 MMHG | SYSTOLIC BLOOD PRESSURE: 126 MMHG | TEMPERATURE: 98 F | WEIGHT: 220 LBS | HEIGHT: 65 IN

## 2018-12-12 DIAGNOSIS — M48.061 STENOSIS OF LATERAL RECESS OF LUMBAR SPINE: ICD-10-CM

## 2018-12-12 DIAGNOSIS — Z98.1 S/P LUMBAR FUSION: Primary | ICD-10-CM

## 2018-12-12 PROCEDURE — 99024 POSTOP FOLLOW-UP VISIT: CPT | Performed by: PHYSICIAN ASSISTANT

## 2018-12-12 NOTE — PROGRESS NOTES
Subjective     Chief Complaint:  Laura Churchill is a 68 y.o. female here today for a second follow up after L3/4 PLIF done on 11/20/18. At her first follow up visit on 12/5/18, she had some incisional redness and was started on oral keflex. She is here today for follow up and wound check.     History of Present Illness  Ms. Churchill is a retired 68 y.o. female who presented to our service with chronic low back and bilateral leg pain.  Preoperative imaging demonstrated a mobile spondylolisthesis of L3 on L4 with severe central canal stenosis and she underwent a L3 4 PLIF on 11/20/2018.   at her first follow up on 12/5, she had complete resolution of her preoperative leg pain. She is still having back spasms on the left when she is standing for any length of time. She takes tizanidine at bedtime and feels that this helps, but is just seeing reassurance that the spasms are normal. She is no longer taking pain medication.  At her last visit, her incision had 2-3cm of bryanna-incisional erythema and mild swelling. She had not been experiencing any fever, chills, or drainage. She was started on Keflex that day and has completed 7 of 10 days of this medication. She says that her incision feels better. She continues to deny fever or drainage.     The following portions of the patient's history were reviewed and updated as appropriate: allergies, current medications, past family history, past medical history, past social history, past surgical history and problem list.    Family history:   Family History   Problem Relation Age of Onset   • Ovarian cancer Maternal Aunt 75   • Alzheimer's disease Mother    • Bone cancer Mother    • Melanoma Father    • Prostate cancer Father    • Diabetes Father    • Heart failure Father         congestive   • Breast cancer Neg Hx        Social history:   Social History     Socioeconomic History   • Marital status:      Spouse name: Not on file   • Number of children: Not on file   • Years  of education: Not on file   • Highest education level: Not on file   Social Needs   • Financial resource strain: Not on file   • Food insecurity - worry: Not on file   • Food insecurity - inability: Not on file   • Transportation needs - medical: Not on file   • Transportation needs - non-medical: Not on file   Occupational History   • Not on file   Tobacco Use   • Smoking status: Former Smoker     Start date: 1975     Last attempt to quit: 1978     Years since quittin.1   • Smokeless tobacco: Never Used   Substance and Sexual Activity   • Alcohol use: Yes     Comment: Rarely   • Drug use: No   • Sexual activity: Yes     Partners: Male     Birth control/protection: Post-menopausal   Other Topics Concern   • Not on file   Social History Narrative   • Not on file       Review of Systems   Constitutional: Negative for activity change, appetite change, chills, diaphoresis, fatigue, fever and unexpected weight change.   HENT: Negative for congestion, dental problem, drooling, ear discharge, ear pain, facial swelling, hearing loss, mouth sores, nosebleeds, postnasal drip, rhinorrhea, sinus pressure, sneezing, sore throat, tinnitus, trouble swallowing and voice change.    Eyes: Negative for photophobia, pain, discharge, redness, itching and visual disturbance.   Respiratory: Negative for apnea, cough, choking, chest tightness, shortness of breath, wheezing and stridor.    Cardiovascular: Negative for chest pain, palpitations and leg swelling.   Gastrointestinal: Negative for abdominal distention, abdominal pain, anal bleeding, blood in stool, constipation, diarrhea, nausea, rectal pain and vomiting.   Endocrine: Negative for cold intolerance, heat intolerance, polydipsia, polyphagia and polyuria.   Genitourinary: Negative for decreased urine volume, difficulty urinating, dysuria, enuresis, flank pain, frequency, genital sores, hematuria and urgency.   Musculoskeletal: Negative for arthralgias, back pain,  "gait problem, joint swelling, myalgias, neck pain and neck stiffness.   Skin: Negative for color change, pallor, rash and wound.   Allergic/Immunologic: Negative for environmental allergies, food allergies and immunocompromised state.   Neurological: Negative for dizziness, tremors, seizures, syncope, facial asymmetry, speech difficulty, weakness, light-headedness, numbness and headaches.   Hematological: Negative for adenopathy. Does not bruise/bleed easily.   Psychiatric/Behavioral: Negative for agitation, behavioral problems, confusion, decreased concentration, dysphoric mood, hallucinations, self-injury, sleep disturbance and suicidal ideas. The patient is not nervous/anxious and is not hyperactive.        Objective   Blood pressure 126/82, temperature 98 °F (36.7 °C), temperature source Temporal, height 165.1 cm (65\"), weight 99.8 kg (220 lb), not currently breastfeeding.  Body mass index is 36.61 kg/m².    Physical Exam   Constitutional: She appears well-developed and well-nourished.   HENT:   Head: Normocephalic.   Neck: Normal range of motion. Neck supple.   Pulmonary/Chest: Effort normal. No respiratory distress.   Musculoskeletal:   Gait is slow and somewhat antalgic, but independent and steady. LE strength intact.    Skin: Skin is warm and dry.   Lumbar incision still has scabs along its length and mild erythema, improved compared to last week.     Assessment/Plan   Ms Churchill is doing well and her incision is healing. She is planning some traveling during the holiday and will not be available next week for follow up. We will have her follow up in 2 weeks with xrays of the lumbar spine as routine.   She was accidentally sent two scripts and will take the extra with her in case her redness recurs    Laura was seen today for post-op.    Diagnoses and all orders for this visit:    S/P lumbar fusion    Stenosis of lateral recess of lumbar spine        Return in about 2 weeks (around 12/26/2018).          "

## 2018-12-14 DIAGNOSIS — I10 ESSENTIAL HYPERTENSION: ICD-10-CM

## 2018-12-14 RX ORDER — LOSARTAN POTASSIUM 25 MG/1
TABLET ORAL
Qty: 90 TABLET | Refills: 1 | Status: SHIPPED | OUTPATIENT
Start: 2018-12-14 | End: 2019-08-15

## 2018-12-27 ENCOUNTER — OFFICE VISIT (OUTPATIENT)
Dept: NEUROSURGERY | Facility: CLINIC | Age: 68
End: 2018-12-27

## 2018-12-27 ENCOUNTER — HOSPITAL ENCOUNTER (OUTPATIENT)
Dept: GENERAL RADIOLOGY | Facility: HOSPITAL | Age: 68
Discharge: HOME | End: 2018-12-27
Attending: PHYSICIAN ASSISTANT | Admitting: PHYSICIAN ASSISTANT

## 2018-12-27 VITALS
WEIGHT: 222 LBS | BODY MASS INDEX: 36.99 KG/M2 | SYSTOLIC BLOOD PRESSURE: 130 MMHG | TEMPERATURE: 97.3 F | HEIGHT: 65 IN | DIASTOLIC BLOOD PRESSURE: 80 MMHG

## 2018-12-27 DIAGNOSIS — M43.10 ACQUIRED SPONDYLOLISTHESIS: ICD-10-CM

## 2018-12-27 DIAGNOSIS — Z98.1 S/P LUMBAR FUSION: ICD-10-CM

## 2018-12-27 DIAGNOSIS — M48.061 STENOSIS OF LATERAL RECESS OF LUMBAR SPINE: ICD-10-CM

## 2018-12-27 DIAGNOSIS — Z98.1 S/P LUMBAR FUSION: Primary | ICD-10-CM

## 2018-12-27 PROCEDURE — 72100 X-RAY EXAM L-S SPINE 2/3 VWS: CPT

## 2018-12-27 PROCEDURE — 99024 POSTOP FOLLOW-UP VISIT: CPT | Performed by: PHYSICIAN ASSISTANT

## 2018-12-27 RX ORDER — TIZANIDINE 4 MG/1
4 TABLET ORAL NIGHTLY PRN
COMMUNITY
End: 2019-01-14 | Stop reason: ALTCHOICE

## 2018-12-27 RX ORDER — METHOCARBAMOL 750 MG/1
750 TABLET, FILM COATED ORAL 3 TIMES DAILY
Qty: 60 TABLET | Refills: 1 | Status: SHIPPED | OUTPATIENT
Start: 2018-12-27 | End: 2019-08-15

## 2018-12-27 NOTE — PROGRESS NOTES
Subjective     Chief Complaint: Follow-up status post PLIF L3-4, wound check    Patient ID: Laura Churchill is a 68 y.o. female is here today for follow-up.    History of Present Illness    Ms. Churchill is a retired 68 y.o. female who presented to our service with chronic low back and bilateral leg pain.  Preoperative imaging demonstrated a mobile spondylolisthesis of L3 on L4 with severe central canal stenosis.  As such, the patient underwent an uncomplicated L3 4 lumbar laminectomy posterior lumbar interbody fusion on 11/20/2018.    The patient is doing very well in terms of pain.  She has hardly any pain and only complains of occasional spasm-like pain to the left of her incision, for which she takes tizanidine 3 times a day.  She has no low back or leg pain and is no longer taking pain medication.  At her first follow-up office visit she had incisional swelling and erythema and was placed on Keflex ×10 days.  Her last follow-up visit her incision was healing well but she still had some scabbing and mild erythema.  The patient's Keflex was accidentally sent to [facility] and her local pharmacy, therefore she received 20 day supply and states today that she completed 20 days of Keflex.  She denies warmth, swelling, tenderness of her incision, fevers or chills.  She is anxious to start aquatic physical therapy classes.    The following portions of the patient's history were reviewed and updated as appropriate: allergies, current medications, past family history, past medical history, past social history, past surgical history and problem list.    Family history:   Family History   Problem Relation Age of Onset   • Ovarian cancer Maternal Aunt 75   • Alzheimer's disease Mother    • Bone cancer Mother    • Melanoma Father    • Prostate cancer Father    • Diabetes Father    • Heart failure Father         congestive   • Breast cancer Neg Hx        Social history:   Social History     Socioeconomic History   •  Marital status:      Spouse name: Not on file   • Number of children: Not on file   • Years of education: Not on file   • Highest education level: Not on file   Social Needs   • Financial resource strain: Not on file   • Food insecurity - worry: Not on file   • Food insecurity - inability: Not on file   • Transportation needs - medical: Not on file   • Transportation needs - non-medical: Not on file   Occupational History   • Not on file   Tobacco Use   • Smoking status: Former Smoker     Start date: 1975     Last attempt to quit: 1978     Years since quittin.1   • Smokeless tobacco: Never Used   Substance and Sexual Activity   • Alcohol use: Yes     Comment: Rarely   • Drug use: No   • Sexual activity: Yes     Partners: Male     Birth control/protection: Post-menopausal   Other Topics Concern   • Not on file   Social History Narrative   • Not on file       Review of Systems   Constitutional: Negative for activity change, appetite change, chills, diaphoresis, fatigue, fever and unexpected weight change.   HENT: Negative for congestion, dental problem, drooling, ear discharge, ear pain, facial swelling, hearing loss, mouth sores, nosebleeds, postnasal drip, rhinorrhea, sinus pressure, sneezing, sore throat, tinnitus, trouble swallowing and voice change.    Eyes: Negative for photophobia, pain, discharge, redness, itching and visual disturbance.   Respiratory: Negative for apnea, cough, choking, chest tightness, shortness of breath, wheezing and stridor.    Cardiovascular: Negative for chest pain, palpitations and leg swelling.   Gastrointestinal: Negative for abdominal distention, abdominal pain, anal bleeding, blood in stool, constipation, diarrhea, nausea, rectal pain and vomiting.   Endocrine: Negative for cold intolerance, heat intolerance, polydipsia, polyphagia and polyuria.   Genitourinary: Negative for decreased urine volume, difficulty urinating, dysuria, enuresis, flank pain,  "frequency, genital sores, hematuria and urgency.   Musculoskeletal: Negative for arthralgias, back pain, gait problem, joint swelling, myalgias, neck pain and neck stiffness.   Skin: Negative for color change, pallor, rash and wound.   Allergic/Immunologic: Negative for environmental allergies, food allergies and immunocompromised state.   Neurological: Negative for dizziness, tremors, seizures, syncope, facial asymmetry, speech difficulty, weakness, light-headedness, numbness and headaches.   Hematological: Negative for adenopathy. Does not bruise/bleed easily.   Psychiatric/Behavioral: Negative for agitation, behavioral problems, confusion, decreased concentration, dysphoric mood, hallucinations, self-injury, sleep disturbance and suicidal ideas. The patient is not nervous/anxious and is not hyperactive.        Objective   Blood pressure 130/80, temperature 97.3 °F (36.3 °C), height 165.1 cm (65\"), weight 101 kg (222 lb), not currently breastfeeding.  Body mass index is 36.94 kg/m².    Physical Exam   Constitutional: She is oriented to person, place, and time. She appears well-developed and well-nourished.   Neck: Normal range of motion.   Pulmonary/Chest: Effort normal.   Musculoskeletal: Normal range of motion. She exhibits no edema or tenderness.   Neurological: She is alert and oriented to person, place, and time. No sensory deficit.   Skin: Skin is warm and dry. No erythema.   Lumbar incision is healing well without erythema. It is well approximated, there is still a small area ~1cm mid incision that is scabbed and not fully approximated.   No swelling or tenderness.    Psychiatric: She has a normal mood and affect.     Lumbar x-rays were reviewed today and demonstrate good alignment and positioning of hardware    Assessment/Plan     Miss Churchill is doing very well postoperatively.  She is very pleased with her complete relief of preoperative pain.  She complains of occasional spasms to the left of her " incision for which she is currently taking tizanidine.  She feels she is not getting much relief from tizanidine, so I have called in Robaxin for her to try 750 mg 3 times a day.  Her incision looks good, however there is still a small area that is not fully healed at the mid incision.  She is very interested in doing aquatic physical therapy.  She'll follow up with our office in 10-14 days to ensure full healing of her incision and release to start with aquatic therapy.    Laura was seen today for post-op.    Diagnoses and all orders for this visit:    S/P lumbar fusion    Stenosis of lateral recess of lumbar spine    Acquired spondylolisthesis    Other orders  -     methocarbamol (ROBAXIN) 750 MG tablet; Take 1 tablet by mouth 3 (Three) Times a Day.        Return in about 2 weeks (around 1/10/2019).         Vanita Leon PA-C

## 2019-01-14 ENCOUNTER — OFFICE VISIT (OUTPATIENT)
Dept: NEUROSURGERY | Facility: CLINIC | Age: 69
End: 2019-01-14

## 2019-01-14 VITALS
SYSTOLIC BLOOD PRESSURE: 122 MMHG | WEIGHT: 225.2 LBS | DIASTOLIC BLOOD PRESSURE: 80 MMHG | BODY MASS INDEX: 37.52 KG/M2 | TEMPERATURE: 96.9 F | HEIGHT: 65 IN

## 2019-01-14 DIAGNOSIS — Z98.1 S/P LUMBAR FUSION: Primary | ICD-10-CM

## 2019-01-14 PROCEDURE — 99024 POSTOP FOLLOW-UP VISIT: CPT | Performed by: PHYSICIAN ASSISTANT

## 2019-01-14 NOTE — PROGRESS NOTES
Subjective     Chief Complaint: wound check s/p L3-4 PLIF    Patient ID: Laura Churchill is a 68 y.o. female is here today for follow-up.    History of Present Illness    Ms. Churchill is a retired 68 y.o. female who presented to our service with chronic low back and bilateral leg pain.  Preoperative imaging demonstrated a mobile spondylolisthesis of L3 on L4 with severe central canal stenosis.  As such, the patient underwent an uncomplicated L3 4 lumbar laminectomy posterior lumbar interbody fusion on 11/20/2018.    Following the patient's procedure, she had some incisional erythema and swelling and was placed on Keflex X 10 days.  Presently, patient's incision is well-healed, clean dry and intact without erythema or swelling.  She denies any problems with her incision.  She was switched from Flexeril to Robaxin at her last office visit and states this has significantly improved her incisional mid back pain.  She is doing very well and is very pleased with her postoperative results.  She has no complaints at this time.    The following portions of the patient's history were reviewed and updated as appropriate: allergies, current medications, past family history, past medical history, past social history, past surgical history and problem list.    Family history:   Family History   Problem Relation Age of Onset   • Ovarian cancer Maternal Aunt 75   • Alzheimer's disease Mother    • Bone cancer Mother    • Melanoma Father    • Prostate cancer Father    • Diabetes Father    • Heart failure Father         congestive   • Breast cancer Neg Hx        Social history:   Social History     Socioeconomic History   • Marital status:      Spouse name: Not on file   • Number of children: Not on file   • Years of education: Not on file   • Highest education level: Not on file   Social Needs   • Financial resource strain: Not on file   • Food insecurity - worry: Not on file   • Food insecurity - inability: Not on file   •  Transportation needs - medical: Not on file   • Transportation needs - non-medical: Not on file   Occupational History   • Not on file   Tobacco Use   • Smoking status: Former Smoker     Start date: 1975     Last attempt to quit: 1978     Years since quittin.1   • Smokeless tobacco: Never Used   Substance and Sexual Activity   • Alcohol use: Yes     Comment: Rarely   • Drug use: No   • Sexual activity: Yes     Partners: Male     Birth control/protection: Post-menopausal   Other Topics Concern   • Not on file   Social History Narrative   • Not on file       Review of Systems   Constitutional: Negative for activity change, appetite change, chills, diaphoresis, fatigue, fever and unexpected weight change.   HENT: Negative for congestion, dental problem, drooling, ear discharge, ear pain, facial swelling, hearing loss, mouth sores, nosebleeds, postnasal drip, rhinorrhea, sinus pressure, sneezing, sore throat, tinnitus, trouble swallowing and voice change.    Eyes: Negative for photophobia, pain, discharge, redness, itching and visual disturbance.   Respiratory: Negative for apnea, cough, choking, chest tightness, shortness of breath, wheezing and stridor.    Cardiovascular: Negative for chest pain, palpitations and leg swelling.   Gastrointestinal: Negative for abdominal distention, abdominal pain, anal bleeding, blood in stool, constipation, diarrhea, nausea, rectal pain and vomiting.   Endocrine: Negative for cold intolerance, heat intolerance, polydipsia, polyphagia and polyuria.   Genitourinary: Negative for decreased urine volume, difficulty urinating, dysuria, enuresis, flank pain, frequency, genital sores, hematuria and urgency.   Musculoskeletal: Positive for myalgias. Negative for arthralgias, back pain, gait problem, joint swelling, neck pain and neck stiffness.   Skin: Negative for color change, pallor, rash and wound.   Allergic/Immunologic: Negative for environmental allergies, food  "allergies and immunocompromised state.   Neurological: Negative for dizziness, tremors, seizures, syncope, facial asymmetry, speech difficulty, weakness, light-headedness, numbness and headaches.   Hematological: Negative for adenopathy. Does not bruise/bleed easily.   Psychiatric/Behavioral: Negative for agitation, behavioral problems, confusion, decreased concentration, dysphoric mood, hallucinations, self-injury, sleep disturbance and suicidal ideas. The patient is not nervous/anxious and is not hyperactive.        Objective   Blood pressure 122/80, temperature 96.9 °F (36.1 °C), temperature source Temporal, height 165.1 cm (65\"), weight 102 kg (225 lb 3.2 oz), not currently breastfeeding.  Body mass index is 37.48 kg/m².    Physical Exam   Constitutional: She is oriented to person, place, and time. She appears well-developed and well-nourished. No distress.   Neck: Normal range of motion.   Pulmonary/Chest: Effort normal.   Musculoskeletal: Normal range of motion. She exhibits no tenderness.   Neurological: She is alert and oriented to person, place, and time. She displays normal reflexes. No sensory deficit.   Skin: Skin is warm and dry. She is not diaphoretic. No erythema.   Lumbar incision is well healed, clean dry and intact without erythema or swelling   Psychiatric: She has a normal mood and affect.         Assessment/Plan     Ms Chruchill is 2 months status post L3-4 PLIF for a mobile spondylolisthesis of L3 on L4.  She is doing very well and is very pleased with her postoperative results.  She has no complaints at this time and her occasional incisional back pain is well-controlled on Robaxin.  She is interested in starting aquatic therapy and has been released to do so as her incision is well-healed at this point.  She will follow up with our office on an as-needed basis.    Laura was seen today for post-op.    Diagnoses and all orders for this visit:    S/P lumbar fusion        Return if symptoms worsen " or fail to improve.             Vanita Leon PA-C

## 2019-04-09 ENCOUNTER — OFFICE VISIT (OUTPATIENT)
Dept: OBSTETRICS AND GYNECOLOGY | Facility: CLINIC | Age: 69
End: 2019-04-09

## 2019-04-09 VITALS
SYSTOLIC BLOOD PRESSURE: 120 MMHG | HEIGHT: 65 IN | WEIGHT: 227 LBS | DIASTOLIC BLOOD PRESSURE: 74 MMHG | BODY MASS INDEX: 37.82 KG/M2

## 2019-04-09 DIAGNOSIS — Z01.419 ENCOUNTER FOR GYNECOLOGICAL EXAMINATION WITHOUT ABNORMAL FINDING: ICD-10-CM

## 2019-04-09 DIAGNOSIS — Z78.0 MENOPAUSE: Primary | ICD-10-CM

## 2019-04-09 PROCEDURE — 99397 PER PM REEVAL EST PAT 65+ YR: CPT | Performed by: OBSTETRICS & GYNECOLOGY

## 2019-04-09 NOTE — PROGRESS NOTES
Chief Complaint   Patient presents with   • Gynecologic Exam   • Breast Problem     c/o tenderness left breast due to fall   • Urinary Incontinence       Laura Churchill is a 68 y.o. year old  presenting to be seen for her annual exam.  This patient is menopausal and does not use estrogen/progestin replacement therapy.  She has developed some complaints of urinary urgency, nocturia, and occasional incontinence.  She has previously had a laparotomy with a right salpingo-oophorectomy; a lap band procedure which was subsequently taken down and a gastric sleeve done; and a cholecystectomy.  She has normal ovarian screening at .  She denies bowel symptoms.    SCREENING TESTS    Year 2012   Age                         PAP       Neg.                  HPV high risk                         Mammogram      benign                   LUCRECIA score                         Breast MRI                         Lipids                         Vitamin D                         Colonoscopy                         DEXA  Frax (hip/any)                         Ovarian Screen      normal normal                      She exercises regularly: no.  She wears her seat belt: yes.  She has concerns about domestic violence: no.  She has noticed changes in height: no    GYN screening history:  · Last mammogram: was done on approximately 2017 and the result was: Birads I (Normal).  · Last DEXA: was done on approximately 2016 and the results were: normal.    No Additional Complaints Reported    The following portions of the patient's history were reviewed and updated as appropriate:vital signs and   She  has a past medical history of Anxiety, Atrophic vaginitis, Chronic pain disorder, Gallstones, GERD (gastroesophageal reflux disease), Hypertension, Joint pain (2016), Knee pain, right, Low back pain, Lumbar stenosis, Lumbosacral  disc disease, Menopause, Migraine headache, Mixed hyperlipidemia, Muscle spasm, Obesity due to excess calories, MOOKIE (obstructive sleep apnea), Osteoarthritis, PONV (postoperative nausea and vomiting), Spinal stenosis, Tendinitis of right shoulder, and Wears glasses.  She does not have any pertinent problems on file.  She  has a past surgical history that includes Cholecystectomy; Gastric Sleeve (2011); Colonoscopy; Laparoscopic gastric banding; pr reconstr total shoulder implant (Left, 7/10/2017); Laparotomy oopherectomy (Right); Esophagogastroduodenoscopy; Hip pinning (Left); Gastric Banding Removal; and lumbar discectomy fusion instrumentation (N/A, 11/20/2018).  Her family history includes Alzheimer's disease in her mother; Bone cancer in her mother; Diabetes in her father; Heart failure in her father; Melanoma in her father; Ovarian cancer (age of onset: 75) in her maternal aunt; Prostate cancer in her father.  She  reports that she quit smoking about 40 years ago. She started smoking about 43 years ago. She has never used smokeless tobacco. She reports that she drinks alcohol. She reports that she does not use drugs.  Current Outpatient Medications   Medication Sig Dispense Refill   • DICLOFENAC SODIUM ER PO Take 1 tablet by mouth Daily.     • atorvastatin (LIPITOR) 10 MG tablet Take 1 tablet by mouth Daily. 90 tablet 3   • losartan (COZAAR) 25 MG tablet TAKE 1 TABLET DAILY 90 tablet 1   • methocarbamol (ROBAXIN) 750 MG tablet Take 1 tablet by mouth 3 (Three) Times a Day. 60 tablet 1   • Multiple Vitamins-Minerals (MULTIVITAMIN ADULTS PO) Take 1 tablet by mouth Daily.     • venlafaxine XR (EFFEXOR-XR) 150 MG 24 hr capsule Take 1 capsule by mouth Daily. 90 capsule 3     No current facility-administered medications for this visit.      She is allergic to codeine; hydrocodone; and sulfa antibiotics..    Review of Systems  A comprehensive review of systems was taken.  Constitutional: negative for fever, chills,  "activity change, appetite change, fatigue and unexpected weight change.  Respiratory: negative  Cardiovascular: negative  Gastrointestinal: negative  Genitourinary:positive for frequency and urinary incontinence  Musculoskeletal:positive for back pain  Behavioral/Psych: negative       /74   Ht 165.1 cm (65\")   Wt 103 kg (227 lb)   LMP  (LMP Unknown)   Breastfeeding? No   BMI 37.77 kg/m²     Physical Exam    General:  alert; cooperative; well developed; well nourished  obese - Body mass index is 37.77 kg/m².   Skin:  No suspicious lesions seen   Thyroid: normal to inspection and palpation   Lungs:  clear to auscultation bilaterally   Heart:  regular rate and rhythm, S1, S2 normal, no murmur, click, rub or gallop   Breasts:  Examined in supine position  Symmetric without masses or skin dimpling  Nipples normal without inversion, lesions or discharge  There are no palpable axillary nodes   Abdomen: soft, non-tender; no masses  no umbilical or inguinal hernias are present  no hepato-splenomegaly   Pelvis: Clinical staff was present for exam  External genitalia:  normal appearance of the external genitalia including Bartholin's and [place]'s glands.  Vaginal:  normal pink mucosa without prolapse or lesions.  Cervix:  normal appearance.  Uterus:  normal size, shape and consistency. anteverted;  Adnexa:  absent, right.  Rectal:  anus visually normal appearing. recto-vaginal exam unremarkable and confirms findings;     Lab Review   No data reviewed    Imaging  Mammogram results and DEXA         ASSESSMENT  Problems Addressed this Visit        Genitourinary    Menopause - Primary      Other Visit Diagnoses     Encounter for gynecological examination without abnormal finding              PLAN    · Medications prescribed this encounter:  No orders of the defined types were placed in this encounter.  · Monthly self breast assessment and annual breast imaging  · I have counseled the patient to empty her bladder more " frequently; to lean forward after she thinks she has completed voiding to empty the bladder; and to do Kegel's exercises  · Calcium, 600 mg/ Vit. D, 400 IU daily; regular weight-bearing exercise  · Follow up: 12 month(s)  *Please note that portions of this documentation may have been completed with a voice recognition program.  Efforts were made to edit this dictation, but occasional words may have been mistranscribed.       This note was electronically signed.    LJ Clayton MD  April 9, 2019  10:57 AM

## 2019-04-09 NOTE — PATIENT INSTRUCTIONS
Kegel Exercises  Kegel exercises help strengthen the muscles that support the rectum, vagina, small intestine, bladder, and uterus. Doing Kegel exercises can help:  · Improve bladder and bowel control.  · Improve sexual response.  · Reduce problems and discomfort during pregnancy.    Kegel exercises involve squeezing your pelvic floor muscles, which are the same muscles you squeeze when you try to stop the flow of urine. The exercises can be done while sitting, standing, or lying down, but it is best to vary your position.  Exercises  1. Squeeze your pelvic floor muscles tight. You should feel a tight lift in your rectal area. If you are a female, you should also feel a tightness in your vaginal area. Keep your stomach, buttocks, and legs relaxed.  2. Hold the muscles tight for up to 10 seconds.  3. Relax your muscles.  Repeat this exercise 50 times a day or as many times as told by your health care provider. Continue to do this exercise for at least 4-6 weeks or for as long as told by your health care provider.  This information is not intended to replace advice given to you by your health care provider. Make sure you discuss any questions you have with your health care provider.  Document Released: 12/04/2013 Document Revised: 04/30/2018 Document Reviewed: 11/06/2016  [employer] Interactive Patient Education © 2019 [employer]

## 2019-04-23 ENCOUNTER — TELEPHONE (OUTPATIENT)
Dept: FAMILY MEDICINE CLINIC | Facility: CLINIC | Age: 69
End: 2019-04-23

## 2019-04-23 RX ORDER — AMLODIPINE BESYLATE 2.5 MG/1
2.5 TABLET ORAL DAILY
Qty: 90 TABLET | Refills: 1 | Status: SHIPPED | OUTPATIENT
Start: 2019-04-23 | End: 2019-08-14 | Stop reason: SDUPTHER

## 2019-04-23 RX ORDER — LISINOPRIL 10 MG/1
10 TABLET ORAL DAILY
Qty: 30 TABLET | Refills: 5 | Status: SHIPPED | OUTPATIENT
Start: 2019-04-23 | End: 2019-08-15

## 2019-04-23 NOTE — TELEPHONE ENCOUNTER
----- Message from Gracy Asher sent at 4/23/2019  9:37 AM EDT -----  Regarding: PLEASE CALL   Contact: [phone]  PLEASE CALL REGARDING LOSARTAN, PT HAS STOPPED TAKING BECAUSE OF RECALL. SHE IS WONDERING IF DR LOERA WILL SEND HER IN AN ALTERNATIVE    Rx has been sent into mail order for Amlodipine 2.5 mg one daily.  BBmarv, RITO

## 2019-06-17 DIAGNOSIS — E78.00 PURE HYPERCHOLESTEROLEMIA: ICD-10-CM

## 2019-06-17 DIAGNOSIS — F41.9 ANXIETY: ICD-10-CM

## 2019-06-17 RX ORDER — VENLAFAXINE HYDROCHLORIDE 150 MG/1
CAPSULE, EXTENDED RELEASE ORAL
Qty: 30 CAPSULE | Refills: 0 | Status: SHIPPED | OUTPATIENT
Start: 2019-06-17 | End: 2019-08-13 | Stop reason: SDUPTHER

## 2019-06-17 RX ORDER — ATORVASTATIN CALCIUM 10 MG/1
TABLET, FILM COATED ORAL
Qty: 30 TABLET | Refills: 0 | Status: SHIPPED | OUTPATIENT
Start: 2019-06-17 | End: 2019-08-13 | Stop reason: SDUPTHER

## 2019-07-01 ENCOUNTER — TRANSCRIBE ORDERS (OUTPATIENT)
Dept: ADMINISTRATIVE | Facility: HOSPITAL | Age: 69
End: 2019-07-01

## 2019-07-01 DIAGNOSIS — Z12.31 VISIT FOR SCREENING MAMMOGRAM: Primary | ICD-10-CM

## 2019-08-13 DIAGNOSIS — E78.00 PURE HYPERCHOLESTEROLEMIA: ICD-10-CM

## 2019-08-13 DIAGNOSIS — F41.9 ANXIETY: ICD-10-CM

## 2019-08-14 ENCOUNTER — TELEPHONE (OUTPATIENT)
Dept: FAMILY MEDICINE CLINIC | Facility: CLINIC | Age: 69
End: 2019-08-14

## 2019-08-14 RX ORDER — DICLOFENAC SODIUM 75 MG/1
TABLET, DELAYED RELEASE ORAL
Qty: 180 TABLET | Refills: 3 | Status: SHIPPED | OUTPATIENT
Start: 2019-08-14 | End: 2019-09-03

## 2019-08-14 RX ORDER — VENLAFAXINE HYDROCHLORIDE 150 MG/1
CAPSULE, EXTENDED RELEASE ORAL
Qty: 30 CAPSULE | Refills: 0 | Status: SHIPPED | OUTPATIENT
Start: 2019-08-14 | End: 2019-08-15 | Stop reason: SDUPTHER

## 2019-08-14 RX ORDER — AMLODIPINE BESYLATE 2.5 MG/1
2.5 TABLET ORAL DAILY
Qty: 90 TABLET | Refills: 1 | Status: SHIPPED | OUTPATIENT
Start: 2019-08-14 | End: 2019-08-15 | Stop reason: ALTCHOICE

## 2019-08-14 RX ORDER — ATORVASTATIN CALCIUM 10 MG/1
TABLET, FILM COATED ORAL
Qty: 30 TABLET | Refills: 0 | Status: SHIPPED | OUTPATIENT
Start: 2019-08-14 | End: 2019-08-15 | Stop reason: SDUPTHER

## 2019-08-14 NOTE — TELEPHONE ENCOUNTER
----- Message from Susan Singer sent at 8/13/2019  4:08 PM EDT -----  Regarding: MED ORDER  Contact: [phone]  PT CALLED IN AND STATED SHE NEEDED HER NORVASC 2.5 MG SENT INTO [facility] BECAUSE THEY ARE STILL SHOWING LOSARTAN IN THE SYSTEM.     Rx has been sent in for the Amlodipine to mail order.  Micah, CMA

## 2019-08-15 ENCOUNTER — OFFICE VISIT (OUTPATIENT)
Dept: FAMILY MEDICINE CLINIC | Facility: CLINIC | Age: 69
End: 2019-08-15

## 2019-08-15 VITALS
BODY MASS INDEX: 38.32 KG/M2 | HEIGHT: 65 IN | DIASTOLIC BLOOD PRESSURE: 96 MMHG | RESPIRATION RATE: 18 BRPM | TEMPERATURE: 97 F | OXYGEN SATURATION: 98 % | WEIGHT: 230 LBS | SYSTOLIC BLOOD PRESSURE: 130 MMHG | HEART RATE: 79 BPM

## 2019-08-15 DIAGNOSIS — I10 ESSENTIAL HYPERTENSION: ICD-10-CM

## 2019-08-15 DIAGNOSIS — F41.9 ANXIETY: ICD-10-CM

## 2019-08-15 DIAGNOSIS — E78.00 PURE HYPERCHOLESTEROLEMIA: ICD-10-CM

## 2019-08-15 PROCEDURE — 99213 OFFICE O/P EST LOW 20 MIN: CPT | Performed by: FAMILY MEDICINE

## 2019-08-15 RX ORDER — VENLAFAXINE HYDROCHLORIDE 150 MG/1
150 CAPSULE, EXTENDED RELEASE ORAL DAILY
Qty: 90 CAPSULE | Refills: 1 | Status: SHIPPED | OUTPATIENT
Start: 2019-08-15 | End: 2019-09-03 | Stop reason: SDUPTHER

## 2019-08-15 RX ORDER — LOSARTAN POTASSIUM 50 MG/1
50 TABLET ORAL DAILY
Qty: 90 TABLET | Refills: 1 | Status: SHIPPED | OUTPATIENT
Start: 2019-08-15 | End: 2020-01-08

## 2019-08-15 RX ORDER — TIZANIDINE 4 MG/1
4 TABLET ORAL NIGHTLY PRN
COMMUNITY
End: 2020-03-21

## 2019-08-15 RX ORDER — ATORVASTATIN CALCIUM 10 MG/1
10 TABLET, FILM COATED ORAL DAILY
Qty: 90 TABLET | Refills: 1 | Status: SHIPPED | OUTPATIENT
Start: 2019-08-15 | End: 2019-09-03 | Stop reason: SDUPTHER

## 2019-08-15 NOTE — PROGRESS NOTES
"Subjective   Laura Churchill is a 68 y.o. female seen today for Hypertension.     Hypertension   This is a recurrent problem. The current episode started more than 1 year ago. The problem has been gradually worsening since onset. The problem is resistant. Associated symptoms include anxiety, headaches (bad headaches) and malaise/fatigue. Pertinent negatives include no blurred vision, chest pain, neck pain, orthopnea, palpitations, peripheral edema, PND, shortness of breath or sweats. Risk factors for coronary artery disease include dyslipidemia, obesity and stress. Past treatments include ACE inhibitors. Current antihypertension treatment includes calcium channel blockers. Compliance problems include diet, exercise and psychosocial issues.       Seeing Orthopedics -Dr Castano.  States she is having a lot of popping in the left knee.  Scheduled to see Dr Castano in about 2 months.      The following portions of the patient's history were reviewed and updated as appropriate: allergies, current medications, past social history and problem list.    Review of Systems   Constitutional: Positive for malaise/fatigue.   Eyes: Negative for blurred vision.   Respiratory: Negative for shortness of breath.    Cardiovascular: Negative for chest pain, palpitations, orthopnea and PND.   Musculoskeletal: Positive for arthralgias (left knee). Negative for neck pain.   Neurological: Positive for headaches (bad headaches).       Objective   /96   Pulse 79   Temp 97 °F (36.1 °C)   Resp 18   Ht 165.1 cm (65\")   Wt 104 kg (230 lb)   LMP  (LMP Unknown)   SpO2 98%   BMI 38.27 kg/m²   Physical Exam   Constitutional: She is oriented to person, place, and time. She appears well-developed and well-nourished.   Eyes: Pupils are equal, round, and reactive to light.   Neck: Normal range of motion. No thyromegaly present.   Cardiovascular: Normal rate and regular rhythm.   No murmur heard.  Pulmonary/Chest: Effort normal and breath sounds " normal. She has no wheezes. She has no rales.   Neurological: She is alert and oriented to person, place, and time.   Nursing note and vitals reviewed.      Assessment/Plan   Problem List Items Addressed This Visit        Cardiovascular and Mediastinum    Essential hypertension    Relevant Medications    losartan (COZAAR) 50 MG tablet    Hyperlipidemia    Relevant Medications    atorvastatin (LIPITOR) 10 MG tablet      Other Visit Diagnoses     Anxiety        Relevant Medications    venlafaxine XR (EFFEXOR-XR) 150 MG 24 hr capsule              Drink plenty fluids.    Discontinue Amlodipine.    Rx for Losartan 50 mg daily #90+1.    Continue other medications as doing.    Follow up on Sept 3 for a medicare wellness exam.                Scribed for Dr Shiva Capone by Sol Mcmahon CMA.          I, Shiva Capone MD, personally performed the services described in this documentation, as scribed by Sol Mcmahon in my presence, and is both accurate and complete.        (Please note that portions of this note were completed with a voice recognition program. Efforts were made to edit the dictations,but occasionally words are mis transcribed.)

## 2019-08-27 ENCOUNTER — HOSPITAL ENCOUNTER (OUTPATIENT)
Dept: MAMMOGRAPHY | Facility: HOSPITAL | Age: 69
Discharge: HOME | End: 2019-08-27
Admitting: OBSTETRICS & GYNECOLOGY

## 2019-08-27 DIAGNOSIS — Z12.31 VISIT FOR SCREENING MAMMOGRAM: ICD-10-CM

## 2019-08-27 PROCEDURE — 77067 SCR MAMMO BI INCL CAD: CPT

## 2019-08-27 PROCEDURE — 77063 BREAST TOMOSYNTHESIS BI: CPT

## 2019-08-27 PROCEDURE — 77067 SCR MAMMO BI INCL CAD: CPT | Performed by: RADIOLOGY

## 2019-08-27 PROCEDURE — 77063 BREAST TOMOSYNTHESIS BI: CPT | Performed by: RADIOLOGY

## 2019-09-03 ENCOUNTER — OFFICE VISIT (OUTPATIENT)
Dept: FAMILY MEDICINE CLINIC | Facility: CLINIC | Age: 69
End: 2019-09-03

## 2019-09-03 VITALS
RESPIRATION RATE: 22 BRPM | DIASTOLIC BLOOD PRESSURE: 84 MMHG | BODY MASS INDEX: 38.32 KG/M2 | SYSTOLIC BLOOD PRESSURE: 124 MMHG | WEIGHT: 230 LBS | TEMPERATURE: 97.3 F | OXYGEN SATURATION: 99 % | HEART RATE: 80 BPM | HEIGHT: 65 IN

## 2019-09-03 DIAGNOSIS — Z11.59 NEED FOR HEPATITIS C SCREENING TEST: ICD-10-CM

## 2019-09-03 DIAGNOSIS — E78.00 PURE HYPERCHOLESTEROLEMIA: ICD-10-CM

## 2019-09-03 DIAGNOSIS — Z00.00 HEALTHCARE MAINTENANCE: ICD-10-CM

## 2019-09-03 DIAGNOSIS — Z00.00 MEDICARE ANNUAL WELLNESS VISIT, SUBSEQUENT: Primary | ICD-10-CM

## 2019-09-03 DIAGNOSIS — F41.9 ANXIETY: ICD-10-CM

## 2019-09-03 LAB
ALBUMIN SERPL-MCNC: 4.4 G/DL (ref 3.5–5.2)
ALBUMIN/GLOB SERPL: 1.5 G/DL
ALP SERPL-CCNC: 94 U/L (ref 39–117)
ALT SERPL W P-5'-P-CCNC: 11 U/L (ref 1–33)
ANION GAP SERPL CALCULATED.3IONS-SCNC: 10.8 MMOL/L (ref 5–15)
AST SERPL-CCNC: 20 U/L (ref 1–32)
BASOPHILS # BLD AUTO: 0.03 10*3/MM3 (ref 0–0.2)
BASOPHILS NFR BLD AUTO: 0.4 % (ref 0–1.5)
BILIRUB BLD-MCNC: NEGATIVE MG/DL
BILIRUB SERPL-MCNC: 0.4 MG/DL (ref 0.2–1.2)
BUN BLD-MCNC: 18 MG/DL (ref 8–23)
BUN/CREAT SERPL: 19.4 (ref 7–25)
CALCIUM SPEC-SCNC: 9.8 MG/DL (ref 8.6–10.5)
CHLORIDE SERPL-SCNC: 107 MMOL/L (ref 98–107)
CHOLEST SERPL-MCNC: 181 MG/DL (ref 0–200)
CLARITY, POC: CLEAR
CO2 SERPL-SCNC: 25.2 MMOL/L (ref 22–29)
COLOR UR: YELLOW
CREAT BLD-MCNC: 0.93 MG/DL (ref 0.57–1)
DEPRECATED RDW RBC AUTO: 45.9 FL (ref 37–54)
EOSINOPHIL # BLD AUTO: 0.14 10*3/MM3 (ref 0–0.4)
EOSINOPHIL NFR BLD AUTO: 2 % (ref 0.3–6.2)
ERYTHROCYTE [DISTWIDTH] IN BLOOD BY AUTOMATED COUNT: 13 % (ref 12.3–15.4)
GFR SERPL CREATININE-BSD FRML MDRD: 60 ML/MIN/1.73
GLOBULIN UR ELPH-MCNC: 3 GM/DL
GLUCOSE BLD-MCNC: 92 MG/DL (ref 65–99)
GLUCOSE UR STRIP-MCNC: NEGATIVE MG/DL
HCT VFR BLD AUTO: 45.1 % (ref 34–46.6)
HCV AB SER DONR QL: NORMAL
HDLC SERPL-MCNC: 60 MG/DL (ref 40–60)
HGB BLD-MCNC: 14 G/DL (ref 12–15.9)
IMM GRANULOCYTES # BLD AUTO: 0.02 10*3/MM3 (ref 0–0.05)
IMM GRANULOCYTES NFR BLD AUTO: 0.3 % (ref 0–0.5)
KETONES UR QL: NEGATIVE
LDLC SERPL CALC-MCNC: 102 MG/DL (ref 0–100)
LDLC/HDLC SERPL: 1.71 {RATIO}
LEUKOCYTE EST, POC: NEGATIVE
LYMPHOCYTES # BLD AUTO: 1.94 10*3/MM3 (ref 0.7–3.1)
LYMPHOCYTES NFR BLD AUTO: 28.4 % (ref 19.6–45.3)
MCH RBC QN AUTO: 30 PG (ref 26.6–33)
MCHC RBC AUTO-ENTMCNC: 31 G/DL (ref 31.5–35.7)
MCV RBC AUTO: 96.6 FL (ref 79–97)
MONOCYTES # BLD AUTO: 0.54 10*3/MM3 (ref 0.1–0.9)
MONOCYTES NFR BLD AUTO: 7.9 % (ref 5–12)
NEUTROPHILS # BLD AUTO: 4.17 10*3/MM3 (ref 1.7–7)
NEUTROPHILS NFR BLD AUTO: 61 % (ref 42.7–76)
NITRITE UR-MCNC: NEGATIVE MG/ML
NRBC BLD AUTO-RTO: 0 /100 WBC (ref 0–0.2)
PH UR: 7 [PH] (ref 5–8)
PLATELET # BLD AUTO: 262 10*3/MM3 (ref 140–450)
PMV BLD AUTO: 9.9 FL (ref 6–12)
POTASSIUM BLD-SCNC: 5.3 MMOL/L (ref 3.5–5.2)
PROT SERPL-MCNC: 7.4 G/DL (ref 6–8.5)
PROT UR STRIP-MCNC: NEGATIVE MG/DL
RBC # BLD AUTO: 4.67 10*6/MM3 (ref 3.77–5.28)
RBC # UR STRIP: ABNORMAL /UL
SODIUM BLD-SCNC: 143 MMOL/L (ref 136–145)
SP GR UR: 1.02 (ref 1–1.03)
TRIGL SERPL-MCNC: 93 MG/DL (ref 0–150)
TSH SERPL DL<=0.05 MIU/L-ACNC: 1.55 UIU/ML (ref 0.27–4.2)
UROBILINOGEN UR QL: ABNORMAL
VLDLC SERPL-MCNC: 18.6 MG/DL (ref 5–40)
WBC NRBC COR # BLD: 6.84 10*3/MM3 (ref 3.4–10.8)

## 2019-09-03 PROCEDURE — 80061 LIPID PANEL: CPT | Performed by: FAMILY MEDICINE

## 2019-09-03 PROCEDURE — 84443 ASSAY THYROID STIM HORMONE: CPT | Performed by: FAMILY MEDICINE

## 2019-09-03 PROCEDURE — 80053 COMPREHEN METABOLIC PANEL: CPT | Performed by: FAMILY MEDICINE

## 2019-09-03 PROCEDURE — 99397 PER PM REEVAL EST PAT 65+ YR: CPT | Performed by: FAMILY MEDICINE

## 2019-09-03 PROCEDURE — G0439 PPPS, SUBSEQ VISIT: HCPCS | Performed by: FAMILY MEDICINE

## 2019-09-03 PROCEDURE — 85025 COMPLETE CBC W/AUTO DIFF WBC: CPT | Performed by: FAMILY MEDICINE

## 2019-09-03 PROCEDURE — 86803 HEPATITIS C AB TEST: CPT | Performed by: FAMILY MEDICINE

## 2019-09-03 PROCEDURE — 81003 URINALYSIS AUTO W/O SCOPE: CPT | Performed by: FAMILY MEDICINE

## 2019-09-03 RX ORDER — AMLODIPINE BESYLATE 2.5 MG/1
2.5 TABLET ORAL DAILY
COMMUNITY
End: 2020-03-21

## 2019-09-03 RX ORDER — ATORVASTATIN CALCIUM 10 MG/1
10 TABLET, FILM COATED ORAL DAILY
Qty: 90 TABLET | Refills: 3 | Status: SHIPPED | OUTPATIENT
Start: 2019-09-03 | End: 2020-11-17

## 2019-09-03 RX ORDER — VENLAFAXINE HYDROCHLORIDE 150 MG/1
150 CAPSULE, EXTENDED RELEASE ORAL DAILY
Qty: 90 CAPSULE | Refills: 3 | Status: SHIPPED | OUTPATIENT
Start: 2019-09-03 | End: 2020-11-17

## 2019-09-03 NOTE — PROGRESS NOTES
The ABCs of the Annual Wellness Visit  Subsequent Medicare Wellness Visit    Chief Complaint   Patient presents with   • Medicare Wellness-subsequent       Subjective   History of Present Illness:  Laura Churchill is a 68 y.o. female who presents for a Subsequent Medicare Wellness Visit.    States she is doing well.  Denies any chest pain or shortness of breath.    BP today is 124/84.  Taking Losartan 50 mg and Amlodipine 2.5 mg daily.  States her BP was running in the 150'3/90's and she was still having the headaches. She restarted the amlodipine 2.5 mg and this has helped her headaches.    Taking Diclofenac 75 mg daily for back and knee pain.  Taking Tizanidine only as needed.    HEALTH RISK ASSESSMENT    Recent Hospitalizations:  Recently treated at the following:  [facility]    Current Medical Providers:  Patient Care Team:  Shiva Capone MD as PCP - General  Richie Fisher MD as Surgeon (Neurosurgery)  Sukumar Mcwilliams MD as Consulting Physician (Anesthesiology)  Glenn Clayton MD as Consulting Physician (Gynecology)   Dr Castano - Orthopedics.  Dr Pulido - ENT.  Dr Sepulveda - Dermatology.  Андрей Garibay .  Dr Soto -Dentist.    Smoking Status:  Social History     Tobacco Use   Smoking Status Former Smoker   • Start date: 1975   • Last attempt to quit: 1978   • Years since quittin.8   Smokeless Tobacco Never Used       Alcohol Consumption:  Social History     Substance and Sexual Activity   Alcohol Use Yes    Comment: Rarely       Depression Screen:   PHQ-2/PHQ-9 Depression Screening 9/3/2019   Little interest or pleasure in doing things 0   Feeling down, depressed, or hopeless 0   Total Score 0       Fall Risk Screen:  STEADI Fall Risk Assessment was completed, and patient is at MODERATE risk for falls. Assessment completed on:9/3/2019    Health Habits and Functional and Cognitive Screening:  Functional & Cognitive Status 9/3/2019   Do you have difficulty preparing  food and eating? No   Do you have difficulty bathing yourself, getting dressed or grooming yourself? No   Do you have difficulty using the toilet? No   Do you have difficulty moving around from place to place? No   Do you have trouble with steps or getting out of a bed or a chair? No   Current Diet Limited Junk Food   Dental Exam Up to date   Eye Exam Up to date   Exercise (times per week) 0 times per week   Current Exercise Activities Include None   Do you need help using the phone?  No   Are you deaf or do you have serious difficulty hearing?  No   Do you need help with transportation? No   Do you need help shopping? No   Do you need help preparing meals?  No   Do you need help with housework?  No   Do you need help with laundry? No   Do you need help taking your medications? No   Do you need help managing money? No   Do you ever drive or ride in a car without wearing a seat belt? No     Mini Cog test administered. Remembered name and address after naming   20 animals.    Does the patient have evidence of cognitive impairment? No    Asprin use counseling:Does not need ASA but is currently taking (advised patient that ASA is not indicated and patient chooses to stop it)    Age-appropriate Screening Schedule:  Refer to the list below for future screening recommendations based on patient's age, sex and/or medical conditions. Orders for these recommended tests are listed in the plan section. The patient has been provided with a written plan.    Health Maintenance   Topic Date Due   • COLONOSCOPY  08/22/2016   • LIPID PANEL  03/29/2017   • DXA SCAN  07/12/2018   • INFLUENZA VACCINE  08/01/2019   • ZOSTER VACCINE (2 of 3) 09/11/2020 (Originally 5/24/2013)   • MAMMOGRAM  08/27/2021   • PNEUMOCOCCAL VACCINES (65+ LOW/MEDIUM RISK)  Completed   • TDAP/TD VACCINES  Discontinued          The following portions of the patient's history were reviewed and updated as appropriate: allergies, current medications, past family  history, past medical history, past social history, past surgical history and problem list.    Outpatient Medications Prior to Visit   Medication Sig Dispense Refill   • amLODIPine (NORVASC) 2.5 MG tablet Take 2.5 mg by mouth Daily.     • DICLOFENAC PO Take 75 mg by mouth Daily As Needed.     • losartan (COZAAR) 50 MG tablet Take 1 tablet by mouth Daily. 90 tablet 1   • Multiple Vitamins-Minerals (MULTIVITAMIN ADULTS PO) Take 1 tablet by mouth Daily.     • tiZANidine (ZANAFLEX) 4 MG tablet Take 4 mg by mouth At Night As Needed for Muscle Spasms.     • atorvastatin (LIPITOR) 10 MG tablet Take 1 tablet by mouth Daily. 90 tablet 1   • diclofenac (VOLTAREN) 75 MG EC tablet TAKE 1 TABLET TWICE A  tablet 3   • venlafaxine XR (EFFEXOR-XR) 150 MG 24 hr capsule Take 1 capsule by mouth Daily. Patient needs appointment 90 capsule 1   • DICLOFENAC SODIUM ER PO Take 1 tablet by mouth Daily.       No facility-administered medications prior to visit.        Patient Active Problem List   Diagnosis   • Essential hypertension   • Hyperlipidemia   • Obstructive sleep apnea syndrome   • Osteoarthritis of knee   • Osteoporosis   • Lumbar spondylosis without myelopathy/Lumbar facet arthropathy   • Displacement of lumbar intervertebral disc   • Stenosis of lateral recess of lumbar spine   • Physical deconditioning   • Morbid obesity due to excess calories (CMS/HCC)   • Anxiety and depression   • Sacroiliac joint dysfunction of right side   • Greater trochanteric bursitis of right hip   • Iliotibial band syndrome of right side   • Spinal stenosis   • Osteoarthritis   • Obesity due to excess calories   • Menopause   • Mixed hyperlipidemia   • Lumbosacral disc disease   • Low back pain   • Joint pain   • Extremity pain   • Chronic pain disorder   • Back problem   • Vaginal atrophy   • Primary localized osteoarthrosis of shoulder region   • Status post total left shoulder arthroplasty   • Sciatica   • Acquired spondylolisthesis   •  "Spinal stenosis, lumbar region, with neurogenic claudication   • S/P lumbar fusion       Advanced Care Planning:  Patient does not have an advance directive - information provided to the patient today    Review of Systems   Constitutional: Negative.    HENT: Negative.    Eyes: Negative.    Respiratory: Negative.    Cardiovascular: Negative.    Gastrointestinal: Negative.    Endocrine: Positive for heat intolerance. Negative for cold intolerance, polydipsia, polyphagia and polyuria.   Genitourinary: Negative.    Musculoskeletal: Positive for arthralgias (knees) and back pain. Negative for gait problem, joint swelling, myalgias, neck pain and neck stiffness.   Skin: Negative.    Allergic/Immunologic: Negative.    Neurological: Positive for dizziness (occasional ). Negative for tremors, seizures, syncope, facial asymmetry, speech difficulty, weakness, light-headedness, numbness and headaches.   Hematological: Negative for adenopathy. Bruises/bleeds easily.   Psychiatric/Behavioral: Negative.        Compared to one year ago, the patient feels her physical health is better.  Compared to one year ago, the patient feels her mental health is the same.    Reviewed chart for potential of high risk medication in the elderly: yes  Reviewed chart for potential of harmful drug interactions in the elderly:yes    Objective         Vitals:    09/03/19 0927   BP: 124/84   Pulse: 80   Resp: 22   Temp: 97.3 °F (36.3 °C)   SpO2: 99%   Weight: 104 kg (230 lb)   Height: 165.1 cm (65\")   PainSc: 0-No pain       Body mass index is 38.27 kg/m².  Discussed the patient's BMI with her. The BMI is above average; BMI management plan is completed.    Physical Exam   Constitutional: She is oriented to person, place, and time. She appears well-developed and well-nourished. No distress.   HENT:   Head: Normocephalic and atraumatic.   Right Ear: External ear normal.   Left Ear: External ear normal.   Nose: Nose normal.   Mouth/Throat: Oropharynx is " clear and moist.   Eyes: Conjunctivae and EOM are normal. Pupils are equal, round, and reactive to light.   Neck: Normal range of motion. Neck supple. No thyromegaly present.   Cardiovascular: Normal rate, regular rhythm, normal heart sounds and intact distal pulses.   Pulmonary/Chest: Effort normal and breath sounds normal. No respiratory distress. She has no wheezes. She has no rales.   Abdominal: Soft. Bowel sounds are normal. There is no tenderness.   Musculoskeletal: Normal range of motion.   Neurological: She is alert and oriented to person, place, and time. She has normal reflexes.   Skin: Skin is warm and dry. She is not diaphoretic.   Psychiatric: She has a normal mood and affect. Her behavior is normal. Judgment and thought content normal.   Nursing note and vitals reviewed.            Assessment/Plan   Medicare Risks and Personalized Health Plan  CMS Preventative Services Quick Reference  Advance Directive Discussion  Chronic Pain   Depression/Dysphoria  Immunizations Discussed/Encouraged (specific immunizations; Shingrix )  Inactivity/Sedentary  Obesity/Overweight   Osteoprorosis Risk  Cardiovascular risk were calculated and 10 year risk found to be 9 %.    The above risks/problems have been discussed with the patient.  Pertinent information has been shared with the patient in the After Visit Summary.  Follow up plans and orders are seen below in the Assessment/Plan Section.    Diagnoses and all orders for this visit:    1. Medicare annual wellness visit, subsequent (Primary)  -     CBC & Differential  -     Comprehensive Metabolic Panel  -     Lipid Panel  -     TSH  -     CBC Auto Differential    2. Healthcare maintenance  -     POCT urinalysis dipstick, automated    3. Anxiety  -     venlafaxine XR (EFFEXOR-XR) 150 MG 24 hr capsule; Take 1 capsule by mouth Daily.  Dispense: 90 capsule; Refill: 3    4. Pure hypercholesterolemia  -     atorvastatin (LIPITOR) 10 MG tablet; Take 1 tablet by mouth Daily.   Dispense: 90 tablet; Refill: 3    5. Need for hepatitis C screening test  -     Hepatitis C Antibody      Follow Up:  Return in about 1 year (around 9/3/2020) for Medicare Wellness.     An After Visit Summary and PPPS were given to the patient.     Drink plenty fluids.    Stop the Aspirin.    Continue other medications as doing.    Check a UA,CBC,CMP,Lipids, Hep C ab,and TSH. Report results by letter.    With regards to lab results, patient is instructed to call the office if they have not received test results within 2 weeks time.    Check with the pharmacy for the Shingrix.    Follow up in 1 year. Sooner if needed.        We discussed with the patient the importance of maintaining a healthy weight by observing a diet that is low in carbohydrates.  We also recommended avoiding consumption of high levels of sodium and caffeine to prevent hypertension. We recommended daily exercise and obtaining a weight with a BMI less than 26.  We also recommended avoiding the use of tobacco and alcohol.  We also recommended an annual physical with their primary care physician.          Scribed for Dr Shiva Capone by Sol Mcmahon CMA.          I, Shiva Capone MD, personally performed the services described in this documentation, as scribed by Sol Mcmahon in my presence, and is both accurate and complete.        (Please note that portions of this note were completed with a voice recognition program. Efforts were made to edit the dictations,but occasionally words are mis transcribed.)

## 2019-10-02 ENCOUNTER — TELEPHONE (OUTPATIENT)
Dept: PAIN MEDICINE | Facility: CLINIC | Age: 69
End: 2019-10-02

## 2019-10-28 ENCOUNTER — FLU SHOT (OUTPATIENT)
Dept: FAMILY MEDICINE CLINIC | Facility: CLINIC | Age: 69
End: 2019-10-28

## 2019-10-28 DIAGNOSIS — Z23 IMMUNIZATION, PNEUMOCOCCUS AND INFLUENZA: ICD-10-CM

## 2019-10-28 PROCEDURE — 90653 IIV ADJUVANT VACCINE IM: CPT | Performed by: FAMILY MEDICINE

## 2019-10-28 PROCEDURE — G0008 ADMIN INFLUENZA VIRUS VAC: HCPCS | Performed by: FAMILY MEDICINE

## 2020-01-02 ENCOUNTER — APPOINTMENT (OUTPATIENT)
Dept: PREADMISSION TESTING | Facility: HOSPITAL | Age: 70
End: 2020-01-02

## 2020-01-02 ENCOUNTER — HOSPITAL ENCOUNTER (OUTPATIENT)
Dept: GENERAL RADIOLOGY | Facility: HOSPITAL | Age: 70
Discharge: HOME | End: 2020-01-02
Admitting: ORTHOPAEDIC SURGERY

## 2020-01-02 VITALS — BODY MASS INDEX: 36.95 KG/M2 | WEIGHT: 221.78 LBS | HEIGHT: 65 IN

## 2020-01-02 LAB
ANION GAP SERPL CALCULATED.3IONS-SCNC: 8 MMOL/L (ref 5–15)
BUN BLD-MCNC: 21 MG/DL (ref 8–23)
BUN/CREAT SERPL: 22.6 (ref 7–25)
CALCIUM SPEC-SCNC: 9.3 MG/DL (ref 8.6–10.5)
CHLORIDE SERPL-SCNC: 107 MMOL/L (ref 98–107)
CO2 SERPL-SCNC: 28 MMOL/L (ref 22–29)
CREAT BLD-MCNC: 0.93 MG/DL (ref 0.57–1)
DEPRECATED RDW RBC AUTO: 43.7 FL (ref 37–54)
ERYTHROCYTE [DISTWIDTH] IN BLOOD BY AUTOMATED COUNT: 12.3 % (ref 12.3–15.4)
GFR SERPL CREATININE-BSD FRML MDRD: 60 ML/MIN/1.73
GLUCOSE BLD-MCNC: 107 MG/DL (ref 65–99)
HBA1C MFR BLD: 5.5 % (ref 4.8–5.6)
HCT VFR BLD AUTO: 40.8 % (ref 34–46.6)
HGB BLD-MCNC: 12.8 G/DL (ref 12–15.9)
MCH RBC QN AUTO: 30.3 PG (ref 26.6–33)
MCHC RBC AUTO-ENTMCNC: 31.4 G/DL (ref 31.5–35.7)
MCV RBC AUTO: 96.7 FL (ref 79–97)
PLATELET # BLD AUTO: 258 10*3/MM3 (ref 140–450)
PMV BLD AUTO: 9.6 FL (ref 6–12)
POTASSIUM BLD-SCNC: 4.6 MMOL/L (ref 3.5–5.2)
RBC # BLD AUTO: 4.22 10*6/MM3 (ref 3.77–5.28)
SODIUM BLD-SCNC: 143 MMOL/L (ref 136–145)
WBC NRBC COR # BLD: 6.98 10*3/MM3 (ref 3.4–10.8)

## 2020-01-02 PROCEDURE — 83036 HEMOGLOBIN GLYCOSYLATED A1C: CPT | Performed by: ORTHOPAEDIC SURGERY

## 2020-01-02 PROCEDURE — 80048 BASIC METABOLIC PNL TOTAL CA: CPT | Performed by: ORTHOPAEDIC SURGERY

## 2020-01-02 PROCEDURE — 93010 ELECTROCARDIOGRAM REPORT: CPT | Performed by: INTERNAL MEDICINE

## 2020-01-02 PROCEDURE — 85027 COMPLETE CBC AUTOMATED: CPT | Performed by: ORTHOPAEDIC SURGERY

## 2020-01-02 PROCEDURE — 71046 X-RAY EXAM CHEST 2 VIEWS: CPT

## 2020-01-02 PROCEDURE — 36415 COLL VENOUS BLD VENIPUNCTURE: CPT

## 2020-01-02 PROCEDURE — 93005 ELECTROCARDIOGRAM TRACING: CPT

## 2020-01-02 ASSESSMENT — KOOS JR
KOOS JR SCORE: 17
KOOS JR SCORE: 44.905

## 2020-01-02 NOTE — PAT
Patient instructed to drink 20 ounces (or until full) of Gatorade and it needs to be completed 1 hour before given arrival time for procedure (NO RED Gatorade)    Patient verbalized understanding.        Per Anesthesia Request, patient instructed not to take their ACE/ARB medications on the AM of surgery.        Patient attended joint replacement class today during PAT visit.      Pt passed memory screen.

## 2020-01-08 DIAGNOSIS — I10 ESSENTIAL HYPERTENSION: ICD-10-CM

## 2020-01-08 RX ORDER — LOSARTAN POTASSIUM 50 MG/1
TABLET ORAL
Qty: 90 TABLET | Refills: 4 | Status: SHIPPED | OUTPATIENT
Start: 2020-01-08 | End: 2020-07-31 | Stop reason: ALTCHOICE

## 2020-01-15 ENCOUNTER — ANESTHESIA EVENT (OUTPATIENT)
Dept: PERIOP | Facility: HOSPITAL | Age: 70
End: 2020-01-15

## 2020-01-15 RX ORDER — SODIUM CHLORIDE 0.9 % (FLUSH) 0.9 %
10 SYRINGE (ML) INJECTION EVERY 12 HOURS SCHEDULED
Status: CANCELLED | OUTPATIENT
Start: 2020-01-15

## 2020-01-15 RX ORDER — SODIUM CHLORIDE 0.9 % (FLUSH) 0.9 %
10 SYRINGE (ML) INJECTION AS NEEDED
Status: CANCELLED | OUTPATIENT
Start: 2020-01-15

## 2020-01-16 ENCOUNTER — APPOINTMENT (OUTPATIENT)
Dept: GENERAL RADIOLOGY | Facility: HOSPITAL | Age: 70
End: 2020-01-16

## 2020-01-16 ENCOUNTER — HOSPITAL ENCOUNTER (OUTPATIENT)
Facility: HOSPITAL | Age: 70
Discharge: HOME | End: 2020-01-17
Attending: ORTHOPAEDIC SURGERY | Admitting: ORTHOPAEDIC SURGERY

## 2020-01-16 ENCOUNTER — ANESTHESIA (OUTPATIENT)
Dept: PERIOP | Facility: HOSPITAL | Age: 70
End: 2020-01-16

## 2020-01-16 DIAGNOSIS — Z96.652 STATUS POST TOTAL LEFT KNEE REPLACEMENT: Primary | ICD-10-CM

## 2020-01-16 DIAGNOSIS — M17.0 PRIMARY OSTEOARTHRITIS OF BOTH KNEES: ICD-10-CM

## 2020-01-16 DIAGNOSIS — Z78.9 IMPAIRED MOBILITY AND ADLS: ICD-10-CM

## 2020-01-16 DIAGNOSIS — M17.12 PRIMARY OSTEOARTHRITIS OF LEFT KNEE: ICD-10-CM

## 2020-01-16 DIAGNOSIS — Z74.09 IMPAIRED MOBILITY AND ADLS: ICD-10-CM

## 2020-01-16 PROCEDURE — 25010000002 ROPIVACAINE PER 1 MG: Performed by: NURSE ANESTHETIST, CERTIFIED REGISTERED

## 2020-01-16 PROCEDURE — C1776 JOINT DEVICE (IMPLANTABLE): HCPCS | Performed by: ORTHOPAEDIC SURGERY

## 2020-01-16 PROCEDURE — 25010000003 CEFAZOLIN IN DEXTROSE 2-4 GM/100ML-% SOLUTION: Performed by: ORTHOPAEDIC SURGERY

## 2020-01-16 PROCEDURE — 63710000001 DIPHENHYDRAMINE PER 50 MG: Performed by: ORTHOPAEDIC SURGERY

## 2020-01-16 PROCEDURE — A9270 NON-COVERED ITEM OR SERVICE: HCPCS | Performed by: NURSE PRACTITIONER

## 2020-01-16 PROCEDURE — 63710000001 LOSARTAN 50 MG TABLET: Performed by: NURSE PRACTITIONER

## 2020-01-16 PROCEDURE — C1713 ANCHOR/SCREW BN/BN,TIS/BN: HCPCS | Performed by: ORTHOPAEDIC SURGERY

## 2020-01-16 PROCEDURE — 25010000002 HYDROMORPHONE PER 4 MG: Performed by: ORTHOPAEDIC SURGERY

## 2020-01-16 PROCEDURE — 97162 PT EVAL MOD COMPLEX 30 MIN: CPT

## 2020-01-16 PROCEDURE — 73560 X-RAY EXAM OF KNEE 1 OR 2: CPT

## 2020-01-16 PROCEDURE — 63710000001 ATORVASTATIN 10 MG TABLET: Performed by: NURSE PRACTITIONER

## 2020-01-16 PROCEDURE — A9270 NON-COVERED ITEM OR SERVICE: HCPCS | Performed by: ORTHOPAEDIC SURGERY

## 2020-01-16 PROCEDURE — 25010000002 PROPOFOL 10 MG/ML EMULSION: Performed by: NURSE ANESTHETIST, CERTIFIED REGISTERED

## 2020-01-16 PROCEDURE — 25010000002 ONDANSETRON PER 1 MG: Performed by: NURSE ANESTHETIST, CERTIFIED REGISTERED

## 2020-01-16 PROCEDURE — 63710000001 OXYCODONE-ACETAMINOPHEN 7.5-325 MG TABLET: Performed by: ORTHOPAEDIC SURGERY

## 2020-01-16 PROCEDURE — 63710000001 BISACODYL 5 MG TABLET DELAYED-RELEASE: Performed by: ORTHOPAEDIC SURGERY

## 2020-01-16 PROCEDURE — 25010000003 MORPHINE PER 10 MG: Performed by: ORTHOPAEDIC SURGERY

## 2020-01-16 PROCEDURE — 97116 GAIT TRAINING THERAPY: CPT

## 2020-01-16 PROCEDURE — 63710000001 VENLAFAXINE XR 75 MG CAPSULE SUSTAINED-RELEASE 24 HR: Performed by: NURSE PRACTITIONER

## 2020-01-16 PROCEDURE — 25010000002 DEXAMETHASONE PER 1 MG: Performed by: NURSE ANESTHETIST, CERTIFIED REGISTERED

## 2020-01-16 PROCEDURE — 25010000002 ROPIVACAINE PER 1 MG: Performed by: ORTHOPAEDIC SURGERY

## 2020-01-16 DEVICE — GEN II 7.5MM RESUR PAT 32MM
Type: IMPLANTABLE DEVICE | Site: KNEE | Status: FUNCTIONAL
Brand: GENESIS II

## 2020-01-16 DEVICE — LEGION POSTERIOR STABILIZED                                    OXINIUM FEMORAL SIZE 5 LEFT
Type: IMPLANTABLE DEVICE | Site: KNEE | Status: FUNCTIONAL
Brand: LEGION

## 2020-01-16 DEVICE — GENESIS II NON-POROUS TIBIAL                                    BASEPLATE SIZE 4 LEFT
Type: IMPLANTABLE DEVICE | Site: KNEE | Status: FUNCTIONAL
Brand: GENESIS II

## 2020-01-16 DEVICE — CMT BONE PALACOS R HI/VISC 1X40: Type: IMPLANTABLE DEVICE | Site: KNEE | Status: FUNCTIONAL

## 2020-01-16 DEVICE — IMPLANTABLE DEVICE: Type: IMPLANTABLE DEVICE | Site: KNEE | Status: FUNCTIONAL

## 2020-01-16 DEVICE — LEGION POSTERIOR STABILIZED HIGH                                    FLEX HIGHLY CROSS LINKED                                    POLYETHYLENE SIZE 3-4 9MM
Type: IMPLANTABLE DEVICE | Site: KNEE | Status: FUNCTIONAL
Brand: LEGION

## 2020-01-16 RX ORDER — BUPIVACAINE HYDROCHLORIDE 5 MG/ML
INJECTION, SOLUTION PERINEURAL
Status: COMPLETED | OUTPATIENT
Start: 2020-01-16 | End: 2020-01-16

## 2020-01-16 RX ORDER — AMLODIPINE BESYLATE 2.5 MG/1
2.5 TABLET ORAL DAILY
Status: DISCONTINUED | OUTPATIENT
Start: 2020-01-17 | End: 2020-01-17 | Stop reason: HOSPADM

## 2020-01-16 RX ORDER — MAGNESIUM HYDROXIDE 1200 MG/15ML
LIQUID ORAL AS NEEDED
Status: DISCONTINUED | OUTPATIENT
Start: 2020-01-16 | End: 2020-01-16 | Stop reason: HOSPADM

## 2020-01-16 RX ORDER — PREGABALIN 75 MG/1
75 CAPSULE ORAL ONCE
Status: COMPLETED | OUTPATIENT
Start: 2020-01-16 | End: 2020-01-16

## 2020-01-16 RX ORDER — CEFAZOLIN SODIUM 2 G/100ML
2 INJECTION, SOLUTION INTRAVENOUS ONCE
Status: COMPLETED | OUTPATIENT
Start: 2020-01-16 | End: 2020-01-16

## 2020-01-16 RX ORDER — BISACODYL 5 MG/1
10 TABLET, DELAYED RELEASE ORAL DAILY PRN
Status: DISCONTINUED | OUTPATIENT
Start: 2020-01-16 | End: 2020-01-17 | Stop reason: HOSPADM

## 2020-01-16 RX ORDER — SODIUM CHLORIDE, SODIUM LACTATE, POTASSIUM CHLORIDE, CALCIUM CHLORIDE 600; 310; 30; 20 MG/100ML; MG/100ML; MG/100ML; MG/100ML
100 INJECTION, SOLUTION INTRAVENOUS CONTINUOUS
Status: CANCELLED | OUTPATIENT
Start: 2020-01-16

## 2020-01-16 RX ORDER — MELOXICAM 7.5 MG/1
15 TABLET ORAL DAILY
Status: DISCONTINUED | OUTPATIENT
Start: 2020-01-17 | End: 2020-01-17 | Stop reason: HOSPADM

## 2020-01-16 RX ORDER — LABETALOL HYDROCHLORIDE 5 MG/ML
10 INJECTION, SOLUTION INTRAVENOUS EVERY 4 HOURS PRN
Status: DISCONTINUED | OUTPATIENT
Start: 2020-01-16 | End: 2020-01-17 | Stop reason: HOSPADM

## 2020-01-16 RX ORDER — LOSARTAN POTASSIUM 50 MG/1
50 TABLET ORAL NIGHTLY
Status: DISCONTINUED | OUTPATIENT
Start: 2020-01-16 | End: 2020-01-17 | Stop reason: HOSPADM

## 2020-01-16 RX ORDER — DIPHENHYDRAMINE HYDROCHLORIDE 50 MG/ML
25 INJECTION INTRAMUSCULAR; INTRAVENOUS EVERY 6 HOURS PRN
Status: DISCONTINUED | OUTPATIENT
Start: 2020-01-16 | End: 2020-01-17 | Stop reason: HOSPADM

## 2020-01-16 RX ORDER — SODIUM CHLORIDE, SODIUM LACTATE, POTASSIUM CHLORIDE, CALCIUM CHLORIDE 600; 310; 30; 20 MG/100ML; MG/100ML; MG/100ML; MG/100ML
9 INJECTION, SOLUTION INTRAVENOUS CONTINUOUS PRN
Status: DISCONTINUED | OUTPATIENT
Start: 2020-01-16 | End: 2020-01-17 | Stop reason: HOSPADM

## 2020-01-16 RX ORDER — LIDOCAINE HYDROCHLORIDE 10 MG/ML
0.5 INJECTION, SOLUTION EPIDURAL; INFILTRATION; INTRACAUDAL; PERINEURAL ONCE AS NEEDED
Status: COMPLETED | OUTPATIENT
Start: 2020-01-16 | End: 2020-01-16

## 2020-01-16 RX ORDER — EPHEDRINE SULFATE 50 MG/ML
5 INJECTION, SOLUTION INTRAVENOUS ONCE AS NEEDED
Status: CANCELLED | OUTPATIENT
Start: 2020-01-16

## 2020-01-16 RX ORDER — VENLAFAXINE HYDROCHLORIDE 75 MG/1
150 CAPSULE, EXTENDED RELEASE ORAL NIGHTLY
Status: DISCONTINUED | OUTPATIENT
Start: 2020-01-16 | End: 2020-01-17 | Stop reason: HOSPADM

## 2020-01-16 RX ORDER — LIDOCAINE HYDROCHLORIDE 10 MG/ML
INJECTION, SOLUTION EPIDURAL; INFILTRATION; INTRACAUDAL; PERINEURAL AS NEEDED
Status: DISCONTINUED | OUTPATIENT
Start: 2020-01-16 | End: 2020-01-16 | Stop reason: SURG

## 2020-01-16 RX ORDER — AMOXICILLIN 250 MG
2 CAPSULE ORAL 2 TIMES DAILY PRN
Status: DISCONTINUED | OUTPATIENT
Start: 2020-01-16 | End: 2020-01-17 | Stop reason: HOSPADM

## 2020-01-16 RX ORDER — ACETAMINOPHEN 500 MG
1000 TABLET ORAL ONCE
Status: COMPLETED | OUTPATIENT
Start: 2020-01-16 | End: 2020-01-16

## 2020-01-16 RX ORDER — NALOXONE HCL 0.4 MG/ML
0.1 VIAL (ML) INJECTION
Status: DISCONTINUED | OUTPATIENT
Start: 2020-01-16 | End: 2020-01-17 | Stop reason: HOSPADM

## 2020-01-16 RX ORDER — ONDANSETRON 2 MG/ML
4 INJECTION INTRAMUSCULAR; INTRAVENOUS ONCE AS NEEDED
Status: CANCELLED | OUTPATIENT
Start: 2020-01-16

## 2020-01-16 RX ORDER — FAMOTIDINE 20 MG/1
20 TABLET, FILM COATED ORAL
Status: DISCONTINUED | OUTPATIENT
Start: 2020-01-16 | End: 2020-01-16 | Stop reason: HOSPADM

## 2020-01-16 RX ORDER — PROPOFOL 10 MG/ML
VIAL (ML) INTRAVENOUS AS NEEDED
Status: DISCONTINUED | OUTPATIENT
Start: 2020-01-16 | End: 2020-01-16 | Stop reason: SURG

## 2020-01-16 RX ORDER — DEXAMETHASONE SODIUM PHOSPHATE 4 MG/ML
INJECTION, SOLUTION INTRA-ARTICULAR; INTRALESIONAL; INTRAMUSCULAR; INTRAVENOUS; SOFT TISSUE AS NEEDED
Status: DISCONTINUED | OUTPATIENT
Start: 2020-01-16 | End: 2020-01-16 | Stop reason: SURG

## 2020-01-16 RX ORDER — ATORVASTATIN CALCIUM 10 MG/1
10 TABLET, FILM COATED ORAL NIGHTLY
Status: DISCONTINUED | OUTPATIENT
Start: 2020-01-16 | End: 2020-01-17 | Stop reason: HOSPADM

## 2020-01-16 RX ORDER — ACETAMINOPHEN 650 MG/1
650 SUPPOSITORY RECTAL EVERY 4 HOURS PRN
Status: DISCONTINUED | OUTPATIENT
Start: 2020-01-16 | End: 2020-01-17 | Stop reason: HOSPADM

## 2020-01-16 RX ORDER — ASPIRIN 325 MG
325 TABLET, DELAYED RELEASE (ENTERIC COATED) ORAL DAILY
Status: DISCONTINUED | OUTPATIENT
Start: 2020-01-17 | End: 2020-01-17 | Stop reason: HOSPADM

## 2020-01-16 RX ORDER — CEFAZOLIN SODIUM 2 G/100ML
2 INJECTION, SOLUTION INTRAVENOUS EVERY 8 HOURS
Status: COMPLETED | OUTPATIENT
Start: 2020-01-16 | End: 2020-01-17

## 2020-01-16 RX ORDER — DOCUSATE SODIUM 100 MG/1
100 CAPSULE, LIQUID FILLED ORAL 2 TIMES DAILY PRN
Status: DISCONTINUED | OUTPATIENT
Start: 2020-01-16 | End: 2020-01-17 | Stop reason: HOSPADM

## 2020-01-16 RX ORDER — ONDANSETRON 4 MG/1
4 TABLET, FILM COATED ORAL EVERY 6 HOURS PRN
Status: DISCONTINUED | OUTPATIENT
Start: 2020-01-16 | End: 2020-01-17 | Stop reason: HOSPADM

## 2020-01-16 RX ORDER — ONDANSETRON 2 MG/ML
INJECTION INTRAMUSCULAR; INTRAVENOUS AS NEEDED
Status: DISCONTINUED | OUTPATIENT
Start: 2020-01-16 | End: 2020-01-16 | Stop reason: SURG

## 2020-01-16 RX ORDER — BISACODYL 10 MG
10 SUPPOSITORY, RECTAL RECTAL DAILY PRN
Status: DISCONTINUED | OUTPATIENT
Start: 2020-01-16 | End: 2020-01-17 | Stop reason: HOSPADM

## 2020-01-16 RX ORDER — FENTANYL CITRATE 50 UG/ML
50 INJECTION, SOLUTION INTRAMUSCULAR; INTRAVENOUS
Status: CANCELLED | OUTPATIENT
Start: 2020-01-16

## 2020-01-16 RX ORDER — OXYCODONE AND ACETAMINOPHEN 7.5; 325 MG/1; MG/1
1 TABLET ORAL EVERY 4 HOURS PRN
Status: DISCONTINUED | OUTPATIENT
Start: 2020-01-16 | End: 2020-01-17 | Stop reason: HOSPADM

## 2020-01-16 RX ORDER — HYDROMORPHONE HYDROCHLORIDE 1 MG/ML
0.5 INJECTION, SOLUTION INTRAMUSCULAR; INTRAVENOUS; SUBCUTANEOUS
Status: DISCONTINUED | OUTPATIENT
Start: 2020-01-16 | End: 2020-01-17 | Stop reason: HOSPADM

## 2020-01-16 RX ORDER — ACETAMINOPHEN 325 MG/1
650 TABLET ORAL EVERY 4 HOURS PRN
Status: DISCONTINUED | OUTPATIENT
Start: 2020-01-16 | End: 2020-01-17 | Stop reason: HOSPADM

## 2020-01-16 RX ORDER — LABETALOL HYDROCHLORIDE 5 MG/ML
5 INJECTION, SOLUTION INTRAVENOUS
Status: CANCELLED | OUTPATIENT
Start: 2020-01-16

## 2020-01-16 RX ORDER — ONDANSETRON 2 MG/ML
4 INJECTION INTRAMUSCULAR; INTRAVENOUS EVERY 6 HOURS PRN
Status: DISCONTINUED | OUTPATIENT
Start: 2020-01-16 | End: 2020-01-17 | Stop reason: HOSPADM

## 2020-01-16 RX ORDER — SODIUM CHLORIDE 9 MG/ML
100 INJECTION, SOLUTION INTRAVENOUS CONTINUOUS
Status: DISCONTINUED | OUTPATIENT
Start: 2020-01-16 | End: 2020-01-17 | Stop reason: HOSPADM

## 2020-01-16 RX ORDER — OXYCODONE AND ACETAMINOPHEN 7.5; 325 MG/1; MG/1
2 TABLET ORAL EVERY 4 HOURS PRN
Status: DISCONTINUED | OUTPATIENT
Start: 2020-01-16 | End: 2020-01-17 | Stop reason: HOSPADM

## 2020-01-16 RX ORDER — BUPIVACAINE HYDROCHLORIDE 2.5 MG/ML
INJECTION, SOLUTION EPIDURAL; INFILTRATION; INTRACAUDAL
Status: COMPLETED | OUTPATIENT
Start: 2020-01-16 | End: 2020-01-16

## 2020-01-16 RX ORDER — NALOXONE HCL 0.4 MG/ML
0.4 VIAL (ML) INJECTION AS NEEDED
Status: CANCELLED | OUTPATIENT
Start: 2020-01-16

## 2020-01-16 RX ORDER — MEPERIDINE HYDROCHLORIDE 25 MG/ML
12.5 INJECTION INTRAMUSCULAR; INTRAVENOUS; SUBCUTANEOUS
Status: CANCELLED | OUTPATIENT
Start: 2020-01-16 | End: 2020-01-17

## 2020-01-16 RX ORDER — DIPHENHYDRAMINE HCL 25 MG
25 CAPSULE ORAL EVERY 6 HOURS PRN
Status: DISCONTINUED | OUTPATIENT
Start: 2020-01-16 | End: 2020-01-17 | Stop reason: HOSPADM

## 2020-01-16 RX ADMIN — PROPOFOL 30 MG: 10 INJECTION, EMULSION INTRAVENOUS at 09:36

## 2020-01-16 RX ADMIN — VENLAFAXINE HYDROCHLORIDE 150 MG: 75 CAPSULE, EXTENDED RELEASE ORAL at 20:43

## 2020-01-16 RX ADMIN — BISACODYL 10 MG: 5 TABLET, COATED ORAL at 20:29

## 2020-01-16 RX ADMIN — DEXAMETHASONE SODIUM PHOSPHATE 8 MG: 4 INJECTION, SOLUTION INTRAMUSCULAR; INTRAVENOUS at 10:40

## 2020-01-16 RX ADMIN — ONDANSETRON 4 MG: 2 INJECTION INTRAMUSCULAR; INTRAVENOUS at 10:40

## 2020-01-16 RX ADMIN — OXYCODONE HYDROCHLORIDE AND ACETAMINOPHEN 2 TABLET: 7.5; 325 TABLET ORAL at 22:59

## 2020-01-16 RX ADMIN — LOSARTAN POTASSIUM 50 MG: 50 TABLET, FILM COATED ORAL at 20:43

## 2020-01-16 RX ADMIN — LIDOCAINE HYDROCHLORIDE 2 ML: 10 INJECTION, SOLUTION EPIDURAL; INFILTRATION; INTRACAUDAL; PERINEURAL at 09:36

## 2020-01-16 RX ADMIN — HYDROMORPHONE HYDROCHLORIDE 0.5 MG: 1 INJECTION, SOLUTION INTRAMUSCULAR; INTRAVENOUS; SUBCUTANEOUS at 17:03

## 2020-01-16 RX ADMIN — LIDOCAINE HYDROCHLORIDE 0.2 ML: 10 INJECTION, SOLUTION EPIDURAL; INFILTRATION; INTRACAUDAL; PERINEURAL at 08:10

## 2020-01-16 RX ADMIN — TRANEXAMIC ACID 1000 MG: 100 INJECTION, SOLUTION INTRAVENOUS at 10:40

## 2020-01-16 RX ADMIN — OXYCODONE HYDROCHLORIDE AND ACETAMINOPHEN 1 TABLET: 7.5; 325 TABLET ORAL at 14:33

## 2020-01-16 RX ADMIN — BUPIVACAINE HYDROCHLORIDE 1.8 ML: 5 INJECTION, SOLUTION PERINEURAL at 09:56

## 2020-01-16 RX ADMIN — PREGABALIN 75 MG: 75 CAPSULE ORAL at 08:23

## 2020-01-16 RX ADMIN — BUPIVACAINE HYDROCHLORIDE 20 ML: 2.5 INJECTION, SOLUTION EPIDURAL; INFILTRATION; INTRACAUDAL; PERINEURAL at 10:56

## 2020-01-16 RX ADMIN — ROPIVACAINE HYDROCHLORIDE 10 ML/HR: 5 INJECTION, SOLUTION EPIDURAL; INFILTRATION; PERINEURAL at 11:08

## 2020-01-16 RX ADMIN — SODIUM CHLORIDE 100 ML/HR: 9 INJECTION, SOLUTION INTRAVENOUS at 14:44

## 2020-01-16 RX ADMIN — OXYCODONE HYDROCHLORIDE AND ACETAMINOPHEN 2 TABLET: 7.5; 325 TABLET ORAL at 18:43

## 2020-01-16 RX ADMIN — LIDOCAINE HYDROCHLORIDE 2 ML: 10 INJECTION, SOLUTION EPIDURAL; INFILTRATION; INTRACAUDAL; PERINEURAL at 09:41

## 2020-01-16 RX ADMIN — FAMOTIDINE 20 MG: 20 TABLET ORAL at 08:23

## 2020-01-16 RX ADMIN — PROPOFOL 100 MCG/KG/MIN: 10 INJECTION, EMULSION INTRAVENOUS at 09:41

## 2020-01-16 RX ADMIN — SODIUM CHLORIDE, POTASSIUM CHLORIDE, SODIUM LACTATE AND CALCIUM CHLORIDE 9 ML/HR: 600; 310; 30; 20 INJECTION, SOLUTION INTRAVENOUS at 08:10

## 2020-01-16 RX ADMIN — TRANEXAMIC ACID 1000 MG: 100 INJECTION, SOLUTION INTRAVENOUS at 09:42

## 2020-01-16 RX ADMIN — CEFAZOLIN SODIUM 2 G: 2 INJECTION, SOLUTION INTRAVENOUS at 09:33

## 2020-01-16 RX ADMIN — ACETAMINOPHEN 1000 MG: 500 TABLET ORAL at 08:23

## 2020-01-16 RX ADMIN — SODIUM CHLORIDE, POTASSIUM CHLORIDE, SODIUM LACTATE AND CALCIUM CHLORIDE 9 ML/HR: 600; 310; 30; 20 INJECTION, SOLUTION INTRAVENOUS at 14:32

## 2020-01-16 RX ADMIN — CEFAZOLIN SODIUM 2 G: 2 INJECTION, SOLUTION INTRAVENOUS at 20:28

## 2020-01-16 RX ADMIN — DIPHENHYDRAMINE HYDROCHLORIDE 25 MG: 25 CAPSULE ORAL at 20:29

## 2020-01-16 RX ADMIN — ATORVASTATIN CALCIUM 10 MG: 10 TABLET, FILM COATED ORAL at 20:43

## 2020-01-16 NOTE — THERAPY EVALUATION
Patient Name: Laura Churchill  : 1950    MRN: [id number]                              Today's Date: 2020       Admit Date: 2020    Visit Dx:     ICD-10-CM ICD-9-CM   1. Status post total left knee replacement Z96.652 V43.65   2. Primary osteoarthritis of both knees M17.0 715.16     Patient Active Problem List   Diagnosis   • Essential hypertension   • Hyperlipidemia   • Obstructive sleep apnea syndrome   • Osteoarthritis of knee   • Osteoporosis   • Lumbar spondylosis without myelopathy/Lumbar facet arthropathy   • Displacement of lumbar intervertebral disc   • Stenosis of lateral recess of lumbar spine   • Physical deconditioning   • Morbid obesity due to excess calories (CMS/HCC)   • Anxiety and depression   • Sacroiliac joint dysfunction of right side   • Greater trochanteric bursitis of right hip   • Iliotibial band syndrome of right side   • Spinal stenosis   • Osteoarthritis   • Obesity due to excess calories   • Menopause   • Mixed hyperlipidemia   • Lumbosacral disc disease   • Low back pain   • Joint pain   • Extremity pain   • Chronic pain disorder   • Back problem   • Vaginal atrophy   • Primary localized osteoarthrosis of shoulder region   • Status post total left shoulder arthroplasty   • Sciatica   • Acquired spondylolisthesis   • Spinal stenosis, lumbar region, with neurogenic claudication   • S/P lumbar fusion   • Primary osteoarthritis of left knee   • Status post total left knee replacement     Past Medical History:   Diagnosis Date   • Anxiety    • Atrophic vaginitis    • Bilateral hip pain    • Dental bridge present     top left permanent    • GERD (gastroesophageal reflux disease)     h/o   • Hypertension    • Low back pain    • Mixed hyperlipidemia    • Muscle spasm    • Obesity due to excess calories    • MOOKIE (obstructive sleep apnea)     NO CPAP USE   • Osteoarthritis    • PONV (postoperative nausea and vomiting)     preprocedural meds help and are needed   • Tendinitis  of right shoulder    • Wears glasses      Past Surgical History:   Procedure Laterality Date   • CHOLECYSTECTOMY     • COLONOSCOPY     • ENDOSCOPY     • GASTRIC BANDING REMOVAL     • GASTRIC SLEEVE LAPAROSCOPIC  2011   • HIP PINNING Left     3 pins   • LAPAROSCOPIC GASTRIC BANDING     • LAPAROTOMY OOPHERECTOMY Right    • LUMBAR DISCECTOMY FUSION INSTRUMENTATION N/A 11/20/2018    Procedure: L3-4 LUMBAR LAMINECTOMY POSTERIOR LUMBAR INTERBODY FUSION PILF;  Surgeon: David Rider MD;  Location:  [facility];  Service: Neurosurgery   • IN RECONSTR TOTAL SHOULDER IMPLANT Left 7/10/2017    Procedure: LEFT TOTAL SHOULDER ARTHROPLASTY ;  Surgeon: Almas Miller MD;  Location:  [facility];  Service: Orthopedics   • WISDOM TOOTH EXTRACTION       General Information     Row Name 01/16/20 1458          PT Evaluation Time/Intention    Document Type  evaluation  -LR     Mode of Treatment  physical therapy;individual therapy  -LR     Row Name 01/16/20 1458          General Information    Patient Profile Reviewed?  yes  -LR     Prior Level of Function  min assist:;all household mobility;community mobility;gait;transfer;bed mobility;ADL's;home management;cooking;cleaning;using stairs;shopping;independent:;driving all mobility limited by pain, used SPC for most mobility  -LR     Existing Precautions/Restrictions  fall;other (see comments) L adductor canal nerve catheter  -LR     Barriers to Rehab  previous functional deficit  -LR     Row Name 01/16/20 1458          Relationship/Environment    Lives With  spouse available to assist at all times upon d/c home  -LR     Row Name 01/16/20 1458          Resource/Environmental Concerns    Current Living Arrangements  home/apartment/condo  -LR     Row Name 01/16/20 1458          Home Main Entrance    Number of Stairs, Main Entrance  three  -LR     Stair Railings, Main Entrance  railings on both sides of stairs too wide to use both handrails at the same time  -LR     Row Name  01/16/20 1458          Stairs Within Home, Primary    Stairs Comment, Within Home, Primary  bedroom on second floor but plans to stay on first floor upon d/c home  -LR     Row Name 01/16/20 1458          Cognitive Assessment/Intervention- PT/OT    Orientation Status (Cognition)  oriented x 4  -LR     Row Name 01/16/20 1458          Safety Issues, Functional Mobility    Safety Issues Affecting Function (Mobility)  safety precaution awareness;safety precautions follow-through/compliance;positioning of assistive device;sequencing abilities  -LR     Impairments Affecting Function (Mobility)  pain;strength;range of motion (ROM)  -LR       User Key  (r) = Recorded By, (t) = Taken By, (c) = Cosigned By    Initials Name Provider Type    LR Madalyn Dorman, PT Physical Therapist        Mobility     Row Name 01/16/20 1458          Bed Mobility Assessment/Treatment    Bed Mobility Assessment/Treatment  supine-sit  -LR     Supine-Sit [place] (Bed Mobility)  verbal cues;minimum assist (75% patient effort)  -LR     Assistive Device (Bed Mobility)  head of bed elevated;bed rails  -LR     Comment (Bed Mobility)  Verbal cues to move LEs towards EOB and to push up from bed to raise trunk into sitting and to scoot hips out to get feet on floor. Increased time required to perform. Min assist to move and support L LE. Denied dizziness upon sitting up.   -LR     Row Name 01/16/20 1458          Transfer Assessment/Treatment    Comment (Transfers)  Verbal cues to push up from bed to stand and to reach back for chair to lower into sitting. Verbal cues to step L LE out before t/f for comfort. Assisted to and from bathroom. Cues to use grab bars for UE support.   -LR     Row Name 01/16/20 1458          Sit-Stand Transfer    Sit-Stand [place] (Transfers)  verbal cues;contact guard;2 person assist  -LR     Assistive Device (Sit-Stand Transfers)  walker, front-wheeled  -LR     Row Name 01/16/20 1458          Gait/Stairs  Assessment/Training    89343 - Gait Training Minutes   6  -LR     [place] Level (Gait)  verbal cues;contact guard;2 person assist  -LR     Assistive Device (Gait)  walker, front-wheeled  -LR     Distance in Feet (Gait)  90  -LR     Pattern (Gait)  step-to;step-through  -LR     Deviations/Abnormal Patterns (Gait)  bilateral deviations;nancy decreased;gait speed decreased;stride length decreased;left sided deviations;antalgic  -LR     Bilateral Gait Deviations  forward flexed posture;heel strike decreased  -LR     Left Sided Gait Deviations  weight shift ability decreased  -LR     Comment (Gait/Stairs)  Patient ambulated with step to gait pattern initially. Able to progress to step through gait pattern with cues for increased step length, increased L LE weight bearing/stance phase, decreased UE weight bearing, and increased L knee flexion during swing phase. Unable to improve knee flexion during swing phase with cues for correction. Gait limited by pain and fatigue.   -LR     Row Name 01/16/20 1458          Mobility Assessment/Intervention    Extremity Weight-bearing Status  left lower extremity  -LR     Left Lower Extremity (Weight-bearing Status)  weight-bearing as tolerated (WBAT)  -LR       User Key  (r) = Recorded By, (t) = Taken By, (c) = Cosigned By    Initials Name Provider Type    LR Madalyn Dorman, PT Physical Therapist        Obj/Interventions     Row Name 01/16/20 1457          General ROM    GENERAL ROM COMMENTS  R LE AROM WFL; L knee AROM impaired 25%, will formally measure POD#1  -LR     Row Name 01/16/20 1453          MMT (Manual Muscle Testing)    General MMT Comments  R LE WFL; L knee functionally 2+/5, unable to perform SLR independently, no knee buckling with gait  -LR     Row Name 01/16/20 1455          Therapeutic Exercise    Exercise Type (Therapeutic Exercise)  AROM (active range of motion);isotonic contraction, concentric;isometric contraction, static  -LR     Position  (Therapeutic Exercise)  supine  -LR     Sets/Reps (Therapeutic Exercise)  x10 reps each  -LR     Comment (Therapeutic Exercise)  cues for technique; able to actively DF bilaterally  -LR     Row Name 01/16/20 1458          Sensory Assessment/Intervention    Sensory General Assessment  no sensation deficits identified denies numbness/tingling;light touch equal and intact  -LR       User Key  (r) = Recorded By, (t) = Taken By, (c) = Cosigned By    Initials Name Provider Type    LR Madalyn Dorman, PT Physical Therapist        Goals/Plan     Row Name 01/16/20 1458          Bed Mobility Goal 1 (PT)    Activity/Assistive Device (Bed Mobility Goal 1, PT)  sit to supine/supine to sit  -LR     [place] Level/Cues Needed (Bed Mobility Goal 1, PT)  conditional independence  -LR     Time Frame (Bed Mobility Goal 1, PT)  long term goal (LTG);3 days  -LR     Progress/Outcomes (Bed Mobility Goal 1, PT)  goal ongoing  -     Row Name 01/16/20 1458          Transfer Goal 1 (PT)    Activity/Assistive Device (Transfer Goal 1, PT)  sit-to-stand/stand-to-sit;walker, rolling  -LR     [place] Level/Cues Needed (Transfer Goal 1, PT)  conditional independence  -LR     Time Frame (Transfer Goal 1, PT)  long term goal (LTG);3 days  -LR     Progress/Outcome (Transfer Goal 1, PT)  goal ongoing  -     Row Name 01/16/20 1458          Gait Training Goal 1 (PT)    Activity/Assistive Device (Gait Training Goal 1, PT)  gait (walking locomotion);walker, rolling  -LR     [place] Level (Gait Training Goal 1, PT)  conditional independence  -LR     Distance (Gait Goal 1, PT)  500 feet  -LR     Time Frame (Gait Training Goal 1, PT)  long term goal (LTG);3 days  -LR     Progress/Outcome (Gait Training Goal 1, PT)  goal ongoing  -     Row Name 01/16/20 1458          ROM Goal 1 (PT)    ROM Goal 1 (PT)  0-90 degrees L knee AAROM  -LR     Time Frame (ROM Goal 1, PT)  long term goal (LTG);3 days  -LR     Progress/Outcome (ROM Goal 1,  PT)  goal ongoing  -LR     Row Name 01/16/20 1458          Stairs Goal 1 (PT)    Activity/Assistive Device (Stairs Goal 1, PT)  ascending stairs;descending stairs;using handrail, left;step-to-step;cane, straight  -LR     [place] Level/Cues Needed (Stairs Goal 1, PT)  contact guard assist  -LR     Number of Stairs (Stairs Goal 1, PT)  3  -LR     Time Frame (Stairs Goal 1, PT)  long term goal (LTG);3 days  -LR     Progress/Outcome (Stairs Goal 1, PT)  goal ongoing  -LR       User Key  (r) = Recorded By, (t) = Taken By, (c) = Cosigned By    Initials Name Provider Type    LR Madalyn Dorman, PT Physical Therapist        Clinical Impression     Row Name 01/16/20 1458          Pain Assessment    Additional Documentation  Pain Scale: Numbers Pre/Post-Treatment (Group)  -LR     Row Name 01/16/20 1458          Pain Scale: Numbers Pre/Post-Treatment    Pain Scale: Numbers, Pretreatment  8/10  -LR     Pain Scale: Numbers, Post-Treatment  8/10  -LR     Pain Location - Side  Left  -LR     Pain Location - Orientation  anterior  -LR     Pain Location  knee  -LR     Pain Intervention(s)  Ambulation/increased activity;Repositioned;Cold applied;Medication (See MAR)  -LR     Row Name 01/16/20 1458          Plan of Care Review    Plan of Care Reviewed With  patient;spouse  -LR     Progress  improving  -LR     Row Name 01/16/20 1458          Physical Therapy Clinical Impression    Patient/Family Goals Statement (PT Clinical Impression)  go home, decrease pain  -LR     Criteria for Skilled Interventions Met (PT Clinical Impression)  yes;treatment indicated  -LR     Rehab Potential (PT Clinical Summary)  good, to achieve stated therapy goals  -LR     Row Name 01/16/20 1458          Positioning and Restraints    Pre-Treatment Position  in bed  -LR     Post Treatment Position  chair  -LR     In Chair  notified nsg;reclined;sitting;call light within reach;encouraged to call for assist;exit alarm on;with family/caregiver;legs  elevated;compression device  -LR       User Key  (r) = Recorded By, (t) = Taken By, (c) = Cosigned By    Initials Name Provider Type    Madalyn Inman, PT Physical Therapist        Outcome Measures     Row Name 01/16/20 5725          How much help from another person do you currently need...    Turning from your back to your side while in flat bed without using bedrails?  3  -LR     Moving from lying on back to sitting on the side of a flat bed without bedrails?  3  -LR     Moving to and from a bed to a chair (including a wheelchair)?  3  -LR     Standing up from a chair using your arms (e.g., wheelchair, bedside chair)?  3  -LR     Climbing 3-5 steps with a railing?  3  -LR     To walk in hospital room?  3  -LR     AM-PAC 6 Clicks Score (PT)  18  -LR     Row Name 01/16/20 8944          Functional Assessment    Outcome Measure Options  AM-PAC 6 Clicks Basic Mobility (PT)  -LR       User Key  (r) = Recorded By, (t) = Taken By, (c) = Cosigned By    Initials Name Provider Type    Madalyn Inman, PT Physical Therapist          PT Recommendation and Plan  Planned Therapy Interventions (PT Eval): balance training, bed mobility training, gait training, home exercise program, stair training, ROM (range of motion), patient/family education, strengthening, transfer training  Outcome Summary/Treatment Plan (PT)  Anticipated Equipment Needs at Discharge (PT): other (see comments)(none)  Anticipated Discharge Disposition (PT): home with assist, home with home health  Plan of Care Reviewed With: patient, spouse  Progress: improving  Outcome Summary: Patient ambulated 90 feet with RW and step through gait pattern, limited by pain and fatigue. ROM to be initiated POD#1. Encouraged patient to ambulate with nursing again later this afternoon. Will continue to progress as able. Plan as d/c home with family and HHPT. PADD score of 7.     Time Calculation:   PT Charges     Row Name 01/16/20 1283             Time  Calculation    Start Time  1458  -LR      PT Received On  01/16/20  -LR      PT Goal Re-Cert Due Date  01/26/20  -LR         Time Calculation- PT    Total Timed Code Minutes- PT  8 minute(s)  -LR         Timed Charges    88295 - PT Therapeutic Exercise Minutes  2  -LR      05439 - Gait Training Minutes   6  -LR        User Key  (r) = Recorded By, (t) = Taken By, (c) = Cosigned By    Initials Name Provider Type    LR Madalyn Dorman, PT Physical Therapist        Therapy Charges for Today     Code Description Service Date Service Provider Modifiers Qty    97977104895 HC GAIT TRAINING EA 15 MIN 1/16/2020 Madalyn Dorman, PT GP 1    59242880144 HC PT THER SUPP EA 15 MIN 1/16/2020 Madalyn Dorman, PT GP 2    [id number]  PT EVAL MOD COMPLEXITY 3 1/16/2020 Madalyn Dorman, PT GP 1          PT G-Codes  Outcome Measure Options: AM-PAC 6 Clicks Basic Mobility (PT)  AM-PAC 6 Clicks Score (PT): 18    Madalyn Dorman, PT  1/16/2020

## 2020-01-16 NOTE — BRIEF OP NOTE
TOTAL KNEE ARTHROPLASTY  Progress Note    Laura Churchill  1/16/2020    Pre-op Diagnosis:   left knee osteoarthritis       Post-Op Diagnosis Codes:     * Primary osteoarthritis of left knee [M17.12]    Procedure/CPT® Codes:  SC TOTAL KNEE ARTHROPLASTY [45652]    Procedure(s):  TOTAL KNEE REPLACEMENT LEFT    Surgeon(s):  Houston Castano MD     Assistant: NASIMA Lozano    Anesthesia: Spinal, local and adductor canal catheter    Staff:   Circulator: Rachel Xavier RN; Bonny Rodriges RN  Scrub Person: Marely Forde; Gelacio Del Rosario Paula M  Vendor Representative: Jeromy Osorio  Assistant: Alex Pinzon RNFA    Estimated Blood Loss: 50 mL    Urine Voided: * No values recorded between 1/16/2020  9:33 AM and 1/16/2020 10:54 AM *    Specimens:                None          Drains: * No LDAs found *    Findings: Left knee severe tricompartmental degenerative changes    Complications: None    Implants: [employer] posterior stabilized total knee arthroplasty with a size 5 femur, 4 tibia, 9 polyethylene and 32 thin patella     tourniquet time: 54 minutes at 300 mmHg          Houston Castano MD     Date: 1/16/2020  Time: 11:09 AM

## 2020-01-16 NOTE — ANESTHESIA PREPROCEDURE EVALUATION
Anesthesia Evaluation     Patient summary reviewed and Nursing notes reviewed   history of anesthetic complications: PONV  NPO Solid Status: > 8 hours  NPO Liquid Status: < 2 hours           Airway   Mallampati: II  TM distance: >3 FB  Neck ROM: full  Dental      Pulmonary    (+) sleep apnea,   (-) COPD, asthma, shortness of breath, recent URI, not a smoker  Cardiovascular     ECG reviewed    (+) hypertension, hyperlipidemia,   (-) past MI, dysrhythmias, angina    ROS comment: ECG NSR     Neuro/Psych  (+) numbness (effexor ), psychiatric history,     (-) seizures, CVA    ROS Comment: L spondy HNP stenosis   GI/Hepatic/Renal/Endo    (+) obesity,  GERD,    (-) morbid obesity, liver disease, no renal disease, diabetes, no thyroid disorder    Musculoskeletal     Abdominal    Substance History      OB/GYN          Other   arthritis,          Phys Exam Other: Grade 1 view Caceres  2   2018                 Anesthesia Plan    ASA 2     spinal   (ACblock/cath  , Propofol infusion,  Opiate sparing      SS ACB / GA if spinal not easily accessed ( lumbar scar) )  intravenous induction     Anesthetic plan, all risks, benefits, and alternatives have been provided, discussed and informed consent has been obtained with: patient.    Plan discussed with CRNA.

## 2020-01-16 NOTE — ANESTHESIA POSTPROCEDURE EVALUATION
Patient: Laura Churchill    Procedure Summary     Date:  01/16/20 Room / Location:   [facility] [address] [place] [address] [facility]    Anesthesia Start:  0933 Anesthesia Stop:      Procedure:  TOTAL KNEE REPLACEMENT LEFT (Left Knee) Diagnosis:      Surgeon:  Houston Castano MD Provider:  Magdy Jaime MD    Anesthesia Type:  spinal ASA Status:  2          Anesthesia Type: spinal    Vitals  Vitals Value Taken Time   /69 1/16/2020 11:00 AM   Temp 98.2 °F (36.8 °C) 1/16/2020 11:00 AM   Pulse 67 1/16/2020 11:05 AM   Resp 16 1/16/2020 11:00 AM   SpO2 90 % 1/16/2020 11:05 AM   Vitals shown include unvalidated device data.        Post Anesthesia Care and Evaluation    Patient location during evaluation: PACU  Patient participation: complete - patient participated  Level of consciousness: awake and alert  Pain score: 0  Pain management: adequate  Airway patency: patent  Anesthetic complications: No anesthetic complications  PONV Status: none  Cardiovascular status: hemodynamically stable and acceptable  Respiratory status: nonlabored ventilation, acceptable and nasal cannula  Hydration status: acceptable

## 2020-01-16 NOTE — ANESTHESIA PROCEDURE NOTES
Peripheral Block      Patient reassessed immediately prior to procedure    Patient location during procedure: post-op  Reason for block: at surgeon's request and post-op pain management  Performed by  CRNA: Nba Varela, CRNA  Preanesthetic Checklist  Completed: patient identified, site marked, surgical consent, pre-op evaluation, timeout performed, IV checked, risks and benefits discussed and monitors and equipment checked  Prep:  Pt Position: supine  Sterile barriers:cap, gloves, mask and sterile barriers  Prep: ChloraPrep  Patient monitoring: blood pressure monitoring, continuous pulse oximetry and EKG  Procedure  Performed under: spinal  Guidance:ultrasound guided  Images:still images obtained, printed/placed on chart    Laterality:left  Block Type:adductor canal block  Injection Technique:catheter  Needle Type:Tuohy and echogenic  Needle Gauge:18 G  Resistance on Injection: none  Catheter Size:20 G (20g)  Cath Depth at skin: 11 cm    Medications Used: bupivacaine PF (MARCAINE) 0.25 % injection, 20 mL  Med admintered at 1/16/2020 10:56 AM      Post Assessment  Injection Assessment: negative aspiration for heme, incremental injection and no paresthesia on injection  Patient Tolerance:comfortable throughout block  Complications:no  Additional Notes  Procedure:             The pt was placed in the Supine position.  The Insertion site was  prepped and Draped in sterile fashion.  The pt was anesthetized with  IV Sedation( see meds).  Skin and cutaneous tissue was infiltrated and anesthetized with 1% Lidocaine 3 mls via a 25g needle.  A BBraun 4 inch 18g echogenic needle was then  inserted approximately midline, mid-thigh and advanced In-plane with Ultrasound guidance.  Normal Saline PSF was utilized for hydrodissection of tissue.  The Vastus medialis and Sartorius muscle where visualized and the needle tip was placed in the adductor canal,  lateral to the femoral artery.  LA injection spread was visualized, injection  was incremental 1-5ml, injection pressure was normal or little, no intraneural injection, no vascular injection.  LA dose was injected thru the needle(see dose above).  A BBraun 20g wire stylet catheter was placed via the needle with ultrasound visualization and confirmation with NS fluid bolus. The labeled Catheter was then secured to skin at insertion site with skin afix and steristrips to curled catheter and CHG transparent dressing.  Thank you.

## 2020-01-16 NOTE — PLAN OF CARE
Problem: Patient Care Overview  Goal: Plan of Care Review  Outcome: Ongoing (interventions implemented as appropriate)  Flowsheets (Taken 1/16/2020 0866)  Outcome Summary: Patient ambulated 90 feet with RW and step through gait pattern, limited by pain and fatigue. ROM to be initiated POD#1. Encouraged patient to ambulate with nursing again later this afternoon. Will continue to progress as able. Plan as d/c home with family and HHPT. PADD score of 7.

## 2020-01-16 NOTE — PLAN OF CARE
VSS. Dressing CDI. Nerve block infusing at 10ml/hr. Patient ambulated 90 feet with therapy, now up to chair. Voiding without issue. Tolerating oral intake. D/C plan is home with spouse assist.

## 2020-01-16 NOTE — PROGRESS NOTES
Discharge Planning Assessment  [facility]     Patient Name: Laura Churchill  MRN: [id number]  Today's Date: 1/16/2020    Admit Date: 1/16/2020    Discharge Needs Assessment     Row Name 01/16/20 1457       Living Environment    Lives With  spouse    Current Living Arrangements  home/apartment/condo    Primary Care Provided by  self    Provides Primary Care For  no one    Family Caregiver if Needed  spouse    Quality of Family Relationships  involved;helpful    Able to Return to Prior Arrangements  yes       Transition Planning    Patient/Family Anticipates Transition to  home with help/services    Patient/Family Anticipated Services at Transition  none    Transportation Anticipated  family or friend will provide       Discharge Needs Assessment    Readmission Within the Last 30 Days  no previous admission in last 30 days    Concerns to be Addressed  discharge planning    Equipment Currently Used at Home  cane, quad;walker, rolling    Anticipated Changes Related to Illness  none    Equipment Needed After Discharge  none    Outpatient/Agency/Support Group Needs  homecare agency    Discharge Facility/Level of Care Needs  home with home health    Provided post acute provider list?  Refused        Discharge Plan     Row Name 01/16/20 1458       Plan    Plan  home with [facility] vs Outpt Rehab with [facility]    Provided post acute provider list?  Refused    Patient/Family in Agreement with Plan  yes    Plan Comments  Pt lives in [facility] with her . She reports she is independent with all ADLs prior to admit. She owns a rolling walker and a quad cane. Pt is followed by her PCP and reports she has medicare D coverage. At this time pt is deciding between returning home with HH or starting outpt PT at discharge. She has requested to work with PT before deciding. CM will continue to follow and make needed arrangments.     Final Discharge Disposition Code  06 - home with home health care        Destination       Coordination has not been started for this encounter.      Durable Medical Equipment      Coordination has not been started for this encounter.      Dialysis/Infusion      Coordination has not been started for this encounter.      Home Medical Care      Service Provider Request Status Selected Services Address Phone Number Fax Number    [facility] Pending - No Request Sent N/A [address], [facility] [address] [phone]      Therapy      Coordination has not been started for this encounter.      Community Resources      Coordination has not been started for this encounter.          Demographic Summary     Row Name 01/16/20 1457       General Information    Admission Type  observation    Referral Source  physician    Reason for Consult  discharge planning    General Information Comments  PCP Shiva Capone        Contact Information    Permission Granted to Share Info With  ;family/designee    Contact Information Comments  Addie Churchill [phone]        Functional Status     Row Name 01/16/20 1457       Functional Status, IADL    Medications  independent    Meal Preparation  independent    Housekeeping  independent    Laundry  independent    Shopping  independent       Mental Status    General Appearance WDL  WDL       Mental Status Summary    Recent Changes in Mental Status/Cognitive Functioning  no changes        Psychosocial    No documentation.       Abuse/Neglect    No documentation.       Legal    No documentation.       Substance Abuse    No documentation.       Patient Forms    No documentation.           Orin Curran, RN

## 2020-01-16 NOTE — H&P
Patient Name: Laura Churchill  MRN: [id number]  : 1950  DOS: 2020    Attending: Houston Castano,*    Primary Care Provider: Shiva Capone MD      Chief complaint: Left knee pain    Subjective   Patient is a 69 y.o. female presented for left total knee arthroplasty by Dr. Castano.    She underwent surgery under spinal anesthesia.  She tolerated surgery well and is admitted for further medical management.  Her knee has been painful for over a year.  She uses a cane for ambulation.  She denies recent falls.  He had cortisone injections earlier in the year that helped with pain for about 2 months.    When seen postop she is doing well.  She is beginning to have knee pain with spinal effects wearing off.  She denies nausea, shortness of breath or chest pain.  No history of DVT or PE.     ( Above is noted, agree.  Doing well when seen in her room afterwards.  No fever chills nausea or vomiting.  She has some anterior knee pain after ambulating with physical therapy.  Nerve block catheter infusion rate was increased.  And is starting to improve.  She walked 90 feet with a rolling walker and step through gait pattern.  Currently sitting in her recliner.)wy    Allergies:  Allergies   Allergen Reactions   • Codeine Hives   • Hydrocodone Hives     Itching      • Sulfa Antibiotics Itching   • Gabapentin Hallucinations     Screaming nightmares       Meds:  Medications Prior to Admission   Medication Sig Dispense Refill Last Dose   • amLODIPine (NORVASC) 2.5 MG tablet Take 2.5 mg by mouth Daily.   2020 at 0630   • atorvastatin (LIPITOR) 10 MG tablet Take 1 tablet by mouth Daily. 90 tablet 3 1/15/2020 at Unknown time   • DICLOFENAC PO Take 75 mg by mouth 2 (Two) Times a Day.   2020 at Unknown time   • losartan (COZAAR) 50 MG tablet TAKE 1 TABLET DAILY 90 tablet 4 1/15/2020 at Unknown time   • Multiple Vitamins-Minerals (MULTIVITAMIN ADULTS PO) Take 1 tablet by mouth Daily.   1/15/2020 at Unknown  time   • venlafaxine XR (EFFEXOR-XR) 150 MG 24 hr capsule Take 1 capsule by mouth Daily. 90 capsule 3 1/15/2020 at Unknown time   • tiZANidine (ZANAFLEX) 4 MG tablet Take 4 mg by mouth At Night As Needed for Muscle Spasms.   More than a month at Unknown time       History:   Past Medical History:   Diagnosis Date   • Anxiety    • Atrophic vaginitis    • Bilateral hip pain    • Dental bridge present     top left permanent    • GERD (gastroesophageal reflux disease)     h/o   • Hypertension    • Low back pain    • Mixed hyperlipidemia    • Muscle spasm    • Obesity due to excess calories    • MOOKIE (obstructive sleep apnea)     NO CPAP USE   • Osteoarthritis    • PONV (postoperative nausea and vomiting)     preprocedural meds help and are needed   • Tendinitis of right shoulder    • Wears glasses      Past Surgical History:   Procedure Laterality Date   • CHOLECYSTECTOMY     • COLONOSCOPY     • ENDOSCOPY     • GASTRIC BANDING REMOVAL     • GASTRIC SLEEVE LAPAROSCOPIC  2011   • HIP PINNING Left     3 pins   • LAPAROSCOPIC GASTRIC BANDING     • LAPAROTOMY OOPHERECTOMY Right    • LUMBAR DISCECTOMY FUSION INSTRUMENTATION N/A 11/20/2018    Procedure: L3-4 LUMBAR LAMINECTOMY POSTERIOR LUMBAR INTERBODY FUSION PILF;  Surgeon: David Rider MD;  Location:  [facility];  Service: Neurosurgery   • [place] RECONSTR TOTAL SHOULDER IMPLANT Left 7/10/2017    Procedure: LEFT TOTAL SHOULDER ARTHROPLASTY ;  Surgeon: Almas Miller MD;  Location:  [facility];  Service: Orthopedics   • WISDOM TOOTH EXTRACTION       Family History   Problem Relation Age of Onset   • Ovarian cancer Maternal Aunt 75   • Alzheimer's disease Mother    • Bone cancer Mother    • Melanoma Father    • Prostate cancer Father    • Diabetes Father    • Heart failure Father         congestive   • Breast cancer Neg Hx      Social History     Tobacco Use   • Smoking status: Former Smoker     Packs/day: 0.25     Years: 3.00     Pack years: 0.75     Types:  Cigarettes     Start date: 1975     Last attempt to quit: 1978     Years since quittin.2   • Smokeless tobacco: Never Used   Substance Use Topics   • Alcohol use: Yes     Frequency: Never     Comment: social    • Drug use: No   She is  with no children.  She is a retired schoolteacher.    Review of Systems  Pertinent items are noted in HPI, all other systems reviewed and negative    Vital Signs  Visit Vitals  /80   Pulse 57   Temp 97.8 °F (36.6 °C) (Temporal)   Resp 16   LMP  (LMP Unknown)   SpO2 92%   Breastfeeding No       Physical Exam:    General Appearance:    Alert, cooperative, in no acute distress   Head:    Normocephalic, without obvious abnormality, atraumatic   Eyes:            Lids and lashes normal, conjunctivae and sclerae normal, no   icterus, no pallor, corneas clear   Ears:    Ears appear intact with no abnormalities noted   Neck:   No adenopathy, supple, trachea midline, no thyromegaly   Lungs:     Clear to auscultation, respirations regular, even and                   unlabored    Heart:    Regular rhythm and normal rate, normal S1 and S2, no            murmur, no gallop   Abdomen:     Normal bowel sounds, no masses, no organomegaly, soft        non-tender, non-distended, no guarding, no rebound                 tenderness   Genitalia:    Deferred   Extremities:  Left knee Ace wrap clean dry intact.  Nerve block present.   Pulses:   Pulses palpable and equal bilaterally   Skin:   No bleeding, bruising or rash   Neurologic:   Cranial nerves 2 - 12 grossly intact, sensation returning to BLE as spinal effect subside. Flexion and dorsiflexion intact bilateral feet.        I reviewed the patient's new clinical results.     Results for FLORIN WAITE (MRN [id number]) as of 2020 14:27   Ref. Range 2020 12:08   Glucose Latest Ref Range: 65 - 99 mg/dL 107 (H)   Sodium Latest Ref Range: 136 - 145 mmol/L 143   Potassium Latest Ref Range: 3.5 - 5.2 mmol/L 4.6   CO2  Latest Ref Range: 22.0 - 29.0 mmol/L 28.0   Chloride Latest Ref Range: 98 - 107 mmol/L 107   Anion Gap Latest Ref Range: 5.0 - 15.0 mmol/L 8.0   Creatinine Latest Ref Range: 0.57 - 1.00 mg/dL 0.93   BUN Latest Ref Range: 8 - 23 mg/dL 21   BUN/Creatinine Ratio Latest Ref Range: 7.0 - 25.0  22.6   Calcium Latest Ref Range: 8.6 - 10.5 mg/dL 9.3   eGFR Non  Am Latest Ref Range: >60 mL/min/1.73 60 (L)   Hemoglobin A1C Latest Ref Range: 4.80 - 5.60 % 5.50   WBC Latest Ref Range: 3.40 - 10.80 10*3/mm3 6.98   RBC Latest Ref Range: 3.77 - 5.28 10*6/mm3 4.22   Hemoglobin Latest Ref Range: 12.0 - 15.9 g/dL 12.8   Hematocrit Latest Ref Range: 34.0 - 46.6 % 40.8   RDW Latest Ref Range: 12.3 - 15.4 % 12.3   MCV Latest Ref Range: 79.0 - 97.0 fL 96.7   MCH Latest Ref Range: 26.6 - 33.0 pg 30.3   MCHC Latest Ref Range: 31.5 - 35.7 g/dL 31.4 (L)   MPV Latest Ref Range: 6.0 - 12.0 fL 9.6   Platelets Latest Ref Range: 140 - 450 10*3/mm3 258     Assessment and Plan:     Status post total left knee replacement    Primary osteoarthritis of left knee    Essential hypertension    Hyperlipidemia      Plan  1. PT/OT- early ambulation postop  2. Pain control-prns, AC nerve block   3. IS-encourage  4. DVT proph- mechs/ASA  5. Bowel regimen  6. Resume home medications as appropriate  7. Monitor post-op labs  8. Discharge planning for home    HTN, Hyperlipidemia  - Continue home Norvasc, statin, and Cozaar  - Monitor BP   - Holding parameters for BP meds  - Labetalol PRN for SBP>170      REGINO Quezada  01/16/20  2:27 PM     I have personally performed the evaluation on this patient. My history is consistent  with HPI obtained. My exam findings are listed above. I have personally reviewed and discussed the above formulated treatment plan with pt, , and . REGINO.wy.

## 2020-01-16 NOTE — H&P
Pre-Op H&P  Laura Churchill  [id number]  1950    Chief complaint: Left knee pain    HPI:    Patient is a 69 y.o.female who presents with left knee pain, found to have left knee osteoarthritis.  Failed conservative treatment.  Here today to undergo left total knee arthroplasty     Review of Systems:  General ROS: negative for chills, fever or skin lesions;  No changes since last office visit  Cardiovascular ROS: no chest pain or dyspnea on exertion  Respiratory ROS: no cough, shortness of breath, or wheezing    Allergies:   Allergies   Allergen Reactions   • Codeine Hives   • Hydrocodone Hives     Itching      • Sulfa Antibiotics Itching   • Gabapentin Hallucinations     Screaming nightmares       Home Meds:    No current facility-administered medications on file prior to encounter.      Current Outpatient Medications on File Prior to Encounter   Medication Sig Dispense Refill   • amLODIPine (NORVASC) 2.5 MG tablet Take 2.5 mg by mouth Daily.     • atorvastatin (LIPITOR) 10 MG tablet Take 1 tablet by mouth Daily. 90 tablet 3   • DICLOFENAC PO Take 75 mg by mouth 2 (Two) Times a Day.     • Multiple Vitamins-Minerals (MULTIVITAMIN ADULTS PO) Take 1 tablet by mouth Daily.     • venlafaxine XR (EFFEXOR-XR) 150 MG 24 hr capsule Take 1 capsule by mouth Daily. 90 capsule 3   • tiZANidine (ZANAFLEX) 4 MG tablet Take 4 mg by mouth At Night As Needed for Muscle Spasms.         PMH:   Past Medical History:   Diagnosis Date   • Anxiety    • Atrophic vaginitis    • Bilateral hip pain    • Dental bridge present     top left permanent    • GERD (gastroesophageal reflux disease)     h/o   • Hypertension    • Low back pain    • Mixed hyperlipidemia    • Muscle spasm    • Obesity due to excess calories    • MOOKIE (obstructive sleep apnea)     NO CPAP USE   • Osteoarthritis    • PONV (postoperative nausea and vomiting)     preprocedural meds help and are needed   • Tendinitis of right shoulder    • Wears glasses      PSH:     Past Surgical History:   Procedure Laterality Date   • CHOLECYSTECTOMY     • COLONOSCOPY     • ENDOSCOPY     • GASTRIC BANDING REMOVAL     • GASTRIC SLEEVE LAPAROSCOPIC     • HIP PINNING Left     3 pins   • LAPAROSCOPIC GASTRIC BANDING     • LAPAROTOMY OOPHERECTOMY Right    • LUMBAR DISCECTOMY FUSION INSTRUMENTATION N/A 2018    Procedure: L3-4 LUMBAR LAMINECTOMY POSTERIOR LUMBAR INTERBODY FUSION PILF;  Surgeon: David Rider MD;  Location:  [facility];  Service: Neurosurgery   • [place] RECONSTR TOTAL SHOULDER IMPLANT Left 7/10/2017    Procedure: LEFT TOTAL SHOULDER ARTHROPLASTY ;  Surgeon: Almas Miller MD;  Location:  [facility];  Service: Orthopedics   • WISDOM TOOTH EXTRACTION       Social History:   Tobacco:   Social History     Tobacco Use   Smoking Status Former Smoker   • Packs/day: 0.25   • Years: 3.00   • Pack years: 0.75   • Types: Cigarettes   • Start date: 1975   • Last attempt to quit: 1978   • Years since quittin.2   Smokeless Tobacco Never Used      Alcohol:     Social History     Substance and Sexual Activity   Alcohol Use Yes   • Frequency: Never    Comment: social        Vitals:           /80 (BP Location: Right arm, Patient Position: Sitting)   Pulse 77   Temp 96.7 °F (35.9 °C) (Temporal)   Resp 18   LMP  (LMP Unknown)   SpO2 97%   Breastfeeding No     Physical Exam:  General Appearance:    Alert, cooperative, no distress, appears stated age   Head:    Normocephalic, without obvious abnormality, atraumatic   Lungs:     Clear to auscultation bilaterally, respirations unlabored    Heart:   Regular rate and rhythm, S1 and S2 normal, no murmur, rub    or gallop    Abdomen:    Soft, non-tender.  +bowel sounds   Breast Exam:    deferred   Genitalia:    deferred   Extremities:   Extremities normal, atraumatic, no cyanosis or edema   Skin:   Skin color, texture, turgor normal, no rashes or lesions   Neurologic:   Grossly intact   Results  Review  LABS:  Lab Results   Component Value Date    WBC 6.98 01/02/2020    HGB 12.8 01/02/2020    HCT 40.8 01/02/2020    MCV 96.7 01/02/2020     01/02/2020    NEUTROABS 4.17 09/03/2019    GLUCOSE 107 (H) 01/02/2020    BUN 21 01/02/2020    CREATININE 0.93 01/02/2020    EGFRIFNONA 60 (L) 01/02/2020     01/02/2020    K 4.6 01/02/2020     01/02/2020    CO2 28.0 01/02/2020    CALCIUM 9.3 01/02/2020    ALBUMIN 4.40 09/03/2019    AST 20 09/03/2019    ALT 11 09/03/2019    BILITOT 0.4 09/03/2019       RADIOLOGY:  Imaging Results (Last 72 Hours)     ** No results found for the last 72 hours. **          I reviewed the patient's new clinical results.    Cancer Staging (if applicable)  Cancer Patient: __ yes _x_no __unknown; If yes, clinical stage T:__ N:__M:__, stage group or __N/A    Impression: Left knee osteoarthritis    Plan: Left total knee arthroplasty     Destini Wiley, APRN   1/16/2020   8:37 AM

## 2020-01-16 NOTE — ANESTHESIA PROCEDURE NOTES
Spinal Block      Patient reassessed immediately prior to procedure    Patient location during procedure: OR  Indication:at surgeon's request  Performed By  CRNA: Nba Varela CRNA  Preanesthetic Checklist  Completed: patient identified, site marked, surgical consent, pre-op evaluation, timeout performed, IV checked, risks and benefits discussed and monitors and equipment checked  Spinal Block Prep:  Patient Position:sitting  Sterile Tech:cap, gloves, sterile barriers and mask  Prep:Chloraprep  Patient Monitoring:blood pressure monitoring, continuous pulse oximetry and EKG  Spinal Block Procedure  Approach:midline  Guidance:landmark technique and palpation technique  Location:L4-L5  Needle Type:Sprotte  Needle Gauge:25 G  Placement of Spinal needle event:cerebrospinal fluid aspirated  Paresthesia: no  Fluid Appearance:clear  Medications: bupivacaine (MARCAINE) 0.5 % injection, 1.8 mL   Post Assessment  Patient Tolerance:patient tolerated the procedure well with no apparent complications  Complications no  Additional Notes  Procedure:  Pt assisted to sitting position, with legs in position of comfort over side of bed.  Pt. instructed in optimal spine presentation, the spine was prepped/ Draped and the skin at insertion site was anesthetized with 1% Lidocaine 2 ml.  The spinal needle was then advanced until CSF flow was obtained and LA was injected:

## 2020-01-16 NOTE — OP NOTE
Preoperative diagnosis:Left knee end stage osteoarthritis    Postoperative diagnosis: Left knee end stage osteoarthritis    Operative procedure: Left total knee arthroplasty    Surgeon: Dr. Kayden Castano    Assistant: NASIMA Lozano.  Necessary during the case for positioning of the patient, exposure, implantation of components and closure.    Anesthesia: Spinal with local and adductor canal catheter    Estimated blood loss: Minimal    Implants: [employer]  posterior stabilized total knee arthroplasty size 5 femur, 4 tibia, 32 thin patella, 9 polyethylene.    Tourniquet time: 54 minutes at 300 mmHg    Indications for procedure: Laura is a very pleasant 69-year-old female who has struggled with end-stage activity limiting left knee pain secondary to osteoarthritis.  X-rays in August revealed severe tricompartmental degenerative changes in a varus knee with complete loss of medial joint space and marginal osteophyte formation.  They have failed exhaustive conservative treatment measures including cortisone injection in October with no relief.  She was not interested in Visco supplementation injections.  She takes diclofenac with minimal relief of her knee pain..  After undergoing a shared decision-making process they agreed to proceed with left total knee arthroplasty.  Surgical consent form was signed.    Description of procedure: The patient was seen in the preoperative holding area and the left leg was signed to confirm the correct operative site.  They were seen by anesthesia.  They received Ancef 2 g IV prophylactic antibiotics within 1 hour of incision time.  They were brought back to the operating room.  Spinal was performed without difficulty and they were given sedation throughout the case.  Patient placed in the supine position and all bony prominences were well-padded.  A bump was placed underneath the left hip.  Nonsterile tourniquet was applied to the thigh.  The left lower extremity was prepped and  draped in the usual sterile fashion and timeout was performed to confirm left total knee arthroplasty for patient Laura Churchill.    The left lower extremity was exsanguinated with an Esmarch.  Tourniquet was inflated to 300 mmHg.  With the knee flexed a midline incision was made with a 10 blade scalpel.  Adequate hemostasis maintained with Bovie electrocautery.  Full-thickness medial and lateral flaps were elevated.  Using a fresh 10 blade scalpel I made a medial parapatellar arthrotomy.  The patella was everted.  Patella fat pad and anterior femoral fat pads were excised.  Marginal osteophytes were removed.  Medial release was performed for this varus knee using Bovie electrocautery.  [place]'s line was marked.  Z retractors were placed medially and laterally.  The distal femur was then drilled and intramedullary distal femoral cutting guide was pinned in place set at a 5° valgus cut for a 9.5 mm cut.  This cut was then made with the oscillating saw.  The femur was then sized to a size 5 set at 3° of external rotation.  4-in-1 cutting guide was pinned in place.  Anterior and posterior cuts were made as were the chamfer cuts.  Cut bone was removed.  We then turned our attention to the tibia.    The tibia was subluxed anteriorly. PCL retractor was placed as were medial and lateral Hohmann retractors.  We then used the extramedullary tibial cutting guide set at 3° posterior slope for the proximal tibial cut.  5 mm of bone was removed from the low medial side and a centimeter from the high lateral side.  Medial and lateral menisci were then excised as were posterior osteophytes.  Flexion and extension gaps were then checked and with a 9 mm block we achieved full extension and flexion with excellent alignment. The tibia was sized to a 4 tibial tray centered off the medial third of the tibial tubercle.  The tray was pinned in place.  We then impacted the tibial fins.  Size 5  trial femur was then placed and box cut was  made with the reamer and punch. We trialed with a size 9 polyethylene, 5 femur and 4 tibia.  With these trial components in place we achieved full extension and flexion with excellent alignment and stable throughout.     We then turned our attention to the patella.  Patella was sized to 20 mm in thickness and we made a 7mm patellar cut with the reciprocal saw.  A 32 thin trial was placed and the patella drill holes were made.  With the trial button in place there was excellent tracking with the no thumbs technique.    Trial components were then removed.  Final components were opened on the back table.  Posterior capsule was injected with 20 mL of half percent ropivacaine and 5 mg of morphine.  Cement was mixed on the back table.  The knee was copiously irrigated with Pulsavac lavage.  The knee was thoroughly dried and a bone plug was placed in the distal femoral drill hole.  Cement was then applied to the tibia and final size 4 tibial tray was impacted in place and excess cement was removed.  Cement was applied to the femur and final size 5 femur was impacted in place and excess cement removed.  We then placed a 9 mm polyethylene-this was seen to be fully seated in the tibial tray.  Knee was then placed in extension and we applied cement to the cut patellar surface and 32 thin patella was held in place with patellar clamp.  All excess cement was removed.  The knee was left in extension while cement cured.    Once the cement was fully cured we irrigated the knee with diluted Betadine solution and Pulsavac lavage.  The knee was taken through full range of motion and seen to achieve full extension and flexion with excellent patellar tracking.    We then proceeded with closure.  The deep fascia was closed with a #1 Stratafix barbed suture.  Dermis was closed with 2-0 Vicryl.  Skin was closed with a running 3-0 Stratafix barbed subcuticular suture.  A sterile dressing was then applied with Prineo mesh dressing and  Dermabond.  We then applied 4 x 4's, ABDs, soft roll and a six-inch Ace bandage.    Tourniquet was deflated at 54 minutes.  Patient was transferred to recovery in stable condition.  All sponge and needle counts were correct ×2.  Patient received first dose of tranexamic acid just prior to incision and the second dose 5-10 minutes prior to letting down the tourniquet to minimize bleeding.    Postoperative plan:  Patient will be admitted to Dr. HENRIQUEZ for medical management  Mobilize starting today with PT/OT as tolerated  Start aspirin for DVT prophylaxis tomorrow   consult for physical therapy and home equipment needs  Follow-up with me in 2-3 weeks.    Houston Castano MD  01/16/20  11:10 AM     CC: Dr. Shiva Capone

## 2020-01-17 VITALS
RESPIRATION RATE: 16 BRPM | SYSTOLIC BLOOD PRESSURE: 135 MMHG | HEART RATE: 79 BPM | TEMPERATURE: 98.1 F | DIASTOLIC BLOOD PRESSURE: 74 MMHG | OXYGEN SATURATION: 96 %

## 2020-01-17 PROBLEM — D72.829 LEUKOCYTOSIS: Status: ACTIVE | Noted: 2020-01-17

## 2020-01-17 PROBLEM — D62 ACUTE BLOOD LOSS ANEMIA: Status: ACTIVE | Noted: 2020-01-17

## 2020-01-17 LAB
ANION GAP SERPL CALCULATED.3IONS-SCNC: 13 MMOL/L (ref 5–15)
BASOPHILS # BLD AUTO: 0.02 10*3/MM3 (ref 0–0.2)
BASOPHILS NFR BLD AUTO: 0.2 % (ref 0–1.5)
BUN BLD-MCNC: 10 MG/DL (ref 8–23)
BUN/CREAT SERPL: 12.7 (ref 7–25)
CALCIUM SPEC-SCNC: 8.8 MG/DL (ref 8.6–10.5)
CHLORIDE SERPL-SCNC: 101 MMOL/L (ref 98–107)
CO2 SERPL-SCNC: 24 MMOL/L (ref 22–29)
CREAT BLD-MCNC: 0.79 MG/DL (ref 0.57–1)
DEPRECATED RDW RBC AUTO: 42.6 FL (ref 37–54)
EOSINOPHIL # BLD AUTO: 0.02 10*3/MM3 (ref 0–0.4)
EOSINOPHIL NFR BLD AUTO: 0.2 % (ref 0.3–6.2)
ERYTHROCYTE [DISTWIDTH] IN BLOOD BY AUTOMATED COUNT: 12.1 % (ref 12.3–15.4)
GFR SERPL CREATININE-BSD FRML MDRD: 72 ML/MIN/1.73
GLUCOSE BLD-MCNC: 141 MG/DL (ref 65–99)
HCT VFR BLD AUTO: 35.5 % (ref 34–46.6)
HGB BLD-MCNC: 11.3 G/DL (ref 12–15.9)
IMM GRANULOCYTES # BLD AUTO: 0.05 10*3/MM3 (ref 0–0.05)
IMM GRANULOCYTES NFR BLD AUTO: 0.4 % (ref 0–0.5)
LYMPHOCYTES # BLD AUTO: 1.59 10*3/MM3 (ref 0.7–3.1)
LYMPHOCYTES NFR BLD AUTO: 13.9 % (ref 19.6–45.3)
MCH RBC QN AUTO: 30.5 PG (ref 26.6–33)
MCHC RBC AUTO-ENTMCNC: 31.8 G/DL (ref 31.5–35.7)
MCV RBC AUTO: 95.9 FL (ref 79–97)
MONOCYTES # BLD AUTO: 1.23 10*3/MM3 (ref 0.1–0.9)
MONOCYTES NFR BLD AUTO: 10.8 % (ref 5–12)
NEUTROPHILS # BLD AUTO: 8.49 10*3/MM3 (ref 1.7–7)
NEUTROPHILS NFR BLD AUTO: 74.5 % (ref 42.7–76)
NRBC BLD AUTO-RTO: 0 /100 WBC (ref 0–0.2)
PLATELET # BLD AUTO: 240 10*3/MM3 (ref 140–450)
PMV BLD AUTO: 9.9 FL (ref 6–12)
POTASSIUM BLD-SCNC: 4 MMOL/L (ref 3.5–5.2)
RBC # BLD AUTO: 3.7 10*6/MM3 (ref 3.77–5.28)
SODIUM BLD-SCNC: 138 MMOL/L (ref 136–145)
WBC NRBC COR # BLD: 11.4 10*3/MM3 (ref 3.4–10.8)

## 2020-01-17 PROCEDURE — 97116 GAIT TRAINING THERAPY: CPT

## 2020-01-17 PROCEDURE — 97165 OT EVAL LOW COMPLEX 30 MIN: CPT

## 2020-01-17 PROCEDURE — A9270 NON-COVERED ITEM OR SERVICE: HCPCS | Performed by: ORTHOPAEDIC SURGERY

## 2020-01-17 PROCEDURE — 63710000001 MELOXICAM 7.5 MG TABLET: Performed by: ORTHOPAEDIC SURGERY

## 2020-01-17 PROCEDURE — A9270 NON-COVERED ITEM OR SERVICE: HCPCS | Performed by: NURSE PRACTITIONER

## 2020-01-17 PROCEDURE — 63710000001 OXYCODONE-ACETAMINOPHEN 7.5-325 MG TABLET: Performed by: ORTHOPAEDIC SURGERY

## 2020-01-17 PROCEDURE — 63710000001 ASPIRIN EC 325 MG TABLET DELAYED-RELEASE: Performed by: ORTHOPAEDIC SURGERY

## 2020-01-17 PROCEDURE — 25010000003 CEFAZOLIN IN DEXTROSE 2-4 GM/100ML-% SOLUTION: Performed by: ORTHOPAEDIC SURGERY

## 2020-01-17 PROCEDURE — 80048 BASIC METABOLIC PNL TOTAL CA: CPT | Performed by: ORTHOPAEDIC SURGERY

## 2020-01-17 PROCEDURE — 85025 COMPLETE CBC W/AUTO DIFF WBC: CPT | Performed by: ORTHOPAEDIC SURGERY

## 2020-01-17 PROCEDURE — 97535 SELF CARE MNGMENT TRAINING: CPT

## 2020-01-17 PROCEDURE — 97110 THERAPEUTIC EXERCISES: CPT

## 2020-01-17 PROCEDURE — 63710000001 AMLODIPINE 2.5 MG TABLET: Performed by: NURSE PRACTITIONER

## 2020-01-17 RX ORDER — OXYCODONE AND ACETAMINOPHEN 7.5; 325 MG/1; MG/1
1 TABLET ORAL EVERY 4 HOURS PRN
Qty: 40 TABLET | Refills: 0 | Status: SHIPPED | OUTPATIENT
Start: 2020-01-17 | End: 2020-01-26

## 2020-01-17 RX ORDER — DICLOFENAC 35 MG/1
75 CAPSULE ORAL 2 TIMES DAILY
Qty: 90 CAPSULE | Status: ON HOLD
Start: 2020-01-17 | End: 2020-05-27

## 2020-01-17 RX ORDER — PSEUDOEPHEDRINE HCL 30 MG
100 TABLET ORAL 2 TIMES DAILY PRN
Qty: 60 EACH | Refills: 0 | Status: SHIPPED | OUTPATIENT
Start: 2020-01-17 | End: 2020-05-18

## 2020-01-17 RX ADMIN — AMLODIPINE BESYLATE 2.5 MG: 2.5 TABLET ORAL at 09:13

## 2020-01-17 RX ADMIN — OXYCODONE HYDROCHLORIDE AND ACETAMINOPHEN 2 TABLET: 7.5; 325 TABLET ORAL at 04:51

## 2020-01-17 RX ADMIN — CEFAZOLIN SODIUM 2 G: 2 INJECTION, SOLUTION INTRAVENOUS at 01:11

## 2020-01-17 RX ADMIN — MELOXICAM 15 MG: 7.5 TABLET ORAL at 09:13

## 2020-01-17 RX ADMIN — OXYCODONE HYDROCHLORIDE AND ACETAMINOPHEN 2 TABLET: 7.5; 325 TABLET ORAL at 09:14

## 2020-01-17 RX ADMIN — ASPIRIN 325 MG: 325 TABLET, COATED ORAL at 09:13

## 2020-01-17 NOTE — PLAN OF CARE
Problem: Patient Care Overview  Goal: Plan of Care Review  Outcome: Ongoing (interventions implemented as appropriate)  Flowsheets (Taken 1/17/2020 0814)  Outcome Summary: OT eval complete. Pt completes all transfers and ambulates with CGA and RW. OT educated pt on use for AE for completion of all LBD. Pt has reacher at home and OT issued sock aid and educated pt on use. Pt completed all dressing with Roro and use of reacher and sock aid. Pt completed bryanna care independently and required Roro for clothing management around nerve catheter. Pt plans to return home this date with assist from family and HHOT.

## 2020-01-17 NOTE — PROGRESS NOTES
Continued Stay Note  [facility]     Patient Name: Laura Churchill  MRN: [id number]  Today's Date: 1/17/2020    Admit Date: 1/16/2020    Discharge Plan     Row Name 01/17/20 1140       Plan    Plan  Home with 's assistance and [facility]    Patient/Family in Agreement with Plan  yes    Plan Comments  Received referral from Dr. Castano to arrange for Ms. Churchill to go to [facility].  Called and faxed outpatient physical therapy orders to Rachael at [facility], ph [phone], fax [phone].  No other needs noted.    CM will continue to follow.    Final Discharge Disposition Code  01 - home or self-care        Discharge Codes    No documentation.       Expected Discharge Date and Time     Expected Discharge Date Expected Discharge Time    Jan 17, 2020             Joie Wang RN

## 2020-01-17 NOTE — THERAPY TREATMENT NOTE
Patient Name: Laura Churchill  : 1950    MRN: [id number]                              Today's Date: 2020       Admit Date: 2020    Visit Dx:     ICD-10-CM ICD-9-CM   1. Status post total left knee replacement Z96.652 V43.65   2. Primary osteoarthritis of both knees M17.0 715.16   3. Impaired mobility and ADLs Z74.09 799.89     Patient Active Problem List   Diagnosis   • Essential hypertension   • Hyperlipidemia   • Obstructive sleep apnea syndrome   • Osteoarthritis of knee   • Osteoporosis   • Lumbar spondylosis without myelopathy/Lumbar facet arthropathy   • Displacement of lumbar intervertebral disc   • Stenosis of lateral recess of lumbar spine   • Physical deconditioning   • Morbid obesity due to excess calories (CMS/HCC)   • Anxiety and depression   • Sacroiliac joint dysfunction of right side   • Greater trochanteric bursitis of right hip   • Iliotibial band syndrome of right side   • Spinal stenosis   • Osteoarthritis   • Obesity due to excess calories   • Menopause   • Mixed hyperlipidemia   • Lumbosacral disc disease   • Low back pain   • Joint pain   • Extremity pain   • Chronic pain disorder   • Back problem   • Vaginal atrophy   • Primary localized osteoarthrosis of shoulder region   • Status post total left shoulder arthroplasty   • Sciatica   • Acquired spondylolisthesis   • Spinal stenosis, lumbar region, with neurogenic claudication   • S/P lumbar fusion   • Primary osteoarthritis of left knee   • Status post total left knee replacement     Past Medical History:   Diagnosis Date   • Anxiety    • Atrophic vaginitis    • Bilateral hip pain    • Dental bridge present     top left permanent    • GERD (gastroesophageal reflux disease)     h/o   • Hypertension    • Low back pain    • Mixed hyperlipidemia    • Muscle spasm    • Obesity due to excess calories    • MOOKIE (obstructive sleep apnea)     NO CPAP USE   • Osteoarthritis    • PONV (postoperative nausea and vomiting)      preprocedural meds help and are needed   • Tendinitis of right shoulder    • Wears glasses      Past Surgical History:   Procedure Laterality Date   • CHOLECYSTECTOMY     • COLONOSCOPY     • ENDOSCOPY     • GASTRIC BANDING REMOVAL     • GASTRIC SLEEVE LAPAROSCOPIC  2011   • HIP PINNING Left     3 pins   • LAPAROSCOPIC GASTRIC BANDING     • LAPAROTOMY OOPHERECTOMY Right    • LUMBAR DISCECTOMY FUSION INSTRUMENTATION N/A 11/20/2018    Procedure: L3-4 LUMBAR LAMINECTOMY POSTERIOR LUMBAR INTERBODY FUSION PILF;  Surgeon: David Rider MD;  Location:  [facility];  Service: Neurosurgery   • IA RECONSTR TOTAL SHOULDER IMPLANT Left 7/10/2017    Procedure: LEFT TOTAL SHOULDER ARTHROPLASTY ;  Surgeon: Almas Miller MD;  Location:  [facility] OR;  Service: Orthopedics   • TOTAL KNEE ARTHROPLASTY Left 1/16/2020    Procedure: TOTAL KNEE REPLACEMENT LEFT;  Surgeon: Houston Castano MD;  Location:  [facility];  Service: Orthopedics   • WISDOM TOOTH EXTRACTION       General Information     Row Name 01/17/20 1105          PT Evaluation Time/Intention    Document Type  therapy note (daily note)  -KG     Mode of Treatment  physical therapy  -KG     Row Name 01/17/20 1105          General Information    Patient Profile Reviewed?  yes  -KG     Existing Precautions/Restrictions  fall;other (see comments)  -KG     Row Name 01/17/20 1105          Cognitive Assessment/Intervention- PT/OT    Orientation Status (Cognition)  oriented x 4  -KG     Row Name 01/17/20 1105          Safety Issues, Functional Mobility    Impairments Affecting Function (Mobility)  pain;strength;range of motion (ROM)  -KG       User Key  (r) = Recorded By, (t) = Taken By, (c) = Cosigned By    Initials Name Provider Type    KG Mouna Olea Physical Therapist        Mobility     Row Name 01/17/20 1106          Bed Mobility Assessment/Treatment    Comment (Bed Mobility)  UIC  -KG     Row Name 01/17/20 1106          Transfer Assessment/Treatment     Comment (Transfers)  VCs to push up with arms from chair and slide LLE forward for comfort  -KG     Row Name 01/17/20 1106          Sit-Stand Transfer    Sit-Stand Parrott (Transfers)  contact guard;1 person assist  -KG     Assistive Device (Sit-Stand Transfers)  walker, front-wheeled  -KG     Row Name 01/17/20 1106          Gait/Stairs Assessment/Training    Gait/Stairs Assessment/Training  gait/ambulation independence;gait/ambulation assistive device  -KG     Parrott Level (Gait)  stand by assist  -KG     Assistive Device (Gait)  walker, front-wheeled  -KG     Distance in Feet (Gait)  200  -KG     Pattern (Gait)  step-to;step-through  -KG     Deviations/Abnormal Patterns (Gait)  bilateral deviations;nancy decreased;gait speed decreased;stride length decreased;left sided deviations;antalgic  -KG     Bilateral Gait Deviations  forward flexed posture;heel strike decreased  -KG     Left Sided Gait Deviations  weight shift ability decreased  -KG     Negotiation (Stairs)  stairs independence;stairs assistive device  -KG     Parrott Level (Stairs)  contact guard  -KG     Assistive Device (Stairs)  cane, straight  -KG     Handrail Location (Stairs)  right side (ascending)  -KG     Ascending Technique (Stairs)  step-to-step  -KG     Descending Technique (Stairs)  step-to-step  -KG     Comment (Gait/Stairs)  Pt ambulated step to and step thru gait, 200 ft with RW and SBA.  Cues for avoiding foot ER, increased WB LLE and posture.  Pt has had difficulty with catheter leaking today, but pain managed by nursing with meds prior to PT.  Pt able to perform 5 steps safely with cane and railing, CGA, pt's  to help her when they use stairs at home.  Pt safe to d/c home with assist and HH.   -KG     Row Name 01/17/20 1106          Mobility Assessment/Intervention    Extremity Weight-bearing Status  left lower extremity  -KG     Left Lower Extremity (Weight-bearing Status)  weight-bearing as tolerated (WBAT)   -KG       User Key  (r) = Recorded By, (t) = Taken By, (c) = Cosigned By    Initials Name Provider Type    KG Mouna Olea Physical Therapist        Obj/Interventions     Row Name 01/17/20 1110          General ROM    GENERAL ROM COMMENTS  7-85 degrees ROM LLE  -KG     Row Name 01/17/20 1110          Therapeutic Exercise    Lower Extremity (Therapeutic Exercise)  heel slides, left;quad sets, bilateral;LAQ (long arc quad), left  -KG     Lower Extremity Range of Motion (Therapeutic Exercise)  ankle dorsiflexion/plantar flexion, bilateral;knee flexion/extension, left  -KG     Exercise Type (Therapeutic Exercise)  AROM (active range of motion);isometric contraction, static  -KG     Position (Therapeutic Exercise)  seated  -KG     Sets/Reps (Therapeutic Exercise)  x10  -KG     Expected Outcome (Therapeutic Exercise)  improve functional stability;facilitate normal movement patterns  -KG     Comment (Therapeutic Exercise)  cues technique  -KG     Row Name 01/17/20 1110          Static Sitting Balance    Level of Gem (Unsupported Sitting, Static Balance)  independent  -KG     Sitting Position (Unsupported Sitting, Static Balance)  sitting in chair  -KG     Time Able to Maintain Position (Unsupported Sitting, Static Balance)  30 to 45 seconds  -KG     Row Name 01/17/20 1110          Static Standing Balance    Level of Gem (Supported Standing, Static Balance)  standby assist  -KG     Time Able to Maintain Position (Supported Standing, Static Balance)  30 to 45 seconds  -KG     Assistive Device Utilized (Supported Standing, Static Balance)  walker, rolling  -KG     Row Name 01/17/20 1110          Dynamic Standing Balance    Level of Gem, Reaches Outside Midline (Standing, Dynamic Balance)  standby assist  -KG     Time Able to Maintain Position, Reaches Outside Midline (Standing, Dynamic Balance)  45 to 60 seconds  -KG     Assistive Device Utilized (Supported Standing, Dynamic Balance)  walker,  rolling  -KG       User Key  (r) = Recorded By, (t) = Taken By, (c) = Cosigned By    Initials Name Provider Type    Mouna Frank Physical Therapist        Goals/Plan    No documentation.       Clinical Impression     Row Name 01/17/20 1114          Pain Assessment    Additional Documentation  Pain Scale: Numbers Pre/Post-Treatment (Group)  -KG     Row Name 01/17/20 1114          Pain Scale: Numbers Pre/Post-Treatment    Pain Scale: Numbers, Pretreatment  4/10  -KG     Pain Scale: Numbers, Post-Treatment  4/10  -KG     Pain Location - Side  Left  -KG     Pain Location - Orientation  anterior  -KG     Pain Location  knee  -KG     Pain Intervention(s)  Repositioned;Cold applied;Ambulation/increased activity  -KG     Row Name 01/17/20 1114          Plan of Care Review    Plan of Care Reviewed With  patient  -KG     Progress  improving  -KG     Outcome Summary  Pt ambulated step to and step thru gait, 200 ft with RW and SBA.  Cues for avoiding foot ER, increased WB LLE and posture.  Pt has had difficulty with catheter leaking today, but pain managed by nursing with meds prior to PT.  Pt able to perform 5 steps safely with cane and railing, CGA, pt's  to help her when they use stairs at home.  Pt safe to d/c home with assist and HH.  7-85 degrees motion L knee.   -KG     Row Name 01/17/20 1114          Positioning and Restraints    Pre-Treatment Position  sitting in chair/recliner  -KG     Post Treatment Position  chair  -KG     In Chair  notified nsg;call light within reach;encouraged to call for assist;exit alarm on;with family/caregiver;legs elevated  -KG       User Key  (r) = Recorded By, (t) = Taken By, (c) = Cosigned By    Initials Name Provider Type    Mouna Frank Physical Therapist        Outcome Measures     Row Name 01/17/20 1116          How much help from another person do you currently need...    Turning from your back to your side while in flat bed without using bedrails?  3  -KG      Moving from lying on back to sitting on the side of a flat bed without bedrails?  3  -KG     Moving to and from a bed to a chair (including a wheelchair)?  3  -KG     Standing up from a chair using your arms (e.g., wheelchair, bedside chair)?  3  -KG     Climbing 3-5 steps with a railing?  3  -KG     To walk in hospital room?  3  -KG     AM-PAC 6 Clicks Score (PT)  18  -KG     Row Name 01/17/20 1116          Functional Assessment    Outcome Measure Options  AM-PAC 6 Clicks Basic Mobility (PT)  -KG       User Key  (r) = Recorded By, (t) = Taken By, (c) = Cosigned By    Initials Name Provider Type    Mouna Frank Physical Therapist          PT Recommendation and Plan     Plan of Care Reviewed With: patient  Progress: improving  Outcome Summary: Pt ambulated step to and step thru gait, 200 ft with RW and SBA.  Cues for avoiding foot ER, increased WB LLE and posture.  Pt has had difficulty with catheter leaking today, but pain managed by nursing with meds prior to PT.  Pt able to perform 5 steps safely with cane and railing, CGA, pt's  to help her when they use stairs at home.  Pt safe to d/c home with assist and HH.  7-85 degrees motion L knee.      Time Calculation:   PT Charges     Row Name 01/17/20 1118             Time Calculation    Start Time  1025  -KG      PT Received On  01/17/20  -KG      PT Goal Re-Cert Due Date  01/26/20  -KG         Time Calculation- PT    Total Timed Code Minutes- PT  40 minute(s)  -KG         Timed Charges    22101 - PT Therapeutic Exercise Minutes  15  -KG      58694 - Gait Training Minutes   25  -KG        User Key  (r) = Recorded By, (t) = Taken By, (c) = Cosigned By    Initials Name Provider Type    Mouna Frank Physical Therapist        Therapy Charges for Today     Code Description Service Date Service Provider Modifiers Qty    49198555120 HC PT THER PROC EA 15 MIN 1/17/2020 Mouna Olea GP 1    93274496436 HC GAIT TRAINING EA 15 MIN 1/17/2020 Lefty  Mouna GP 2          PT G-Codes  Outcome Measure Options: AM-PAC 6 Clicks Basic Mobility (PT)  AM-PAC 6 Clicks Score (PT): 18    Mouna Olea  1/17/2020

## 2020-01-17 NOTE — DISCHARGE PLACEMENT REQUEST
"Laura Waite (69 y.o. Female)   PT referral from Joie Wang RN BSN, Case Management, ph [phone]    Date of Birth Social Security Number Address Home Phone MRN    1950  [address] [facility] [address] [facility] [address] [phone] [id number]    [place] Marital Status          Rastafari        Admission Date Admission Type Admitting Provider Attending Provider Department, Room/Bed    20 Elective Houston Castano MD Coy, Samuel Christopher, MD [address], S371/1    Discharge Date Discharge Disposition Discharge Destination         Home or Self Care              Attending Provider:  Houston Castano MD    Allergies:  Codeine, Hydrocodone, Sulfa Antibiotics, Gabapentin    Isolation:  None   Infection:  None   Code Status:  CPR    Ht:  165.1 cm (65\")   Wt:  101 kg (221 lb 12.5 oz)    Admission Cmt:  None   Principal Problem:  Status post total left knee replacement [Z96.652]                 Active Insurance as of 2020     Primary Coverage     Payor Plan Insurance Group Employer/Plan Group    [employer] [employer] [place] [place] [employer] [id number]     Payor Plan Address Payor Plan Phone Number Payor Plan Fax Number Effective Dates    [address] [address]   2016 - None Entered    [facility] [id number]       Subscriber Name Subscriber Birth Date Member ID       LAURA WAITE 1950 [id number]               [address] [address]  Phone:  [phone]  Fax:   Date: 2020      Ambulatory Referral to Physical Therapy Evaluate and treat, Ortho; Full weight bearing     Patient:  Laura Waite MRN:  [id number]   [address] [facility] [address] [facility] [address] :  1950  SSN:    Phone: [phone] Sex:  F      INSURANCE PAYOR PLAN GROUP # SUBSCRIBER ID   Primary:    [employer]PL[employer]EMENT [id number]      Referring Provider " Information:  JESSIKA ONEILL Phone: [phone] Fax:       Referral Information:   # Visits:  1 Referral Type: Therapy [AE1]   Urgency:  Routine Referral Reason: Specialty Services Required   Start Date: Jan 17, 2020 End Date:  To be determined by Insurer   Diagnosis: Status post total left knee replacement (Z96.652 [ICD-10-CM] V43.65 [ICD-9-CM])  Impaired mobility and ADLs (Z74.09 [ICD-10-CM] 799.89 [ICD-9-CM])  Primary osteoarthritis of left knee (M17.12 [ICD-10-CM] 715.16 [ICD-9-CM])      Refer to Dept:   Refer to Provider:   Refer to Facility:       Specialty needed: Evaluate and treat  Specialty needed: Ortho  Weight Bearing Status: Full weight bearing     This document serves as a request of services and does not constitute Insurance authorization or approval of services.  To determine eligibility, please contact the members Insurance carrier to verify and review coverage.     If you have medical questions regarding this request for services. Please contact [address] at [phone] during normal business hours.       Verbal Order Mode: Per protocol: cosign required  Authorizing Provider: Jessika Oneill MD  Authorizing Provider's NPI: [id number]     Order Entered By: Joie Wang RN 1/17/2020 11:38 AM     Electronically signed by:            Emergency Contacts      (Rel.) Home Phone Work Phone Mobile Phone    Hollis Churchill (Spouse) [phone] -- [phone]               Physician Progress Notes (most recent note)      Jessika Oneill MD at 01/17/20 0757          /76 (BP Location: Right arm, Patient Position: Lying)   Pulse 87   Temp 98.5 °F (36.9 °C) (Oral)   Resp 16   LMP  (LMP Unknown)   SpO2 94%   Breastfeeding No     Lab Results (last 24 hours)     Procedure Component Value Units Date/Time    Basic Metabolic Panel [[id number]]  (Abnormal) Collected:  01/17/20 0527    Specimen:  Blood Updated:  01/17/20 0727     Glucose 141 mg/dL         BUN 10 mg/dL      Creatinine 0.79 mg/dL      Sodium 138 mmol/L      Potassium 4.0 mmol/L      Chloride 101 mmol/L      CO2 24.0 mmol/L      Calcium 8.8 mg/dL      eGFR Non African Amer 72 mL/min/1.73      BUN/Creatinine Ratio 12.7     Anion Gap 13.0 mmol/L     Narrative:       GFR Normal >60  Chronic Kidney Disease <60  Kidney Failure <15      CBC & Differential [[id number]] Collected:  01/17/20 0527    Specimen:  Blood Updated:  01/17/20 0632    Narrative:       The following orders were created for panel order CBC & Differential.  Procedure                               Abnormality         Status                     ---------                               -----------         ------                     CBC Auto Differential[[id number]]        Abnormal            Final result                 Please view results for these tests on the individual orders.    CBC Auto Differential [[id number]]  (Abnormal) Collected:  01/17/20 0527    Specimen:  Blood Updated:  01/17/20 0632     WBC 11.40 10*3/mm3      RBC 3.70 10*6/mm3      Hemoglobin 11.3 g/dL      Hematocrit 35.5 %      MCV 95.9 fL      MCH 30.5 pg      MCHC 31.8 g/dL      RDW 12.1 %      RDW-SD 42.6 fl      MPV 9.9 fL      Platelets 240 10*3/mm3      Neutrophil % 74.5 %      Lymphocyte % 13.9 %      Monocyte % 10.8 %      Eosinophil % 0.2 %      Basophil % 0.2 %      Immature Grans % 0.4 %      Neutrophils, Absolute 8.49 10*3/mm3      Lymphocytes, Absolute 1.59 10*3/mm3      Monocytes, Absolute 1.23 10*3/mm3      Eosinophils, Absolute 0.02 10*3/mm3      Basophils, Absolute 0.02 10*3/mm3      Immature Grans, Absolute 0.05 10*3/mm3      nRBC 0.0 /100 WBC           Imaging Results (Last 24 Hours)     Procedure Component Value Units Date/Time    XR Knee 1 or 2 View Left [[id number]] Collected:  01/16/20 1135     Updated:  01/16/20 1139    Narrative:       EXAMINATION: XR KNEE 1 OR 2 VW, LEFT-01/16/2020:      INDICATION: Post-op knee arthoplasty.      COMPARISON: NONE.      FINDINGS: Images of the left knee demonstrate a total left knee  arthroplasty. The prosthetic device is well positioned. There is no  loosening, dislocation or fracture.           Impression:       Expected postoperative findings.     D:  2020  E:  2020     This report was finalized on 2020 11:36 AM by Dr. Braulio Buck MD.             Patient Care Team:  Shiva Capone MD as PCP - General  Richie Fisher MD as Surgeon (Neurosurgery)  Sukumar Mcwilliams MD as Consulting Physician (Anesthesiology)  Glenn Clayton MD as Consulting Physician (Gynecology)    SUBJECTIVE: She is doing well.  She walked 90 feet with therapy.  Reports pain is relatively well controlled with Percocet.    PHYSICAL EXAM  Left knee incision is clean, dry and intact with mesh dressing in place  Thigh and calf soft and nontender  Neurovascular intact distally       Status post total left knee replacement    Essential hypertension    Hyperlipidemia    Primary osteoarthritis of left knee      PLAN / DISPOSITION: 69-year-old female postop day 1 status post left total knee arthroplasty    -Continue to mobilize with therapy as tolerated  -Aspirin for DVT prophylaxis  -okay to shower with mesh dressing in place once pain catheter removed  -Plan discharge home likely later today with home physical therapy  -follow-up in 2 weeks    Houston Castano MD  20  7:57 AM        Electronically signed by Houston Castano MD at 20 3247       Consult Notes (most recent note)    No notes of this type exist for this encounter.            Physical Therapy Notes (most recent note)      Mouna Olea at 20 1025  Version 1 of 1         Patient Name: Laura Churchill  : 1950    MRN: [id number]                              Today's Date: 2020       Admit Date: 2020    Visit Dx:     ICD-10-CM ICD-9-CM   1. Status post total left knee replacement Z96.652 V43.65   2. Primary osteoarthritis  of both knees M17.0 715.16   3. Impaired mobility and ADLs Z74.09 799.89     Patient Active Problem List   Diagnosis   • Essential hypertension   • Hyperlipidemia   • Obstructive sleep apnea syndrome   • Osteoarthritis of knee   • Osteoporosis   • Lumbar spondylosis without myelopathy/Lumbar facet arthropathy   • Displacement of lumbar intervertebral disc   • Stenosis of lateral recess of lumbar spine   • Physical deconditioning   • Morbid obesity due to excess calories (CMS/HCC)   • Anxiety and depression   • Sacroiliac joint dysfunction of right side   • Greater trochanteric bursitis of right hip   • Iliotibial band syndrome of right side   • Spinal stenosis   • Osteoarthritis   • Obesity due to excess calories   • Menopause   • Mixed hyperlipidemia   • Lumbosacral disc disease   • Low back pain   • Joint pain   • Extremity pain   • Chronic pain disorder   • Back problem   • Vaginal atrophy   • Primary localized osteoarthrosis of shoulder region   • Status post total left shoulder arthroplasty   • Sciatica   • Acquired spondylolisthesis   • Spinal stenosis, lumbar region, with neurogenic claudication   • S/P lumbar fusion   • Primary osteoarthritis of left knee   • Status post total left knee replacement     Past Medical History:   Diagnosis Date   • Anxiety    • Atrophic vaginitis    • Bilateral hip pain    • Dental bridge present     top left permanent    • GERD (gastroesophageal reflux disease)     h/o   • Hypertension    • Low back pain    • Mixed hyperlipidemia    • Muscle spasm    • Obesity due to excess calories    • MOOKIE (obstructive sleep apnea)     NO CPAP USE   • Osteoarthritis    • PONV (postoperative nausea and vomiting)     preprocedural meds help and are needed   • Tendinitis of right shoulder    • Wears glasses      Past Surgical History:   Procedure Laterality Date   • CHOLECYSTECTOMY     • COLONOSCOPY     • ENDOSCOPY     • GASTRIC BANDING REMOVAL     • GASTRIC SLEEVE LAPAROSCOPIC  2011   • HIP  PINNING Left     3 pins   • LAPAROSCOPIC GASTRIC BANDING     • LAPAROTOMY OOPHERECTOMY Right    • LUMBAR DISCECTOMY FUSION INSTRUMENTATION N/A 11/20/2018    Procedure: L3-4 LUMBAR LAMINECTOMY POSTERIOR LUMBAR INTERBODY FUSION PILF;  Surgeon: David Rider MD;  Location:  [facility];  Service: Neurosurgery   • TN RECONSTR TOTAL SHOULDER IMPLANT Left 7/10/2017    Procedure: LEFT TOTAL SHOULDER ARTHROPLASTY ;  Surgeon: Almas Miller MD;  Location:  [facility];  Service: Orthopedics   • TOTAL KNEE ARTHROPLASTY Left 1/16/2020    Procedure: TOTAL KNEE REPLACEMENT LEFT;  Surgeon: Houston Castano MD;  Location:  [facility];  Service: Orthopedics   • WISDOM TOOTH EXTRACTION       General Information     Row Name 01/17/20 1105          PT Evaluation Time/Intention    Document Type  therapy note (daily note)  -KG     Mode of Treatment  physical therapy  -KG     Row Name 01/17/20 1105          General Information    Patient Profile Reviewed?  yes  -KG     Existing Precautions/Restrictions  fall;other (see comments)  -KG     Row Name 01/17/20 1105          Cognitive Assessment/Intervention- PT/OT    Orientation Status (Cognition)  oriented x 4  -KG     Row Name 01/17/20 1105          Safety Issues, Functional Mobility    Impairments Affecting Function (Mobility)  pain;strength;range of motion (ROM)  -KG       User Key  (r) = Recorded By, (t) = Taken By, (c) = Cosigned By    Initials Name Provider Type    KG Mouna Olea Physical Therapist        Mobility     Row Name 01/17/20 1106          Bed Mobility Assessment/Treatment    Comment (Bed Mobility)  UIC  -KG     Row Name 01/17/20 1106          Transfer Assessment/Treatment    Comment (Transfers)  VCs to push up with arms from chair and slide LLE forward for comfort  -KG     Row Name 01/17/20 1106          Sit-Stand Transfer    Sit-Stand [place] (Transfers)  contact guard;1 person assist  -KG     Assistive Device (Sit-Stand Transfers)  walker,  front-wheeled  -KG     Row Name 01/17/20 1106          Gait/Stairs Assessment/Training    Gait/Stairs Assessment/Training  gait/ambulation independence;gait/ambulation assistive device  -KG     [place] Level (Gait)  stand by assist  -KG     Assistive Device (Gait)  walker, front-wheeled  -KG     Distance in Feet (Gait)  200  -KG     Pattern (Gait)  step-to;step-through  -KG     Deviations/Abnormal Patterns (Gait)  bilateral deviations;nancy decreased;gait speed decreased;stride length decreased;left sided deviations;antalgic  -KG     Bilateral Gait Deviations  forward flexed posture;heel strike decreased  -KG     Left Sided Gait Deviations  weight shift ability decreased  -KG     Negotiation (Stairs)  stairs independence;stairs assistive device  -KG     [place] Level (Stairs)  contact guard  -KG     Assistive Device (Stairs)  cane, straight  -KG     Handrail Location (Stairs)  right side (ascending)  -KG     Ascending Technique (Stairs)  step-to-step  -KG     Descending Technique (Stairs)  step-to-step  -KG     Comment (Gait/Stairs)  Pt ambulated step to and step thru gait, 200 ft with RW and SBA.  Cues for avoiding foot ER, increased WB LLE and posture.  Pt has had difficulty with catheter leaking today, but pain managed by nursing with meds prior to PT.  Pt able to perform 5 steps safely with cane and railing, CGA, pt's  to help her when they use stairs at home.  Pt safe to d/c home with assist and HH.   -KG     Row Name 01/17/20 1106          Mobility Assessment/Intervention    Extremity Weight-bearing Status  left lower extremity  -KG     Left Lower Extremity (Weight-bearing Status)  weight-bearing as tolerated (WBAT)  -KG       User Key  (r) = Recorded By, (t) = Taken By, (c) = Cosigned By    Initials Name Provider Type    KG Mouna Olea Physical Therapist        Obj/Interventions     Row Name 01/17/20 1110          General ROM    GENERAL ROM COMMENTS  7-85 degrees ROM LLE  -KG     Row  Name 01/17/20 1110          Therapeutic Exercise    Lower Extremity (Therapeutic Exercise)  heel slides, left;quad sets, bilateral;LAQ (long arc quad), left  -KG     Lower Extremity Range of Motion (Therapeutic Exercise)  ankle dorsiflexion/plantar flexion, bilateral;knee flexion/extension, left  -KG     Exercise Type (Therapeutic Exercise)  AROM (active range of motion);isometric contraction, static  -KG     Position (Therapeutic Exercise)  seated  -KG     Sets/Reps (Therapeutic Exercise)  x10  -KG     Expected Outcome (Therapeutic Exercise)  improve functional stability;facilitate normal movement patterns  -KG     Comment (Therapeutic Exercise)  cues technique  -KG     Row Name 01/17/20 1110          Static Sitting Balance    Level of [place] (Unsupported Sitting, Static Balance)  independent  -KG     Sitting Position (Unsupported Sitting, Static Balance)  sitting in chair  -KG     Time Able to Maintain Position (Unsupported Sitting, Static Balance)  30 to 45 seconds  -KG     Row Name 01/17/20 1110          Static Standing Balance    Level of [place] (Supported Standing, Static Balance)  standby assist  -KG     Time Able to Maintain Position (Supported Standing, Static Balance)  30 to 45 seconds  -KG     Assistive Device Utilized (Supported Standing, Static Balance)  walker, rolling  -KG     Row Name 01/17/20 1110          Dynamic Standing Balance    Level of [place], Reaches Outside Midline (Standing, Dynamic Balance)  standby assist  -KG     Time Able to Maintain Position, Reaches Outside Midline (Standing, Dynamic Balance)  45 to 60 seconds  -KG     Assistive Device Utilized (Supported Standing, Dynamic Balance)  walker, rolling  -KG       User Key  (r) = Recorded By, (t) = Taken By, (c) = Cosigned By    Initials Name Provider Type    KG Mouna Olea Physical Therapist        Goals/Plan    No documentation.       Clinical Impression     Row Name 01/17/20 1114          Pain Assessment     Additional Documentation  Pain Scale: Numbers Pre/Post-Treatment (Group)  -KG     Row Name 01/17/20 1114          Pain Scale: Numbers Pre/Post-Treatment    Pain Scale: Numbers, Pretreatment  4/10  -KG     Pain Scale: Numbers, Post-Treatment  4/10  -KG     Pain Location - Side  Left  -KG     Pain Location - Orientation  anterior  -KG     Pain Location  knee  -KG     Pain Intervention(s)  Repositioned;Cold applied;Ambulation/increased activity  -KG     Row Name 01/17/20 1114          Plan of Care Review    Plan of Care Reviewed With  patient  -KG     Progress  improving  -KG     Outcome Summary  Pt ambulated step to and step thru gait, 200 ft with RW and SBA.  Cues for avoiding foot ER, increased WB LLE and posture.  Pt has had difficulty with catheter leaking today, but pain managed by nursing with meds prior to PT.  Pt able to perform 5 steps safely with cane and railing, CGA, pt's  to help her when they use stairs at home.  Pt safe to d/c home with assist and HH.  7-85 degrees motion L knee.   -KG     Row Name 01/17/20 1114          Positioning and Restraints    Pre-Treatment Position  sitting in chair/recliner  -KG     Post Treatment Position  chair  -KG     In Chair  notified nsg;call light within reach;encouraged to call for assist;exit alarm on;with family/caregiver;legs elevated  -KG       User Key  (r) = Recorded By, (t) = Taken By, (c) = Cosigned By    Initials Name Provider Type    KG Mouna Olea Physical Therapist        Outcome Measures     Row Name 01/17/20 1116          How much help from another person do you currently need...    Turning from your back to your side while in flat bed without using bedrails?  3  -KG     Moving from lying on back to sitting on the side of a flat bed without bedrails?  3  -KG     Moving to and from a bed to a chair (including a wheelchair)?  3  -KG     Standing up from a chair using your arms (e.g., wheelchair, bedside chair)?  3  -KG     Climbing 3-5 steps  with a railing?  3  -KG     To walk in hospital room?  3  -KG     AM-PAC 6 Clicks Score (PT)  18  -KG     Row Name 01/17/20 1116          Functional Assessment    Outcome Measure Options  AM-PAC 6 Clicks Basic Mobility (PT)  -KG       User Key  (r) = Recorded By, (t) = Taken By, (c) = Cosigned By    Initials Name Provider Type    CARLOS Mouna Olea Physical Therapist          PT Recommendation and Plan     Plan of Care Reviewed With: patient  Progress: improving  Outcome Summary: Pt ambulated step to and step thru gait, 200 ft with RW and SBA.  Cues for avoiding foot ER, increased WB LLE and posture.  Pt has had difficulty with catheter leaking today, but pain managed by nursing with meds prior to PT.  Pt able to perform 5 steps safely with cane and railing, CGA, pt's  to help her when they use stairs at home.  Pt safe to d/c home with assist and HH.  7-85 degrees motion L knee.      Time Calculation:   PT Charges     Row Name 01/17/20 1118             Time Calculation    Start Time  1025  -KG      PT Received On  01/17/20  -KG      PT Goal Re-Cert Due Date  01/26/20  -KG         Time Calculation- PT    Total Timed Code Minutes- PT  40 minute(s)  -KG         Timed Charges    77911 - PT Therapeutic Exercise Minutes  15  -KG      22188 - Gait Training Minutes   25  -KG        User Key  (r) = Recorded By, (t) = Taken By, (c) = Cosigned By    Initials Name Provider Type    CARLOS Mouna Olea Physical Therapist        Therapy Charges for Today     Code Description Service Date Service Provider Modifiers Qty    52288423401 HC PT THER PROC EA 15 MIN 1/17/2020 Mouna Olea GP 1    86481379975 HC GAIT TRAINING EA 15 MIN 1/17/2020 Mouna Olea GP 2          PT G-Codes  Outcome Measure Options: AM-PAC 6 Clicks Basic Mobility (PT)  AM-PAC 6 Clicks Score (PT): 18    Mouna Olea  1/17/2020         Electronically signed by Mouna Olea at 01/17/20 111

## 2020-01-17 NOTE — PLAN OF CARE
Problem: Patient Care Overview  Goal: Plan of Care Review  Flowsheets (Taken 1/17/2020 1114)  Progress: improving  Plan of Care Reviewed With: patient  Outcome Summary: Pt ambulated step to and step thru gait, 200 ft with RW and SBA.  Cues for avoiding foot ER, increased WB LLE and posture.  Pt has had difficulty with catheter leaking today, but pain managed by nursing with meds prior to PT.  Pt able to perform 5 steps safely with cane and railing, CGA, pt's  to help her when they use stairs at home.  Pt safe to d/c home with assist and HH.  7-85 degrees motion L knee.

## 2020-01-17 NOTE — DISCHARGE SUMMARY
Patient Name: Laura Churchill  MRN: [id number]  : 1950  DOS: 2020    Attending: No att. providers found    Primary Care Provider: Shiva Capone MD    Date of Admission:.2020  7:50 AM    Date of Discharge:  2020    Discharge Diagnosis:   Status post total left knee replacement    Primary osteoarthritis of left knee    Essential hypertension    Hyperlipidemia    Leukocytosis, likely reactive    Acute blood loss anemia, mild, asymptomatic      Hospital Course  Patient is a 69 y.o. female presented for left total knee arthroplasty by Dr. Castano.     She underwent surgery under spinal anesthesia.  She tolerated surgery well and was admitted for further medical management.  Her knee has been painful for over a year.  She uses a cane for ambulation.  She denies recent falls.  He had cortisone injections earlier in the year that helped with pain for about 2 months.    Patient was provided pain medications as needed for pain control, along with adductor canal nerve block infusion of Ropivacaine.      Adjustments were made to pain medications to optimize postop pain management. Risks and benefits of opiate medications discussed with patient.    She was seen by PT and OT and has progressed well over her stay.  She used an IS for atelectasis prophylaxis and aspirin along with mechanicals for DVT prophylaxis.  Home medications were resumed as appropriate, and labs were monitored and remained fairly stable.     With the progress she has made, she is ready for DC home today.    She will have an Arrow pump ( instructed on it during this admit).    Discussed with patient regarding plan and she shows understanding and agreement.    Patient will have HHPT following discharge.        Procedures Performed  Preoperative diagnosis:Left knee end stage osteoarthritis     Postoperative diagnosis: Left knee end stage osteoarthritis     Operative procedure: Left total knee arthroplasty     Surgeon: Dr. Monique  Eyad    Pertinent Test Results:    I reviewed the patient's new clinical results.   Results from last 7 days   Lab Units 20  0527   WBC 10*3/mm3 11.40*   HEMOGLOBIN g/dL 11.3*   HEMATOCRIT % 35.5   PLATELETS 10*3/mm3 240     Results for FLORIN WAITE (MRN [id number]) as of 2020 14:24   Ref. Range 2020 12:08   Hemoglobin Latest Ref Range: 12.0 - 15.9 g/dL 12.8   Hematocrit Latest Ref Range: 34.0 - 46.6 % 40.8     Results from last 7 days   Lab Units 20  0527   SODIUM mmol/L 138   POTASSIUM mmol/L 4.0   CHLORIDE mmol/L 101   CO2 mmol/L 24.0   BUN mg/dL 10   CREATININE mg/dL 0.79   CALCIUM mg/dL 8.8   GLUCOSE mg/dL 141*     I reviewed the patient's new imaging including images and reports.      Physical therapy: Pt ambulated step to and step thru gait, 200 ft with RW and SBA.  Cues for avoiding foot ER, increased WB LLE and posture.  Pt has had difficulty with catheter leaking today, but pain managed by nursing with meds prior to PT.  Pt able to perform 5 steps safely with cane and railing, CGA, pt's  to help her when they use stairs at home.  Pt safe to d/c home with assist and HH.  7-85 degrees motion L knee.     Discharge Assessment:    Vital Signs  Visit Vitals  /74 (BP Location: Right arm, Patient Position: Lying)   Pulse 79   Temp 98.1 °F (36.7 °C) (Oral)   Resp 16   LMP  (LMP Unknown)   SpO2 96%   Breastfeeding No     Temp (24hrs), Av °F (36.7 °C), Min:97.6 °F (36.4 °C), Max:98.5 °F (36.9 °C)      General Appearance:    Alert, cooperative, in no acute distress   Lungs:     Clear to auscultation,respirations regular, even and                   unlabored    Heart:    Regular rhythm and normal rate, normal S1 and S2   Abdomen:     Normal bowel sounds, no masses, no organomegaly, soft        non-tender, non-distended, no guarding, no rebound                 tenderness   Extremities:   Moves all extremities well, no edema, no cyanosis, no              Redness. Left knee Ace  wrap clean dry intact.  Nerve block present   Pulses:   Pulses palpable and equal bilaterally   Skin:   No bleeding, bruising or rash   Neurologic:   Cranial nerves 2 - 12 grossly intact, sensation intact. Flexion and dorsiflexion intact bilateral feet.       Discharge Disposition: Home    Discharge Medications     Discharge Medications      New Medications      Instructions Start Date   aspirin  MG tablet   325 mg, Oral, Daily, For 1 month   Start Date:  January 18, 2020     oxyCODONE-acetaminophen 7.5-325 MG per tablet  Commonly known as:  PERCOCET   1 tablet, Oral, Every 4 Hours PRN      Ropivacine HCl-NaCl  Commonly known as:  NAROPIN  Notes to patient:  This medication was discontinued per your request prior to your discharge.    10 mL/hr (20 mg/hr), Peripheral Nerve, Continuous      STOOL SOFTENER 100 MG capsule  Generic drug:  docusate sodium   100 mg, Oral, 2 Times Daily PRN         Changes to Medications      Instructions Start Date   Diclofenac 35 MG capsule  What changed:    · medication strength  · additional instructions   75 mg, Oral, 2 Times Daily, Must take an hour after aspirin if needed.         Continue These Medications      Instructions Start Date   amLODIPine 2.5 MG tablet  Commonly known as:  NORVASC   2.5 mg, Oral, Daily      atorvastatin 10 MG tablet  Commonly known as:  LIPITOR   10 mg, Oral, Daily      losartan 50 MG tablet  Commonly known as:  COZAAR   TAKE 1 TABLET DAILY      MULTIVITAMIN ADULTS PO   1 tablet, Oral, Daily      tiZANidine 4 MG tablet  Commonly known as:  ZANAFLEX   4 mg, Oral, Nightly PRN      venlafaxine  MG 24 hr capsule  Commonly known as:  EFFEXOR-XR   150 mg, Oral, Daily             Discharge Diet: Regular diet    Activity at Discharge: WBAT LLE    Follow-up Appointments  Dr. Castano per his orders      REGINO Quezada  01/17/20  2:24 PM   I have personally performed the evaluation on this patient. My history is consistant  with above documentation . My  exam finding are listed above. I have personally reviewed and discussed the above formulated discharge plan with patient and [email or web address].

## 2020-01-17 NOTE — PLAN OF CARE
Dressing Clean, Dry, Intact. Nerve block infusing at 10ml/hr. Patient ambulated to the bathroom twice. Voiding without issue. Tolerating oral intake. D/C plan is home with spouse assist, possible discharge today.

## 2020-01-17 NOTE — PROGRESS NOTES
[facility]    Acute pain service Inpatient Progress Note    Patient Name: Laura Churchill  :  1950  MRN:  [id number]        Acute Pain  Service Inpatient Progress Note:    Comments: Nerve Catheter leaking. Pt request it be dc'd.

## 2020-01-17 NOTE — THERAPY DISCHARGE NOTE
Acute Care - Occupational Therapy Initial Eval/Discharge   [place]     Patient Name: Laura Churchill  : 1950  MRN: [id number]  Today's Date: 2020  Onset of Illness/Injury or Date of Surgery: 20  Date of Referral to OT: 20  Referring Physician: MD Eyad       Admit Date: 2020       ICD-10-CM ICD-9-CM   1. Status post total left knee replacement Z96.652 V43.65   2. Primary osteoarthritis of both knees M17.0 715.16   3. Impaired mobility and ADLs Z74.09 799.89   4. Primary osteoarthritis of left knee M17.12 715.16     Patient Active Problem List   Diagnosis   • Essential hypertension   • Hyperlipidemia   • Obstructive sleep apnea syndrome   • Osteoarthritis of knee   • Osteoporosis   • Lumbar spondylosis without myelopathy/Lumbar facet arthropathy   • Displacement of lumbar intervertebral disc   • Stenosis of lateral recess of lumbar spine   • Physical deconditioning   • Morbid obesity due to excess calories (CMS/HCC)   • Anxiety and depression   • Sacroiliac joint dysfunction of right side   • Greater trochanteric bursitis of right hip   • Iliotibial band syndrome of right side   • Spinal stenosis   • Osteoarthritis   • Obesity due to excess calories   • Menopause   • Mixed hyperlipidemia   • Lumbosacral disc disease   • Low back pain   • Joint pain   • Extremity pain   • Chronic pain disorder   • Back problem   • Vaginal atrophy   • Primary localized osteoarthrosis of shoulder region   • Status post total left shoulder arthroplasty   • Sciatica   • Acquired spondylolisthesis   • Spinal stenosis, lumbar region, with neurogenic claudication   • S/P lumbar fusion   • Primary osteoarthritis of left knee   • Status post total left knee replacement     Past Medical History:   Diagnosis Date   • Anxiety    • Atrophic vaginitis    • Bilateral hip pain    • Dental bridge present     top left permanent    • GERD (gastroesophageal reflux disease)     h/o   • Hypertension    • Low back pain     • Mixed hyperlipidemia    • Muscle spasm    • Obesity due to excess calories    • MOOKIE (obstructive sleep apnea)     NO CPAP USE   • Osteoarthritis    • PONV (postoperative nausea and vomiting)     preprocedural meds help and are needed   • Tendinitis of right shoulder    • Wears glasses      Past Surgical History:   Procedure Laterality Date   • CHOLECYSTECTOMY     • COLONOSCOPY     • ENDOSCOPY     • GASTRIC BANDING REMOVAL     • GASTRIC SLEEVE LAPAROSCOPIC  2011   • HIP PINNING Left     3 pins   • LAPAROSCOPIC GASTRIC BANDING     • LAPAROTOMY OOPHERECTOMY Right    • LUMBAR DISCECTOMY FUSION INSTRUMENTATION N/A 11/20/2018    Procedure: L3-4 LUMBAR LAMINECTOMY POSTERIOR LUMBAR INTERBODY FUSION PILF;  Surgeon: David Rider MD;  Location:  [facility];  Service: Neurosurgery   • SC RECONSTR TOTAL SHOULDER IMPLANT Left 7/10/2017    Procedure: LEFT TOTAL SHOULDER ARTHROPLASTY ;  Surgeon: Almas Miller MD;  Location:  [facility];  Service: Orthopedics   • TOTAL KNEE ARTHROPLASTY Left 1/16/2020    Procedure: TOTAL KNEE REPLACEMENT LEFT;  Surgeon: Houston Castano MD;  Location:  [facility];  Service: Orthopedics   • WISDOM TOOTH EXTRACTION            OT ASSESSMENT FLOWSHEET (last 12 hours)      Occupational Therapy Evaluation     Row Name 01/17/20 0814                   OT Evaluation Time/Intention    Subjective Information  complains of;pain  -HK        Document Type  discharge evaluation/summary  -HK        Mode of Treatment  individual therapy;occupational therapy  -HK        Patient Effort  excellent  -HK        Symptoms Noted During/After Treatment  increased pain  -HK        Comment  (S) During session L adductor canal nerve catheter noted to be leaking. RN aware.   -HK           General Information    Patient Profile Reviewed?  yes  -HK        Onset of Illness/Injury or Date of Surgery  01/16/20  -HK        Referring Physician  MD Eyad   -HK        Patient Observations  cooperative;alert;agree  to therapy  -HK        Patient/Family Observations   sleeping at bedside.   -HK        General Observations of Patient  Pt received supine in bed with IV heplocked and L adductor canal nerve catheter intact.   -HK        Prior Level of Function  min assist:;all household mobility;gait;community mobility;transfer;bed mobility;ADL's  -HK        Equipment Currently Used at Home  walker, rolling;cane, straight  -HK        Pertinent History of Current Functional Problem  Pt is a 69 YOF who presents to [facility] for surgical managment of intractable L knee pain and dysfunction that failed to improve with conservative management. Pt is POD #1 s/p L TKA.   -HK        Existing Precautions/Restrictions  fall;other (see comments) knee precautions; L adductor canal nerve catheter   -HK        Equipment Issued to Patient  sock aid;leg   -HK        Risks Reviewed  patient:;LOB;nausea/vomiting;dizziness;increased discomfort;change in vital signs;increased drainage;lines disloged  -HK        Benefits Reviewed  patient:;improve function;increase independence;increase strength;decrease pain;decrease risk of DVT;improve skin integrity;increase knowledge;increase balance  -HK        Barriers to Rehab  previous functional deficit  -HK           Relationship/Environment    Primary Source of Support/Comfort  spouse;pet;sibling(s)  -HK        Lives With  spouse;sibling(s)  -HK           Resource/Environmental Concerns    Current Living Arrangements  home/apartment/condo  -HK           Home Main Entrance    Number of Stairs, Main Entrance  three  -HK        Stair Railings, Main Entrance  railings on both sides of stairs  -HK           Stairs Within Home, Primary    Number of Stairs, Within Home, Primary  other (see comments) 13 stairs to second floor   -HK        Stairs Comment, Within Home, Primary  Pt reports plans to stay on first floor.   -HK           Cognitive Assessment/Interventions    Additional Documentation  Cognitive  Assessment/Intervention (Group)  -HK           Cognitive Assessment/Intervention- PT/OT    Affect/Mental Status (Cognitive)  WNL  -HK        Orientation Status (Cognition)  oriented x 4  -HK        Follows Commands (Cognition)  follows one step commands;over 90% accuracy  -HK        Cognitive Function (Cognitive)  safety deficit  -HK        Safety Deficit (Cognitive)  mild deficit;safety precautions follow-through/compliance;safety precautions awareness  -HK           Safety Issues, Functional Mobility    Safety Issues Affecting Function (Mobility)  safety precautions follow-through/compliance;safety precaution awareness  -HK        Impairments Affecting Function (Mobility)  balance;pain;range of motion (ROM);strength  -HK           Mobility Assessment/Treatment    Extremity Weight-bearing Status  left lower extremity  -HK        Left Lower Extremity (Weight-bearing Status)  weight-bearing as tolerated (WBAT)  -HK           Bed Mobility Assessment/Treatment    Bed Mobility Assessment/Treatment  supine-sit;scooting/bridging  -HK        Scooting/Bridging [place] (Bed Mobility)  supervision;verbal cues  -HK        Supine-Sit [place] (Bed Mobility)  supervision;verbal cues  -HK        Bed Mobility, Safety Issues  decreased use of legs for bridging/pushing  -HK        Assistive Device (Bed Mobility)  leg ;head of bed elevated;bed rails  -HK        Comment (Bed Mobility)  OT issued leg  and educated pt on use for completion of bed mobility. Pt advanced to EOB with no assist from OT.   -HK           Functional Mobility    Functional Mobility- Ind. Level  contact guard assist;verbal cues required  -HK        Functional Mobility- Device  rolling walker  -HK        Functional Mobility-Distance (Feet)  30  -HK        Functional Mobility- Safety Issues  step length decreased;weight-shifting ability decreased  -HK        Functional Mobility- Comment  Pt ambulated from bed to bathroom and back to chair.    -HK           Transfer Assessment/Treatment    Transfer Assessment/Treatment  sit-stand transfer;stand-sit transfer;toilet transfer  -HK        Comment (Transfers)  verbal cues for safe hand placement, sequencing, and to slide L LE out prior to transfer. Pt educated on knee precautions and how to maintain during all transfers.    -HK           Sit-Stand Transfer    Sit-Stand [place] (Transfers)  contact guard  -HK        Assistive Device (Sit-Stand Transfers)  walker, front-wheeled  -HK           Stand-Sit Transfer    Stand-Sit [place] (Transfers)  contact guard;verbal cues  -HK        Assistive Device (Stand-Sit Transfers)  walker, front-wheeled  -HK           Toilet Transfer    Type (Toilet Transfer)  sit-stand;stand-sit  -HK        [place] Level (Toilet Transfer)  contact guard;verbal cues  -HK        Assistive Device (Toilet Transfer)  walker, front-wheeled;raised toilet seat;grab bars/safety frame  -HK           ADL Assessment/Intervention    BADL Assessment/Intervention  bathing;upper body dressing;lower body dressing;toileting  -HK           Bathing Assessment/Intervention    Comment (Bathing)  OT educated pt on use of LH sponge for completion of all bathing.   -HK           Upper Body Dressing Assessment/Training    Upper Body Dressing [place] Level  don;pull-over garment;independent  -HK        Upper Body Dressing Position  unsupported sitting  -HK           Lower Body Dressing Assessment/Training    Lower Body Dressing [place] Level  doff;don;socks;undergarment;pants/bottoms;minimum assist (75% patient effort);verbal cues  -HK        Lower Body Dressing Position  unsupported sitting  -HK        Comment (Lower Body Dressing)  OT educated pt on use of AE for completion of all LBD. Pt reports she has reacher at home. OT issued sock aid and educated pt on use. Pt plans to buy shoe horn if needed OSH.   -HK           Toileting Assessment/Training    [place] Level (Toileting)   adjust/manage clothing;minimum assist (75% patient effort);verbal cues;perform perineal hygiene;independent  -HK        Toileting Position  unsupported sitting;unsupported standing  -HK        Comment (Toileting)  Pt requires Roro for all clothing management. Completes bryanna care independently.   -HK           BADL Safety/Performance    Impairments, BADL Safety/Performance  balance;pain;strength;range of motion  -HK        Skilled BADL Treatment/Intervention  BADL process/adaptation training;adaptive equipment training  -HK           General ROM    RT Upper Ext  Rt Shoulder Flexion  -HK        LT Upper Ext  Lt Shoulder Flexion  -HK           Right Upper Ext    Rt Shoulder Flexion AROM  WFL for eval   -HK           Left Upper Ext    Lt Shoulder Flexion AROM  WFL for eval   -HK           MMT (Manual Muscle Testing)    Rt Upper Ext  Rt Shoulder WFL  -HK        Lt Upper Ext  Lt Shoulder WFL  -HK        General MMT Comments  BUE WFL for eval   -HK           Motor Assessment/Interventions    Additional Documentation  Balance (Group)  -HK           Balance    Balance  static sitting balance;static standing balance;dynamic sitting balance;dynamic standing balance  -HK           Static Sitting Balance    Level of [place] (Unsupported Sitting, Static Balance)  independent  -HK        Sitting Position (Unsupported Sitting, Static Balance)  sitting on edge of bed  -HK        Time Able to Maintain Position (Unsupported Sitting, Static Balance)  3 to 4 minutes  -HK           Dynamic Sitting Balance    Level of [place], Reaches Outside Midline (Sitting, Dynamic Balance)  independent  -HK        Sitting Position, Reaches Outside Midline (Sitting, Dynamic Balance)  sitting on edge of bed  -HK        Comment, Reaches Outside Midline (Sitting, Dynamic Balance)  completing LBD   -HK           Static Standing Balance    Level of [place] (Supported Standing, Static Balance)  contact guard assist  -HK        Time Able to  Maintain Position (Supported Standing, Static Balance)  1 to 2 minutes  -HK        Assistive Device Utilized (Supported Standing, Static Balance)  walker, rolling  -HK           Dynamic Standing Balance    Level of [place], Reaches Outside Midline (Standing, Dynamic Balance)  contact guard assist  -HK        Time Able to Maintain Position, Reaches Outside Midline (Standing, Dynamic Balance)  1 to 2 minutes  -HK        Assistive Device Utilized (Supported Standing, Dynamic Balance)  walker, rolling  -HK        Comment, Reaches Outside Midline (Standing, Dynamic Balance)  clothing management post toileting   -HK           Sensory Assessment/Intervention    Sensory General Assessment  no sensation deficits identified  -HK           Positioning and Restraints    Pre-Treatment Position  in bed  -HK        Post Treatment Position  chair  -HK        In Chair  notified nsg;reclined;call light within reach;encouraged to call for assist;exit alarm on;with family/caregiver  -HK           Pain Assessment    Additional Documentation  Pain Scale: Numbers Pre/Post-Treatment (Group)  -HK           Pain Scale: Numbers Pre/Post-Treatment    Pain Scale: Numbers, Pretreatment  5/10  -HK        Pain Scale: Numbers, Post-Treatment  9/10  -HK        Pain Location - Side  Left  -HK        Pain Location - Orientation  anterior  -HK        Pain Location  knee  -HK        Pain Intervention(s)  Repositioned;Ambulation/increased activity  -HK           Wound 01/16/20 1007 Left anterior knee Incision    Wound - Properties Group Date first assessed: 01/16/20  -TK Time first assessed: 1007  -TK Side: Left  -TK Orientation: anterior  -TK Location: knee  -TK Primary Wound Type: Incision  -TK       Coping    Observed Emotional State  accepting;calm;cooperative  -HK           Plan of Care Review    Plan of Care Reviewed With  patient  -HK        Progress  improving  -HK           Clinical Impression (OT)    Date of Referral to OT  01/16/20  -HK         OT Diagnosis  Decreased independence with ADLS and Mobility   -HK        Patient/Family Goals Statement (OT Eval)  Pt would like to improve and return home.   -HK        Criteria for Skilled Therapeutic Interventions Met (OT Eval)  yes;treatment indicated  -HK        Rehab Potential (OT Eval)  good, to achieve stated therapy goals  -HK        Therapy Frequency (OT Eval)  daily  -HK        Care Plan Review (OT)  evaluation/treatment results reviewed;care plan/treatment goals reviewed;risks/benefits reviewed;patient/other agree to care plan  -HK        Anticipated Discharge Disposition (OT)  home with home health;home with assist  -HK           Vital Signs    Pre Systolic BP Rehab  133 RN cleared for tx; VSS   -HK        Pre Treatment Diastolic BP  76  -HK        Post Systolic BP Rehab  139  -HK        Post Treatment Diastolic BP  70  -HK        Pretreatment Heart Rate (beats/min)  80  -HK        Posttreatment Heart Rate (beats/min)  74  -HK        Pre Patient Position  Supine  -HK        Intra Patient Position  Standing  -HK        Post Patient Position  Sitting  -HK           OT Goals    Bed Mobility Goal Selection (OT)  bed mobility, OT goal 1  -HK        Transfer Goal Selection (OT)  transfer, OT goal 1  -HK        Dressing Goal Selection (OT)  dressing, OT goal 1  -HK        Toileting Goal Selection (OT)  toileting, OT goal 1  -HK           Bed Mobility Goal 1 (OT)    Activity/Assistive Device (Bed Mobility Goal 1, OT)  sit to supine/supine to sit;scooting  -HK        [place] Level/Cues Needed (Bed Mobility Goal 1, OT)  minimum assist (75% or more patient effort);verbal cues required  -HK        Time Frame (Bed Mobility Goal 1, OT)  5 days  -HK        Progress/Outcomes (Bed Mobility Goal 1, OT)  goal met  -HK           Transfer Goal 1 (OT)    Activity/Assistive Device (Transfer Goal 1, OT)  sit-to-stand/stand-to-sit;toilet  -HK        [place] Level/Cues Needed (Transfer Goal 1, OT)  contact guard  assist;verbal cues required  -HK        Time Frame (Transfer Goal 1, OT)  5 days  -HK        Progress/Outcome (Transfer Goal 1, OT)  goal met  -HK           Dressing Goal 1 (OT)    Activity/Assistive Device (Dressing Goal 1, OT)  lower body dressing;sock-aid;reacher  -HK        [place]/Cues Needed (Dressing Goal 1, OT)  minimum assist (75% or more patient effort);verbal cues required  -HK        Time Frame (Dressing Goal 1, OT)  5 days  -HK        Progress/Outcome (Dressing Goal 1, OT)  goal met  -HK           Toileting Goal 1 (OT)    Activity/Device (Toileting Goal 1, OT)  adjust/manage clothing;perform perineal hygiene  -HK        [place] Level/Cues Needed (Toileting Goal 1, OT)  minimum assist (75% or more patient effort);verbal cues required  -HK        Time Frame (Toileting Goal 1, OT)  5 days  -HK        Progress/Outcome (Toileting Goal 1, OT)  goal met  -HK           Discharge Summary (Occupational Therapy)    Additional Documentation  Discharge Summary, OT Eval (Group)  -HK           Discharge Summary, OT Eval    Reason for Discharge (OT Discharge Summary)  patient met all goals and outcomes;patient discharged from this facility  -HK        Outcomes Achieved Upon Discharge (OT Discharge Summary)  able to achieve all goals within established timeline  -HK        Transfer to Another Level of Care or Facility (OT Discharge Summary)  patient will continue therapy goals following discharge;patient to receive therapy via home health  -HK           Living Environment    Home Accessibility  stairs to enter home;stairs within home has walk in shower on first floor   -HK          User Key  (r) = Recorded By, (t) = Taken By, (c) = Cosigned By    Initials Name Effective Dates    Bonny Lobo RN 06/16/16 -     HK Petra House OT 03/07/18 -               OT Recommendation and Plan  Outcome Summary/Treatment Plan (OT)  Anticipated Discharge Disposition (OT): home with home health, home with assist  Reason  for Discharge (OT Discharge Summary): patient met all goals and outcomes, patient discharged from this facility  Therapy Frequency (OT Eval): daily  Plan of Care Review  Plan of Care Reviewed With: patient  Plan of Care Reviewed With: patient  Outcome Summary: OT eval complete. Pt completes all transfers and ambulates with CGA and RW. OT educated pt on use for AE for completion of all LBD. Pt has reacher at home and OT issued sock aid and educated pt on use. Pt completed all dressing with Roro and use of reacher and sock aid. Pt completed bryanna care independently and required Roro for clothing management around nerve catheter. Pt plans to return home this date with assist from family and HHOT.     Rehab Goal Summary     Row Name 01/17/20 0814             Occupational Therapy Goals    Bed Mobility Goal Selection (OT)  bed mobility, OT goal 1  -HK      Transfer Goal Selection (OT)  transfer, OT goal 1  -HK      Dressing Goal Selection (OT)  dressing, OT goal 1  -HK      Toileting Goal Selection (OT)  toileting, OT goal 1  -HK         Bed Mobility Goal 1 (OT)    Activity/Assistive Device (Bed Mobility Goal 1, OT)  sit to supine/supine to sit;scooting  -HK      [place] Level/Cues Needed (Bed Mobility Goal 1, OT)  minimum assist (75% or more patient effort);verbal cues required  -HK      Time Frame (Bed Mobility Goal 1, OT)  5 days  -HK      Progress/Outcomes (Bed Mobility Goal 1, OT)  goal met  -HK         Transfer Goal 1 (OT)    Activity/Assistive Device (Transfer Goal 1, OT)  sit-to-stand/stand-to-sit;toilet  -HK      [place] Level/Cues Needed (Transfer Goal 1, OT)  contact guard assist;verbal cues required  -HK      Time Frame (Transfer Goal 1, OT)  5 days  -HK      Progress/Outcome (Transfer Goal 1, OT)  goal met  -HK         Dressing Goal 1 (OT)    Activity/Assistive Device (Dressing Goal 1, OT)  lower body dressing;sock-aid;reacher  -HK      [place]/Cues Needed (Dressing Goal 1, OT)  minimum assist  (75% or more patient effort);verbal cues required  -HK      Time Frame (Dressing Goal 1, OT)  5 days  -HK      Progress/Outcome (Dressing Goal 1, OT)  goal met  -HK         Toileting Goal 1 (OT)    Activity/Device (Toileting Goal 1, OT)  adjust/manage clothing;perform perineal hygiene  -HK      [place] Level/Cues Needed (Toileting Goal 1, OT)  minimum assist (75% or more patient effort);verbal cues required  -HK      Time Frame (Toileting Goal 1, OT)  5 days  -HK      Progress/Outcome (Toileting Goal 1, OT)  goal met  -HK        User Key  (r) = Recorded By, (t) = Taken By, (c) = Cosigned By    Initials Name Provider Type Discipline    Petra Cardoza, OT Occupational Therapist OT          Outcome Measures     Row Name 01/17/20 0814             How much help from another is currently needed...    Putting on and taking off regular lower body clothing?  3  -HK      Bathing (including washing, rinsing, and drying)  3  -HK      Toileting (which includes using toilet bed pan or urinal)  3  -HK      Putting on and taking off regular upper body clothing  4  -HK      Taking care of personal grooming (such as brushing teeth)  3  -HK      Eating meals  4  -HK      AM-PAC 6 Clicks Score (OT)  20  -HK         Functional Assessment    Outcome Measure Options  AM-PAC 6 Clicks Daily Activity (OT)  -HK        User Key  (r) = Recorded By, (t) = Taken By, (c) = Cosigned By    Initials Name Provider Type    Petra Cardoza, OT Occupational Therapist          Time Calculation:   Time Calculation- OT     Row Name 01/17/20 1118 01/17/20 0814          Time Calculation- OT    OT Start Time  --  0814  -     OT Received On  --  01/17/20  -        Timed Charges    16412 - Gait Training Minutes   25  -KG  --     52022 - OT Self Care/Mgmt Minutes  --  25  -HK       User Key  (r) = Recorded By, (t) = Taken By, (c) = Cosigned By    Initials Name Provider Type    Petra Cardoza, OT Occupational Therapist    Mouna Frank  Physical Therapist        Therapy Suggested Charges     Code   Minutes Charges    41848 (CPT®) Hc Ot Neuromusc Re Education Ea 15 Min      99520 (CPT®) Hc Ot Ther Proc Ea 15 Min      17860 (CPT®) Hc Ot Therapeutic Act Ea 15 Min      09467 (CPT®) Hc Ot Manual Therapy Ea 15 Min      92539 (CPT®) Hc Ot Iontophoresis Ea 15 Min      75688 (CPT®) Hc Ot Elec Stim Ea-Per 15 Min      76808 (CPT®) Hc Ot Ultrasound Ea 15 Min      53498 (CPT®) Hc Ot Self Care/Mgmt/Train Ea 15 Min 25 2    Total  25 2        Therapy Charges for Today     Code Description Service Date Service Provider Modifiers Qty    55415246872 HC OT SELF CARE/MGMT/TRAIN EA 15 MIN 1/17/2020 [facility], OT GO 2    23398031385 HC OT EVAL LOW COMPLEXITY 2 1/17/2020 Petra House, OT GO 1               OT Discharge Summary  Anticipated Discharge Disposition (OT): home with home health, home with assist    Petra House OT  1/17/2020

## 2020-01-17 NOTE — PROGRESS NOTES
/76 (BP Location: Right arm, Patient Position: Lying)   Pulse 87   Temp 98.5 °F (36.9 °C) (Oral)   Resp 16   LMP  (LMP Unknown)   SpO2 94%   Breastfeeding No     Lab Results (last 24 hours)     Procedure Component Value Units Date/Time    Basic Metabolic Panel [[id number]]  (Abnormal) Collected:  01/17/20 0527    Specimen:  Blood Updated:  01/17/20 0727     Glucose 141 mg/dL      BUN 10 mg/dL      Creatinine 0.79 mg/dL      Sodium 138 mmol/L      Potassium 4.0 mmol/L      Chloride 101 mmol/L      CO2 24.0 mmol/L      Calcium 8.8 mg/dL      eGFR Non African Amer 72 mL/min/1.73      BUN/Creatinine Ratio 12.7     Anion Gap 13.0 mmol/L     Narrative:       GFR Normal >60  Chronic Kidney Disease <60  Kidney Failure <15      CBC & Differential [[id number]] Collected:  01/17/20 0527    Specimen:  Blood Updated:  01/17/20 0632    Narrative:       The following orders were created for panel order CBC & Differential.  Procedure                               Abnormality         Status                     ---------                               -----------         ------                     CBC Auto Differential[[id number]]        Abnormal            Final result                 Please view results for these tests on the individual orders.    CBC Auto Differential [[id number]]  (Abnormal) Collected:  01/17/20 0527    Specimen:  Blood Updated:  01/17/20 0632     WBC 11.40 10*3/mm3      RBC 3.70 10*6/mm3      Hemoglobin 11.3 g/dL      Hematocrit 35.5 %      MCV 95.9 fL      MCH 30.5 pg      MCHC 31.8 g/dL      RDW 12.1 %      RDW-SD 42.6 fl      MPV 9.9 fL      Platelets 240 10*3/mm3      Neutrophil % 74.5 %      Lymphocyte % 13.9 %      Monocyte % 10.8 %      Eosinophil % 0.2 %      Basophil % 0.2 %      Immature Grans % 0.4 %      Neutrophils, Absolute 8.49 10*3/mm3      Lymphocytes, Absolute 1.59 10*3/mm3      Monocytes, Absolute 1.23 10*3/mm3      Eosinophils, Absolute 0.02 10*3/mm3      Basophils, Absolute 0.02  10*3/mm3      Immature Grans, Absolute 0.05 10*3/mm3      nRBC 0.0 /100 WBC           Imaging Results (Last 24 Hours)     Procedure Component Value Units Date/Time    XR Knee 1 or 2 View Left [[id number]] Collected:  01/16/20 1135     Updated:  01/16/20 1139    Narrative:       EXAMINATION: XR KNEE 1 OR 2 VW, LEFT-01/16/2020:      INDICATION: Post-op knee arthoplasty.      COMPARISON: NONE.     FINDINGS: Images of the left knee demonstrate a total left knee  arthroplasty. The prosthetic device is well positioned. There is no  loosening, dislocation or fracture.           Impression:       Expected postoperative findings.     D:  01/16/2020  E:  01/16/2020     This report was finalized on 1/16/2020 11:36 AM by Dr. Braulio Buck MD.             Patient Care Team:  Shiva Capone MD as PCP - General  Richie Fisher MD as Surgeon (Neurosurgery)  Sukumar Mcwilliams MD as Consulting Physician (Anesthesiology)  Glenn Clayton MD as Consulting Physician (Gynecology)    SUBJECTIVE: She is doing well.  She walked 90 feet with therapy.  Reports pain is relatively well controlled with Percocet.    PHYSICAL EXAM  Left knee incision is clean, dry and intact with mesh dressing in place  Thigh and calf soft and nontender  Neurovascular intact distally       Status post total left knee replacement    Essential hypertension    Hyperlipidemia    Primary osteoarthritis of left knee      PLAN / DISPOSITION: 69-year-old female postop day 1 status post left total knee arthroplasty    -Continue to mobilize with therapy as tolerated  -Aspirin for DVT prophylaxis  -okay to shower with mesh dressing in place once pain catheter removed  -Plan discharge home likely later today with home physical therapy  -follow-up in 2 weeks    Houston Castano MD  01/17/20  7:57 AM

## 2020-01-17 NOTE — PROGRESS NOTES
[facility]    Acute pain service Inpatient Progress Note    Patient Name: Laura Churchill  :  1950  MRN:  [id number]        Acute Pain  Service Inpatient Progress Note:    Analgesia:Fair  Pain Score:6/10  LOC: alert and awake  Resp Status: room air  Cardiac: VS stable  Side Effects:None  Catheter Site:clean, dressing intact and dry  Cath type: peripheral nerve cath with ON Q  Infusion rate: 14ml/hr  Catheter Plan:Catheter to remain Insitu and Continue catheter infusion rate unchanged  Comments: Anterior - 6  Posterior - 6      ---------------------------------------------------------------------------------  Physical Therapy Manual Muscle Testing Results:  MMT (Manual Muscle Testing)  General MMT Comments: R LE WFL; L knee functionally 2+/5, unable to perform SLR independently, no knee buckling with gait ([id number])]    Physical Therapy - Plan of Care Review - Outcome Summary:  Outcome Summary: Patient ambulated 90 feet with RW and step through gait pattern, limited by pain and fatigue. ROM to be initiated POD#1. Encouraged patient to ambulate with nursing again later this afternoon. Will continue to progress as able. Plan as d/c home with family and HHPT. PADD score of 7. (20 521)]    Occupational Therapy - Plan of Care Review - Outcome Summary:   ]  ----------------------------------------------------------------------------------

## 2020-03-21 RX ORDER — TIZANIDINE 4 MG/1
TABLET ORAL
Qty: 180 TABLET | Refills: 1 | Status: ON HOLD | OUTPATIENT
Start: 2020-03-21 | End: 2020-07-21

## 2020-03-21 RX ORDER — AMLODIPINE BESYLATE 2.5 MG/1
TABLET ORAL
Qty: 90 TABLET | Refills: 1 | Status: SHIPPED | OUTPATIENT
Start: 2020-03-21 | End: 2020-07-31 | Stop reason: ALTCHOICE

## 2020-03-26 ENCOUNTER — TELEPHONE (OUTPATIENT)
Dept: NEUROSURGERY | Facility: CLINIC | Age: 70
End: 2020-03-26

## 2020-03-26 RX ORDER — METHYLPREDNISOLONE 4 MG/1
TABLET ORAL
Qty: 1 EACH | Refills: 0 | Status: SHIPPED | OUTPATIENT
Start: 2020-03-26 | End: 2020-05-18

## 2020-04-01 NOTE — TELEPHONE ENCOUNTER
Patient called back with an update on her condition. Her bilateral buttocks pain has improved significantly after completing the medrol dose vesta. She continues to have some intermittent electrical shocks. She is reluctant to try Lyrica as she has severe nightmares with Gabapentin in the past. Denies weakness, numbness, saddle anesthesia or b/b dysfunction.    I have advised her to start some physical therapy exercises for low back pain with radiculopathy - she will look up some exercises online. She will call back next week with an update on her condition. If radicular pain persists, we will try some Lyrica.

## 2020-04-01 NOTE — DISCHARGE INSTRUCTIONS
The following information and instructions were given:    Do not eat, drink, smoke or chew gum after midnight the night before surgery. This also includes no mints.  Take all routine, prescribed medications including heart and blood pressure medicines with a sip of water unless otherwise instructed by your physician.   Do NOT take diabetic medication unless instructed by your physician.    DO NOT shave for two days before your procedure.  Do not wear makeup.      DO NOT wear fingernail polish (gel/regular) and/or acrylic/artificial nails on the day of surgery.   If you have had a recent manicure and would rather not remove polish or artificial nails, then the minimum requirement is that the polish/artificial nails must be removed from the middle finger on each hand.      If you are having surgery/procedure on an upper extremity, then fingernail polish/artificial fingernails must be removed for surgery.  NO EXCEPTIONS.      If you are having surgery/procedure on a lower extremity, then toenail polish on both extremities must be removed for surgery.  NO EXCEPTIONS.    Remove all jewelry (advise to go to jeweler if unable to remove).  Jewelry, especially rings, can no longer be taped for surgery.    Leave anything you consider valuable at home.    Leave your suitcase in the car until after your surgery.    Bring the following with you the day of your procedure (when applicable)       -picture ID and insurance cards       -Co-pay/deductible required by insurance       -Medications in the original bottles (not a list) including all over-the-counter medications if not brought to PAT       -Copy of advance directive, living will or power of  documents if not brought to PAT       -CPAP or BIPAP mask and tubing (do not bring machine)       -Skin prep instruction(s) sheet       -PAT Pass    Education booklet, brochure, handout or binder related to procedure given to patient.  Education booklet also includes general  information related to their recovery that mentions signs and symptoms of infection and when to call the doctor.    When applicable, an ERAS booklet was given to patient.    Pain Control After Surgery handout given to patient.    Respirex use (handout given to patient) and pneumonia prevention education provided.    Signs and Symptoms of infection were discussed with patient in Pre Admission testing.  Patient instructed to call their doctor if any of the following symptoms are noted during recovery:  fever of 100.4 F or higher, incision that is warm or has increasing bleeding, redness, or drainage.    DVT Prevention instructions given verbally during Pre Admission Testing appointment that stressed the importance of ambulation to improve blood circulation.  Also encouraged patient to perform foot exercises when in bed and that the application of a sequential device might be applied to lower extremities to improve circulation.      Please apply Chlorhexadine wipes to surgical area (if instructed) the night before procedure and the AM of procedure and document date/time of applications on skin prep instruction sheet.           HPI: A 82 years old M with PMH of IDDM, HTN, HLD, CKD stage 4, sleep apnea (has CPAP machine), gout, Paget's s/p Reclast in 6/2014. alk phos normal since, melanoma, top of head 2019 s/p Moh's. and verrucous acanthoma, presented from his nephrologist office (Dr. Jaffe?) because of fatigue and poor appetite and malaise w/ apparent increased creatinine in past week w/ productive cough.   COVID was sent for the pt

## 2020-04-03 ENCOUNTER — DOCUMENTATION (OUTPATIENT)
Dept: NEUROSURGERY | Facility: CLINIC | Age: 70
End: 2020-04-03

## 2020-04-03 ENCOUNTER — TELEPHONE (OUTPATIENT)
Dept: NEUROSURGERY | Facility: CLINIC | Age: 70
End: 2020-04-03

## 2020-04-03 NOTE — TELEPHONE ENCOUNTER
PT CALLED STATED BLAYNE GAVE HER A SCRIPT FOR MEDROL DOSEPAK FOR HER LOWER BACK PAIN AND SHE COMPLETED THE MEDICATION 2 DAYS AGO AND SHE IS HAVING A LOT OF PAIN. SHE WOULD LIKE FOR SOMEONE TO CALL HER ABOUT GIVING HER A SCRIPT FOR THE PAIN.     PT CONTACT # [phone]  BEST TIME TO CALL: ANYTIME  PHARMACY: [facility]

## 2020-04-06 ENCOUNTER — TELEPHONE (OUTPATIENT)
Dept: NEUROSURGERY | Facility: CLINIC | Age: 70
End: 2020-04-06

## 2020-04-06 NOTE — TELEPHONE ENCOUNTER
I have called and discussed this medication several times at length with the patient. If she does not want to take the medication, that is fine and her choice. We discussed that it is likely to help her symptoms in her legs. However, if she would like to hold off with the understanding there is no other medication I can offer (she cannot tolerate GBP), that is ok. Please call and discuss with her. Thanks.

## 2020-04-06 NOTE — TELEPHONE ENCOUNTER
Called pt. She is not going to take the medicine as she is concerned about the side effects. She will keep her appointment on 05/06 to discuss additional options at that point.

## 2020-04-06 NOTE — TELEPHONE ENCOUNTER
I SCHEDULED MS WAITE THIS MORNING FOR HER FOLLOW UP AND SHE IS VERY APPREHENSIVE ABOUT THE LYRICA. SHE FEELS THE SIDE EFFECTS ARE WORSE THAN HER SYMPTOMS. I TOLD HER YOU WOULD GIVE HER A CALL AND GO OVER IT WITH HER.

## 2020-05-03 DIAGNOSIS — I10 ESSENTIAL HYPERTENSION: ICD-10-CM

## 2020-05-04 RX ORDER — LOSARTAN POTASSIUM 25 MG/1
TABLET ORAL
Qty: 90 TABLET | Refills: 3 | OUTPATIENT
Start: 2020-05-04

## 2020-05-06 ENCOUNTER — TELEMEDICINE (OUTPATIENT)
Dept: NEUROSURGERY | Facility: CLINIC | Age: 70
End: 2020-05-06

## 2020-05-06 DIAGNOSIS — Z98.1 S/P LUMBAR FUSION: Primary | ICD-10-CM

## 2020-05-06 DIAGNOSIS — M48.062 SPINAL STENOSIS, LUMBAR REGION, WITH NEUROGENIC CLAUDICATION: ICD-10-CM

## 2020-05-06 PROCEDURE — 99213 OFFICE O/P EST LOW 20 MIN: CPT | Performed by: PHYSICIAN ASSISTANT

## 2020-05-06 NOTE — PROGRESS NOTES
Subjective     Chief Complaint: Back pain, bilateral buttock pain    Patient ID: Laura Churchill is a 69 y.o. female is here today for follow-up.    History of Present Illness    This is a 68-year-old woman who underwent an L3-4 PLIF in November 2018.  She did well following her surgery up until several months ago.  She underwent a knee replacement January 2020 and while participating in postoperative PT, she began expensing back to pain.  Since that time, the patient has had progressive back pain with radiation to bilateral buttocks.  Pain feels similar to prior preoperative symptoms.  She endorses a walking and standing intolerance.  She has to occasionally use a walker due to pain.  Symptoms are improved with sitting and lying down.  She denies any pain radiating into lower extremities, overt weakness, numbness/tingling, saddle anesthesia, or bowel or bladder dysfunction.  She has been doing some physical therapy exercises at home but has significant pain with these.    The following portions of the patient's history were reviewed and updated as appropriate: allergies, current medications, past family history, past medical history, past social history, past surgical history and problem list.    Family history:   Family History   Problem Relation Age of Onset   • Ovarian cancer Maternal Aunt 75   • Alzheimer's disease Mother    • Bone cancer Mother    • Melanoma Father    • Prostate cancer Father    • Diabetes Father    • Heart failure Father         congestive   • Breast cancer Neg Hx        Social history:   Social History     Socioeconomic History   • Marital status:      Spouse name: Not on file   • Number of children: Not on file   • Years of education: Not on file   • Highest education level: Not on file   Tobacco Use   • Smoking status: Former Smoker     Packs/day: 0.25     Years: 3.00     Pack years: 0.75     Types: Cigarettes     Start date: 11/13/1975     Last attempt to quit: 11/13/1978      Years since quittin.5   • Smokeless tobacco: Never Used   Substance and Sexual Activity   • Alcohol use: Yes     Frequency: Never     Comment: social    • Drug use: No   • Sexual activity: Defer     Partners: Male     Birth control/protection: Post-menopausal       Review of Systems   Constitutional: Negative for activity change, appetite change, chills, diaphoresis, fatigue, fever and unexpected weight change.   HENT: Negative for congestion, dental problem, drooling, ear discharge, ear pain, facial swelling, hearing loss, mouth sores, nosebleeds, postnasal drip, rhinorrhea, sinus pressure, sinus pain, sneezing, sore throat, tinnitus, trouble swallowing and voice change.    Eyes: Negative for photophobia, pain, discharge, redness, itching and visual disturbance.   Respiratory: Negative for apnea, cough, choking, chest tightness, shortness of breath, wheezing and stridor.    Cardiovascular: Negative for chest pain, palpitations and leg swelling.   Gastrointestinal: Negative for abdominal distention, abdominal pain, anal bleeding, blood in stool, constipation, diarrhea, nausea, rectal pain and vomiting.   Endocrine: Negative for cold intolerance, heat intolerance, polydipsia, polyphagia and polyuria.   Genitourinary: Negative for decreased urine volume, difficulty urinating, dysuria, enuresis, flank pain, frequency, genital sores, hematuria and urgency.   Musculoskeletal: Positive for back pain and gait problem. Negative for arthralgias, joint swelling, myalgias, neck pain and neck stiffness.   Skin: Negative for color change, pallor, rash and wound.   Allergic/Immunologic: Negative for environmental allergies, food allergies and immunocompromised state.   Neurological: Negative for dizziness, tremors, seizures, syncope, facial asymmetry, speech difficulty, weakness, light-headedness, numbness and headaches.   Hematological: Negative for adenopathy. Does not bruise/bleed easily.   Psychiatric/Behavioral:  Negative for agitation, behavioral problems, confusion, decreased concentration, dysphoric mood, hallucinations, self-injury, sleep disturbance and suicidal ideas. The patient is not nervous/anxious and is not hyperactive.        Objective   not currently breastfeeding.  There is no height or weight on file to calculate BMI.  Patient's There is no height or weight on file to calculate BMI. BMI is above normal parameters. Recommendations include: exercise counseling and nutrition counseling.      Physical Exam      Assessment/Plan     This is a 69-year-old woman who is status post L3-4 PLIF November 2018.  She is endorsing symptoms that are consistent with neurogenic claudication, and similar to her prior preoperative symptoms.  Symptoms have been progressive over the last several months.  The pain in her low back and bilateral buttocks is becoming unbearable, particularly worse with walking and standing.  She has attempted physical therapy at home with no improvement.  She was unable to tolerate gabapentin or Lyrica.  I have ordered a lumbar MRI with and without contrast.  The patient will follow-up with us in the next couple of weeks with imaging studies.    You have chosen to receive care through a telephone visit. Do you consent to use a telephone visit for your medical care today? Yes  I spent approximately 15 minutes on the phone with the patient.      Laura was seen today for back pain.    Diagnoses and all orders for this visit:    S/P lumbar fusion  -     MRI Lumbar Spine With & Without Contrast; Future    Spinal stenosis, lumbar region, with neurogenic claudication  -     MRI Lumbar Spine With & Without Contrast; Future        Return in about 2 weeks (around 5/20/2020), or if symptoms worsen or fail to improve.             Vanita Leon PA-C

## 2020-05-18 ENCOUNTER — OFFICE VISIT (OUTPATIENT)
Dept: NEUROSURGERY | Facility: CLINIC | Age: 70
End: 2020-05-18

## 2020-05-18 ENCOUNTER — HOSPITAL ENCOUNTER (OUTPATIENT)
Dept: MRI IMAGING | Facility: HOSPITAL | Age: 70
Discharge: HOME | End: 2020-05-18
Admitting: PHYSICIAN ASSISTANT

## 2020-05-18 VITALS — BODY MASS INDEX: 37.82 KG/M2 | WEIGHT: 227 LBS | HEIGHT: 65 IN

## 2020-05-18 DIAGNOSIS — Z98.1 S/P LUMBAR FUSION: ICD-10-CM

## 2020-05-18 DIAGNOSIS — M48.061 STENOSIS OF LATERAL RECESS OF LUMBAR SPINE: ICD-10-CM

## 2020-05-18 DIAGNOSIS — M48.062 SPINAL STENOSIS, LUMBAR REGION, WITH NEUROGENIC CLAUDICATION: ICD-10-CM

## 2020-05-18 DIAGNOSIS — E66.01 CLASS 2 SEVERE OBESITY DUE TO EXCESS CALORIES WITH SERIOUS COMORBIDITY AND BODY MASS INDEX (BMI) OF 37.0 TO 37.9 IN ADULT (HCC): ICD-10-CM

## 2020-05-18 DIAGNOSIS — M48.062 SPINAL STENOSIS, LUMBAR REGION, WITH NEUROGENIC CLAUDICATION: Primary | ICD-10-CM

## 2020-05-18 LAB — CREAT BLDA-MCNC: 1 MG/DL (ref 0.6–1.3)

## 2020-05-18 PROCEDURE — 72158 MRI LUMBAR SPINE W/O & W/DYE: CPT

## 2020-05-18 PROCEDURE — 99213 OFFICE O/P EST LOW 20 MIN: CPT | Performed by: NEUROLOGICAL SURGERY

## 2020-05-18 PROCEDURE — 82565 ASSAY OF CREATININE: CPT

## 2020-05-18 PROCEDURE — 0 GADOBENATE DIMEGLUMINE 529 MG/ML SOLUTION: Performed by: PHYSICIAN ASSISTANT

## 2020-05-18 PROCEDURE — A9577 INJ MULTIHANCE: HCPCS | Performed by: PHYSICIAN ASSISTANT

## 2020-05-18 RX ADMIN — GADOBENATE DIMEGLUMINE 17 ML: 529 INJECTION, SOLUTION INTRAVENOUS at 14:13

## 2020-05-18 NOTE — PROGRESS NOTES
Subjective     Chief Complaint: Follow-up lumbar fusion, recurrent symptoms of neurogenic claudication    Patient ID: Laura Churchill is a 69 y.o. female is here today for follow-up.    History of Present Illness    This is a 69-year-old woman in whom I performed an L3-4 fusion in 2018.  She did well after this operation, but in the last few months has unfortunately developed recurrent symptoms consistent with lumbar stenosis and neurogenic claudication.    The following portions of the patient's history were reviewed and updated as appropriate: allergies, current medications, past family history, past medical history, past social history, past surgical history and problem list.    Family history:   Family History   Problem Relation Age of Onset   • Ovarian cancer Maternal Aunt 75   • Alzheimer's disease Mother    • Bone cancer Mother    • Melanoma Father    • Prostate cancer Father    • Diabetes Father    • Heart failure Father         congestive   • Breast cancer Neg Hx        Social history:   Social History     Socioeconomic History   • Marital status:      Spouse name: Not on file   • Number of children: Not on file   • Years of education: Not on file   • Highest education level: Not on file   Tobacco Use   • Smoking status: Former Smoker     Packs/day: 0.25     Years: 3.00     Pack years: 0.75     Types: Cigarettes     Start date: 1975     Last attempt to quit: 1978     Years since quittin.5   • Smokeless tobacco: Never Used   Substance and Sexual Activity   • Alcohol use: Yes     Frequency: Never     Comment: social    • Drug use: No   • Sexual activity: Defer     Partners: Male     Birth control/protection: Post-menopausal       Review of Systems   Constitutional: Negative for activity change, appetite change, chills, diaphoresis, fatigue, fever and unexpected weight change.   HENT: Negative for congestion, dental problem, drooling, ear discharge, ear pain, facial swelling,  "hearing loss, mouth sores, nosebleeds, postnasal drip, rhinorrhea, sinus pressure, sinus pain, sneezing, sore throat, tinnitus, trouble swallowing and voice change.    Eyes: Negative for photophobia, pain, discharge, redness, itching and visual disturbance.   Respiratory: Negative for apnea, cough, choking, chest tightness, shortness of breath, wheezing and stridor.    Cardiovascular: Negative for chest pain, palpitations and leg swelling.   Gastrointestinal: Negative for abdominal distention, abdominal pain, anal bleeding, blood in stool, constipation, diarrhea, nausea, rectal pain and vomiting.   Endocrine: Negative for cold intolerance, heat intolerance, polydipsia, polyphagia and polyuria.   Genitourinary: Negative for decreased urine volume, difficulty urinating, dyspareunia, dysuria, enuresis, flank pain, frequency, genital sores, hematuria, menstrual problem, pelvic pain, urgency, vaginal bleeding, vaginal discharge and vaginal pain.   Musculoskeletal: Negative for arthralgias, back pain, gait problem, joint swelling, myalgias, neck pain and neck stiffness.   Skin: Negative for color change, pallor, rash and wound.   Allergic/Immunologic: Negative for environmental allergies, food allergies and immunocompromised state.   Neurological: Negative for dizziness, tremors, seizures, syncope, facial asymmetry, speech difficulty, weakness, light-headedness, numbness and headaches.   Hematological: Negative for adenopathy. Does not bruise/bleed easily.   Psychiatric/Behavioral: Negative for agitation, behavioral problems, confusion, decreased concentration, dysphoric mood, hallucinations, self-injury, sleep disturbance and suicidal ideas. The patient is not nervous/anxious and is not hyperactive.        Objective   Height 165.1 cm (65\"), weight 103 kg (227 lb), not currently breastfeeding.  Body mass index is 37.77 kg/m².    Physical Exam   Constitutional: She is oriented to person, place, and time. She appears " well-developed and well-nourished. No distress.   HENT:   Head: Normocephalic and atraumatic.   Pulmonary/Chest: Effort normal.   Neurological: She is alert and oriented to person, place, and time. No cranial nerve deficit.   Skin: Skin is warm and dry. She is not diaphoretic.   Psychiatric: She has a normal mood and affect.         Assessment/Plan     Independent Review of Radiographic Studies:      Available for my review is a MRI of the lumbar spine which was performed earlier today.  Redemonstrated are the extensive degenerative changes throughout the lumbar spine.  There is susceptibility artifact within the L3 and L4 vertebral bodies consistent with lumbar instrumentation hardware.  There appears to be severe central canal stenosis at L2-3.  There is severe bilateral lateral recess stenosis at L4-5.  There is severe degenerative disc disease at L1-2 with a 2-3 mm retrolisthesis of 1 on 2.  L5-S1 demonstrates near complete collapse of disc height with severe bilateral intraforaminal stenosis.    Medical Decision Making:      I would like to order a lumbar myelogram to assess for any additional areas of dynamic nerve root compression.  Given the extensive nature of the degenerative disc disease throughout her lumbar spine, I think she probably will need an extensive spinal reconstructive operation.  I will follow-up with her after her myelogram, but I think a simple decompression or extending her fusion to L2-3 is likely to exacerbate the significant adjacent level disease at L1-2 and L4-5 which is already present.  It may make more sense to perform a thoracic to sacral fusion.  This should ideally be performed by a deformity surgeon.  Final recommendations will be left pending the results of her myelogram.    Laura was seen today for back pain.    Diagnoses and all orders for this visit:    Spinal stenosis, lumbar region, with neurogenic claudication  -     Case Request Cath Lab: IR myelogram, lumbar;  Standing  -     Case Request Cath Lab: IR myelogram, lumbar    Stenosis of lateral recess of lumbar spine  -     Case Request Cath Lab: IR myelogram, lumbar; Standing  -     Case Request Cath Lab: IR myelogram, lumbar    S/P lumbar fusion    Class 2 severe obesity due to excess calories with serious comorbidity and body mass index (BMI) of 37.0 to 37.9 in adult (CMS/[facility])        No follow-ups on file.           This document signed by EBENEZER Rider MD May 18, 2020 15:18

## 2020-05-27 ENCOUNTER — DOCUMENTATION (OUTPATIENT)
Dept: NEUROSURGERY | Facility: CLINIC | Age: 70
End: 2020-05-27

## 2020-05-27 ENCOUNTER — APPOINTMENT (OUTPATIENT)
Dept: CT IMAGING | Facility: HOSPITAL | Age: 70
End: 2020-05-27

## 2020-05-27 ENCOUNTER — HOSPITAL ENCOUNTER (OUTPATIENT)
Facility: HOSPITAL | Age: 70
Setting detail: HOSPITAL OUTPATIENT SURGERY
Discharge: HOME | End: 2020-05-27
Attending: RADIOLOGY | Admitting: RADIOLOGY

## 2020-05-27 VITALS
OXYGEN SATURATION: 97 % | TEMPERATURE: 97.1 F | DIASTOLIC BLOOD PRESSURE: 78 MMHG | HEART RATE: 70 BPM | SYSTOLIC BLOOD PRESSURE: 125 MMHG | RESPIRATION RATE: 16 BRPM | BODY MASS INDEX: 38.24 KG/M2 | HEIGHT: 65 IN | WEIGHT: 229.5 LBS

## 2020-05-27 DIAGNOSIS — M48.062 SPINAL STENOSIS, LUMBAR REGION, WITH NEUROGENIC CLAUDICATION: ICD-10-CM

## 2020-05-27 DIAGNOSIS — M48.061 STENOSIS OF LATERAL RECESS OF LUMBAR SPINE: ICD-10-CM

## 2020-05-27 PROCEDURE — 72132 CT LUMBAR SPINE W/DYE: CPT

## 2020-05-27 PROCEDURE — 62304 MYELOGRAPHY LUMBAR INJECTION: CPT | Performed by: RADIOLOGY

## 2020-05-27 PROCEDURE — 0 IOPAMIDOL 41 % SOLUTION: Performed by: RADIOLOGY

## 2020-05-27 PROCEDURE — 25010000003 LIDOCAINE 1 % SOLUTION: Performed by: RADIOLOGY

## 2020-05-27 RX ORDER — LIDOCAINE HYDROCHLORIDE 10 MG/ML
INJECTION, SOLUTION INFILTRATION; PERINEURAL AS NEEDED
Status: DISCONTINUED | OUTPATIENT
Start: 2020-05-27 | End: 2020-05-27 | Stop reason: HOSPADM

## 2020-05-27 NOTE — PROGRESS NOTES
Patient asked if her  is allowed to be in the exam room with her for her upcoming appointment with Dr. Rider.    Per [facility], it is OK.

## 2020-05-29 ENCOUNTER — OFFICE VISIT (OUTPATIENT)
Dept: NEUROSURGERY | Facility: CLINIC | Age: 70
End: 2020-05-29

## 2020-05-29 VITALS — BODY MASS INDEX: 37.99 KG/M2 | TEMPERATURE: 97.5 F | HEIGHT: 65 IN | WEIGHT: 228 LBS

## 2020-05-29 DIAGNOSIS — M48.062 SPINAL STENOSIS, LUMBAR REGION, WITH NEUROGENIC CLAUDICATION: Primary | ICD-10-CM

## 2020-05-29 PROCEDURE — 99214 OFFICE O/P EST MOD 30 MIN: CPT | Performed by: NEUROLOGICAL SURGERY

## 2020-05-29 RX ORDER — HYDROCODONE BITARTRATE AND ACETAMINOPHEN 5; 325 MG/1; MG/1
1 TABLET ORAL AS NEEDED
Status: ON HOLD | COMMUNITY
Start: 2020-05-18 | End: 2020-07-21

## 2020-05-29 NOTE — PROGRESS NOTES
Subjective     Chief Complaint: Back pain, leg pain/weakness    Patient ID: Laura Churchill is a 69 y.o. female is here today for follow-up.    History of Present Illness    This is a 69-year-old woman in whom I performed an L3-4 PLIF in 2018.  She represented to my clinic with worsening back and leg pain.  I ordered a CT myelogram.  She presents today to discuss the results of the study.  She reports no significant change in her symptoms since her last visit.  She denies any bladder/bowel incontinence.    The following portions of the patient's history were reviewed and updated as appropriate: allergies, current medications, past family history, past medical history, past social history, past surgical history and problem list.    Family history:   Family History   Problem Relation Age of Onset   • Ovarian cancer Maternal Aunt 75   • Alzheimer's disease Mother    • Bone cancer Mother    • Melanoma Father    • Prostate cancer Father    • Diabetes Father    • Heart failure Father         congestive   • Breast cancer Neg Hx        Social history:   Social History     Socioeconomic History   • Marital status:      Spouse name: Not on file   • Number of children: Not on file   • Years of education: Not on file   • Highest education level: Not on file   Tobacco Use   • Smoking status: Former Smoker     Packs/day: 0.25     Years: 3.00     Pack years: 0.75     Types: Cigarettes     Start date: 1975     Last attempt to quit: 1978     Years since quittin.5   • Smokeless tobacco: Never Used   Substance and Sexual Activity   • Alcohol use: Yes     Frequency: Never     Comment: social    • Drug use: No   • Sexual activity: Defer     Partners: Male     Birth control/protection: Post-menopausal       Review of Systems   Constitutional: Negative for activity change, appetite change, chills, diaphoresis, fatigue, fever and unexpected weight change.   HENT: Negative for congestion, dental problem,  "drooling, ear discharge, ear pain, facial swelling, hearing loss, mouth sores, nosebleeds, postnasal drip, rhinorrhea, sinus pressure, sinus pain, sneezing, sore throat, tinnitus, trouble swallowing and voice change.    Eyes: Negative for photophobia, pain, discharge, redness, itching and visual disturbance.   Respiratory: Negative for apnea, cough, choking, chest tightness, shortness of breath, wheezing and stridor.    Cardiovascular: Negative for chest pain, palpitations and leg swelling.   Gastrointestinal: Negative for abdominal distention, abdominal pain, anal bleeding, blood in stool, constipation, diarrhea, nausea, rectal pain and vomiting.   Endocrine: Negative for cold intolerance, heat intolerance, polydipsia, polyphagia and polyuria.   Genitourinary: Negative for decreased urine volume, difficulty urinating, dyspareunia, dysuria, enuresis, flank pain, frequency, genital sores, hematuria, menstrual problem, pelvic pain, urgency, vaginal bleeding, vaginal discharge and vaginal pain.   Musculoskeletal: Negative for arthralgias, back pain, gait problem, joint swelling, myalgias, neck pain and neck stiffness.   Skin: Negative for color change, pallor, rash and wound.   Allergic/Immunologic: Negative for environmental allergies, food allergies and immunocompromised state.   Neurological: Negative for dizziness, tremors, seizures, syncope, facial asymmetry, speech difficulty, weakness, light-headedness, numbness and headaches.   Hematological: Negative for adenopathy. Does not bruise/bleed easily.   Psychiatric/Behavioral: Negative for agitation, behavioral problems, confusion, decreased concentration, dysphoric mood, hallucinations, self-injury, sleep disturbance and suicidal ideas. The patient is not nervous/anxious and is not hyperactive.        Objective   Temperature 97.5 °F (36.4 °C), temperature source Temporal, height 165.1 cm (65\"), weight 103 kg (228 lb), not currently breastfeeding.  Body mass index " is 37.94 kg/m².    Physical Exam   Constitutional: She is oriented to person, place, and time. She appears well-developed and well-nourished. No distress.   HENT:   Head: Normocephalic and atraumatic.   Pulmonary/Chest: Effort normal.   Neurological: She is alert and oriented to person, place, and time. No cranial nerve deficit.   Skin: Skin is warm and dry. She is not diaphoretic.   Psychiatric: She has a normal mood and affect.         Assessment/Plan     Independent Review of Radiographic Studies:      Available for my review is a CT myelogram of the lumbar spine.  This demonstrates severe stenosis at L2-3 which is exacerbated in the upright position.  There is obvious radiographic evidence of nerve root impingement.  She has advanced degenerative disc disease at L1-2, as well as L4-5 and L5-S1.    Medical Decision Making:      I think it is relatively straightforward to address the most proximal concern which is the stenosis at L2-3.  She would likely need extension of her PLIF from L3-4 to L2-4, however I am concerned about the stress that this would place at the L1-2 disc space, particularly in light of how quickly she developed adjacent level disease following her first PLIF.  I recommended that she visit with the orthopedic surgeons at the [facility] for additional consultation.  It may make more sense to go ahead and perform a thoracolumbosacral fusion.  She is to follow-up with me if she wants to have her L2-3 PLIF with me, or she can seek more extensive spinal reconstruction at the [facility].    Laura was seen today for back pain.    Diagnoses and all orders for this visit:    Spinal stenosis, lumbar region, with neurogenic claudication  -     Ambulatory Referral to Neurosurgery        Return if symptoms worsen or fail to improve.           This document signed by EBENEZER Rider MD May 29, 2020 12:00

## 2020-06-16 ENCOUNTER — TELEPHONE (OUTPATIENT)
Dept: NEUROSURGERY | Facility: CLINIC | Age: 70
End: 2020-06-16

## 2020-06-16 NOTE — TELEPHONE ENCOUNTER
PT CALLED TO CHECK IF DR. EISENBERG RECEIVED THE PROGRESS NOTES FROM DR. ALTMAN'S OFFICE. PT SEEN DR. ALTMAN YESTERDAY AND WANTED TO SEE IF DR. EISENBERG RECEIVED AND REVIEWED. SHE WOULD LIKE TO SCHEDULE AN APPT WITH DR. EISENBERG TO GO OVER RESULTS FROM DR. ALTMAN.    PT CONTACT# [phone]  BEST TIME TO CALL: ANYTIME

## 2020-06-17 ENCOUNTER — TELEPHONE (OUTPATIENT)
Dept: NEUROSURGERY | Facility: CLINIC | Age: 70
End: 2020-06-17

## 2020-06-17 NOTE — TELEPHONE ENCOUNTER
PATIENT CALLED TO SEE IF  RECEIVED DR. ALTMAN'S NOTES YET. PLEASE ADVISE, CALL PATIENT BACK [phone].

## 2020-06-26 ENCOUNTER — OFFICE VISIT (OUTPATIENT)
Dept: NEUROSURGERY | Facility: CLINIC | Age: 70
End: 2020-06-26

## 2020-06-26 DIAGNOSIS — E66.01 MORBID OBESITY DUE TO EXCESS CALORIES (HCC): Primary | ICD-10-CM

## 2020-06-26 DIAGNOSIS — Z98.1 S/P LUMBAR FUSION: ICD-10-CM

## 2020-06-26 DIAGNOSIS — M48.062 SPINAL STENOSIS, LUMBAR REGION, WITH NEUROGENIC CLAUDICATION: ICD-10-CM

## 2020-06-26 PROCEDURE — 99441 PR PHYS/QHP TELEPHONE EVALUATION 5-10 MIN: CPT | Performed by: NEUROLOGICAL SURGERY

## 2020-06-26 RX ORDER — PREGABALIN 75 MG/1
150 CAPSULE ORAL ONCE
Status: CANCELLED | OUTPATIENT
Start: 2020-06-26 | End: 2020-06-26

## 2020-06-26 RX ORDER — SODIUM CHLORIDE 0.9 % (FLUSH) 0.9 %
10 SYRINGE (ML) INJECTION AS NEEDED
Status: CANCELLED | OUTPATIENT
Start: 2020-06-26

## 2020-06-26 RX ORDER — ACETAMINOPHEN 325 MG/1
650 TABLET ORAL ONCE
Status: CANCELLED | OUTPATIENT
Start: 2020-06-26 | End: 2020-06-26

## 2020-06-26 RX ORDER — ACETAMINOPHEN 325 MG/1
1000 TABLET ORAL ONCE
Status: CANCELLED | OUTPATIENT
Start: 2020-06-26 | End: 2020-06-26

## 2020-06-26 RX ORDER — IBUPROFEN 200 MG
800 TABLET ORAL ONCE
Status: CANCELLED | OUTPATIENT
Start: 2020-06-26 | End: 2020-06-26

## 2020-06-26 RX ORDER — OXYCODONE HCL 10 MG/1
10 TABLET, FILM COATED, EXTENDED RELEASE ORAL ONCE
Status: CANCELLED | OUTPATIENT
Start: 2020-06-26 | End: 2020-06-26

## 2020-06-26 RX ORDER — HYDROCODONE BITARTRATE AND ACETAMINOPHEN 7.5; 325 MG/1; MG/1
1 TABLET ORAL ONCE
Status: CANCELLED | OUTPATIENT
Start: 2020-06-26 | End: 2020-06-26

## 2020-06-26 RX ORDER — CHLORHEXIDINE GLUCONATE 4 G/100ML
SOLUTION TOPICAL
Qty: 120 ML | Refills: 0 | Status: ON HOLD | OUTPATIENT
Start: 2020-06-26 | End: 2020-07-21

## 2020-06-26 RX ORDER — SODIUM CHLORIDE 0.9 % (FLUSH) 0.9 %
3 SYRINGE (ML) INJECTION EVERY 12 HOURS SCHEDULED
Status: CANCELLED | OUTPATIENT
Start: 2020-06-26

## 2020-06-26 NOTE — H&P
Chief Complaint: Back pain, leg pain/weakness     Patient ID: Laura Churchill is a 69 y.o. female is here today for follow-up.     History of Present Illness     This is a 69-year-old woman in whom I performed an L3-4 PLIF in 2018.  She represented to my clinic with worsening back and leg pain.  I ordered a CT myelogram.  She presents today to discuss the results of the study.  She reports no significant change in her symptoms since her last visit.  She denies any bladder/bowel incontinence.     The following portions of the patient's history were reviewed and updated as appropriate: allergies, current medications, past family history, past medical history, past social history, past surgical history and problem list.     Family history:         Family History   Problem Relation Age of Onset   • Ovarian cancer Maternal Aunt 75   • Alzheimer's disease Mother     • Bone cancer Mother     • Melanoma Father     • Prostate cancer Father     • Diabetes Father     • Heart failure Father           congestive   • Breast cancer Neg Hx           Social history:   Social History   Social History            Socioeconomic History   • Marital status:        Spouse name: Not on file   • Number of children: Not on file   • Years of education: Not on file   • Highest education level: Not on file   Tobacco Use   • Smoking status: Former Smoker       Packs/day: 0.25       Years: 3.00       Pack years: 0.75       Types: Cigarettes       Start date: 1975       Last attempt to quit: 1978       Years since quittin.5   • Smokeless tobacco: Never Used   Substance and Sexual Activity   • Alcohol use: Yes       Frequency: Never       Comment: social    • Drug use: No   • Sexual activity: Defer       Partners: Male       Birth control/protection: Post-menopausal            Review of Systems   Constitutional: Negative for activity change, appetite change, chills, diaphoresis, fatigue, fever and unexpected  "weight change.   HENT: Negative for congestion, dental problem, drooling, ear discharge, ear pain, facial swelling, hearing loss, mouth sores, nosebleeds, postnasal drip, rhinorrhea, sinus pressure, sinus pain, sneezing, sore throat, tinnitus, trouble swallowing and voice change.    Eyes: Negative for photophobia, pain, discharge, redness, itching and visual disturbance.   Respiratory: Negative for apnea, cough, choking, chest tightness, shortness of breath, wheezing and stridor.    Cardiovascular: Negative for chest pain, palpitations and leg swelling.   Gastrointestinal: Negative for abdominal distention, abdominal pain, anal bleeding, blood in stool, constipation, diarrhea, nausea, rectal pain and vomiting.   Endocrine: Negative for cold intolerance, heat intolerance, polydipsia, polyphagia and polyuria.   Genitourinary: Negative for decreased urine volume, difficulty urinating, dyspareunia, dysuria, enuresis, flank pain, frequency, genital sores, hematuria, menstrual problem, pelvic pain, urgency, vaginal bleeding, vaginal discharge and vaginal pain.   Musculoskeletal: Negative for arthralgias, back pain, gait problem, joint swelling, myalgias, neck pain and neck stiffness.   Skin: Negative for color change, pallor, rash and wound.   Allergic/Immunologic: Negative for environmental allergies, food allergies and immunocompromised state.   Neurological: Negative for dizziness, tremors, seizures, syncope, facial asymmetry, speech difficulty, weakness, light-headedness, numbness and headaches.   Hematological: Negative for adenopathy. Does not bruise/bleed easily.   Psychiatric/Behavioral: Negative for agitation, behavioral problems, confusion, decreased concentration, dysphoric mood, hallucinations, self-injury, sleep disturbance and suicidal ideas. The patient is not nervous/anxious and is not hyperactive.             Objective      Temperature 97.5 °F (36.4 °C), temperature source Temporal, height 165.1 cm (65\"), " weight 103 kg (228 lb), not currently breastfeeding.  Body mass index is 37.94 kg/m².     Physical Exam   Constitutional: She is oriented to person, place, and time. She appears well-developed and well-nourished. No distress.   HENT:   Head: Normocephalic and atraumatic.   Pulmonary/Chest: Effort normal.   Neurological: She is alert and oriented to person, place, and time. No cranial nerve deficit.   Skin: Skin is warm and dry. She is not diaphoretic.   Psychiatric: She has a normal mood and affect.               Assessment/Plan         Independent Review of Radiographic Studies:       Available for my review is a CT myelogram of the lumbar spine.  This demonstrates severe stenosis at L2-3 which is exacerbated in the upright position.  There is obvious radiographic evidence of nerve root impingement.  She has advanced degenerative disc disease at L1-2, as well as L4-5 and L5-S1.     Medical Decision Making:      She elects to proceed with an L2-3 PLIF and extension of her L3-4 fusion.  Informed consent for this procedure was obtained from the patient.  She acknowledges the attendant risks and benefits.

## 2020-06-26 NOTE — PROGRESS NOTES
Subjective     Chief Complaint: Follow-up low back pain    Patient ID: Laura Churchill is a 69 y.o. female is here today for follow-up.    History of Present Illness    This is a 69-year-old woman in whom I performed an L3-4 PLIF about a year and a half ago.  She has adjacent level disease.  She has a very complex spine with advanced degenerative changes.  I was concerned that she will ultimately need a long segment fusion, so I referred her to the [facility] in [place].  Unfortunately, the patient did not like their interactions with Dr. Rodriguez.  They have elected not to pursue any further treatment with him.  She would like to discuss the L2-3 stenosis that she has developed.    She denies any significant changes in her symptoms since her last clinic visit.    The following portions of the patient's history were reviewed and updated as appropriate: allergies, current medications, past family history, past medical history, past social history, past surgical history and problem list.    Family history:   Family History   Problem Relation Age of Onset   • Ovarian cancer Maternal Aunt 75   • Alzheimer's disease Mother    • Bone cancer Mother    • Melanoma Father    • Prostate cancer Father    • Diabetes Father    • Heart failure Father         congestive   • Breast cancer Neg Hx        Social history:   Social History     Socioeconomic History   • Marital status:      Spouse name: Not on file   • Number of children: Not on file   • Years of education: Not on file   • Highest education level: Not on file   Tobacco Use   • Smoking status: Former Smoker     Packs/day: 0.25     Years: 3.00     Pack years: 0.75     Types: Cigarettes     Start date: 1975     Last attempt to quit: 1978     Years since quittin.6   • Smokeless tobacco: Never Used   Substance and Sexual Activity   • Alcohol use: Yes     Frequency: Never     Comment: social    • Drug use: No   • Sexual activity: Defer      Partners: Male     Birth control/protection: Post-menopausal       Review of Systems   Constitutional: Negative for activity change, appetite change, chills, diaphoresis, fatigue, fever and unexpected weight change.   HENT: Negative for congestion, dental problem, drooling, ear discharge, ear pain, facial swelling, hearing loss, mouth sores, nosebleeds, postnasal drip, rhinorrhea, sinus pressure, sinus pain, sneezing, sore throat, tinnitus, trouble swallowing and voice change.    Eyes: Negative for photophobia, pain, discharge, redness, itching and visual disturbance.   Respiratory: Negative for apnea, cough, choking, chest tightness, shortness of breath, wheezing and stridor.    Cardiovascular: Negative for chest pain, palpitations and leg swelling.   Gastrointestinal: Negative for abdominal distention, abdominal pain, anal bleeding, blood in stool, constipation, diarrhea, nausea, rectal pain and vomiting.   Endocrine: Negative for cold intolerance, heat intolerance, polydipsia, polyphagia and polyuria.   Genitourinary: Negative for decreased urine volume, difficulty urinating, dyspareunia, dysuria, enuresis, flank pain, frequency, genital sores, hematuria, menstrual problem, pelvic pain, urgency, vaginal bleeding, vaginal discharge and vaginal pain.   Musculoskeletal: Positive for back pain. Negative for arthralgias, gait problem, joint swelling, myalgias, neck pain and neck stiffness.   Skin: Negative for color change, pallor, rash and wound.   Allergic/Immunologic: Negative for environmental allergies, food allergies and immunocompromised state.   Neurological: Negative for dizziness, tremors, seizures, syncope, facial asymmetry, speech difficulty, weakness, light-headedness, numbness and headaches.   Hematological: Negative for adenopathy. Does not bruise/bleed easily.   Psychiatric/Behavioral: Negative for agitation, behavioral problems, confusion, decreased concentration, dysphoric mood, hallucinations,  self-injury, sleep disturbance and suicidal ideas. The patient is not nervous/anxious and is not hyperactive.        Objective   not currently breastfeeding.  There is no height or weight on file to calculate BMI.    Physical Exam      Assessment/Plan     Independent Review of Radiographic Studies:      She has no new imaging for me to review.  She has severe lumbar stenosis at L2-3 with instrumentation present at L3-4.  She has severe degenerative disc disease at L4-5 and L5-S1.  She has moderate, bordering on advanced degenerative disc disease with no significant neural element impingement at L1-2.    Medical Decision Making:      I spoke with her for about 10 minutes on the phone.  She would like to proceed with an L2-3 decompression and extension of her fusion.  Informed consent for this procedure was obtained from the patient.  She acknowledges that she is at increased risk of periprocedural complications due to her prior surgeries and her morbid obesity.  We will schedule her on an elective basis in the near future for an L2-3 PLIF with exploration of her L3-4 fusion.    You have chosen to receive care through a telephone visit. Do you consent to use a telephone visit for your medical care today? Yes    Diagnoses and all orders for this visit:    Morbid obesity due to excess calories (CMS/[facility])    S/P lumbar fusion    Spinal stenosis, lumbar region, with neurogenic claudication        No follow-ups on file.           This document signed by EBENEZER Rider MD June 26, 2020 10:21

## 2020-07-16 ENCOUNTER — TELEPHONE (OUTPATIENT)
Dept: NEUROSURGERY | Facility: CLINIC | Age: 70
End: 2020-07-16

## 2020-07-16 NOTE — TELEPHONE ENCOUNTER
Patient called stating her dog has ringworm and the vet urged her to notify us to ensure this will not interfere with her upcoming surgery on Tuesday. Pt denies symptoms of ringworm.       S/W DANTE Leon PA-C - pt may proceed with sx as long as she is asymptomatic.

## 2020-07-19 ENCOUNTER — APPOINTMENT (OUTPATIENT)
Dept: PREADMISSION TESTING | Facility: HOSPITAL | Age: 70
End: 2020-07-19

## 2020-07-19 VITALS — BODY MASS INDEX: 36.07 KG/M2 | HEIGHT: 65 IN | WEIGHT: 216.49 LBS

## 2020-07-19 DIAGNOSIS — Z98.1 S/P LUMBAR FUSION: ICD-10-CM

## 2020-07-19 DIAGNOSIS — M48.062 SPINAL STENOSIS, LUMBAR REGION, WITH NEUROGENIC CLAUDICATION: ICD-10-CM

## 2020-07-19 LAB
ANION GAP SERPL CALCULATED.3IONS-SCNC: 9 MMOL/L (ref 5–15)
BILIRUB UR QL STRIP: NEGATIVE
BUN SERPL-MCNC: 33 MG/DL (ref 8–23)
BUN/CREAT SERPL: 37.5 (ref 7–25)
CALCIUM SPEC-SCNC: 9.2 MG/DL (ref 8.6–10.5)
CHLORIDE SERPL-SCNC: 115 MMOL/L (ref 98–107)
CLARITY UR: CLEAR
CO2 SERPL-SCNC: 20 MMOL/L (ref 22–29)
COLOR UR: YELLOW
CREAT SERPL-MCNC: 0.88 MG/DL (ref 0.57–1)
DEPRECATED RDW RBC AUTO: 47.9 FL (ref 37–54)
ERYTHROCYTE [DISTWIDTH] IN BLOOD BY AUTOMATED COUNT: 13.9 % (ref 12.3–15.4)
GFR SERPL CREATININE-BSD FRML MDRD: 64 ML/MIN/1.73
GLUCOSE SERPL-MCNC: 105 MG/DL (ref 65–99)
GLUCOSE UR STRIP-MCNC: NEGATIVE MG/DL
HBA1C MFR BLD: 5.3 % (ref 4.8–5.6)
HCT VFR BLD AUTO: 40.3 % (ref 34–46.6)
HGB BLD-MCNC: 12.9 G/DL (ref 12–15.9)
HGB UR QL STRIP.AUTO: NEGATIVE
KETONES UR QL STRIP: ABNORMAL
LEUKOCYTE ESTERASE UR QL STRIP.AUTO: NEGATIVE
MCH RBC QN AUTO: 30.1 PG (ref 26.6–33)
MCHC RBC AUTO-ENTMCNC: 32 G/DL (ref 31.5–35.7)
MCV RBC AUTO: 94.2 FL (ref 79–97)
MRSA DNA SPEC QL NAA+PROBE: NEGATIVE
NITRITE UR QL STRIP: NEGATIVE
PH UR STRIP.AUTO: <=5 [PH] (ref 5–8)
PLATELET # BLD AUTO: 241 10*3/MM3 (ref 140–450)
PMV BLD AUTO: 10.1 FL (ref 6–12)
POTASSIUM SERPL-SCNC: 4.8 MMOL/L (ref 3.5–5.2)
PROT UR QL STRIP: ABNORMAL
RBC # BLD AUTO: 4.28 10*6/MM3 (ref 3.77–5.28)
SODIUM SERPL-SCNC: 144 MMOL/L (ref 136–145)
SP GR UR STRIP: 1.03 (ref 1–1.03)
UROBILINOGEN UR QL STRIP: ABNORMAL
WBC # BLD AUTO: 6.32 10*3/MM3 (ref 3.4–10.8)

## 2020-07-19 PROCEDURE — 93010 ELECTROCARDIOGRAM REPORT: CPT | Performed by: INTERNAL MEDICINE

## 2020-07-19 PROCEDURE — 93005 ELECTROCARDIOGRAM TRACING: CPT

## 2020-07-19 PROCEDURE — 87641 MR-STAPH DNA AMP PROBE: CPT | Performed by: NEUROLOGICAL SURGERY

## 2020-07-19 PROCEDURE — 83036 HEMOGLOBIN GLYCOSYLATED A1C: CPT | Performed by: NEUROLOGICAL SURGERY

## 2020-07-19 PROCEDURE — 80048 BASIC METABOLIC PNL TOTAL CA: CPT | Performed by: NEUROLOGICAL SURGERY

## 2020-07-19 PROCEDURE — U0002 COVID-19 LAB TEST NON-CDC: HCPCS

## 2020-07-19 PROCEDURE — 36415 COLL VENOUS BLD VENIPUNCTURE: CPT

## 2020-07-19 PROCEDURE — 81003 URINALYSIS AUTO W/O SCOPE: CPT | Performed by: NEUROLOGICAL SURGERY

## 2020-07-19 PROCEDURE — C9803 HOPD COVID-19 SPEC COLLECT: HCPCS

## 2020-07-19 PROCEDURE — 85027 COMPLETE CBC AUTOMATED: CPT | Performed by: NEUROLOGICAL SURGERY

## 2020-07-20 ENCOUNTER — ANESTHESIA EVENT (OUTPATIENT)
Dept: PERIOP | Facility: HOSPITAL | Age: 70
End: 2020-07-20

## 2020-07-20 LAB
REF LAB TEST METHOD: NORMAL
SARS-COV-2 RNA RESP QL NAA+PROBE: NOT DETECTED

## 2020-07-20 RX ORDER — FAMOTIDINE 10 MG/ML
20 INJECTION, SOLUTION INTRAVENOUS ONCE
Status: CANCELLED | OUTPATIENT
Start: 2020-07-20 | End: 2020-07-20

## 2020-07-21 ENCOUNTER — APPOINTMENT (OUTPATIENT)
Dept: GENERAL RADIOLOGY | Facility: HOSPITAL | Age: 70
End: 2020-07-21

## 2020-07-21 ENCOUNTER — ANESTHESIA (OUTPATIENT)
Dept: PERIOP | Facility: HOSPITAL | Age: 70
End: 2020-07-21

## 2020-07-21 ENCOUNTER — HOSPITAL ENCOUNTER (INPATIENT)
Facility: HOSPITAL | Age: 70
LOS: 2 days | Discharge: HOME | End: 2020-07-23
Attending: NEUROLOGICAL SURGERY | Admitting: NEUROLOGICAL SURGERY

## 2020-07-21 DIAGNOSIS — Z74.09 IMPAIRED MOBILITY AND ADLS: Primary | ICD-10-CM

## 2020-07-21 DIAGNOSIS — Z98.1 S/P LUMBAR FUSION: ICD-10-CM

## 2020-07-21 DIAGNOSIS — M48.062 SPINAL STENOSIS, LUMBAR REGION, WITH NEUROGENIC CLAUDICATION: ICD-10-CM

## 2020-07-21 DIAGNOSIS — Z78.9 IMPAIRED MOBILITY AND ADLS: Primary | ICD-10-CM

## 2020-07-21 LAB
GLUCOSE BLDC GLUCOMTR-MCNC: 95 MG/DL (ref 70–130)
HCT VFR BLD AUTO: 32 % (ref 34–46.6)
HCT VFR BLD AUTO: 37.9 % (ref 34–46.6)
HGB BLD-MCNC: 10 G/DL (ref 12–15.9)
HGB BLD-MCNC: 12 G/DL (ref 12–15.9)

## 2020-07-21 PROCEDURE — 25010000002 PHENYLEPHRINE PER 1 ML: Performed by: NURSE ANESTHETIST, CERTIFIED REGISTERED

## 2020-07-21 PROCEDURE — 0SG00J1 FUSION OF LUMBAR VERTEBRAL JOINT WITH SYNTHETIC SUBSTITUTE, POSTERIOR APPROACH, POSTERIOR COLUMN, OPEN APPROACH: ICD-10-PCS | Performed by: NEUROLOGICAL SURGERY

## 2020-07-21 PROCEDURE — 85014 HEMATOCRIT: CPT | Performed by: NEUROLOGICAL SURGERY

## 2020-07-21 PROCEDURE — C1713 ANCHOR/SCREW BN/BN,TIS/BN: HCPCS | Performed by: NEUROLOGICAL SURGERY

## 2020-07-21 PROCEDURE — 25010000002 BUPRENORPHINE PER 0.1 MG: Performed by: NEUROLOGICAL SURGERY

## 2020-07-21 PROCEDURE — 22853 INSJ BIOMECHANICAL DEVICE: CPT | Performed by: NEUROLOGICAL SURGERY

## 2020-07-21 PROCEDURE — 01NB0ZZ RELEASE LUMBAR NERVE, OPEN APPROACH: ICD-10-PCS | Performed by: NEUROLOGICAL SURGERY

## 2020-07-21 PROCEDURE — 22614 ARTHRD PST TQ 1NTRSPC EA ADD: CPT | Performed by: NEUROLOGICAL SURGERY

## 2020-07-21 PROCEDURE — 25010000002 PROPOFOL 10 MG/ML EMULSION: Performed by: NURSE ANESTHETIST, CERTIFIED REGISTERED

## 2020-07-21 PROCEDURE — 22853 INSJ BIOMECHANICAL DEVICE: CPT | Performed by: PHYSICIAN ASSISTANT

## 2020-07-21 PROCEDURE — 22830 EXPLORATION OF SPINAL FUSION: CPT | Performed by: NEUROLOGICAL SURGERY

## 2020-07-21 PROCEDURE — 22842 INSERT SPINE FIXATION DEVICE: CPT | Performed by: NEUROLOGICAL SURGERY

## 2020-07-21 PROCEDURE — 22614 ARTHRD PST TQ 1NTRSPC EA ADD: CPT | Performed by: PHYSICIAN ASSISTANT

## 2020-07-21 PROCEDURE — 0SP004Z REMOVAL OF INTERNAL FIXATION DEVICE FROM LUMBAR VERTEBRAL JOINT, OPEN APPROACH: ICD-10-PCS | Performed by: NEUROLOGICAL SURGERY

## 2020-07-21 PROCEDURE — 3E0U0GB INTRODUCTION OF RECOMBINANT BONE MORPHOGENETIC PROTEIN INTO JOINTS, OPEN APPROACH: ICD-10-PCS | Performed by: NEUROLOGICAL SURGERY

## 2020-07-21 PROCEDURE — 22633 ARTHRD CMBN 1NTRSPC LUMBAR: CPT | Performed by: PHYSICIAN ASSISTANT

## 2020-07-21 PROCEDURE — 25010000002 DEXAMETHASONE SODIUM PHOSPHATE 10 MG/ML SOLUTION 1 ML VIAL: Performed by: NEUROLOGICAL SURGERY

## 2020-07-21 PROCEDURE — 85018 HEMOGLOBIN: CPT | Performed by: NEUROLOGICAL SURGERY

## 2020-07-21 PROCEDURE — 25010000002 ONDANSETRON PER 1 MG: Performed by: NURSE ANESTHETIST, CERTIFIED REGISTERED

## 2020-07-21 PROCEDURE — 25010000002 ALBUMIN HUMAN 5% PER 50 ML: Performed by: NURSE ANESTHETIST, CERTIFIED REGISTERED

## 2020-07-21 PROCEDURE — P9041 ALBUMIN (HUMAN),5%, 50ML: HCPCS | Performed by: NURSE ANESTHETIST, CERTIFIED REGISTERED

## 2020-07-21 PROCEDURE — 25010000003 CEFAZOLIN IN DEXTROSE 2-4 GM/100ML-% SOLUTION: Performed by: NEUROLOGICAL SURGERY

## 2020-07-21 PROCEDURE — 22842 INSERT SPINE FIXATION DEVICE: CPT | Performed by: PHYSICIAN ASSISTANT

## 2020-07-21 PROCEDURE — 25010000002 NEOSTIGMINE 10 MG/10ML SOLUTION: Performed by: NURSE ANESTHETIST, CERTIFIED REGISTERED

## 2020-07-21 PROCEDURE — 82962 GLUCOSE BLOOD TEST: CPT

## 2020-07-21 PROCEDURE — 25010000002 DEXAMETHASONE PER 1 MG: Performed by: NURSE ANESTHETIST, CERTIFIED REGISTERED

## 2020-07-21 PROCEDURE — 0ST20ZZ RESECTION OF LUMBAR VERTEBRAL DISC, OPEN APPROACH: ICD-10-PCS | Performed by: NEUROLOGICAL SURGERY

## 2020-07-21 PROCEDURE — 0SG00AJ FUSION OF LUMBAR VERTEBRAL JOINT WITH INTERBODY FUSION DEVICE, POSTERIOR APPROACH, ANTERIOR COLUMN, OPEN APPROACH: ICD-10-PCS | Performed by: NEUROLOGICAL SURGERY

## 2020-07-21 PROCEDURE — 25010000002 FENTANYL CITRATE (PF) 250 MCG/5ML SOLUTION: Performed by: NURSE ANESTHETIST, CERTIFIED REGISTERED

## 2020-07-21 PROCEDURE — 22633 ARTHRD CMBN 1NTRSPC LUMBAR: CPT | Performed by: NEUROLOGICAL SURGERY

## 2020-07-21 PROCEDURE — 22830 EXPLORATION OF SPINAL FUSION: CPT | Performed by: PHYSICIAN ASSISTANT

## 2020-07-21 PROCEDURE — 61783 SCAN PROC SPINAL: CPT | Performed by: NEUROLOGICAL SURGERY

## 2020-07-21 PROCEDURE — 76000 FLUOROSCOPY <1 HR PHYS/QHP: CPT

## 2020-07-21 DEVICE — SCREW 54840017545 CN MAS 7.5X45 CC
Type: IMPLANTABLE DEVICE | Status: FUNCTIONAL
Brand: CD HORIZON® SPINAL SYSTEM

## 2020-07-21 DEVICE — BONE GRAFT KIT 7510400 INFUSE MEDIUM
Type: IMPLANTABLE DEVICE | Status: FUNCTIONAL
Brand: INFUSE® BONE GRAFT

## 2020-07-21 DEVICE — FLOSEAL HEMOSTATIC MATRIX, 10ML
Type: IMPLANTABLE DEVICE | Status: FUNCTIONAL
Brand: FLOSEAL HEMOSTATIC MATRIX

## 2020-07-21 DEVICE — ROD 1476006070 4.75 CCM+ STRT PERC 70MM
Type: IMPLANTABLE DEVICE | Status: FUNCTIONAL
Brand: CD HORIZON® SPINAL SYSTEM

## 2020-07-21 DEVICE — SPACER 7770728 ELEVATE X-LOR 28X7MM
Type: IMPLANTABLE DEVICE | Status: FUNCTIONAL
Brand: ELEVATE™ SPINAL SYSTEM

## 2020-07-21 DEVICE — SET SCREW 5440030 4.75 TI NS BRK OFF
Type: IMPLANTABLE DEVICE | Status: FUNCTIONAL
Brand: CD HORIZON® SPINAL SYSTEM

## 2020-07-21 DEVICE — SCREW 54840017550 CN MAS 7.5X50 CC
Type: IMPLANTABLE DEVICE | Status: FUNCTIONAL
Brand: CD HORIZON® SPINAL SYSTEM

## 2020-07-21 DEVICE — ROD 1476006080 4.75 CCM+ STRT PERC 80MM
Type: IMPLANTABLE DEVICE | Status: FUNCTIONAL
Brand: CD HORIZON® SPINAL SYSTEM

## 2020-07-21 DEVICE — SCREW 54840016545 CN MAS 6.5X45 CC
Type: IMPLANTABLE DEVICE | Status: FUNCTIONAL
Brand: CD HORIZON® SPINAL SYSTEM

## 2020-07-21 RX ORDER — FENTANYL CITRATE 50 UG/ML
50 INJECTION, SOLUTION INTRAMUSCULAR; INTRAVENOUS
Status: DISCONTINUED | OUTPATIENT
Start: 2020-07-21 | End: 2020-07-21 | Stop reason: HOSPADM

## 2020-07-21 RX ORDER — FENTANYL CITRATE 50 UG/ML
INJECTION, SOLUTION INTRAMUSCULAR; INTRAVENOUS AS NEEDED
Status: DISCONTINUED | OUTPATIENT
Start: 2020-07-21 | End: 2020-07-21 | Stop reason: SURG

## 2020-07-21 RX ORDER — GLYCOPYRROLATE 0.2 MG/ML
INJECTION INTRAMUSCULAR; INTRAVENOUS AS NEEDED
Status: DISCONTINUED | OUTPATIENT
Start: 2020-07-21 | End: 2020-07-21 | Stop reason: SURG

## 2020-07-21 RX ORDER — PREGABALIN 75 MG/1
75 CAPSULE ORAL 2 TIMES DAILY
Status: DISCONTINUED | OUTPATIENT
Start: 2020-07-21 | End: 2020-07-23 | Stop reason: HOSPADM

## 2020-07-21 RX ORDER — SODIUM CHLORIDE 0.9 % (FLUSH) 0.9 %
10 SYRINGE (ML) INJECTION EVERY 12 HOURS SCHEDULED
Status: DISCONTINUED | OUTPATIENT
Start: 2020-07-21 | End: 2020-07-21 | Stop reason: HOSPADM

## 2020-07-21 RX ORDER — BUPRENORPHINE HYDROCHLORIDE 0.32 MG/ML
INJECTION INTRAMUSCULAR; INTRAVENOUS AS NEEDED
Status: DISCONTINUED | OUTPATIENT
Start: 2020-07-21 | End: 2020-07-21 | Stop reason: HOSPADM

## 2020-07-21 RX ORDER — SODIUM CHLORIDE 0.9 % (FLUSH) 0.9 %
10 SYRINGE (ML) INJECTION AS NEEDED
Status: DISCONTINUED | OUTPATIENT
Start: 2020-07-21 | End: 2020-07-23 | Stop reason: HOSPADM

## 2020-07-21 RX ORDER — HYDROCODONE BITARTRATE AND ACETAMINOPHEN 10; 325 MG/1; MG/1
1 TABLET ORAL EVERY 6 HOURS PRN
Status: DISCONTINUED | OUTPATIENT
Start: 2020-07-21 | End: 2020-07-23 | Stop reason: HOSPADM

## 2020-07-21 RX ORDER — BISACODYL 5 MG/1
5 TABLET, DELAYED RELEASE ORAL DAILY PRN
Status: DISCONTINUED | OUTPATIENT
Start: 2020-07-21 | End: 2020-07-23 | Stop reason: HOSPADM

## 2020-07-21 RX ORDER — PROPOFOL 10 MG/ML
VIAL (ML) INTRAVENOUS AS NEEDED
Status: DISCONTINUED | OUTPATIENT
Start: 2020-07-21 | End: 2020-07-21 | Stop reason: SURG

## 2020-07-21 RX ORDER — DEXAMETHASONE SODIUM PHOSPHATE 4 MG/ML
INJECTION, SOLUTION INTRA-ARTICULAR; INTRALESIONAL; INTRAMUSCULAR; INTRAVENOUS; SOFT TISSUE AS NEEDED
Status: DISCONTINUED | OUTPATIENT
Start: 2020-07-21 | End: 2020-07-21 | Stop reason: SURG

## 2020-07-21 RX ORDER — OXYCODONE HCL 10 MG/1
10 TABLET, FILM COATED, EXTENDED RELEASE ORAL EVERY 12 HOURS SCHEDULED
Status: DISCONTINUED | OUTPATIENT
Start: 2020-07-21 | End: 2020-07-23

## 2020-07-21 RX ORDER — IBUPROFEN 800 MG/1
800 TABLET ORAL ONCE
Status: COMPLETED | OUTPATIENT
Start: 2020-07-21 | End: 2020-07-21

## 2020-07-21 RX ORDER — LIDOCAINE HYDROCHLORIDE AND EPINEPHRINE 5; 5 MG/ML; UG/ML
INJECTION, SOLUTION INFILTRATION; PERINEURAL AS NEEDED
Status: DISCONTINUED | OUTPATIENT
Start: 2020-07-21 | End: 2020-07-21 | Stop reason: HOSPADM

## 2020-07-21 RX ORDER — NEOSTIGMINE METHYLSULFATE 1 MG/ML
INJECTION, SOLUTION INTRAVENOUS AS NEEDED
Status: DISCONTINUED | OUTPATIENT
Start: 2020-07-21 | End: 2020-07-21 | Stop reason: SURG

## 2020-07-21 RX ORDER — ONDANSETRON 2 MG/ML
4 INJECTION INTRAMUSCULAR; INTRAVENOUS ONCE AS NEEDED
Status: COMPLETED | OUTPATIENT
Start: 2020-07-21 | End: 2020-07-21

## 2020-07-21 RX ORDER — FAMOTIDINE 20 MG/1
20 TABLET, FILM COATED ORAL ONCE
Status: COMPLETED | OUTPATIENT
Start: 2020-07-21 | End: 2020-07-21

## 2020-07-21 RX ORDER — LIDOCAINE HYDROCHLORIDE 10 MG/ML
0.5 INJECTION, SOLUTION EPIDURAL; INFILTRATION; INTRACAUDAL; PERINEURAL ONCE AS NEEDED
Status: COMPLETED | OUTPATIENT
Start: 2020-07-21 | End: 2020-07-21

## 2020-07-21 RX ORDER — PREGABALIN 150 MG/1
150 CAPSULE ORAL ONCE
Status: COMPLETED | OUTPATIENT
Start: 2020-07-21 | End: 2020-07-21

## 2020-07-21 RX ORDER — LOSARTAN POTASSIUM 50 MG/1
50 TABLET ORAL DAILY
Status: DISCONTINUED | OUTPATIENT
Start: 2020-07-21 | End: 2020-07-23 | Stop reason: HOSPADM

## 2020-07-21 RX ORDER — CEFAZOLIN SODIUM 2 G/100ML
2 INJECTION, SOLUTION INTRAVENOUS EVERY 8 HOURS
Status: COMPLETED | OUTPATIENT
Start: 2020-07-21 | End: 2020-07-22

## 2020-07-21 RX ORDER — ONDANSETRON 2 MG/ML
INJECTION INTRAMUSCULAR; INTRAVENOUS AS NEEDED
Status: DISCONTINUED | OUTPATIENT
Start: 2020-07-21 | End: 2020-07-21 | Stop reason: SURG

## 2020-07-21 RX ORDER — DEXAMETHASONE IN 0.9 % SOD CHL 10 MG/50ML
10 INTRAVENOUS SOLUTION, PIGGYBACK (ML) INTRAVENOUS ONCE
Status: DISCONTINUED | OUTPATIENT
Start: 2020-07-21 | End: 2020-07-21 | Stop reason: HOSPADM

## 2020-07-21 RX ORDER — MAGNESIUM HYDROXIDE 1200 MG/15ML
LIQUID ORAL AS NEEDED
Status: DISCONTINUED | OUTPATIENT
Start: 2020-07-21 | End: 2020-07-21 | Stop reason: HOSPADM

## 2020-07-21 RX ORDER — SODIUM CHLORIDE 0.9 % (FLUSH) 0.9 %
10 SYRINGE (ML) INJECTION AS NEEDED
Status: DISCONTINUED | OUTPATIENT
Start: 2020-07-21 | End: 2020-07-21 | Stop reason: HOSPADM

## 2020-07-21 RX ORDER — ACETAMINOPHEN 325 MG/1
650 TABLET ORAL EVERY 4 HOURS PRN
Status: DISCONTINUED | OUTPATIENT
Start: 2020-07-21 | End: 2020-07-23 | Stop reason: HOSPADM

## 2020-07-21 RX ORDER — SODIUM CHLORIDE, SODIUM LACTATE, POTASSIUM CHLORIDE, CALCIUM CHLORIDE 600; 310; 30; 20 MG/100ML; MG/100ML; MG/100ML; MG/100ML
9 INJECTION, SOLUTION INTRAVENOUS CONTINUOUS
Status: DISCONTINUED | OUTPATIENT
Start: 2020-07-21 | End: 2020-07-21

## 2020-07-21 RX ORDER — ONDANSETRON 4 MG/1
4 TABLET, FILM COATED ORAL EVERY 6 HOURS PRN
Status: DISCONTINUED | OUTPATIENT
Start: 2020-07-21 | End: 2020-07-23 | Stop reason: HOSPADM

## 2020-07-21 RX ORDER — SODIUM CHLORIDE 0.9 % (FLUSH) 0.9 %
3 SYRINGE (ML) INJECTION EVERY 12 HOURS SCHEDULED
Status: DISCONTINUED | OUTPATIENT
Start: 2020-07-21 | End: 2020-07-23 | Stop reason: HOSPADM

## 2020-07-21 RX ORDER — DOCUSATE SODIUM 100 MG/1
100 CAPSULE, LIQUID FILLED ORAL 2 TIMES DAILY PRN
Status: DISCONTINUED | OUTPATIENT
Start: 2020-07-21 | End: 2020-07-23 | Stop reason: HOSPADM

## 2020-07-21 RX ORDER — LIDOCAINE HYDROCHLORIDE 10 MG/ML
INJECTION, SOLUTION EPIDURAL; INFILTRATION; INTRACAUDAL; PERINEURAL AS NEEDED
Status: DISCONTINUED | OUTPATIENT
Start: 2020-07-21 | End: 2020-07-21 | Stop reason: SURG

## 2020-07-21 RX ORDER — AMOXICILLIN 250 MG
1 CAPSULE ORAL NIGHTLY PRN
Status: DISCONTINUED | OUTPATIENT
Start: 2020-07-21 | End: 2020-07-23 | Stop reason: HOSPADM

## 2020-07-21 RX ORDER — POLYETHYLENE GLYCOL 3350 17 G/17G
17 POWDER, FOR SOLUTION ORAL DAILY PRN
Status: DISCONTINUED | OUTPATIENT
Start: 2020-07-21 | End: 2020-07-23 | Stop reason: HOSPADM

## 2020-07-21 RX ORDER — ATORVASTATIN CALCIUM 10 MG/1
10 TABLET, FILM COATED ORAL DAILY
Status: DISCONTINUED | OUTPATIENT
Start: 2020-07-21 | End: 2020-07-23 | Stop reason: HOSPADM

## 2020-07-21 RX ORDER — ATRACURIUM BESYLATE 10 MG/ML
INJECTION, SOLUTION INTRAVENOUS AS NEEDED
Status: DISCONTINUED | OUTPATIENT
Start: 2020-07-21 | End: 2020-07-21 | Stop reason: SURG

## 2020-07-21 RX ORDER — VENLAFAXINE HYDROCHLORIDE 75 MG/1
150 CAPSULE, EXTENDED RELEASE ORAL DAILY
Status: DISCONTINUED | OUTPATIENT
Start: 2020-07-21 | End: 2020-07-23 | Stop reason: HOSPADM

## 2020-07-21 RX ORDER — ALBUMIN, HUMAN INJ 5% 5 %
SOLUTION INTRAVENOUS CONTINUOUS PRN
Status: DISCONTINUED | OUTPATIENT
Start: 2020-07-21 | End: 2020-07-21 | Stop reason: SURG

## 2020-07-21 RX ORDER — ONDANSETRON 2 MG/ML
4 INJECTION INTRAMUSCULAR; INTRAVENOUS EVERY 6 HOURS PRN
Status: DISCONTINUED | OUTPATIENT
Start: 2020-07-21 | End: 2020-07-23 | Stop reason: HOSPADM

## 2020-07-21 RX ORDER — HYDROCODONE BITARTRATE AND ACETAMINOPHEN 7.5; 325 MG/1; MG/1
1 TABLET ORAL ONCE
Status: COMPLETED | OUTPATIENT
Start: 2020-07-21 | End: 2020-07-21

## 2020-07-21 RX ORDER — AMLODIPINE BESYLATE 2.5 MG/1
2.5 TABLET ORAL DAILY
Status: DISCONTINUED | OUTPATIENT
Start: 2020-07-21 | End: 2020-07-23 | Stop reason: HOSPADM

## 2020-07-21 RX ORDER — BISACODYL 10 MG
10 SUPPOSITORY, RECTAL RECTAL DAILY PRN
Status: DISCONTINUED | OUTPATIENT
Start: 2020-07-21 | End: 2020-07-23 | Stop reason: HOSPADM

## 2020-07-21 RX ORDER — ACETAMINOPHEN 325 MG/1
650 TABLET ORAL ONCE
Status: COMPLETED | OUTPATIENT
Start: 2020-07-21 | End: 2020-07-21

## 2020-07-21 RX ORDER — SODIUM CHLORIDE 9 MG/ML
50 INJECTION, SOLUTION INTRAVENOUS CONTINUOUS
Status: ACTIVE | OUTPATIENT
Start: 2020-07-21 | End: 2020-07-22

## 2020-07-21 RX ORDER — CEFAZOLIN SODIUM 2 G/100ML
2 INJECTION, SOLUTION INTRAVENOUS ONCE
Status: COMPLETED | OUTPATIENT
Start: 2020-07-21 | End: 2020-07-21

## 2020-07-21 RX ORDER — HYDROMORPHONE HYDROCHLORIDE 1 MG/ML
0.5 INJECTION, SOLUTION INTRAMUSCULAR; INTRAVENOUS; SUBCUTANEOUS
Status: DISCONTINUED | OUTPATIENT
Start: 2020-07-21 | End: 2020-07-21 | Stop reason: HOSPADM

## 2020-07-21 RX ORDER — CYCLOBENZAPRINE HCL 10 MG
10 TABLET ORAL 3 TIMES DAILY PRN
Status: DISCONTINUED | OUTPATIENT
Start: 2020-07-21 | End: 2020-07-23 | Stop reason: HOSPADM

## 2020-07-21 RX ADMIN — PROPOFOL 150 MG: 10 INJECTION, EMULSION INTRAVENOUS at 12:27

## 2020-07-21 RX ADMIN — ALBUMIN HUMAN: 0.05 INJECTION, SOLUTION INTRAVENOUS at 15:30

## 2020-07-21 RX ADMIN — ACETAMINOPHEN 650 MG: 325 TABLET ORAL at 08:52

## 2020-07-21 RX ADMIN — PHENYLEPHRINE HYDROCHLORIDE 100 MCG: 10 INJECTION INTRAVENOUS at 12:50

## 2020-07-21 RX ADMIN — GLYCOPYRROLATE 0.2 MG: 0.2 INJECTION INTRAMUSCULAR; INTRAVENOUS at 15:40

## 2020-07-21 RX ADMIN — ONDANSETRON 4 MG: 2 INJECTION INTRAMUSCULAR; INTRAVENOUS at 15:31

## 2020-07-21 RX ADMIN — AMLODIPINE BESYLATE 2.5 MG: 2.5 TABLET ORAL at 20:55

## 2020-07-21 RX ADMIN — LIDOCAINE HYDROCHLORIDE 0.2 ML: 10 INJECTION, SOLUTION EPIDURAL; INFILTRATION; INTRACAUDAL; PERINEURAL at 08:40

## 2020-07-21 RX ADMIN — NEOSTIGMINE 2 MG: 1 INJECTION INTRAVENOUS at 15:40

## 2020-07-21 RX ADMIN — VENLAFAXINE HYDROCHLORIDE 150 MG: 75 CAPSULE, EXTENDED RELEASE ORAL at 20:55

## 2020-07-21 RX ADMIN — PHENYLEPHRINE HYDROCHLORIDE 100 MCG: 10 INJECTION INTRAVENOUS at 12:53

## 2020-07-21 RX ADMIN — DEXAMETHASONE SODIUM PHOSPHATE 10 MG: 4 INJECTION, SOLUTION INTRAMUSCULAR; INTRAVENOUS at 12:27

## 2020-07-21 RX ADMIN — PREGABALIN 75 MG: 75 CAPSULE ORAL at 20:55

## 2020-07-21 RX ADMIN — FAMOTIDINE 20 MG: 20 TABLET, FILM COATED ORAL at 08:52

## 2020-07-21 RX ADMIN — LOSARTAN POTASSIUM 50 MG: 50 TABLET, FILM COATED ORAL at 20:54

## 2020-07-21 RX ADMIN — IBUPROFEN 800 MG: 800 TABLET, FILM COATED ORAL at 08:52

## 2020-07-21 RX ADMIN — OXYCODONE HYDROCHLORIDE 10 MG: 10 TABLET, FILM COATED, EXTENDED RELEASE ORAL at 20:55

## 2020-07-21 RX ADMIN — HYDROCODONE BITARTRATE AND ACETAMINOPHEN 1 TABLET: 7.5; 325 TABLET ORAL at 08:52

## 2020-07-21 RX ADMIN — SODIUM CHLORIDE, PRESERVATIVE FREE 3 ML: 5 INJECTION INTRAVENOUS at 20:56

## 2020-07-21 RX ADMIN — ATORVASTATIN CALCIUM 10 MG: 10 TABLET, FILM COATED ORAL at 20:54

## 2020-07-21 RX ADMIN — PHENYLEPHRINE HYDROCHLORIDE 1 MCG/KG/MIN: 10 INJECTION INTRAVENOUS at 13:07

## 2020-07-21 RX ADMIN — SODIUM CHLORIDE, POTASSIUM CHLORIDE, SODIUM LACTATE AND CALCIUM CHLORIDE: 600; 310; 30; 20 INJECTION, SOLUTION INTRAVENOUS at 15:42

## 2020-07-21 RX ADMIN — ATRACURIUM BESYLATE 50 MG: 10 INJECTION, SOLUTION INTRAVENOUS at 12:27

## 2020-07-21 RX ADMIN — ONDANSETRON HYDROCHLORIDE 4 MG: 2 SOLUTION INTRAMUSCULAR; INTRAVENOUS at 16:17

## 2020-07-21 RX ADMIN — SODIUM CHLORIDE, POTASSIUM CHLORIDE, SODIUM LACTATE AND CALCIUM CHLORIDE: 600; 310; 30; 20 INJECTION, SOLUTION INTRAVENOUS at 13:20

## 2020-07-21 RX ADMIN — CEFAZOLIN SODIUM 2 G: 2 INJECTION, SOLUTION INTRAVENOUS at 12:30

## 2020-07-21 RX ADMIN — FENTANYL CITRATE 100 MCG: 50 INJECTION, SOLUTION INTRAMUSCULAR; INTRAVENOUS at 12:27

## 2020-07-21 RX ADMIN — ALBUMIN HUMAN: 0.05 INJECTION, SOLUTION INTRAVENOUS at 15:14

## 2020-07-21 RX ADMIN — SODIUM CHLORIDE 50 ML/HR: 9 INJECTION, SOLUTION INTRAVENOUS at 18:01

## 2020-07-21 RX ADMIN — LIDOCAINE HYDROCHLORIDE 60 MG: 10 INJECTION, SOLUTION EPIDURAL; INFILTRATION; INTRACAUDAL; PERINEURAL at 12:27

## 2020-07-21 RX ADMIN — SODIUM CHLORIDE, POTASSIUM CHLORIDE, SODIUM LACTATE AND CALCIUM CHLORIDE 9 ML/HR: 600; 310; 30; 20 INJECTION, SOLUTION INTRAVENOUS at 08:40

## 2020-07-21 RX ADMIN — CEFAZOLIN SODIUM 2 G: 2 INJECTION, SOLUTION INTRAVENOUS at 21:00

## 2020-07-21 RX ADMIN — PREGABALIN 150 MG: 150 CAPSULE ORAL at 08:52

## 2020-07-21 NOTE — ANESTHESIA PROCEDURE NOTES
Airway  Urgency: elective    Date/Time: 7/21/2020 12:28 PM  Airway not difficult    General Information and Staff    Patient location during procedure: OR    Indications and Patient Condition  Indications for airway management: airway protection    Preoxygenated: yes  MILS not maintained throughout  Mask difficulty assessment: 1 - vent by mask    Final Airway Details  Final airway type: endotracheal airway      Successful airway: ETT  Cuffed: yes   Successful intubation technique: direct laryngoscopy  Endotracheal tube insertion site: oral  Blade: Brian  Blade size: 3  ETT size (mm): 6.5  Cormack-Lehane Classification: grade I - full view of glottis  Placement verified by: chest auscultation and capnometry   Number of attempts at approach: 1  Assessment: lips, teeth, and gum same as pre-op and atraumatic intubation

## 2020-07-21 NOTE — ANESTHESIA PREPROCEDURE EVALUATION
Anesthesia Evaluation     Patient summary reviewed and Nursing notes reviewed   history of anesthetic complications: PONV  NPO Solid Status: > 8 hours  NPO Liquid Status: > 2 hours           Airway   Mallampati: II  TM distance: >3 FB  Neck ROM: full  No difficulty expected  Dental - normal exam     Pulmonary - normal exam   (+) a smoker Former, sleep apnea,   Cardiovascular - normal exam    ECG reviewed    (+) hypertension, hyperlipidemia,   (-) past MI, dysrhythmias, angina    ROS comment: Normal sinus rhythm  Normal ECG  When compared with ECG of 02-JAN-2020 12:17,  No significant change was found    Neuro/Psych  (+) numbness (effexor), psychiatric history Anxiety,     (-) seizures, CVA  GI/Hepatic/Renal/Endo    (+) obesity,  GERD,      Musculoskeletal     (+) back pain,       ROS comment: Spinal stenosis, lumbar region, with neurogenic claudication    Knee pain  S/p TKA  Abdominal    Substance History      OB/GYN          Other   arthritis,                      Anesthesia Plan    ASA 3     general     intravenous induction     Anesthetic plan, all risks, benefits, and alternatives have been provided, discussed and informed consent has been obtained with: patient.    Plan discussed with CRNA.

## 2020-07-22 LAB
HCT VFR BLD AUTO: 29.6 % (ref 34–46.6)
HGB BLD-MCNC: 9.3 G/DL (ref 12–15.9)

## 2020-07-22 PROCEDURE — 97166 OT EVAL MOD COMPLEX 45 MIN: CPT

## 2020-07-22 PROCEDURE — 25010000003 CEFAZOLIN IN DEXTROSE 2-4 GM/100ML-% SOLUTION: Performed by: NEUROLOGICAL SURGERY

## 2020-07-22 PROCEDURE — 99024 POSTOP FOLLOW-UP VISIT: CPT | Performed by: PHYSICIAN ASSISTANT

## 2020-07-22 PROCEDURE — 97162 PT EVAL MOD COMPLEX 30 MIN: CPT

## 2020-07-22 PROCEDURE — 97535 SELF CARE MNGMENT TRAINING: CPT

## 2020-07-22 PROCEDURE — 85018 HEMOGLOBIN: CPT | Performed by: NEUROLOGICAL SURGERY

## 2020-07-22 PROCEDURE — 97116 GAIT TRAINING THERAPY: CPT

## 2020-07-22 PROCEDURE — 85014 HEMATOCRIT: CPT | Performed by: NEUROLOGICAL SURGERY

## 2020-07-22 RX ADMIN — DOCUSATE SODIUM 100 MG: 100 CAPSULE, LIQUID FILLED ORAL at 10:36

## 2020-07-22 RX ADMIN — OXYCODONE HYDROCHLORIDE 10 MG: 10 TABLET, FILM COATED, EXTENDED RELEASE ORAL at 21:25

## 2020-07-22 RX ADMIN — VENLAFAXINE HYDROCHLORIDE 150 MG: 75 CAPSULE, EXTENDED RELEASE ORAL at 10:36

## 2020-07-22 RX ADMIN — PREGABALIN 75 MG: 75 CAPSULE ORAL at 21:25

## 2020-07-22 RX ADMIN — CEFAZOLIN SODIUM 2 G: 2 INJECTION, SOLUTION INTRAVENOUS at 04:39

## 2020-07-22 RX ADMIN — OXYCODONE HYDROCHLORIDE 10 MG: 10 TABLET, FILM COATED, EXTENDED RELEASE ORAL at 10:36

## 2020-07-22 RX ADMIN — POLYETHYLENE GLYCOL 3350 17 G: 17 POWDER, FOR SOLUTION ORAL at 10:36

## 2020-07-22 RX ADMIN — ACETAMINOPHEN 650 MG: 325 TABLET, FILM COATED ORAL at 04:41

## 2020-07-22 RX ADMIN — SODIUM CHLORIDE, PRESERVATIVE FREE 3 ML: 5 INJECTION INTRAVENOUS at 21:26

## 2020-07-23 ENCOUNTER — READMISSION MANAGEMENT (OUTPATIENT)
Dept: CALL CENTER | Facility: HOSPITAL | Age: 70
End: 2020-07-23

## 2020-07-23 VITALS
OXYGEN SATURATION: 83 % | TEMPERATURE: 98.1 F | RESPIRATION RATE: 16 BRPM | WEIGHT: 216.49 LBS | HEIGHT: 65 IN | HEART RATE: 89 BPM | BODY MASS INDEX: 36.07 KG/M2 | SYSTOLIC BLOOD PRESSURE: 103 MMHG | DIASTOLIC BLOOD PRESSURE: 69 MMHG

## 2020-07-23 LAB
HCT VFR BLD AUTO: 29.1 % (ref 34–46.6)
HGB BLD-MCNC: 9.3 G/DL (ref 12–15.9)

## 2020-07-23 PROCEDURE — 85018 HEMOGLOBIN: CPT | Performed by: PHYSICIAN ASSISTANT

## 2020-07-23 PROCEDURE — 85014 HEMATOCRIT: CPT | Performed by: PHYSICIAN ASSISTANT

## 2020-07-23 PROCEDURE — 97116 GAIT TRAINING THERAPY: CPT

## 2020-07-23 PROCEDURE — 99024 POSTOP FOLLOW-UP VISIT: CPT | Performed by: PHYSICIAN ASSISTANT

## 2020-07-23 PROCEDURE — 97110 THERAPEUTIC EXERCISES: CPT

## 2020-07-23 RX ORDER — PREGABALIN 75 MG/1
75 CAPSULE ORAL 2 TIMES DAILY
Qty: 60 CAPSULE | Refills: 0 | Status: SHIPPED | OUTPATIENT
Start: 2020-07-23 | End: 2020-07-31

## 2020-07-23 RX ORDER — CYCLOBENZAPRINE HCL 10 MG
10 TABLET ORAL 3 TIMES DAILY PRN
Qty: 60 TABLET | Refills: 0 | Status: SHIPPED | OUTPATIENT
Start: 2020-07-23 | End: 2020-07-31

## 2020-07-23 RX ORDER — BISACODYL 5 MG/1
5 TABLET, DELAYED RELEASE ORAL DAILY PRN
Qty: 30 TABLET | Refills: 0 | Status: SHIPPED | OUTPATIENT
Start: 2020-07-23 | End: 2020-07-31

## 2020-07-23 RX ORDER — ONDANSETRON 4 MG/1
4 TABLET, FILM COATED ORAL EVERY 6 HOURS PRN
Qty: 30 TABLET | Refills: 0 | Status: SHIPPED | OUTPATIENT
Start: 2020-07-23 | End: 2020-07-31

## 2020-07-23 RX ORDER — HYDROCODONE BITARTRATE AND ACETAMINOPHEN 10; 325 MG/1; MG/1
1 TABLET ORAL EVERY 4 HOURS PRN
Qty: 50 TABLET | Refills: 0 | Status: SHIPPED | OUTPATIENT
Start: 2020-07-23 | End: 2020-07-31 | Stop reason: SDUPTHER

## 2020-07-23 RX ORDER — BETHANECHOL CHLORIDE 5 MG
5 TABLET ORAL 2 TIMES DAILY
Qty: 14 TABLET | Refills: 0 | Status: SHIPPED | OUTPATIENT
Start: 2020-07-23 | End: 2020-07-31

## 2020-07-23 RX ADMIN — SODIUM CHLORIDE 500 ML: 9 INJECTION, SOLUTION INTRAVENOUS at 12:53

## 2020-07-23 RX ADMIN — LOSARTAN POTASSIUM 50 MG: 50 TABLET, FILM COATED ORAL at 08:19

## 2020-07-23 RX ADMIN — VENLAFAXINE HYDROCHLORIDE 150 MG: 75 CAPSULE, EXTENDED RELEASE ORAL at 08:19

## 2020-07-23 RX ADMIN — ATORVASTATIN CALCIUM 10 MG: 10 TABLET, FILM COATED ORAL at 08:20

## 2020-07-23 RX ADMIN — PREGABALIN 75 MG: 75 CAPSULE ORAL at 08:20

## 2020-07-23 RX ADMIN — OXYCODONE HYDROCHLORIDE 10 MG: 10 TABLET, FILM COATED, EXTENDED RELEASE ORAL at 08:20

## 2020-07-23 RX ADMIN — SODIUM CHLORIDE, PRESERVATIVE FREE 3 ML: 5 INJECTION INTRAVENOUS at 08:21

## 2020-07-23 RX ADMIN — AMLODIPINE BESYLATE 2.5 MG: 2.5 TABLET ORAL at 08:20

## 2020-07-23 NOTE — OUTREACH NOTE
Prep Survey      Responses   [facility] facility patient discharged from?  [place]   Is LACE score < 7 ?  Yes   Eligibility  [facility]   Date of Admission  07/21/20   Date of Discharge  07/23/20   Discharge Disposition  Home or Self Care   Discharge diagnosis  LUMBAR DISCECTOMY FUSION INSTRUMENTATION   COVID-19 Test Status  Negative   Does the patient have one of the following disease processes/diagnoses(primary or secondary)?  General Surgery   Does the patient have Home health ordered?  No   Is there a DME ordered?  No   Prep survey completed?  Yes          Dinah Goldstein RN

## 2020-07-24 ENCOUNTER — TRANSITIONAL CARE MANAGEMENT TELEPHONE ENCOUNTER (OUTPATIENT)
Dept: CALL CENTER | Facility: HOSPITAL | Age: 70
End: 2020-07-24

## 2020-07-24 ENCOUNTER — OFFICE VISIT (OUTPATIENT)
Dept: FAMILY MEDICINE CLINIC | Facility: CLINIC | Age: 70
End: 2020-07-24

## 2020-07-24 DIAGNOSIS — D64.9 POSTOPERATIVE ANEMIA: Primary | ICD-10-CM

## 2020-07-24 DIAGNOSIS — I95.81 POSTPROCEDURAL HYPOTENSION: ICD-10-CM

## 2020-07-24 PROCEDURE — 99442 PR PHYS/QHP TELEPHONE EVALUATION 11-20 MIN: CPT | Performed by: FAMILY MEDICINE

## 2020-07-24 NOTE — PROGRESS NOTES
Subjective   Laura Churchill is a 69 y.o. female seen today for No chief complaint on file.. You have chosen to receive care through a telephone visit. Do you consent to use a telephone visit for your medical care today? Yes    A telephone visit was conducted with the patient.  She was located at her home and I am located here at my office at [facility].    History of Present Illness   The patient presents with follow-up from her recent hospitalization.  She underwent a surgery on her lumbar spine per Dr. Rider with neurosurgery.  She did have improvement in her strength postoperatively.  She was able to go home on September 22.  The patient did have lower blood pressures in the hospital.  She was taken off both her amlodipine as well as the lisinopril.  She is monitoring her blood pressure at home and has readings today of 102/59, 100/67, and 90/70.  Her hemoglobin at time of discharge day before yesterday was 9.3.  She denies any bleeding and no operative site pain.  Her cardiovascular and pulmonary function are normal as is her abdominal function.    She notes no bleeding or bruising.    The following portions of the patient's history were reviewed and updated as appropriate: allergies, current medications, past social history and problem list.    Review of Systems   Constitutional: Positive for activity change. Negative for appetite change, chills, diaphoresis, fatigue, fever and unexpected weight change.   HENT: Negative for congestion.    Eyes: Negative for visual disturbance.   Respiratory: Negative for cough and shortness of breath.    Cardiovascular: Negative for chest pain, palpitations and leg swelling.   Gastrointestinal: Negative for abdominal pain, anal bleeding, blood in stool, constipation, diarrhea and nausea.   Genitourinary: Negative for difficulty urinating.   Musculoskeletal: Positive for back pain. Negative for neck pain.   Neurological: Negative for syncope.   Hematological: Does not  bruise/bleed easily.       Objective   LMP  (LMP Unknown)   Physical Exam   Constitutional:   No physical examination could be accomplished.       Assessment/Plan   Problem List Items Addressed This Visit     None      Visit Diagnoses     Postoperative anemia    -  Primary    Postprocedural hypotension            The patient is feeling relatively well.  She is having no difficulty with dizziness, lightheadedness, presyncope, diaphoresis.  She has been able to be on her feet and walk.  She is having no difficulty with diarrhea or difficulty with urination.  She is noticed no bruising or bleeding.    She did have some postoperative anemia.  We will have her obtain a diet that is high in oral iron including broccoli, spinach, raisins, watermelon and beef.  She is to continue to stay off of her amlodipine as well as the lisinopril.  Continue monitoring her blood pressure.  Get 2 L of fluid by oral intake each day.  Recheck your hemoglobin within the next 2 weeks.  This can be done at her follow-up visit with her neurosurgeon on August 7.    20 minutes were spent with the patient on the telephone as well as in completing her note.

## 2020-07-24 NOTE — OUTREACH NOTE
Call Center TCM Note      Responses   [facility] patient discharged from?  [place]   Does the patient have one of the following disease processes/diagnoses(primary or secondary)?  General Surgery   TCM attempt successful?  Yes   Call start time  0919   Call end time  0920   Discharge diagnosis  LUMBAR DISCECTOMY FUSION INSTRUMENTATION   Meds reviewed with patient/caregiver?  Yes   Is the patient having any side effects they believe may be caused by any medication additions or changes?  No   Does the patient have all medications related to this admission filled (includes all antibiotics, pain medications, etc.)  Yes   Is the patient taking all medications as directed (includes completed medication regime)?  Yes   Does the patient have a follow up appointment scheduled with their surgeon?  Yes   Has the patient kept scheduled appointments due by today?  N/A   Comments  f/u with Dr. Capone today at 1:30 pm   Psychosocial issues?  No   Did the patient receive a copy of their discharge instructions?  Yes   Nursing interventions  Reviewed instructions with patient   What is the patient's perception of their health status since discharge?  Improving   Nursing interventions  Nurse provided patient education   Is the patient /caregiver able to teach back basic post-op care?  Lifting as instructed by MD in discharge instructions, Do not remove steri-strips, Keep incision areas clean,dry and protected, No tub bath, swimming, or hot tub until instructed by MD, Take showers only when approved by MD-sponge bathe until then, Drive as instructed by MD in discharge instructions, Continue use of incentive spirometry at least 1 week post discharge, Practice 'cough and deep breath'   Is the patient/caregiver able to teach back signs and symptoms of incisional infection?  Fever   Is the patient/caregiver able to teach back the hierarchy of who to call/visit for symptoms/problems? PCP, Specialist, Home health nurse, Urgent Care,  ED, 911  Yes   TCM call completed?  Yes   Wrap up additional comments  Patient says she is doing well and is following her discharge instructions closely, some issues with her BP but she is talking to PCP today.          Perla Dominguez RN    7/24/2020, 09:21

## 2020-07-29 ENCOUNTER — TELEPHONE (OUTPATIENT)
Dept: NEUROSURGERY | Facility: CLINIC | Age: 70
End: 2020-07-29

## 2020-07-29 NOTE — TELEPHONE ENCOUNTER
Called and spoke with patient.  She is having terrible nightmares while on Lyrica.  Advised her that she can come off of this and we discussed how to taper off.  She was appreciative of call.

## 2020-07-29 NOTE — TELEPHONE ENCOUNTER
Provider:  Tereso  Caller: patient  Time of call:   9:31  Phone #:  [phone]  Surgery:  L2-3 PLIF exploration of L3-L4 fusion, L2-4 fusion  Surgery Date:  07/21/20  Last visit:   same  Next visit: 08/07/20    KIA:         Reason for call:     Patient called and said she did not realize it, but she has been on Lyrica since her hospital visit.  She has been slowly weaning herself off.  She is down to taking 1 QOD.    Wants to know where to go from here.

## 2020-07-31 ENCOUNTER — OFFICE VISIT (OUTPATIENT)
Dept: FAMILY MEDICINE CLINIC | Facility: CLINIC | Age: 70
End: 2020-07-31

## 2020-07-31 VITALS
DIASTOLIC BLOOD PRESSURE: 66 MMHG | SYSTOLIC BLOOD PRESSURE: 94 MMHG | HEART RATE: 82 BPM | RESPIRATION RATE: 18 BRPM | HEIGHT: 65 IN | WEIGHT: 216 LBS | BODY MASS INDEX: 35.99 KG/M2 | OXYGEN SATURATION: 98 % | TEMPERATURE: 97.5 F

## 2020-07-31 DIAGNOSIS — I95.9 HYPOTENSION, UNSPECIFIED HYPOTENSION TYPE: ICD-10-CM

## 2020-07-31 DIAGNOSIS — K59.03 DRUG-INDUCED CONSTIPATION: ICD-10-CM

## 2020-07-31 DIAGNOSIS — D64.9 POSTOPERATIVE ANEMIA: Primary | ICD-10-CM

## 2020-07-31 LAB — HGB BLDA-MCNC: 10.6 G/DL (ref 12–17)

## 2020-07-31 PROCEDURE — 85018 HEMOGLOBIN: CPT | Performed by: FAMILY MEDICINE

## 2020-07-31 PROCEDURE — 99213 OFFICE O/P EST LOW 20 MIN: CPT | Performed by: FAMILY MEDICINE

## 2020-08-07 ENCOUNTER — HOSPITAL ENCOUNTER (OUTPATIENT)
Dept: GENERAL RADIOLOGY | Facility: HOSPITAL | Age: 70
Discharge: HOME | End: 2020-08-07
Admitting: NEUROLOGICAL SURGERY

## 2020-08-07 ENCOUNTER — OFFICE VISIT (OUTPATIENT)
Dept: NEUROSURGERY | Facility: CLINIC | Age: 70
End: 2020-08-07

## 2020-08-07 VITALS — TEMPERATURE: 97.1 F | HEIGHT: 65 IN | WEIGHT: 212 LBS | BODY MASS INDEX: 35.32 KG/M2

## 2020-08-07 DIAGNOSIS — Z98.1 S/P LUMBAR FUSION: Primary | ICD-10-CM

## 2020-08-07 DIAGNOSIS — Z98.1 S/P LUMBAR FUSION: ICD-10-CM

## 2020-08-07 PROCEDURE — 72100 X-RAY EXAM L-S SPINE 2/3 VWS: CPT

## 2020-08-07 PROCEDURE — 99024 POSTOP FOLLOW-UP VISIT: CPT | Performed by: NEUROLOGICAL SURGERY

## 2020-08-07 NOTE — PROGRESS NOTES
"Subjective     Chief Complaint: Follow-up PLIF extension    Patient ID: Laura Churchill is a 69 y.o. female is here today for follow-up.    History of Present Illness    This is a 69-year-old woman who underwent revision of an L3-4 PLIF, and extension of her fusion to include L2-3.  She presents today for routine postoperative follow-up.  She states that her leg pain has completely resolved since surgery.  She is off of narcotic pain medicine.  She is eager to begin physical therapy.    The following portions of the patient's history were reviewed and updated as appropriate: allergies, current medications, past family history, past medical history, past social history, past surgical history and problem list.    Family history:   Family History   Problem Relation Age of Onset   • Ovarian cancer Maternal Aunt 75   • Alzheimer's disease Mother    • Bone cancer Mother    • Melanoma Father    • Prostate cancer Father    • Diabetes Father    • Heart failure Father         congestive   • Breast cancer Neg Hx        Social history:   Social History     Socioeconomic History   • Marital status:      Spouse name: Not on file   • Number of children: Not on file   • Years of education: Not on file   • Highest education level: Not on file   Tobacco Use   • Smoking status: Former Smoker     Packs/day: 0.25     Years: 3.00     Pack years: 0.75     Types: Cigarettes     Start date: 1975     Last attempt to quit: 1978     Years since quittin.7   • Smokeless tobacco: Never Used   Substance and Sexual Activity   • Alcohol use: Yes     Frequency: Never     Comment: social    • Drug use: No   • Sexual activity: Defer     Partners: Male     Birth control/protection: Post-menopausal       Review of Systems   All other systems reviewed and are negative.      Objective   Temperature 97.1 °F (36.2 °C), temperature source Temporal, height 165.1 cm (65\"), weight 96.2 kg (212 lb), not currently breastfeeding.  Body " mass index is 35.28 kg/m².    Physical Exam    Her surgical incision is well-healed with no fluctuance or erythema.    Assessment/Plan     Independent Review of Radiographic Studies:      She has no new imaging for me to review    Medical Decision Making:      I would like to get plain films of her lumbar spine while she is here today.  She can begin physical therapy.    I would like to follow-up with her in 3 months, or sooner if clinically indicated.    Diagnoses and all orders for this visit:    S/P lumbar fusion  -     XR Spine Lumbar 2 or 3 View; Future  -     Ambulatory Referral to Physical Therapy Aquatics, Evaluate and treat; Stretching, ROM, Strengthening        Return in about 3 months (around 11/7/2020).           This document signed by EBENEZER Rider MD August 7, 2020 12:52

## 2020-08-24 RX ORDER — DICLOFENAC SODIUM 75 MG/1
TABLET, DELAYED RELEASE ORAL
Qty: 180 TABLET | Refills: 3 | Status: SHIPPED | OUTPATIENT
Start: 2020-08-24 | End: 2021-09-14 | Stop reason: SDUPTHER

## 2020-09-09 ENCOUNTER — TRANSCRIBE ORDERS (OUTPATIENT)
Dept: ADMINISTRATIVE | Facility: HOSPITAL | Age: 70
End: 2020-09-09

## 2020-09-09 ENCOUNTER — OFFICE VISIT (OUTPATIENT)
Dept: OBSTETRICS AND GYNECOLOGY | Facility: CLINIC | Age: 70
End: 2020-09-09

## 2020-09-09 VITALS
DIASTOLIC BLOOD PRESSURE: 76 MMHG | SYSTOLIC BLOOD PRESSURE: 120 MMHG | BODY MASS INDEX: 34.55 KG/M2 | HEIGHT: 65 IN | TEMPERATURE: 97.1 F | WEIGHT: 207.4 LBS

## 2020-09-09 DIAGNOSIS — Z12.31 VISIT FOR SCREENING MAMMOGRAM: Primary | ICD-10-CM

## 2020-09-09 DIAGNOSIS — N95.2 VAGINAL ATROPHY: ICD-10-CM

## 2020-09-09 DIAGNOSIS — Z01.419 ENCOUNTER FOR GYNECOLOGICAL EXAMINATION WITHOUT ABNORMAL FINDING: ICD-10-CM

## 2020-09-09 DIAGNOSIS — Z78.0 MENOPAUSE: Primary | ICD-10-CM

## 2020-09-09 PROCEDURE — 99397 PER PM REEVAL EST PAT 65+ YR: CPT | Performed by: OBSTETRICS & GYNECOLOGY

## 2020-09-09 NOTE — PROGRESS NOTES
Chief Complaint   Patient presents with   • Gynecologic Exam       Laura Churchill is a 69 y.o. year old  presenting to be seen for her annual exam.  This patient is menopausal and does not use estrogen/progestin replacement therapy.  She denies menopausal symptoms.  She has previously had a lap band procedure; a lap sleeve procedure, and a cholecystectomy.  She has a history of degenerative disc disease and has had her left knee replaced and spinal fusion procedures.  She has chronic back pain.  She denies bowel or urinary symptoms.  She does take Effexor ER, 150 mg daily.    SCREENING TESTS    Year 2012   Age                         PAP       Neg.                  HPV high risk                         Mammogram       benign benign                 LUCRECIA score                         Breast MRI                         Lipids                         Vitamin D                         Colonoscopy                         DEXA  Frax (hip/any)     normal                    Ovarian Screen                             She exercises regularly: no.  She wears her seat belt: yes.  She has concerns about domestic violence: no.  She has noticed changes in height: no    GYN screening history:  · Last mammogram: was done on approximately 2019 and the result was: Birads I (Normal).  · Last DEXA: was done on approximately 2016 and the results were: normal.    No Additional Complaints Reported    The following portions of the patient's history were reviewed and updated as appropriate:vital signs and   She  has a past medical history of Anxiety, Atrophic vaginitis, Bilateral hip pain, Cancer (CMS/HCC), Dental bridge present, GERD (gastroesophageal reflux disease), Hypertension, Low back pain, Menopause, Mixed hyperlipidemia, Muscle spasm, Obesity due to excess calories, MOOKIE (obstructive sleep apnea),  Osteoarthritis, PONV (postoperative nausea and vomiting), Tendinitis of right shoulder, Vertigo, and Wears glasses.  She does not have any pertinent problems on file.  She  has a past surgical history that includes Cholecystectomy; Gastric Sleeve (2011); Laparoscopic gastric banding; pr reconstr total shoulder implant (Left, 7/10/2017); Laparotomy oopherectomy (Right); Esophagogastroduodenoscopy; Hip pinning (Left); Gastric Banding Removal; lumbar discectomy fusion instrumentation (N/A, 11/20/2018); [place] tooth extraction; Total knee arthroplasty (Left, 1/16/2020); Interventional radiology procedure (N/A, 5/27/2020); Colonoscopy (2013); and lumbar discectomy fusion instrumentation (N/A, 7/21/2020).  Her family history includes Alzheimer's disease in her mother; Bone cancer in her mother; Diabetes in her father; Heart failure in her father; Melanoma in her father; Ovarian cancer (age of onset: 75) in her maternal aunt; Prostate cancer in her father.  She  reports that she quit smoking about 41 years ago. Her smoking use included cigarettes. She started smoking about 44 years ago. She has a 0.75 pack-year smoking history. She has never used smokeless tobacco. She reports that she drinks alcohol. She reports that she does not use drugs.  Current Outpatient Medications   Medication Sig Dispense Refill   • atorvastatin (LIPITOR) 10 MG tablet Take 1 tablet by mouth Daily. 90 tablet 3   • diclofenac (VOLTAREN) 75 MG EC tablet TAKE 1 TABLET TWICE A  tablet 3   • venlafaxine XR (EFFEXOR-XR) 150 MG 24 hr capsule Take 1 capsule by mouth Daily. 90 capsule 3     No current facility-administered medications for this visit.      She is allergic to codeine; gabapentin; and sulfa antibiotics..    Review of Systems  A review of systems was taken.  She denies cough, fever, shortness of breath, or loss of her sense of taste or smell.  Constitutional: negative for fever, chills, activity change, appetite change, fatigue and  "unexpected weight change.  Respiratory: negative  Cardiovascular: negative  Gastrointestinal: negative  Genitourinary:negative  Musculoskeletal:positive for back pain  Behavioral/Psych: negative       /76   Temp 97.1 °F (36.2 °C)   Ht 165.1 cm (65\")   Wt 94.1 kg (207 lb 6.4 oz)   LMP  (LMP Unknown)   Breastfeeding No   BMI 34.51 kg/m²     Physical Exam    General:  alert; cooperative; well developed; well nourished  obese - Body mass index is 34.51 kg/m².   Skin:  No suspicious lesions seen   Thyroid: normal to inspection and palpation   Lungs:  clear to auscultation bilaterally   Heart:  regular rate and rhythm, S1, S2 normal, no murmur, click, rub or gallop   Breasts:  Examined in supine position  Symmetric without masses or skin dimpling  Nipples normal without inversion, lesions or discharge  There are no palpable axillary nodes   Abdomen: soft, non-tender; no masses  no umbilical or inguinal hernias are present  no hepato-splenomegaly   Pelvis: Clinical staff was present for exam  External genitalia:  normal appearance of the external genitalia including Bartholin's and [place]'s glands.  Vaginal:  normal pink mucosa without prolapse or lesions.  Cervix:  normal appearance.  Uterus:  normal size, shape and consistency. anteverted;  Adnexa:  non palpable bilaterally.  Rectal:  anus visually normal appearing. recto-vaginal exam unremarkable and confirms findings;     Lab Review   CBC results    Imaging  Mammogram results         ASSESSMENT  Problems Addressed this Visit        Genitourinary    Menopause - Primary    Vaginal atrophy      Other Visit Diagnoses     Encounter for gynecological examination without abnormal finding                  Substance History:   reports that she quit smoking about 41 years ago. Her smoking use included cigarettes. She started smoking about 44 years ago. She has a 0.75 pack-year smoking history. She has never used smokeless tobacco.   reports that she drinks alcohol.   " reports that she does not use drugs.    Substance use counseling is not indicated based on patient history.  She indicates that her alcohol intake is social, and controlled.      PLAN    · Medications prescribed this encounter:  No orders of the defined types were placed in this encounter.  · Monthly self breast assessment and annual breast imaging  · Calcium, 600 mg/ Vit. D, 400 IU daily; regular weight-bearing exercise  · Follow up: 12 month(s)  *Please note that portions of this documentation may have been completed with a voice recognition program.  Efforts were made to edit this dictation, but occasional words may have been mistranscribed.       This note was electronically signed.    LJ Clayton MD  September 9, 2020  10:41

## 2020-09-15 ENCOUNTER — HOSPITAL ENCOUNTER (OUTPATIENT)
Dept: MAMMOGRAPHY | Facility: HOSPITAL | Age: 70
Discharge: HOME | End: 2020-09-15
Admitting: OBSTETRICS & GYNECOLOGY

## 2020-09-15 DIAGNOSIS — Z12.31 VISIT FOR SCREENING MAMMOGRAM: ICD-10-CM

## 2020-09-15 PROCEDURE — 77067 SCR MAMMO BI INCL CAD: CPT | Performed by: RADIOLOGY

## 2020-09-15 PROCEDURE — 77063 BREAST TOMOSYNTHESIS BI: CPT

## 2020-09-15 PROCEDURE — 77063 BREAST TOMOSYNTHESIS BI: CPT | Performed by: RADIOLOGY

## 2020-09-15 PROCEDURE — 77067 SCR MAMMO BI INCL CAD: CPT

## 2020-09-30 ENCOUNTER — OFFICE VISIT (OUTPATIENT)
Dept: FAMILY MEDICINE CLINIC | Facility: CLINIC | Age: 70
End: 2020-09-30

## 2020-09-30 VITALS
DIASTOLIC BLOOD PRESSURE: 78 MMHG | HEART RATE: 80 BPM | TEMPERATURE: 98 F | SYSTOLIC BLOOD PRESSURE: 114 MMHG | BODY MASS INDEX: 33.49 KG/M2 | RESPIRATION RATE: 15 BRPM | HEIGHT: 65 IN | OXYGEN SATURATION: 99 % | WEIGHT: 201 LBS

## 2020-09-30 DIAGNOSIS — D64.9 POSTOPERATIVE ANEMIA: Primary | ICD-10-CM

## 2020-09-30 DIAGNOSIS — I10 ESSENTIAL HYPERTENSION: ICD-10-CM

## 2020-09-30 LAB
ALBUMIN SERPL-MCNC: 4.6 G/DL (ref 3.5–5.2)
ALBUMIN/GLOB SERPL: 1.8 G/DL
ALP SERPL-CCNC: 105 U/L (ref 39–117)
ALT SERPL W P-5'-P-CCNC: 14 U/L (ref 1–33)
ANION GAP SERPL CALCULATED.3IONS-SCNC: 9.8 MMOL/L (ref 5–15)
AST SERPL-CCNC: 23 U/L (ref 1–32)
BILIRUB SERPL-MCNC: 0.2 MG/DL (ref 0–1.2)
BUN SERPL-MCNC: 19 MG/DL (ref 8–23)
BUN/CREAT SERPL: 24.1 (ref 7–25)
CALCIUM SPEC-SCNC: 9.4 MG/DL (ref 8.6–10.5)
CHLORIDE SERPL-SCNC: 104 MMOL/L (ref 98–107)
CHOLEST SERPL-MCNC: 140 MG/DL (ref 0–200)
CO2 SERPL-SCNC: 26.2 MMOL/L (ref 22–29)
CREAT SERPL-MCNC: 0.79 MG/DL (ref 0.57–1)
EXPIRATION DATE: NORMAL
GFR SERPL CREATININE-BSD FRML MDRD: 72 ML/MIN/1.73
GLOBULIN UR ELPH-MCNC: 2.6 GM/DL
GLUCOSE SERPL-MCNC: 85 MG/DL (ref 65–99)
HDLC SERPL-MCNC: 53 MG/DL (ref 40–60)
HGB BLDA-MCNC: 12.8 G/DL (ref 12–17)
LDLC SERPL CALC-MCNC: 73 MG/DL (ref 0–100)
LDLC/HDLC SERPL: 1.37 {RATIO}
Lab: NORMAL
POTASSIUM SERPL-SCNC: 4.5 MMOL/L (ref 3.5–5.2)
PROT SERPL-MCNC: 7.2 G/DL (ref 6–8.5)
SODIUM SERPL-SCNC: 140 MMOL/L (ref 136–145)
TRIGL SERPL-MCNC: 72 MG/DL (ref 0–150)
TSH SERPL DL<=0.05 MIU/L-ACNC: 1.26 UIU/ML (ref 0.27–4.2)
VLDLC SERPL-MCNC: 14.4 MG/DL (ref 5–40)

## 2020-09-30 PROCEDURE — 85018 HEMOGLOBIN: CPT | Performed by: FAMILY MEDICINE

## 2020-09-30 PROCEDURE — 80053 COMPREHEN METABOLIC PANEL: CPT | Performed by: FAMILY MEDICINE

## 2020-09-30 PROCEDURE — G0008 ADMIN INFLUENZA VIRUS VAC: HCPCS | Performed by: FAMILY MEDICINE

## 2020-09-30 PROCEDURE — 80061 LIPID PANEL: CPT | Performed by: FAMILY MEDICINE

## 2020-09-30 PROCEDURE — 90694 VACC AIIV4 NO PRSRV 0.5ML IM: CPT | Performed by: FAMILY MEDICINE

## 2020-09-30 PROCEDURE — 84443 ASSAY THYROID STIM HORMONE: CPT | Performed by: FAMILY MEDICINE

## 2020-09-30 PROCEDURE — 36415 COLL VENOUS BLD VENIPUNCTURE: CPT | Performed by: FAMILY MEDICINE

## 2020-09-30 PROCEDURE — 99213 OFFICE O/P EST LOW 20 MIN: CPT | Performed by: FAMILY MEDICINE

## 2020-09-30 RX ORDER — MULTIVIT WITH MINERALS/LUTEIN
1000 TABLET ORAL DAILY
COMMUNITY
End: 2022-08-17

## 2020-11-09 ENCOUNTER — OFFICE VISIT (OUTPATIENT)
Dept: NEUROSURGERY | Facility: CLINIC | Age: 70
End: 2020-11-09

## 2020-11-09 VITALS — WEIGHT: 201 LBS | BODY MASS INDEX: 33.49 KG/M2 | HEIGHT: 65 IN

## 2020-11-09 DIAGNOSIS — Z98.1 S/P LUMBAR FUSION: Primary | ICD-10-CM

## 2020-11-09 PROCEDURE — 99212 OFFICE O/P EST SF 10 MIN: CPT | Performed by: NEUROLOGICAL SURGERY

## 2020-11-09 NOTE — PROGRESS NOTES
Subjective     Chief Complaint: Follow-up PLIF    Patient ID: Laura Churchill is a 70 y.o. female is here today for follow-up.    History of Present Illness    This is a 70-year-old woman who underwent an uncomplicated PLIF extension in July of this year.  She presents today and reports that her symptoms have essentially resolved.  She has some occasional right-sided low back pain, however she has initiated an exercise regimen consisting of core strengthening exercises and stretching/range of motion.  She reports that her symptoms have significantly improved since surgery.  She is also lost approximately 30 pounds with diet and exercise in the last 6 months.    The following portions of the patient's history were reviewed and updated as appropriate: allergies, current medications, past family history, past medical history, past social history, past surgical history and problem list.    Family history:   Family History   Problem Relation Age of Onset   • Ovarian cancer Maternal Aunt 75   • Alzheimer's disease Mother    • Bone cancer Mother    • Melanoma Father    • Prostate cancer Father    • Diabetes Father    • Heart failure Father         congestive   • Breast cancer Neg Hx        Social history:   Social History     Socioeconomic History   • Marital status:      Spouse name: Not on file   • Number of children: Not on file   • Years of education: Not on file   • Highest education level: Not on file   Tobacco Use   • Smoking status: Former Smoker     Packs/day: 0.25     Years: 3.00     Pack years: 0.75     Types: Cigarettes     Start date: 1975     Quit date: 1978     Years since quittin.0   • Smokeless tobacco: Never Used   Substance and Sexual Activity   • Alcohol use: Yes     Frequency: Never     Comment: social    • Drug use: No   • Sexual activity: Defer     Partners: Male     Birth control/protection: Post-menopausal       Review of Systems   Musculoskeletal: Positive for back pain.  "  All other systems reviewed and are negative.      Objective   Height 165.1 cm (65\"), weight 91.2 kg (201 lb), not currently breastfeeding.  Body mass index is 33.45 kg/m².    Physical Exam  Constitutional:       General: She is not in acute distress.     Appearance: She is well-developed. She is not diaphoretic.   HENT:      Head: Normocephalic and atraumatic.   Pulmonary:      Effort: Pulmonary effort is normal.   Skin:     General: Skin is warm and dry.   Neurological:      Mental Status: She is alert and oriented to person, place, and time.      Cranial Nerves: No cranial nerve deficit.         Assessment/Plan     Independent Review of Radiographic Studies:      She has no new imaging for me to review    Medical Decision Making:      I reviewed the signs and symptoms of lumbosacral radiculopathy with her.  I directed her to contact my office with new or worsening symptoms.  I would be happy to follow-up with her on an as-needed basis moving forward.    Diagnoses and all orders for this visit:    1. L3-5 PLIF 7/2020 (Primary)        No follow-ups on file.           This document signed by EBENEZER Rider MD November 9, 2020 10:39 EST      "

## 2020-11-17 DIAGNOSIS — E78.00 PURE HYPERCHOLESTEROLEMIA: ICD-10-CM

## 2020-11-17 DIAGNOSIS — F41.9 ANXIETY: ICD-10-CM

## 2020-11-17 RX ORDER — ATORVASTATIN CALCIUM 10 MG/1
TABLET, FILM COATED ORAL
Qty: 90 TABLET | Refills: 3 | Status: SHIPPED | OUTPATIENT
Start: 2020-11-17 | End: 2022-01-04 | Stop reason: SDUPTHER

## 2020-11-17 RX ORDER — VENLAFAXINE HYDROCHLORIDE 150 MG/1
CAPSULE, EXTENDED RELEASE ORAL
Qty: 90 CAPSULE | Refills: 3 | Status: SHIPPED | OUTPATIENT
Start: 2020-11-17 | End: 2022-01-03 | Stop reason: SDUPTHER

## 2021-03-12 ENCOUNTER — OFFICE VISIT (OUTPATIENT)
Dept: FAMILY MEDICINE CLINIC | Facility: CLINIC | Age: 71
End: 2021-03-12

## 2021-03-12 VITALS
SYSTOLIC BLOOD PRESSURE: 152 MMHG | WEIGHT: 203.2 LBS | DIASTOLIC BLOOD PRESSURE: 90 MMHG | RESPIRATION RATE: 16 BRPM | BODY MASS INDEX: 33.85 KG/M2 | HEART RATE: 69 BPM | HEIGHT: 65 IN | OXYGEN SATURATION: 98 % | TEMPERATURE: 97.7 F

## 2021-03-12 DIAGNOSIS — I10 ESSENTIAL HYPERTENSION: Primary | ICD-10-CM

## 2021-03-12 DIAGNOSIS — R51.9 NONINTRACTABLE EPISODIC HEADACHE, UNSPECIFIED HEADACHE TYPE: ICD-10-CM

## 2021-03-12 PROCEDURE — 99214 OFFICE O/P EST MOD 30 MIN: CPT | Performed by: FAMILY MEDICINE

## 2021-03-12 RX ORDER — LOSARTAN POTASSIUM 50 MG/1
50 TABLET ORAL DAILY
Qty: 90 TABLET | Refills: 1 | Status: SHIPPED | OUTPATIENT
Start: 2021-03-12 | End: 2021-03-23

## 2021-03-12 NOTE — PROGRESS NOTES
Follow Up Office Visit      Patient Name: Laura Churchill  : 1950   MRN: [id number]     Chief Complaint:    Chief Complaint   Patient presents with   • Establish Care   • Headache   • Hypertension       History of Present Illness: Laura Churchill is a 70 y.o. female who is here today to follow up with headache and recurrent hypertension.  Patient had previous hypertension that was controlled on 2 medications with after having a surgery last year, her blood pressure bottomed out and she has been off blood pressure medications since then.  Over the last month patient has noticed elevated blood pressures at home and a headache that is related to his blood pressure.  Patient reports a history of migraines.  Patient says the migraines have resolved over time.      Review of systems patient denies any chest pain or shortness of breath.  Patient denies any abdominal pain.  Positive for headache    Physical exam: Patient's neurological exam was grossly intact.  Patient's heart and lung exam was normal.  Patient's mood and affect was appropriate.      Subjective        I have reviewed and the following portions of the patient's history were updated as appropriate: past family history, past medical history, past social history, past surgical history and problem list.    Medications:     Current Outpatient Medications:   •  ascorbic acid (VITAMIN C) 1000 MG tablet, Take 1,000 mg by mouth Daily., Disp: , Rfl:   •  atorvastatin (LIPITOR) 10 MG tablet, TAKE 1 TABLET DAILY, Disp: 90 tablet, Rfl: 3  •  B Complex Vitamins (VITAMIN B COMPLEX PO), Take 1 tablet by mouth Daily., Disp: , Rfl:   •  diclofenac (VOLTAREN) 75 MG EC tablet, TAKE 1 TABLET TWICE A DAY, Disp: 180 tablet, Rfl: 3  •  venlafaxine XR (EFFEXOR-XR) 150 MG 24 hr capsule, TAKE 1 CAPSULE DAILY, Disp: 90 capsule, Rfl: 3  •  VITAMIN D PO, Take  by mouth., Disp: , Rfl:   •  losartan (Cozaar) 50 MG tablet, Take 1 tablet by mouth Daily., Disp: 90 tablet,  "Rfl: 1    Allergies:   Allergies   Allergen Reactions   • Codeine Hives   • Gabapentin Hallucinations     Screaming nightmares   • Sulfa Antibiotics Itching       Objective     Physical Exam: Please see HPI for physical exam  Vital Signs:   Vitals:    03/12/21 1117   BP: 152/90   Pulse: 69   Resp: 16   Temp: 97.7 °F (36.5 °C)   TempSrc: Temporal   SpO2: 98%   Weight: 92.2 kg (203 lb 3.2 oz)   Height: 165.1 cm (65\")   PainSc: 0-No pain     Body mass index is 33.81 kg/m².          Assessment / Plan      Assessment/Plan:   Diagnoses and all orders for this visit:    1. Essential hypertension (Primary)  -     losartan (Cozaar) 50 MG tablet; Take 1 tablet by mouth Daily.  Dispense: 90 tablet; Refill: 1    2. Nonintractable episodic headache, unspecified headache type    We will restart losartan to 50 mg.  In 1 month patient can call to increase it to 100 mg if her blood pressure is over 140/90.    Counseled patient regarding headache as far as resolution of it with treatment of blood pressure.  If headache does not resolve after blood pressure is more controlled consider migraine treatment.    Follow Up:   Return in about 6 months (around 9/12/2021).    Julius Rivera DO  [facility] [place]       Please note that portions of this note may have been completed with a voice recognition program. Efforts were made to edit the dictations, but occasionally words are mistranscribed.   "

## 2021-03-22 ENCOUNTER — TELEPHONE (OUTPATIENT)
Dept: FAMILY MEDICINE CLINIC | Facility: CLINIC | Age: 71
End: 2021-03-22

## 2021-03-22 NOTE — TELEPHONE ENCOUNTER
Caller: Laura Churchill    Relationship: Self    Best call back number: [phone]  What is the best time to reach you:     Who are you requesting to speak with (clinical staff, provider,  specific staff member):DR GARCIA    Do you know the name of the person who called:     What was the call regarding: INCREASE IN BLOOD PRESSURE MEDICATION    Do you require a callback: YES    PATIENT WANTED TO INFORM DR GARCIA THAT HER BLOOD PRESSURE MEDICATION DOSAGE WAS NOT EFFECTIVE. AT HER LAST APPOINTMENT DR GARCIA TOLD PATIENT SHE COULD DOUBLE DOSAGE AFTER ONE MONTH IF NEEDED.   PATIENT INCREASED THE DOSAGE ON 03/20 WHICH  WAS NOT AFTER A MONTH.  PATIENT STATES THIS CHANGE HAS HELPED BUT WISHED TO DISCUSS THIS WITH DR GARCIA

## 2021-03-23 DIAGNOSIS — I10 ESSENTIAL HYPERTENSION: Primary | ICD-10-CM

## 2021-03-23 DIAGNOSIS — I10 ESSENTIAL HYPERTENSION: ICD-10-CM

## 2021-03-23 RX ORDER — LOSARTAN POTASSIUM 100 MG/1
100 TABLET ORAL DAILY
Qty: 90 TABLET | Refills: 1 | Status: SHIPPED | OUTPATIENT
Start: 2021-03-23 | End: 2021-03-23 | Stop reason: SDUPTHER

## 2021-03-23 RX ORDER — LOSARTAN POTASSIUM 100 MG/1
100 TABLET ORAL DAILY
Qty: 90 TABLET | Refills: 1 | Status: SHIPPED | OUTPATIENT
Start: 2021-03-23 | End: 2021-09-14 | Stop reason: SDUPTHER

## 2021-07-22 RX ORDER — TIZANIDINE 4 MG/1
TABLET ORAL
Qty: 180 TABLET | Refills: 3 | Status: SHIPPED | OUTPATIENT
Start: 2021-07-22 | End: 2021-09-28 | Stop reason: SDUPTHER

## 2021-08-27 ENCOUNTER — OFFICE VISIT (OUTPATIENT)
Dept: FAMILY MEDICINE CLINIC | Facility: CLINIC | Age: 71
End: 2021-08-27

## 2021-08-27 VITALS
OXYGEN SATURATION: 97 % | HEART RATE: 83 BPM | DIASTOLIC BLOOD PRESSURE: 74 MMHG | WEIGHT: 213.8 LBS | TEMPERATURE: 97.1 F | BODY MASS INDEX: 35.62 KG/M2 | HEIGHT: 65 IN | SYSTOLIC BLOOD PRESSURE: 116 MMHG | RESPIRATION RATE: 16 BRPM

## 2021-08-27 DIAGNOSIS — Z12.12 SCREENING FOR COLORECTAL CANCER: Primary | ICD-10-CM

## 2021-08-27 DIAGNOSIS — M79.651 RIGHT THIGH PAIN: ICD-10-CM

## 2021-08-27 DIAGNOSIS — Z12.11 SCREENING FOR COLORECTAL CANCER: Primary | ICD-10-CM

## 2021-08-27 DIAGNOSIS — I10 ESSENTIAL HYPERTENSION: ICD-10-CM

## 2021-08-27 PROCEDURE — 99214 OFFICE O/P EST MOD 30 MIN: CPT | Performed by: FAMILY MEDICINE

## 2021-08-27 NOTE — PROGRESS NOTES
Follow Up Office Visit      Patient Name: Laura Churchill  : 1950   MRN: [id number]     Chief Complaint:    Chief Complaint   Patient presents with   • Muscle Pain     right leg started on wednesday    • Hypertension     f/u   • Headache     f/u       History of Present Illness: Laura Churchill is a 70 y.o. female who is here today to follow up with right thigh muscle strain and blood pressure.    Right thigh muscle strain- Patient states that a few days ago she found a sore spot on her right leg. Patient states that she is unsure how she hurt her thigh. Patient sat in a low chair and was getting up and felt her entire thigh muscles buckle and she was unable to stand up. During this event she experienced numbness down her entire legs. This went away after resting. Patient uses a walker to get up. She does not use the walker to walk in general. Patient states she is unable to go up the stairs using her right leg. She uses her left leg and goes up the stairs one at a time. Patient has history of deg disc disease and arthritis. Patient uses tizanidine for muscle spasms. She used them before for bowel spasms as they were given by Dr. Capone years ago. Patient has been using tizanidine for this and it does not help too much. Patient has a hard time getting up from seated positions. Patient does not have a hard time walking too often. Patient states she does limp but this is very rarely.     Hypertension- Patient states that her blood pressure medications have been great. Patient states she doubled the dose since her last visit. Patient does well on the dose and she has been compliant with all medications. Patient states there are no side effects with the medications she is on. Patient does not need refills at this visit.       [facility]  pt in [place]     ROS is positive right thigh muscle strain and hypertension.    Physical exam:   CV- rrr, no murmurs or gallops  Lungs- cta b/l, no wheezing  "or rales   MSK- (-) straight leg test b/l, limited rom of hips, pain with resistance when pushing leg up, 5/5 strength UE... No tenderness palpation of right thigh      Subjective        I have reviewed and the following portions of the patient's history were updated as appropriate: past family history, past medical history, past social history, past surgical history and problem list.    Medications:     Current Outpatient Medications:   •  ascorbic acid (VITAMIN C) 1000 MG tablet, Take 1,000 mg by mouth Daily., Disp: , Rfl:   •  atorvastatin (LIPITOR) 10 MG tablet, TAKE 1 TABLET DAILY, Disp: 90 tablet, Rfl: 3  •  B Complex Vitamins (VITAMIN B COMPLEX PO), Take 1 tablet by mouth Daily., Disp: , Rfl:   •  diclofenac (VOLTAREN) 75 MG EC tablet, TAKE 1 TABLET TWICE A DAY, Disp: 180 tablet, Rfl: 3  •  losartan (Cozaar) 100 MG tablet, Take 1 tablet by mouth Daily., Disp: 90 tablet, Rfl: 1  •  tiZANidine (ZANAFLEX) 4 MG tablet, TAKE 1 TABLET TWICE A DAY, Disp: 180 tablet, Rfl: 3  •  venlafaxine XR (EFFEXOR-XR) 150 MG 24 hr capsule, TAKE 1 CAPSULE DAILY, Disp: 90 capsule, Rfl: 3  •  VITAMIN D PO, Take  by mouth., Disp: , Rfl:     Allergies:   Allergies   Allergen Reactions   • Codeine Hives   • Gabapentin Hallucinations     Screaming nightmares   • Sulfa Antibiotics Itching       Objective     Physical Exam: Please see HPI for physical exam  Vital Signs:   Vitals:    08/27/21 1019   BP: 116/74   Pulse: 83   Resp: 16   Temp: 97.1 °F (36.2 °C)   TempSrc: Temporal   SpO2: 97%   Weight: 97 kg (213 lb 12.8 oz)   Height: 165.1 cm (65\")   PainSc:   2     Body mass index is 35.58 kg/m².          Assessment / Plan      Assessment/Plan:   Diagnoses and all orders for this visit:    1. Screening for colorectal cancer (Primary)  -     Cologuard - Stool, Per Rectum; Future    2. Right thigh pain    3. Essential hypertension        ddx for thigh pain is radicular pain from lumbar, hip arthritis, femoral nerve injury, quad strain... " recommend rest and heating pad for her back. She should not do activities that cause pain. If pain improves then no further work up... if it worsens or persists then I rec PT and xr lumbar... if no improvement with that plan then she can f/u with ortho.           rtc in 2-3 months for wellness exam      Follow Up:   Return if symptoms worsen or fail to improve.             Julius Rivera, DO  [facility] [place]     Answers for HPI/ROS submitted by the patient on 8/27/2021  Please describe your symptoms.: I started having what I thought was muscle pain in my right leg, front thigh.  It didn't seem serious, just hurt.  On Wednesday, when trying to stand up, the muscles and nerves in my leg felt like they were twisting from my knee up to my hip joint.  Very, very painful.  It caused me to fall back down because I couldn't continue to stand.  This pain is ok when walking, but returns when trying to sit, bend over, or get up.  Have you had these symptoms before?: No  How long have you been having these symptoms?: 1-4 days  Please describe any probable cause for these symptoms. : I have no idea what caused the pain.  What is the primary reason for your visit?: Other    I attest that I have reviewed the student note and that the components of the history of the present illness, the physical exam, and the assessment and plan documented were performed by me or were performed in my presence by the student and verified by me.

## 2021-08-27 NOTE — PATIENT INSTRUCTIONS
How to Perform the Epley Maneuver  The Epley maneuver is an exercise that relieves symptoms of vertigo. Vertigo is the feeling that you or your surroundings are moving when they are not. When you feel vertigo, you may feel like the room is spinning and may have trouble walking. The Epley maneuver is used for a type of vertigo caused by a calcium deposit in a part of the inner ear. The maneuver involves changing head positions to help the deposit move out of the area.  You can do this maneuver at home whenever you have symptoms of vertigo. You can repeat it in 24 hours if your vertigo has not gone away.  Even though the Epley maneuver may relieve your vertigo for a few weeks, it is possible that your symptoms will return. This maneuver relieves vertigo, but it does not relieve dizziness.  What are the risks?  If it is done correctly, the Epley maneuver is considered safe. Sometimes it can lead to dizziness or nausea that goes away after a short time. If you develop other symptoms--such as changes in vision, weakness, or numbness--stop doing the maneuver and call your health care provider.  Supplies needed:  · A bed or table.  · A pillow.  How to do the Epley maneuver         1. Sit on the edge of a bed or table with your back straight and your legs extended or hanging over the edge of the bed or table.  2. Turn your head [facility] toward the affected ear or side as told by your health care provider.  3. Lie backward quickly with your head turned until you are lying flat on your back. You may want to position a pillow under your shoulders.  4. Hold this position for at least 30 seconds. If you feel dizzy or have symptoms of vertigo, continue to hold the position until the symptoms stop.  5. Turn your head to the opposite direction until your unaffected ear is facing the floor.  6. Hold this position for at least 30 seconds. If you feel dizzy or have symptoms of vertigo, continue to hold the position until the symptoms  stop.  7. Turn your whole body to the same side as your head so that you are positioned on your side. Your head will now be nearly facedown. Hold for at least 30 seconds. If you feel dizzy or have symptoms of vertigo, continue to hold the position until the symptoms stop.  8. Sit back up.  You can repeat the maneuver in 24 hours if your vertigo does not go away.  Follow these instructions at home:  For 24 hours after doing the Epley maneuver:  · Keep your head in an upright position.  · When lying down to sleep or rest, keep your head raised (elevated) with two or more pillows.  · Avoid excessive neck movements.  Activity  · Do not drive or use machinery if you feel dizzy.  · After doing the Epley maneuver, return to your normal activities as told by your health care provider. Ask your health care provider what activities are safe for you.  General instructions  · Drink enough fluid to keep your urine pale yellow.  · Do not drink alcohol.  · Take over-the-counter and prescription medicines only as told by your health care provider.  · Keep all follow-up visits as told by your health care provider. This is important.  Preventing vertigo symptoms  Ask your health care provider if there is anything you should do at home to prevent vertigo. He or she may recommend that you:  · Keep your head elevated with two or more pillows while you sleep.  · Do not sleep on the side of your affected ear.  · Get up slowly from bed.  · Avoid sudden movements during the day.  · Avoid extreme head positions or movement, such as looking up or bending over.  Contact a health care provider if:  · Your vertigo gets worse.  · You have other symptoms, including:  ? Nausea.  ? Vomiting.  ? Headache.  Get help right away if you:  · Have vision changes.  · Have a headache or neck pain that is severe or getting worse.  · Cannot stop vomiting.  · Have new numbness or weakness in any part of your body.  Summary  · Vertigo is the feeling that you or  your surroundings are moving when they are not.  · The Epley maneuver is an exercise that relieves symptoms of vertigo.  · If the Epley maneuver is done correctly, it is considered safe and relieves vertigo quickly.  This information is not intended to replace advice given to you by your health care provider. Make sure you discuss any questions you have with your health care provider.  Document Revised: 10/14/2020 Document Reviewed: 10/14/2020  Elsevier Patient Education © 2021 [employer]

## 2021-09-14 DIAGNOSIS — I10 ESSENTIAL HYPERTENSION: ICD-10-CM

## 2021-09-14 RX ORDER — DICLOFENAC SODIUM 75 MG/1
75 TABLET, DELAYED RELEASE ORAL 2 TIMES DAILY
Qty: 60 TABLET | Refills: 0 | Status: SHIPPED | OUTPATIENT
Start: 2021-09-14 | End: 2021-09-16 | Stop reason: SDUPTHER

## 2021-09-14 RX ORDER — LOSARTAN POTASSIUM 100 MG/1
100 TABLET ORAL DAILY
Qty: 90 TABLET | Refills: 0 | Status: SHIPPED | OUTPATIENT
Start: 2021-09-14 | End: 2021-09-16 | Stop reason: SDUPTHER

## 2021-09-16 ENCOUNTER — TELEPHONE (OUTPATIENT)
Dept: FAMILY MEDICINE CLINIC | Facility: CLINIC | Age: 71
End: 2021-09-16

## 2021-09-16 DIAGNOSIS — I10 ESSENTIAL HYPERTENSION: ICD-10-CM

## 2021-09-16 RX ORDER — LOSARTAN POTASSIUM 100 MG/1
100 TABLET ORAL DAILY
Qty: 90 TABLET | Refills: 0 | Status: SHIPPED | OUTPATIENT
Start: 2021-09-16 | End: 2022-01-03 | Stop reason: SDUPTHER

## 2021-09-16 RX ORDER — DICLOFENAC SODIUM 75 MG/1
75 TABLET, DELAYED RELEASE ORAL 2 TIMES DAILY
Qty: 60 TABLET | Refills: 0 | Status: SHIPPED | OUTPATIENT
Start: 2021-09-16 | End: 2022-04-01 | Stop reason: SDUPTHER

## 2021-09-16 NOTE — TELEPHONE ENCOUNTER
Caller: Laura Churchill    Relationship: Self    Best call back number: [phone] (H)    Medication needed:   diclofenac (VOLTAREN) 75 MG EC tablet  75 mg, 2 Times Daily 0 ordered         Summary: Take 1 tablet by mouth 2 (Two) Times a Day., Starting Tue 9/14/2021, Normal   Dose, Route, Frequency: 75 mg, Oral, 2 Times Daily        losartan (Cozaar) 100 MG tablet  100 mg, Daily 0 ordered         Summary: Take 1 tablet by mouth Daily., Starting Tue 9/14/2021, Normal   Dose, Route, Frequency: 100 mg, Oral, Daily          When do you need the refill by: TODAY    What additional details did the patient provide when requesting the medication: PATIENT STATES THAT HER ORIGINAL REFILL REQUEST WAS ERRONEOUSLY SENT TO [facility] INSTEAD OF HER PERFERRED PHARMACY [email or web address]. PATIENT STATES THAT SHE WILL LIKE FOR THE PRESCRIPTION THAT WENT TO [facility] BE CANCELLED AND SENT TO [email or web address] FOR FULFILLMENT.      Does the patient have less than a 3 day supply:  [x] Yes  [] No    What is the patient's preferred pharmacy:      [facility] - [address] [phone] [facility] [phone]     PATIENT HAS BEEN ADVISED THAT THIS REQUEST HAS BEEN MARKED AS A HIGH PRIORITY, PLEASE ALLOW 48 HOURS FOR OUR CLINICAL TEAM TO FOLLOW UP ON THIS REQUEST.

## 2021-09-28 ENCOUNTER — OFFICE VISIT (OUTPATIENT)
Dept: FAMILY MEDICINE CLINIC | Facility: CLINIC | Age: 71
End: 2021-09-28

## 2021-09-28 ENCOUNTER — OFFICE VISIT (OUTPATIENT)
Dept: OBSTETRICS AND GYNECOLOGY | Facility: CLINIC | Age: 71
End: 2021-09-28

## 2021-09-28 VITALS
SYSTOLIC BLOOD PRESSURE: 138 MMHG | WEIGHT: 213 LBS | BODY MASS INDEX: 35.49 KG/M2 | HEIGHT: 65 IN | DIASTOLIC BLOOD PRESSURE: 72 MMHG

## 2021-09-28 VITALS
TEMPERATURE: 97.4 F | OXYGEN SATURATION: 99 % | DIASTOLIC BLOOD PRESSURE: 62 MMHG | HEIGHT: 65 IN | BODY MASS INDEX: 35.49 KG/M2 | RESPIRATION RATE: 18 BRPM | WEIGHT: 213 LBS | SYSTOLIC BLOOD PRESSURE: 120 MMHG | HEART RATE: 76 BPM

## 2021-09-28 DIAGNOSIS — Z00.00 MEDICARE ANNUAL WELLNESS VISIT, SUBSEQUENT: Primary | ICD-10-CM

## 2021-09-28 DIAGNOSIS — M62.838 MUSCLE SPASM: ICD-10-CM

## 2021-09-28 DIAGNOSIS — Z01.419 ENCOUNTER FOR GYNECOLOGICAL EXAMINATION WITHOUT ABNORMAL FINDING: Primary | ICD-10-CM

## 2021-09-28 DIAGNOSIS — Z78.0 ENCOUNTER FOR OSTEOPOROSIS SCREENING IN ASYMPTOMATIC POSTMENOPAUSAL PATIENT: ICD-10-CM

## 2021-09-28 DIAGNOSIS — Z78.0 MENOPAUSE: ICD-10-CM

## 2021-09-28 DIAGNOSIS — Z13.820 ENCOUNTER FOR OSTEOPOROSIS SCREENING IN ASYMPTOMATIC POSTMENOPAUSAL PATIENT: ICD-10-CM

## 2021-09-28 DIAGNOSIS — N95.2 VAGINAL ATROPHY: ICD-10-CM

## 2021-09-28 PROCEDURE — 80061 LIPID PANEL: CPT | Performed by: FAMILY MEDICINE

## 2021-09-28 PROCEDURE — 84443 ASSAY THYROID STIM HORMONE: CPT | Performed by: FAMILY MEDICINE

## 2021-09-28 PROCEDURE — 83036 HEMOGLOBIN GLYCOSYLATED A1C: CPT | Performed by: FAMILY MEDICINE

## 2021-09-28 PROCEDURE — 99397 PER PM REEVAL EST PAT 65+ YR: CPT | Performed by: OBSTETRICS & GYNECOLOGY

## 2021-09-28 PROCEDURE — G0439 PPPS, SUBSEQ VISIT: HCPCS | Performed by: FAMILY MEDICINE

## 2021-09-28 PROCEDURE — 1159F MED LIST DOCD IN RCRD: CPT | Performed by: FAMILY MEDICINE

## 2021-09-28 PROCEDURE — 80053 COMPREHEN METABOLIC PANEL: CPT | Performed by: FAMILY MEDICINE

## 2021-09-28 RX ORDER — TIZANIDINE 4 MG/1
4 TABLET ORAL 2 TIMES DAILY
Qty: 180 TABLET | Refills: 3 | Status: SHIPPED | OUTPATIENT
Start: 2021-09-28 | End: 2022-05-27

## 2021-09-28 NOTE — PROGRESS NOTES
Chief Complaint   Patient presents with   • Gynecologic Exam       Laura Churchill is a 70 y.o. year old  presenting to be seen for her annual exam.  This patient has previously had a laparotomy with a right salpingo-oophorectomy for ovarian torsion.  She has a history of degenerative disc disease and has had lumbar disc surgery.  She had a gastric band procedure; removal of the LAP-BAND and a subsequent gastric sleeve procedure with a cholecystectomy.  She denies bowel or urinary symptoms.  Her back pain is stable.  She has had Covid-19 immunization.    SCREENING TESTS    Year 2012   Age                         PAP       Neg.                  HPV high risk                         Mammogram        benign benign                LUCRECIA score                         Breast MRI                         Lipids                         Vitamin D                         Colonoscopy                         DEXA  Frax (hip/any)                         Ovarian Screen                             She exercises regularly: no.  She wears her seat belt: yes.  She has concerns about domestic violence: no.  She has noticed changes in height: no    GYN screening history:  · Last mammogram: was done on approximately 9/15/2020 and the result was: Birads II (Benign findings)..    No Additional Complaints Reported    The following portions of the patient's history were reviewed and updated as appropriate:vital signs and   She  has a past medical history of Anxiety, Atrophic vaginitis, Bilateral hip pain, Cancer (CMS/HCC), Dental bridge present, GERD (gastroesophageal reflux disease), Hypertension, Low back pain, Menopause, Mixed hyperlipidemia, Muscle spasm, Obesity due to excess calories, MOOKIE (obstructive sleep apnea), Osteoarthritis, PONV (postoperative nausea and vomiting), Tendinitis of right shoulder, Vertigo, and Wears  glasses.  She does not have any pertinent problems on file.  She  has a past surgical history that includes Cholecystectomy; Gastric Sleeve (2011); Laparoscopic gastric banding; pr reconstr total shoulder implant (Left, 7/10/2017); Laparotomy oopherectomy (Right); Esophagogastroduodenoscopy; Hip pinning (Left); Gastric Banding Removal; lumbar discectomy fusion instrumentation (N/A, 11/20/2018); [place] tooth extraction; Total knee arthroplasty (Left, 1/16/2020); Interventional radiology procedure (N/A, 5/27/2020); Colonoscopy (2013); and lumbar discectomy fusion instrumentation (N/A, 7/21/2020).  Her family history includes Alzheimer's disease in her mother; Bone cancer in her mother; Diabetes in her father; Heart failure in her father; Melanoma in her father; Ovarian cancer (age of onset: 75) in her maternal aunt; Prostate cancer in her father.  She  reports that she quit smoking about 42 years ago. Her smoking use included cigarettes. She started smoking about 45 years ago. She has a 0.75 pack-year smoking history. She has never used smokeless tobacco. She reports current alcohol use. She reports that she does not use drugs.  Current Outpatient Medications   Medication Sig Dispense Refill   • ascorbic acid (VITAMIN C) 1000 MG tablet Take 1,000 mg by mouth Daily.     • atorvastatin (LIPITOR) 10 MG tablet TAKE 1 TABLET DAILY 90 tablet 3   • B Complex Vitamins (VITAMIN B COMPLEX PO) Take 1 tablet by mouth Daily.     • diclofenac (VOLTAREN) 75 MG EC tablet Take 1 tablet by mouth 2 (Two) Times a Day. 60 tablet 0   • losartan (Cozaar) 100 MG tablet Take 1 tablet by mouth Daily. 90 tablet 0   • tiZANidine (ZANAFLEX) 4 MG tablet TAKE 1 TABLET TWICE A  tablet 3   • venlafaxine XR (EFFEXOR-XR) 150 MG 24 hr capsule TAKE 1 CAPSULE DAILY 90 capsule 3   • VITAMIN D PO Take  by mouth.       No current facility-administered medications for this visit.     She is allergic to codeine, gabapentin, and sulfa  "antibiotics..    Review of Systems  A review of systems was taken.  Constitutional: negative for fever, chills, activity change, appetite change, fatigue and unexpected weight change.  Respiratory: negative  Cardiovascular: negative  Gastrointestinal: negative  Genitourinary:negative  Musculoskeletal: back pain  Behavioral/Psych: negative   Counseling/Anticipatory Guidance Discussed: nutrition, physical activity, healthy weight, injury prevention, immunizations, screenings and self-breast exam      /72   Ht 165.1 cm (65\")   Wt 96.6 kg (213 lb)   LMP  (LMP Unknown)   Breastfeeding No   BMI 35.45 kg/m²     BMI reviewed     MEDICALLY INDICATED   Physical Exam    Neuro: alert and oriented to person, place and time    General:  alert; cooperative; well developed; well nourished   Skin:  No suspicious lesions seen   Thyroid: normal to inspection and palpation   Lungs:  breathing is unlabored  clear to auscultation bilaterally   Heart:  regular rate and rhythm, S1, S2 normal, no murmur, click, rub or gallop  normal apical impulse   Breasts:  Examined in supine position  Symmetric without masses or skin dimpling  Nipples normal without inversion, lesions or discharge  There are no palpable axillary nodes   Abdomen: soft, non-tender; no masses  no umbilical or inguinal hernias are present  no hepato-splenomegaly   Pelvis: Clinical staff was present for exam  External genitalia:  normal appearance of the external genitalia including Bartholin's and [place]'s glands.  Vaginal:  atrophic mucosal changes are present;  Cervix:  normal appearance.  Uterus:  normal size, shape and consistency. anteverted;  Adnexa:  LEFT adnexa normal; RIGHT adnexa absent  Rectal:  anus visually normal appearing. recto-vaginal exam unremarkable and confirms findings;     Lab Review   CMP results and TSH results    Imaging  Mammogram results              ASSESSMENT  Problems Addressed this Visit        Genitourinary and Reproductive     " Menopause    Vaginal atrophy      Other Visit Diagnoses     Encounter for gynecological examination without abnormal finding    -  Primary      Diagnoses       Codes Comments    Encounter for gynecological examination without abnormal finding    -  Primary ICD-10-CM: Z01.419  ICD-9-CM: V72.31     Menopause     ICD-10-CM: Z78.0  ICD-9-CM: 627.2     Vaginal atrophy     ICD-10-CM: N95.2  ICD-9-CM: 627.3               Substance History:   reports that she quit smoking about 42 years ago. Her smoking use included cigarettes. She started smoking about 45 years ago. She has a 0.75 pack-year smoking history. She has never used smokeless tobacco.   reports current alcohol use.   reports no history of drug use.    Substance use counseling is not indicated based on patient history.  She indicates that her alcohol intake is social, and controlled.      PLAN    · Medications prescribed this encounter:  No orders of the defined types were placed in this encounter.  · Monthly self breast assessment and annual breast imaging  · Calcium, 600 mg/ Vit. D, 400 IU daily; regular weight-bearing exercise  · Follow up: 12 month(s)  *Please note that portions of this documentation may have been completed with a voice recognition program.  Efforts were made to edit this dictation, but occasional words may have been mistranscribed.       This note was electronically signed.    LJ Clayton MD  September 28, 2021  10:54 EDT

## 2021-09-28 NOTE — PROGRESS NOTES
The ABCs of the Annual Wellness Visit  Subsequent Medicare Wellness Visit    Chief Complaint   Patient presents with   • Hypertension   • Leg Pain      Subjective    History of Present Illness:  Laura Churchill is a 70 y.o. female who presents for a Subsequent Medicare Wellness Visit.    The following portions of the patient's history were reviewed and   updated as appropriate: allergies, current medications, past family history, past medical history, past social history, past surgical history and problem list.    Compared to one year ago, the patient feels her physical   health is better.    Compared to one year ago, the patient feels her mental   health is better.    Recent Hospitalizations:  She was not admitted to the hospital during the last year.       Current Medical Providers:  Patient Care Team:  Julius Rivera DO as PCP - General (Family Medicine)  Richie Fisher MD as Surgeon (Neurosurgery)  Sukumar Mcwilliams MD as Consulting Physician (Anesthesiology)  Glenn Clayton MD as Consulting Physician (Gynecology)  Shiva Capone MD as Referring Physician (Family Medicine)    Outpatient Medications Prior to Visit   Medication Sig Dispense Refill   • ascorbic acid (VITAMIN C) 1000 MG tablet Take 1,000 mg by mouth Daily.     • atorvastatin (LIPITOR) 10 MG tablet TAKE 1 TABLET DAILY 90 tablet 3   • B Complex Vitamins (VITAMIN B COMPLEX PO) Take 1 tablet by mouth Daily.     • diclofenac (VOLTAREN) 75 MG EC tablet Take 1 tablet by mouth 2 (Two) Times a Day. 60 tablet 0   • losartan (Cozaar) 100 MG tablet Take 1 tablet by mouth Daily. 90 tablet 0   • venlafaxine XR (EFFEXOR-XR) 150 MG 24 hr capsule TAKE 1 CAPSULE DAILY 90 capsule 3   • VITAMIN D PO Take  by mouth.     • tiZANidine (ZANAFLEX) 4 MG tablet TAKE 1 TABLET TWICE A  tablet 3     No facility-administered medications prior to visit.       No opioid medication identified on active medication list. I have reviewed chart for other potential  " high risk medication/s and harmful drug interactions in the elderly.          Aspirin is not on active medication list.  Aspirin use is not indicated based on review of current medical condition/s. Risk of harm outweighs potential benefits.  .    Patient Active Problem List   Diagnosis   • Essential hypertension   • Hyperlipidemia   • Obstructive sleep apnea syndrome   • Osteoarthritis of knee   • Osteoporosis   • Lumbar spondylosis without myelopathy/Lumbar facet arthropathy   • Displacement of lumbar intervertebral disc   • Stenosis of lateral recess of lumbar spine   • Physical deconditioning   • Morbid obesity due to excess calories (CMS/[facility])   • Anxiety and depression   • Sacroiliac joint dysfunction of right side   • Greater trochanteric bursitis of right hip   • Iliotibial band syndrome of right side   • Spinal stenosis   • Osteoarthritis   • Obesity due to excess calories   • Menopause   • Mixed hyperlipidemia   • Lumbosacral disc disease   • Low back pain   • Joint pain   • Extremity pain   • Chronic pain disorder   • Back problem   • Vaginal atrophy   • Primary localized osteoarthrosis of shoulder region   • Status post total left shoulder arthroplasty   • Sciatica   • Acquired spondylolisthesis   • Spinal stenosis, lumbar region, with neurogenic claudication   • L3-5 PLIF 7/2020   • Primary osteoarthritis of left knee   • Status post total left knee replacement   • Leukocytosis, likely reactive   • Acute blood loss anemia, mild, asymptomatic   • Nonintractable episodic headache   • Right thigh pain     Advance Care Planning  Advance Directive is not on file.  ACP discussion was held with the patient during this visit. Patient has an advance directive (not in EMR), copy requested.          Objective    Vitals:    09/28/21 1313   BP: 120/62   Pulse: 76   Resp: 18   Temp: 97.4 °F (36.3 °C)   SpO2: 99%   Weight: 96.6 kg (213 lb)   Height: 165.1 cm (65\")     BMI Readings from Last 1 Encounters:   09/28/21 " 35.45 kg/m²   BMI is above normal parameters. Recommendations include: educational material    Does the patient have evidence of cognitive impairment? No    Physical Exam            HEALTH RISK ASSESSMENT    Smoking Status:  Social History     Tobacco Use   Smoking Status Former Smoker   • Packs/day: 0.25   • Years: 3.00   • Pack years: 0.75   • Types: Cigarettes   • Start date: 1975   • Quit date: 1978   • Years since quittin.9   Smokeless Tobacco Never Used     Alcohol Consumption:  Social History     Substance and Sexual Activity   Alcohol Use Yes    Comment: social      Fall Risk Screen:    STEADI Fall Risk Assessment was completed, and patient is at LOW risk for falls.Assessment completed on:2021    Depression Screening:  PHQ-2/PHQ-9 Depression Screening 2021   Little interest or pleasure in doing things 0   Feeling down, depressed, or hopeless 0   Total Score 0       Health Habits and Functional and Cognitive Screening:  Functional & Cognitive Status 9/3/2019   Do you have difficulty preparing food and eating? No   Do you have difficulty bathing yourself, getting dressed or grooming yourself? No   Do you have difficulty using the toilet? No   Do you have difficulty moving around from place to place? No   Do you have trouble with steps or getting out of a bed or a chair? No   Current Diet Limited Junk Food   Dental Exam Up to date   Eye Exam Up to date   Exercise (times per week) 0 times per week   Current Exercise Activities Include None   Do you need help using the phone?  No   Are you deaf or do you have serious difficulty hearing?  No   Do you need help with transportation? No   Do you need help shopping? No   Do you need help preparing meals?  No   Do you need help with housework?  No   Do you need help with laundry? No   Do you need help taking your medications? No   Do you need help managing money? No   Do you ever drive or ride in a car without wearing a seat belt? No        Age-appropriate Screening Schedule:  Refer to the list below for future screening recommendations based on patient's age, sex and/or medical conditions. Orders for these recommended tests are listed in the plan section. The patient has been provided with a written plan.    Health Maintenance   Topic Date Due   • DXA SCAN  07/12/2018   • LIPID PANEL  09/30/2021   • INFLUENZA VACCINE  10/01/2021   • MAMMOGRAM  09/15/2022   • ZOSTER VACCINE  Completed   • PAP SMEAR  Discontinued   • TDAP/TD VACCINES  Discontinued              Assessment/Plan   CMS Preventative Services Quick Reference  Risk Factors Identified During Encounter  Inadequate Social Support, Isolation, Loneliness, Lack of Transportation, Financial Difficulties, or Caregiver Stress   Inactivity/Sedentary  Obesity/Overweight   The above risks/problems have been discussed with the patient.  Follow up actions/plans if indicated are seen below in the Assessment/Plan Section.  Pertinent information has been shared with the patient in the After Visit Summary.    Diagnoses and all orders for this visit:    1. Medicare annual wellness visit, subsequent (Primary)  -     Comprehensive Metabolic Panel  -     Lipid Panel  -     TSH Rfx On Abnormal To Free T4  -     Hemoglobin A1c    2. Encounter for osteoporosis screening in asymptomatic postmenopausal patient  -     DEXA Bone Density Axial; Future    3. Muscle spasm  -     tiZANidine (ZANAFLEX) 4 MG tablet; Take 1 tablet by mouth 2 (Two) Times a Day.  Dispense: 180 tablet; Refill: 3          I did tell the patient that I will refill her medications if she calls in.  Patient is up-to-date with her blood work for the year    Follow Up:   No follow-ups on file.     An After Visit Summary and PPPS were made available to the patient.

## 2021-09-28 NOTE — PATIENT INSTRUCTIONS
Calorie Counting for Weight Loss  Calories are units of energy. Your body needs a certain number of calories from food to keep going throughout the day. When you eat or drink more calories than your body needs, your body stores the extra calories mostly as fat. When you eat or drink fewer calories than your body needs, your body burns fat to get the energy it needs.  Calorie counting means keeping track of how many calories you eat and drink each day. Calorie counting can be helpful if you need to lose weight. If you eat fewer calories than your body needs, you should lose weight. Ask your health care provider what a healthy weight is for you.  For calorie counting to work, you will need to eat the right number of calories each day to lose a healthy amount of weight per week. A dietitian can help you figure out how many calories you need in a day and will suggest ways to reach your calorie goal.  · A healthy amount of weight to lose each week is usually 1-2 lb (0.5-0.9 kg). This usually means that your daily calorie intake should be reduced by 500-750 calories.  · Eating 1,200-1,500 calories a day can help most women lose weight.  · Eating 1,500-1,800 calories a day can help most men lose weight.  What do I need to know about calorie counting?  Work with your health care provider or dietitian to determine how many calories you should get each day. To meet your daily calorie goal, you will need to:  · Find out how many calories are in each food that you would like to eat. Try to do this before you eat.  · Decide how much of the food you plan to eat.  · Keep a food log. Do this by writing down what you ate and how many calories it had.  To successfully lose weight, it is important to balance calorie counting with a healthy lifestyle that includes regular activity.  Where do I find calorie information?    The number of calories in a food can be found on a Nutrition Facts label. If a food does not have a Nutrition Facts  label, try to look up the calories online or ask your dietitian for help.  Remember that calories are listed per serving. If you choose to have more than one serving of a food, you will have to multiply the calories per serving by the number of servings you plan to eat. For example, the label on a package of bread might say that a serving size is 1 slice and that there are 90 calories in a serving. If you eat 1 slice, you will have eaten 90 calories. If you eat 2 slices, you will have eaten 180 calories.  How do I keep a food log?  After each time that you eat, record the following in your food log as soon as possible:  · What you ate. Be sure to include toppings, sauces, and other extras on the food.  · How much you ate. This can be measured in cups, ounces, or number of items.  · How many calories were in each food and drink.  · The total number of calories in the food you ate.  Keep your food log near you, such as in a pocket-sized notebook or on an anibal or website on your mobile phone. Some programs will calculate calories for you and show you how many calories you have left to meet your daily goal.  What are some portion-control tips?  · Know how many calories are in a serving. This will help you know how many servings you can have of a certain food.  · Use a measuring cup to measure serving sizes. You could also try weighing out portions on a kitchen scale. With time, you will be able to estimate serving sizes for some foods.  · Take time to put servings of different foods on your favorite plates or in your favorite bowls and cups so you know what a serving looks like.  · Try not to eat straight from a food's packaging, such as from a bag or box. Eating straight from the package makes it hard to see how much you are eating and can lead to overeating. Put the amount you would like to eat in a cup or on a plate to make sure you are eating the right portion.  · Use smaller plates, glasses, and bowls for smaller  portions and to prevent overeating.  · Try not to multitask. For example, avoid watching TV or using your computer while eating. If it is time to eat, sit down at a table and enjoy your food. This will help you recognize when you are full. It will also help you be more mindful of what and how much you are eating.  What are tips for following this plan?  Reading food labels  · Check the calorie count compared with the serving size. The serving size may be smaller than what you are used to eating.  · Check the source of the calories. Try to choose foods that are high in protein, fiber, and vitamins, and low in saturated fat, trans fat, and sodium.  Shopping  · Read nutrition labels while you shop. This will help you make healthy decisions about which foods to buy.  · Pay attention to nutrition labels for low-fat or fat-free foods. These foods sometimes have the same number of calories or more calories than the full-fat versions. They also often have added sugar, starch, or salt to make up for flavor that was removed with the fat.  · Make a grocery list of lower-calorie foods and stick to it.  Cooking  · Try to cook your favorite foods in a healthier way. For example, try baking instead of frying.  · Use low-fat dairy products.  Meal planning  · Use more fruits and vegetables. One-half of your plate should be fruits and vegetables.  · Include lean proteins, such as chicken, turkey, and fish.  Lifestyle  Each week, aim to do one of the following:  · 150 minutes of moderate exercise, such as walking.  · 75 minutes of vigorous exercise, such as running.  General information  · Know how many calories are in the foods you eat most often. This will help you calculate calorie counts faster.  · Find a way of tracking calories that works for you. Get creative. Try different apps or programs if writing down calories does not work for you.  What foods should I eat?    · Eat nutritious foods. It is better to have a nutritious,  high-calorie food, such as an avocado, than a food with few nutrients, such as a bag of potato chips.  · Use your calories on foods and drinks that will fill you up and will not leave you hungry soon after eating.  ? Examples of foods that fill you up are nuts and nut butters, vegetables, lean proteins, and high-fiber foods such as whole grains. High-fiber foods are foods with more than 5 g of fiber per serving.  · Pay attention to calories in drinks. Low-calorie drinks include water and unsweetened drinks.  The items listed above may not be a complete list of foods and beverages you can eat. Contact a dietitian for more information.  What foods should I limit?  Limit foods or drinks that are not good sources of vitamins, minerals, or protein or that are high in unhealthy fats. These include:  · Candy.  · Other sweets.  · Sodas, specialty coffee drinks, alcohol, and juice.  The items listed above may not be a complete list of foods and beverages you should avoid. Contact a dietitian for more information.  How do I count calories when eating out?  · Pay attention to portions. Often, portions are much larger when eating out. Try these tips to keep portions smaller:  ? Consider sharing a meal instead of getting your own.  ? If you get your own meal, eat only half of it. Before you start eating, ask for a container and put half of your meal into it.  ? When available, consider ordering smaller portions from the menu instead of full portions.  · Pay attention to your food and drink choices. Knowing the way food is cooked and what is included with the meal can help you eat fewer calories.  ? If calories are listed on the menu, choose the lower-calorie options.  ? Choose dishes that include vegetables, fruits, whole grains, low-fat dairy products, and lean proteins.  ? Choose items that are boiled, broiled, grilled, or steamed. Avoid items that are buttered, battered, fried, or served with cream sauce. Items labeled as  crispy are usually fried, unless stated otherwise.  ? Choose water, low-fat milk, unsweetened iced tea, or other drinks without added sugar. If you want an alcoholic beverage, choose a lower-calorie option, such as a glass of wine or light beer.  ? Ask for dressings, sauces, and syrups on the side. These are usually high in calories, so you should limit the amount you eat.  ? If you want a salad, choose a garden salad and ask for grilled meats. Avoid extra toppings such as siddiqui, cheese, or fried items. Ask for the dressing on the side, or ask for olive oil and vinegar or lemon to use as dressing.  · Estimate how many servings of a food you are given. Knowing serving sizes will help you be aware of how much food you are eating at restaurants.  Where to find more information  · Centers for Disease Control and Prevention: www.cdc.gov  · [place]. Department of Agriculture: myplate.gov  Summary  · Calorie counting means keeping track of how many calories you eat and drink each day. If you eat fewer calories than your body needs, you should lose weight.  · A healthy amount of weight to lose per week is usually 1-2 lb (0.5-0.9 kg). This usually means reducing your daily calorie intake by 500-750 calories.  · The number of calories in a food can be found on a Nutrition Facts label. If a food does not have a Nutrition Facts label, try to look up the calories online or ask your dietitian for help.  · Use smaller plates, glasses, and bowls for smaller portions and to prevent overeating.  · Use your calories on foods and drinks that will fill you up and not leave you hungry shortly after a meal.  This information is not intended to replace advice given to you by your health care provider. Make sure you discuss any questions you have with your health care provider.  Document Revised: 01/28/2021 Document Reviewed: 01/28/2021  Elsevier Patient Education © 2021 [employer]

## 2021-09-29 LAB
ALBUMIN SERPL-MCNC: 4.6 G/DL (ref 3.5–5.2)
ALBUMIN/GLOB SERPL: 2.2 G/DL
ALP SERPL-CCNC: 72 U/L (ref 39–117)
ALT SERPL W P-5'-P-CCNC: 13 U/L (ref 1–33)
ANION GAP SERPL CALCULATED.3IONS-SCNC: 10.1 MMOL/L (ref 5–15)
AST SERPL-CCNC: 22 U/L (ref 1–32)
BILIRUB SERPL-MCNC: 0.2 MG/DL (ref 0–1.2)
BUN SERPL-MCNC: 21 MG/DL (ref 8–23)
BUN/CREAT SERPL: 24.7 (ref 7–25)
CALCIUM SPEC-SCNC: 9.6 MG/DL (ref 8.6–10.5)
CHLORIDE SERPL-SCNC: 105 MMOL/L (ref 98–107)
CHOLEST SERPL-MCNC: 148 MG/DL (ref 0–200)
CO2 SERPL-SCNC: 24.9 MMOL/L (ref 22–29)
CREAT SERPL-MCNC: 0.85 MG/DL (ref 0.57–1)
GFR SERPL CREATININE-BSD FRML MDRD: 66 ML/MIN/1.73
GLOBULIN UR ELPH-MCNC: 2.1 GM/DL
GLUCOSE SERPL-MCNC: 83 MG/DL (ref 65–99)
HBA1C MFR BLD: 5.4 % (ref 4.8–5.6)
HDLC SERPL-MCNC: 58 MG/DL (ref 40–60)
LDLC SERPL CALC-MCNC: 74 MG/DL (ref 0–100)
LDLC/HDLC SERPL: 1.27 {RATIO}
POTASSIUM SERPL-SCNC: 4.6 MMOL/L (ref 3.5–5.2)
PROT SERPL-MCNC: 6.7 G/DL (ref 6–8.5)
SODIUM SERPL-SCNC: 140 MMOL/L (ref 136–145)
TRIGL SERPL-MCNC: 83 MG/DL (ref 0–150)
TSH SERPL DL<=0.05 MIU/L-ACNC: 1.06 UIU/ML (ref 0.27–4.2)
VLDLC SERPL-MCNC: 16 MG/DL (ref 5–40)

## 2021-10-07 ENCOUNTER — TRANSCRIBE ORDERS (OUTPATIENT)
Dept: FAMILY MEDICINE CLINIC | Facility: CLINIC | Age: 71
End: 2021-10-07

## 2021-10-07 ENCOUNTER — TRANSCRIBE ORDERS (OUTPATIENT)
Dept: ADMINISTRATIVE | Facility: HOSPITAL | Age: 71
End: 2021-10-07

## 2021-10-07 DIAGNOSIS — Z12.31 VISIT FOR SCREENING MAMMOGRAM: Primary | ICD-10-CM

## 2021-10-08 ENCOUNTER — HOSPITAL ENCOUNTER (OUTPATIENT)
Dept: BONE DENSITY | Facility: HOSPITAL | Age: 71
Discharge: HOME | End: 2021-10-08
Admitting: FAMILY MEDICINE

## 2021-10-08 DIAGNOSIS — Z78.0 ENCOUNTER FOR OSTEOPOROSIS SCREENING IN ASYMPTOMATIC POSTMENOPAUSAL PATIENT: ICD-10-CM

## 2021-10-08 DIAGNOSIS — Z13.820 ENCOUNTER FOR OSTEOPOROSIS SCREENING IN ASYMPTOMATIC POSTMENOPAUSAL PATIENT: ICD-10-CM

## 2021-10-08 PROCEDURE — 77080 DXA BONE DENSITY AXIAL: CPT

## 2022-01-03 DIAGNOSIS — F41.9 ANXIETY: ICD-10-CM

## 2022-01-03 DIAGNOSIS — I10 ESSENTIAL HYPERTENSION: ICD-10-CM

## 2022-01-03 RX ORDER — LOSARTAN POTASSIUM 100 MG/1
100 TABLET ORAL DAILY
Qty: 90 TABLET | Refills: 0 | Status: SHIPPED | OUTPATIENT
Start: 2022-01-03 | End: 2022-01-04 | Stop reason: SDUPTHER

## 2022-01-03 RX ORDER — VENLAFAXINE HYDROCHLORIDE 150 MG/1
150 CAPSULE, EXTENDED RELEASE ORAL DAILY
Qty: 90 CAPSULE | Refills: 3 | Status: SHIPPED | OUTPATIENT
Start: 2022-01-03 | End: 2022-01-04 | Stop reason: SDUPTHER

## 2022-01-04 ENCOUNTER — HOSPITAL ENCOUNTER (OUTPATIENT)
Dept: MAMMOGRAPHY | Facility: HOSPITAL | Age: 72
Discharge: HOME | End: 2022-01-04
Admitting: NURSE PRACTITIONER

## 2022-01-04 DIAGNOSIS — I10 ESSENTIAL HYPERTENSION: ICD-10-CM

## 2022-01-04 DIAGNOSIS — E78.00 PURE HYPERCHOLESTEROLEMIA: ICD-10-CM

## 2022-01-04 DIAGNOSIS — Z12.31 VISIT FOR SCREENING MAMMOGRAM: ICD-10-CM

## 2022-01-04 DIAGNOSIS — F41.9 ANXIETY: ICD-10-CM

## 2022-01-04 PROCEDURE — 77063 BREAST TOMOSYNTHESIS BI: CPT

## 2022-01-04 PROCEDURE — 77063 BREAST TOMOSYNTHESIS BI: CPT | Performed by: RADIOLOGY

## 2022-01-04 PROCEDURE — 77067 SCR MAMMO BI INCL CAD: CPT | Performed by: RADIOLOGY

## 2022-01-04 PROCEDURE — 77067 SCR MAMMO BI INCL CAD: CPT

## 2022-01-04 RX ORDER — ATORVASTATIN CALCIUM 10 MG/1
10 TABLET, FILM COATED ORAL DAILY
Qty: 90 TABLET | Refills: 0 | Status: SHIPPED | OUTPATIENT
Start: 2022-01-04 | End: 2022-06-09 | Stop reason: SDUPTHER

## 2022-01-04 RX ORDER — VENLAFAXINE HYDROCHLORIDE 150 MG/1
150 CAPSULE, EXTENDED RELEASE ORAL DAILY
Qty: 90 CAPSULE | Refills: 0 | Status: SHIPPED | OUTPATIENT
Start: 2022-01-04 | End: 2022-04-01 | Stop reason: SDUPTHER

## 2022-01-04 RX ORDER — LOSARTAN POTASSIUM 100 MG/1
100 TABLET ORAL DAILY
Qty: 90 TABLET | Refills: 0 | Status: SHIPPED | OUTPATIENT
Start: 2022-01-04 | End: 2022-04-01 | Stop reason: SDUPTHER

## 2022-01-04 NOTE — TELEPHONE ENCOUNTER
Caller: Laura Churchill    Relationship: Self    Best call back number: [phone]    Requested Prescriptions:   Requested Prescriptions     Pending Prescriptions Disp Refills   • atorvastatin (LIPITOR) 10 MG tablet 90 tablet 3     Sig: Take 1 tablet by mouth Daily.   • losartan (Cozaar) 100 MG tablet 90 tablet 0     Sig: Take 1 tablet by mouth Daily.   • venlafaxine XR (EFFEXOR-XR) 150 MG 24 hr capsule 90 capsule 3     Sig: Take 1 capsule by mouth Daily.        Pharmacy where request should be sent: [facility] [address] [phone] [facility] [phone]      Additional details provided by patient: patient has 4 days left    Does the patient have less than a 3 day supply:  [] Yes  [x] No    Susan Duarte Rep   01/04/22 10:28 EST

## 2022-02-07 ENCOUNTER — HOSPITAL ENCOUNTER (OUTPATIENT)
Dept: MAMMOGRAPHY | Facility: HOSPITAL | Age: 72
Discharge: HOME | End: 2022-02-07
Admitting: RADIOLOGY

## 2022-02-07 DIAGNOSIS — R92.8 ABNORMAL MAMMOGRAM: ICD-10-CM

## 2022-02-07 PROCEDURE — G0279 TOMOSYNTHESIS, MAMMO: HCPCS | Performed by: RADIOLOGY

## 2022-02-07 PROCEDURE — G0279 TOMOSYNTHESIS, MAMMO: HCPCS

## 2022-02-07 PROCEDURE — 77066 DX MAMMO INCL CAD BI: CPT | Performed by: RADIOLOGY

## 2022-02-07 PROCEDURE — 77066 DX MAMMO INCL CAD BI: CPT

## 2022-04-01 DIAGNOSIS — F41.9 ANXIETY: ICD-10-CM

## 2022-04-01 DIAGNOSIS — I10 ESSENTIAL HYPERTENSION: ICD-10-CM

## 2022-04-01 RX ORDER — VENLAFAXINE HYDROCHLORIDE 150 MG/1
150 CAPSULE, EXTENDED RELEASE ORAL DAILY
Qty: 90 CAPSULE | Refills: 0 | Status: SHIPPED | OUTPATIENT
Start: 2022-04-01 | End: 2022-07-20 | Stop reason: SDUPTHER

## 2022-04-01 RX ORDER — LOSARTAN POTASSIUM 100 MG/1
100 TABLET ORAL DAILY
Qty: 90 TABLET | Refills: 0 | Status: SHIPPED | OUTPATIENT
Start: 2022-04-01 | End: 2022-08-11 | Stop reason: SDUPTHER

## 2022-04-01 RX ORDER — DICLOFENAC SODIUM 75 MG/1
75 TABLET, DELAYED RELEASE ORAL 2 TIMES DAILY
Qty: 60 TABLET | Refills: 0 | Status: SHIPPED | OUTPATIENT
Start: 2022-04-01 | End: 2022-08-17 | Stop reason: SDUPTHER

## 2022-04-01 NOTE — TELEPHONE ENCOUNTER
Caller: Luara Churchill    Relationship: Self    Best call back number: [phone]    Requested Prescriptions:   Requested Prescriptions     Pending Prescriptions Disp Refills   • diclofenac (VOLTAREN) 75 MG EC tablet 60 tablet 0     Sig: Take 1 tablet by mouth 2 (Two) Times a Day.   • venlafaxine XR (EFFEXOR-XR) 150 MG 24 hr capsule 90 capsule 0     Sig: Take 1 capsule by mouth Daily.   • losartan (Cozaar) 100 MG tablet 90 tablet 0     Sig: Take 1 tablet by mouth Daily.        Pharmacy where request should be sent: [facility] [address] [phone] [facility] [phone] FX     Does the patient have less than a 3 day supply:  [] Yes  [x] No    Susan Jarquin Rep   04/01/22 08:36 EDT

## 2022-05-27 ENCOUNTER — TELEMEDICINE (OUTPATIENT)
Dept: FAMILY MEDICINE CLINIC | Facility: CLINIC | Age: 72
End: 2022-05-27

## 2022-05-27 ENCOUNTER — TELEPHONE (OUTPATIENT)
Dept: FAMILY MEDICINE CLINIC | Facility: CLINIC | Age: 72
End: 2022-05-27

## 2022-05-27 DIAGNOSIS — R21 RASH: Primary | ICD-10-CM

## 2022-05-27 PROBLEM — S32.009K PSEUDOARTHROSIS OF LUMBAR SPINE: Status: ACTIVE | Noted: 2020-06-15

## 2022-05-27 PROCEDURE — 99213 OFFICE O/P EST LOW 20 MIN: CPT | Performed by: FAMILY MEDICINE

## 2022-05-27 NOTE — TELEPHONE ENCOUNTER
PATIENT HAS POISON IVY ON ARM, IT IS SPREADING.  SHE WOULD LIKE MEDICATION FOR THIS.     [facility]    PLEASE CALL [phone]

## 2022-06-09 DIAGNOSIS — E78.00 PURE HYPERCHOLESTEROLEMIA: ICD-10-CM

## 2022-06-09 RX ORDER — ATORVASTATIN CALCIUM 10 MG/1
10 TABLET, FILM COATED ORAL DAILY
Qty: 90 TABLET | Refills: 0 | Status: SHIPPED | OUTPATIENT
Start: 2022-06-09 | End: 2022-09-02 | Stop reason: SDUPTHER

## 2022-06-09 NOTE — TELEPHONE ENCOUNTER
Caller: Laura Churchill    Relationship: Self    Best call back number: [phone]    Requested Prescriptions:   Requested Prescriptions     Pending Prescriptions Disp Refills   • atorvastatin (LIPITOR) 10 MG tablet 90 tablet 0     Sig: Take 1 tablet by mouth Daily.        Pharmacy where request should be sent: [facility] - [address] [phone] [facility] [phone]      Additional details provided by patient: PATIENT HAS A COUPLE OF DAYS LEFT OF MEDICATION     Does the patient have less than a 3 day supply:  [x] Yes  [] No    Susan Giraldo Rep   06/09/22 09:19 EDT

## 2022-06-11 NOTE — ANESTHESIA POSTPROCEDURE EVALUATION
Patient: Laura Churcihll    Procedure Summary     Date:  07/21/20 Room / Location:   ED OR [address] [place] [address] ED OR    Anesthesia Start:  1222 Anesthesia Stop:  1607    Procedure:  L2-3 PLIF, exploration of L3-4 fusion, L2-4 fusion, L2 laminectomy (N/A Spine Lumbar) Diagnosis:       S/P lumbar fusion      Spinal stenosis, lumbar region, with neurogenic claudication      (S/P lumbar fusion [Z98.1])      (Spinal stenosis, lumbar region, with neurogenic claudication [M48.062])    Surgeon:  David Rider MD Provider:  Micky Patricia MD    Anesthesia Type:  general ASA Status:  3          Anesthesia Type: general    Vitals  Vitals Value Taken Time   /73 7/21/2020  4:06 PM   Temp 97.2 °F (36.2 °C) 7/21/2020  4:06 PM   Pulse 98 7/21/2020  4:06 PM   Resp 16 7/21/2020  4:06 PM   SpO2 99 % 7/21/2020  4:07 PM   Vitals shown include unvalidated device data.        Post Anesthesia Care and Evaluation    Patient location during evaluation: PACU  Patient participation: complete - patient participated  Level of consciousness: awake and alert  Pain management: adequate  Airway patency: patent  Anesthetic complications: No anesthetic complications  PONV Status: none  Cardiovascular status: acceptable  Respiratory status: acceptable  Hydration status: acceptable      
11-Jun-2022 00:17:04

## 2022-07-20 DIAGNOSIS — F41.9 ANXIETY: ICD-10-CM

## 2022-07-20 RX ORDER — VENLAFAXINE HYDROCHLORIDE 150 MG/1
150 CAPSULE, EXTENDED RELEASE ORAL DAILY
Qty: 90 CAPSULE | Refills: 0 | Status: SHIPPED | OUTPATIENT
Start: 2022-07-20 | End: 2022-07-29 | Stop reason: SDUPTHER

## 2022-07-20 NOTE — TELEPHONE ENCOUNTER
Caller: Larua Churchill    Relationship: Self    Best call back number: [phone]    Requested Prescriptions:   Requested Prescriptions     Pending Prescriptions Disp Refills   • venlafaxine XR (EFFEXOR-XR) 150 MG 24 hr capsule 90 capsule 0     Sig: Take 1 capsule by mouth Daily.        Pharmacy where request should be sent: [facility] [place] [address] & [facility] - [phone] [facility] [phone] FX     Additional details provided by patient:   Does the patient have less than a 3 day supply:  [x] Yes  [] No    Susan INFANTE Rep   07/20/22 09:59 EDT

## 2022-07-20 NOTE — TELEPHONE ENCOUNTER
Rx Refill Note  Requested Prescriptions     Pending Prescriptions Disp Refills   • venlafaxine XR (EFFEXOR-XR) 150 MG 24 hr capsule 90 capsule 0     Sig: Take 1 capsule by mouth Daily.      Last office visit with prescribing clinician: 9/28/2021      Next office visit with prescribing clinician: 9/30/2022            ELBERT FALCON MA  07/20/22, 11:11 EDT

## 2022-07-29 DIAGNOSIS — F41.9 ANXIETY: ICD-10-CM

## 2022-07-29 RX ORDER — VENLAFAXINE HYDROCHLORIDE 150 MG/1
150 CAPSULE, EXTENDED RELEASE ORAL DAILY
Qty: 90 CAPSULE | Refills: 0 | Status: SHIPPED | OUTPATIENT
Start: 2022-07-29 | End: 2022-07-29 | Stop reason: SDUPTHER

## 2022-07-29 RX ORDER — VENLAFAXINE HYDROCHLORIDE 150 MG/1
150 CAPSULE, EXTENDED RELEASE ORAL DAILY
Qty: 90 CAPSULE | Refills: 0 | Status: SHIPPED | OUTPATIENT
Start: 2022-07-29 | End: 2022-10-14 | Stop reason: SDUPTHER

## 2022-07-29 NOTE — TELEPHONE ENCOUNTER
Caller: [facility] - [place], [place] - [address] [place] [address] - [phone] [facility] [phone] FX    Relationship: Pharmacy    Best call back number: [phone]  Requested Prescriptions:   Requested Prescriptions     Pending Prescriptions Disp Refills   • venlafaxine XR (EFFEXOR-XR) 150 MG 24 hr capsule 90 capsule 0     Sig: Take 1 capsule by mouth Daily.        Pharmacy where request should be sent: [facility] - [place] - [address] [facility] - [phone] [facility] [phone] FX     Additional details provided by patient: REQUESTING 90 DAY SUPPLY WITH REFILLS       Does the patient have less than a 3 day supply:  [] Yes  [x] No    Susan Crockett Rep   07/29/22 14:37 EDT

## 2022-07-29 NOTE — TELEPHONE ENCOUNTER
Rx Refill Note  Requested Prescriptions     Pending Prescriptions Disp Refills   • venlafaxine XR (EFFEXOR-XR) 150 MG 24 hr capsule 90 capsule 0     Sig: Take 1 capsule by mouth Daily.      Last office visit with prescribing clinician: 9/28/2021      Next office visit with prescribing clinician: 9/30/2022            Valentina Ramirez  07/29/22, 16:09 EDT

## 2022-08-11 DIAGNOSIS — I10 ESSENTIAL HYPERTENSION: ICD-10-CM

## 2022-08-11 RX ORDER — LOSARTAN POTASSIUM 100 MG/1
100 TABLET ORAL DAILY
Qty: 90 TABLET | Refills: 0 | Status: SHIPPED | OUTPATIENT
Start: 2022-08-11 | End: 2022-11-10 | Stop reason: SDUPTHER

## 2022-08-11 NOTE — TELEPHONE ENCOUNTER
Caller: Laura Churchill    Relationship: Self    Best call back number: [phone]    Requested Prescriptions:   Requested Prescriptions     Pending Prescriptions Disp Refills   • losartan (Cozaar) 100 MG tablet 90 tablet 0     Sig: Take 1 tablet by mouth Daily.        Pharmacy where request should be sent: [facility] - [address] [phone] [facility] [phone] FX     Additional details provided by patient:     Does the patient have less than a 3 day supply:  [x] Yes  [] No    Susan Enamorado Rep   08/11/22 13:41 EDT

## 2022-08-11 NOTE — TELEPHONE ENCOUNTER
LOV: 9/28/21  NOV: NOT SCHEDULED     Do you authorize a refill or will the pt need to come in for an appointment?

## 2022-08-17 ENCOUNTER — OFFICE VISIT (OUTPATIENT)
Dept: FAMILY MEDICINE CLINIC | Facility: CLINIC | Age: 72
End: 2022-08-17

## 2022-08-17 ENCOUNTER — LAB (OUTPATIENT)
Dept: LAB | Facility: HOSPITAL | Age: 72
End: 2022-08-17

## 2022-08-17 VITALS
HEIGHT: 65 IN | SYSTOLIC BLOOD PRESSURE: 126 MMHG | DIASTOLIC BLOOD PRESSURE: 78 MMHG | WEIGHT: 208.6 LBS | OXYGEN SATURATION: 99 % | HEART RATE: 74 BPM | BODY MASS INDEX: 34.75 KG/M2 | TEMPERATURE: 97.6 F

## 2022-08-17 DIAGNOSIS — M19.90 ARTHRITIS: ICD-10-CM

## 2022-08-17 DIAGNOSIS — L29.9 ITCHING: ICD-10-CM

## 2022-08-17 DIAGNOSIS — R21 RASH: ICD-10-CM

## 2022-08-17 DIAGNOSIS — L29.9 ITCHING: Primary | ICD-10-CM

## 2022-08-17 LAB
ALBUMIN SERPL-MCNC: 4.4 G/DL (ref 3.5–5.2)
ALBUMIN/GLOB SERPL: 2.1 G/DL
ALP SERPL-CCNC: 74 U/L (ref 39–117)
ALT SERPL W P-5'-P-CCNC: 13 U/L (ref 1–33)
ANION GAP SERPL CALCULATED.3IONS-SCNC: 10.8 MMOL/L (ref 5–15)
AST SERPL-CCNC: 17 U/L (ref 1–32)
BILIRUB SERPL-MCNC: 0.3 MG/DL (ref 0–1.2)
BUN SERPL-MCNC: 14 MG/DL (ref 8–23)
BUN/CREAT SERPL: 17.7 (ref 7–25)
CALCIUM SPEC-SCNC: 9.1 MG/DL (ref 8.6–10.5)
CHLORIDE SERPL-SCNC: 103 MMOL/L (ref 98–107)
CO2 SERPL-SCNC: 26.2 MMOL/L (ref 22–29)
CREAT SERPL-MCNC: 0.79 MG/DL (ref 0.57–1)
EGFRCR SERPLBLD CKD-EPI 2021: 80.1 ML/MIN/1.73
GLOBULIN UR ELPH-MCNC: 2.1 GM/DL
GLUCOSE SERPL-MCNC: 99 MG/DL (ref 65–99)
POTASSIUM SERPL-SCNC: 4.2 MMOL/L (ref 3.5–5.2)
PROT SERPL-MCNC: 6.5 G/DL (ref 6–8.5)
SODIUM SERPL-SCNC: 140 MMOL/L (ref 136–145)

## 2022-08-17 PROCEDURE — 99213 OFFICE O/P EST LOW 20 MIN: CPT | Performed by: FAMILY MEDICINE

## 2022-08-17 PROCEDURE — 80053 COMPREHEN METABOLIC PANEL: CPT

## 2022-08-17 RX ORDER — DICLOFENAC SODIUM 75 MG/1
75 TABLET, DELAYED RELEASE ORAL 2 TIMES DAILY
Qty: 60 TABLET | Refills: 11 | Status: ON HOLD | OUTPATIENT
Start: 2022-08-17 | End: 2022-09-22

## 2022-08-17 RX ORDER — CLINDAMYCIN PHOSPHATE 10 MG/G
1 GEL TOPICAL 2 TIMES DAILY
Qty: 60 G | Refills: 1 | Status: ON HOLD | OUTPATIENT
Start: 2022-08-17 | End: 2022-09-22

## 2022-08-17 RX ORDER — FEXOFENADINE HYDROCHLORIDE 60 MG/1
60 TABLET, FILM COATED ORAL DAILY
Qty: 30 TABLET | Refills: 2 | Status: SHIPPED | OUTPATIENT
Start: 2022-08-17 | End: 2022-10-17 | Stop reason: SDUPTHER

## 2022-08-17 RX ORDER — FEXOFENADINE HYDROCHLORIDE 60 MG/1
60 TABLET, FILM COATED ORAL DAILY
Qty: 30 TABLET | Refills: 2 | Status: SHIPPED | OUTPATIENT
Start: 2022-08-17 | End: 2022-08-17

## 2022-08-17 RX ORDER — CLINDAMYCIN PHOSPHATE 10 MG/G
1 GEL TOPICAL 2 TIMES DAILY
Qty: 60 G | Refills: 1 | Status: SHIPPED | OUTPATIENT
Start: 2022-08-17 | End: 2022-08-17

## 2022-08-17 NOTE — PROGRESS NOTES
Follow Up Office Visit      Patient Name: Laura Churchill  : 1950   MRN: [id number]     Chief Complaint:    Chief Complaint   Patient presents with   • Rash     On right arm in bend. Taking benadryl, whole body itching.       History of Present Illness: Laura Churchill is a 71 y.o. female who is here today to follow up with red rash on right arm that started a week or two ago. Has been using HC on it. Rash itches. Has gotten bigger.  Patient does not have any lesions over her entire body causing itching.  She does not have any welts or hives.  Patient denies any major medication changes.  She did start turmeric and fish oil for aches and pains.  Patient says she stopped this recently and has not changed symptoms.    She does have a separate rash that does not related to the itching all over her body.  She has a rash on her right arm.  It is red.  It started out smaller and has enlarged.  It is itchy.  She denies any new soaps or detergents.  Has never had a lesion like this before.  Not tender      Review of systems positive for rash and itching      Physical exam: Patient's right arm has 2 separate lesions 1 below and 1 above the elbow.  Lesions are red and slightly indurated.  Well circumcised lesions that are fairly large.      Subjective        I have reviewed and the following portions of the patient's history were updated as appropriate: past family history, past medical history, past social history, past surgical history and problem list.    Medications:     Current Outpatient Medications:   •  atorvastatin (LIPITOR) 10 MG tablet, Take 1 tablet by mouth Daily., Disp: 90 tablet, Rfl: 0  •  B Complex Vitamins (VITAMIN B COMPLEX PO), Take 1 tablet by mouth Daily., Disp: , Rfl:   •  clindamycin (Clindagel) 1 % gel, Apply 1 application topically to the appropriate area as directed 2 (Two) Times a Day., Disp: 60 g, Rfl: 1  •  diclofenac (VOLTAREN) 75 MG EC tablet, Take 1 tablet by mouth 2 (Two)  "Times a Day., Disp: 60 tablet, Rfl: 11  •  fexofenadine (Allegra Allergy) 60 MG tablet, Take 1 tablet by mouth Daily., Disp: 30 tablet, Rfl: 2  •  losartan (Cozaar) 100 MG tablet, Take 1 tablet by mouth Daily., Disp: 90 tablet, Rfl: 0  •  triamcinolone (KENALOG) 0.1 % ointment, Apply 1 application topically to the appropriate area as directed 2 (Two) Times a Day., Disp: 80 g, Rfl: 1  •  venlafaxine XR (EFFEXOR-XR) 150 MG 24 hr capsule, Take 1 capsule by mouth Daily., Disp: 90 capsule, Rfl: 0  •  VITAMIN D PO, Take  by mouth., Disp: , Rfl:     Allergies:   Allergies   Allergen Reactions   • Codeine Hives   • Gabapentin Hallucinations     Screaming nightmares   • Sulfa Antibiotics Itching       Objective     Physical Exam: Please see above  Vital Signs:   Vitals:    08/17/22 1041   BP: 126/78   Pulse: 74   Temp: 97.6 °F (36.4 °C)   SpO2: 99%   Weight: 94.6 kg (208 lb 9.6 oz)   Height: 165.1 cm (65\")     Body mass index is 34.71 kg/m².          Assessment / Plan      Assessment/Plan:   Diagnoses and all orders for this visit:    1. Itching (Primary)  -     Comprehensive Metabolic Panel; Future  -     Discontinue: fexofenadine (Allegra Allergy) 60 MG tablet; Take 1 tablet by mouth Daily.  Dispense: 30 tablet; Refill: 2  -     fexofenadine (Allegra Allergy) 60 MG tablet; Take 1 tablet by mouth Daily.  Dispense: 30 tablet; Refill: 2    2. Rash  -     Discontinue: clindamycin (Clindagel) 1 % gel; Apply 1 application topically to the appropriate area as directed 2 (Two) Times a Day.  Dispense: 60 g; Refill: 1  -     clindamycin (Clindagel) 1 % gel; Apply 1 application topically to the appropriate area as directed 2 (Two) Times a Day.  Dispense: 60 g; Refill: 1    3. Arthritis  -     diclofenac (VOLTAREN) 75 MG EC tablet; Take 1 tablet by mouth 2 (Two) Times a Day.  Dispense: 60 tablet; Refill: 11    Can DC Voltaren in case it is causing itchiness.  Can restart as needed.  Will refill today in case patient wants to restart " it    Rash looks to be either strep infection or dermatitis.  Stop triamcinolone as it has worsened.  We will treat with clindamycin gel.  If mild improvement may treat with oral medication.  May also try Lotrisone in the future.  Recommend biopsy or follow-up with dermatology if it does not resolve    Itching all over her body could be related to liver enzymes being elevated.  Could also be an allergy to diclofenac or other drug allergy.  Could be new detergent or perfume that she is unaware of.  Recommend using Allegra to help with symptoms.  If symptoms persist would recommend follow-up with dermatology    Follow Up:   No follow-ups on file.    Julius Rivera,   [facility] [place]

## 2022-08-24 ENCOUNTER — TELEPHONE (OUTPATIENT)
Dept: FAMILY MEDICINE CLINIC | Facility: CLINIC | Age: 72
End: 2022-08-24

## 2022-09-02 DIAGNOSIS — E78.00 PURE HYPERCHOLESTEROLEMIA: ICD-10-CM

## 2022-09-02 RX ORDER — ATORVASTATIN CALCIUM 10 MG/1
10 TABLET, FILM COATED ORAL DAILY
Qty: 90 TABLET | Refills: 0 | Status: SHIPPED | OUTPATIENT
Start: 2022-09-02 | End: 2022-09-06 | Stop reason: SDUPTHER

## 2022-09-06 ENCOUNTER — TELEPHONE (OUTPATIENT)
Dept: FAMILY MEDICINE CLINIC | Facility: CLINIC | Age: 72
End: 2022-09-06

## 2022-09-06 DIAGNOSIS — E78.00 PURE HYPERCHOLESTEROLEMIA: ICD-10-CM

## 2022-09-06 RX ORDER — ATORVASTATIN CALCIUM 10 MG/1
10 TABLET, FILM COATED ORAL DAILY
Qty: 90 TABLET | Refills: 0 | Status: SHIPPED | OUTPATIENT
Start: 2022-09-06 | End: 2022-11-10 | Stop reason: SDUPTHER

## 2022-09-06 NOTE — TELEPHONE ENCOUNTER
Caller: Laura Churchill    Relationship to patient: Self    Best call back number: [phone]    Patient is needing: PATIENT STATED THAT atorvastatin (LIPITOR) 10 MG tablet WOULD NEED TO GO TO [facility] WITH A 90 DAY SUPPLY     PLEASE ADVISE

## 2022-09-09 ENCOUNTER — TELEPHONE (OUTPATIENT)
Dept: FAMILY MEDICINE CLINIC | Facility: CLINIC | Age: 72
End: 2022-09-09

## 2022-09-09 NOTE — TELEPHONE ENCOUNTER
Caller: Laura Churchill    Relationship to patient: Self    Best call back number: [phone]    Chief complaint: NCC    Type of visit: SUBSEQUENT MEDICARE WELLNESS    Requested date: 1ST WEEK OF November     If rescheduling, when is the original appointment: 9/30/22    Additional notes:PATIENT HAS SURGERY SCHEDULED ON 9/22/22

## 2022-09-19 ENCOUNTER — PRE-ADMISSION TESTING (OUTPATIENT)
Dept: PREADMISSION TESTING | Facility: HOSPITAL | Age: 72
End: 2022-09-19

## 2022-09-19 VITALS — WEIGHT: 208.34 LBS | HEIGHT: 65 IN | BODY MASS INDEX: 34.71 KG/M2

## 2022-09-19 LAB
ALBUMIN SERPL-MCNC: 4.3 G/DL (ref 3.5–5.2)
ALBUMIN/GLOB SERPL: 1.9 G/DL
ALP SERPL-CCNC: 75 U/L (ref 39–117)
ALT SERPL W P-5'-P-CCNC: 10 U/L (ref 1–33)
ANION GAP SERPL CALCULATED.3IONS-SCNC: 10 MMOL/L (ref 5–15)
AST SERPL-CCNC: 16 U/L (ref 1–32)
BILIRUB SERPL-MCNC: 0.3 MG/DL (ref 0–1.2)
BUN SERPL-MCNC: 17 MG/DL (ref 8–23)
BUN/CREAT SERPL: 19.1 (ref 7–25)
CALCIUM SPEC-SCNC: 9.2 MG/DL (ref 8.6–10.5)
CHLORIDE SERPL-SCNC: 108 MMOL/L (ref 98–107)
CO2 SERPL-SCNC: 25 MMOL/L (ref 22–29)
CREAT SERPL-MCNC: 0.89 MG/DL (ref 0.57–1)
DEPRECATED RDW RBC AUTO: 43.1 FL (ref 37–54)
EGFRCR SERPLBLD CKD-EPI 2021: 69.4 ML/MIN/1.73
ERYTHROCYTE [DISTWIDTH] IN BLOOD BY AUTOMATED COUNT: 12.5 % (ref 12.3–15.4)
GLOBULIN UR ELPH-MCNC: 2.3 GM/DL
GLUCOSE SERPL-MCNC: 102 MG/DL (ref 65–99)
HBA1C MFR BLD: 5.3 % (ref 4.8–5.6)
HCT VFR BLD AUTO: 37.3 % (ref 34–46.6)
HGB BLD-MCNC: 12.1 G/DL (ref 12–15.9)
MCH RBC QN AUTO: 31.1 PG (ref 26.6–33)
MCHC RBC AUTO-ENTMCNC: 32.4 G/DL (ref 31.5–35.7)
MCV RBC AUTO: 95.9 FL (ref 79–97)
PLATELET # BLD AUTO: 250 10*3/MM3 (ref 140–450)
PMV BLD AUTO: 9.1 FL (ref 6–12)
POTASSIUM SERPL-SCNC: 4 MMOL/L (ref 3.5–5.2)
PROT SERPL-MCNC: 6.6 G/DL (ref 6–8.5)
QT INTERVAL: 378 MS
QTC INTERVAL: 425 MS
RBC # BLD AUTO: 3.89 10*6/MM3 (ref 3.77–5.28)
SODIUM SERPL-SCNC: 143 MMOL/L (ref 136–145)
WBC NRBC COR # BLD: 7.36 10*3/MM3 (ref 3.4–10.8)

## 2022-09-19 PROCEDURE — 80053 COMPREHEN METABOLIC PANEL: CPT

## 2022-09-19 PROCEDURE — 85027 COMPLETE CBC AUTOMATED: CPT

## 2022-09-19 PROCEDURE — 93010 ELECTROCARDIOGRAM REPORT: CPT | Performed by: STUDENT IN AN ORGANIZED HEALTH CARE EDUCATION/TRAINING PROGRAM

## 2022-09-19 PROCEDURE — 83036 HEMOGLOBIN GLYCOSYLATED A1C: CPT

## 2022-09-19 PROCEDURE — 93005 ELECTROCARDIOGRAM TRACING: CPT

## 2022-09-19 PROCEDURE — 36415 COLL VENOUS BLD VENIPUNCTURE: CPT

## 2022-09-19 RX ORDER — LANOLIN ALCOHOL/MO/W.PET/CERES
1000 CREAM (GRAM) TOPICAL DAILY
COMMUNITY

## 2022-09-19 NOTE — PAT
Patient to apply Chlorhexadine wipes  to surgical area (as instructed) the night before procedure and the AM of procedure. Wipes provided.    Patient instructed to drink 20 ounces of Gatorade and it needs to be completed 1 hour (for Main OR patients) or 2 hours (scheduled  section & [facility] patients) before given arrival time for procedure (NO RED Gatorade)    Patient verbalized understanding.    Per Anesthesia Request, patient instructed not to take their ACE/ARB medications on the AM of surgery.    Discussed with patient options for receiving total joint replacement education and assessed patient's ability and preference. Joint Replacement Guide given to patient during PAT visit since not received a copy within the last year. Encouraged patient/family to read guide thoroughly and notify PAT staff with any questions or concerns. Handout provided directing patient to links to watch online videos related to joint replacement surgery on the [facility] website. The handout gives detailed instructions for joining an online joint replacement class through Zoom or phone conference offered on . Patient agreed to participate by watching videos online. Patient verbalized understanding of instructions and to complete the online learning tool survey. Encouraged to share information with family and/or . An overview of the joint replacement education was provided during the visit including general perioperative instructions that are routine for all surgical patients (PAT PASS, wipes, directions to pre-op, etc.).    Clean catch urinalysis not indicated because patient denied recent urinary frequency, urinary urgency, burning/pain upon urination, or flank pain. No recent UTIs.    It was noted during Pre Admission Testing that patient was wearing some form of fingernail polish (gel/regular) and/or acrylic/artificial nails.  Patient was told that polish and/or artificial nails must be removed for surgery.  If  a patient had recent manicure, and would rather not remove polish or artificial nails. Then the minimum requirement is that the polish/artificial nails must be removed from the middle finger on each hand.  Patient verbalized understanding.  If patient was having surgery on an upper extremity, then the patient was instructed that fingernail polish/artificial fingernails must be removed for surgery.  NO EXCEPTIONS.  Patient verbalized understanding.  If patient was having surgery on a lower extremity, then the patient was instructed that toenail polish on both extremities must be removed for surgery.  NO EXCEPTIONS. Patient verbalized understanding.

## 2022-09-21 ENCOUNTER — ANESTHESIA EVENT (OUTPATIENT)
Dept: PERIOP | Facility: HOSPITAL | Age: 72
End: 2022-09-21

## 2022-09-21 RX ORDER — FAMOTIDINE 10 MG/ML
20 INJECTION, SOLUTION INTRAVENOUS ONCE
Status: CANCELLED | OUTPATIENT
Start: 2022-09-21 | End: 2022-09-21

## 2022-09-21 RX ORDER — SODIUM CHLORIDE 0.9 % (FLUSH) 0.9 %
10 SYRINGE (ML) INJECTION EVERY 12 HOURS SCHEDULED
Status: CANCELLED | OUTPATIENT
Start: 2022-09-21

## 2022-09-21 RX ORDER — SODIUM CHLORIDE 0.9 % (FLUSH) 0.9 %
10 SYRINGE (ML) INJECTION AS NEEDED
Status: CANCELLED | OUTPATIENT
Start: 2022-09-21

## 2022-09-22 ENCOUNTER — ANESTHESIA EVENT CONVERTED (OUTPATIENT)
Dept: ANESTHESIOLOGY | Facility: HOSPITAL | Age: 72
End: 2022-09-22

## 2022-09-22 ENCOUNTER — APPOINTMENT (OUTPATIENT)
Dept: GENERAL RADIOLOGY | Facility: HOSPITAL | Age: 72
End: 2022-09-22

## 2022-09-22 ENCOUNTER — ANESTHESIA (OUTPATIENT)
Dept: PERIOP | Facility: HOSPITAL | Age: 72
End: 2022-09-22

## 2022-09-22 ENCOUNTER — HOSPITAL ENCOUNTER (OUTPATIENT)
Facility: HOSPITAL | Age: 72
Discharge: HOME | End: 2022-09-23
Attending: ORTHOPAEDIC SURGERY | Admitting: ORTHOPAEDIC SURGERY

## 2022-09-22 DIAGNOSIS — Z96.651 STATUS POST RIGHT KNEE REPLACEMENT: Primary | ICD-10-CM

## 2022-09-22 PROBLEM — M17.11 PRIMARY LOCALIZED OSTEOARTHRITIS OF RIGHT KNEE: Status: ACTIVE | Noted: 2022-09-22

## 2022-09-22 PROBLEM — E66.9 OBESITY: Status: ACTIVE | Noted: 2022-09-22

## 2022-09-22 PROCEDURE — 25010000002 DEXAMETHASONE PER 1 MG: Performed by: ANESTHESIOLOGY

## 2022-09-22 PROCEDURE — 25010000002 ROPIVACAINE PER 1 MG: Performed by: ORTHOPAEDIC SURGERY

## 2022-09-22 PROCEDURE — 25010000002 DROPERIDOL PER 5 MG

## 2022-09-22 PROCEDURE — 25010000002 CEFAZOLIN IN DEXTROSE 2-4 GM/100ML-% SOLUTION: Performed by: ORTHOPAEDIC SURGERY

## 2022-09-22 PROCEDURE — A9270 NON-COVERED ITEM OR SERVICE: HCPCS | Performed by: INTERNAL MEDICINE

## 2022-09-22 PROCEDURE — C1776 JOINT DEVICE (IMPLANTABLE): HCPCS | Performed by: ORTHOPAEDIC SURGERY

## 2022-09-22 PROCEDURE — 25010000002 PROPOFOL 10 MG/ML EMULSION: Performed by: ANESTHESIOLOGY

## 2022-09-22 PROCEDURE — C1755 CATHETER, INTRASPINAL: HCPCS | Performed by: ORTHOPAEDIC SURGERY

## 2022-09-22 PROCEDURE — C1889 IMPLANT/INSERT DEVICE, NOC: HCPCS | Performed by: ORTHOPAEDIC SURGERY

## 2022-09-22 PROCEDURE — 63710000001 ACETAMINOPHEN 500 MG TABLET: Performed by: ORTHOPAEDIC SURGERY

## 2022-09-22 PROCEDURE — 73560 X-RAY EXAM OF KNEE 1 OR 2: CPT

## 2022-09-22 PROCEDURE — 25010000002 ROPIVACAINE PER 1 MG: Performed by: NURSE ANESTHETIST, CERTIFIED REGISTERED

## 2022-09-22 PROCEDURE — 25010000002 ONDANSETRON PER 1 MG: Performed by: ANESTHESIOLOGY

## 2022-09-22 PROCEDURE — C1713 ANCHOR/SCREW BN/BN,TIS/BN: HCPCS | Performed by: ORTHOPAEDIC SURGERY

## 2022-09-22 PROCEDURE — A9270 NON-COVERED ITEM OR SERVICE: HCPCS | Performed by: ORTHOPAEDIC SURGERY

## 2022-09-22 PROCEDURE — 0 MORPHINE PER 10 MG: Performed by: ORTHOPAEDIC SURGERY

## 2022-09-22 PROCEDURE — 63710000001 LOSARTAN 50 MG TABLET: Performed by: INTERNAL MEDICINE

## 2022-09-22 DEVICE — LEGION POSTERIOR STABILIZED                                    OXINIUM FEMORAL SIZE 5 RIGHT
Type: IMPLANTABLE DEVICE | Site: KNEE | Status: FUNCTIONAL
Brand: LEGION

## 2022-09-22 DEVICE — GENESIS II NON-POROUS TIBIAL                                    BASEPLATE SIZE 4 RIGHT
Type: IMPLANTABLE DEVICE | Site: KNEE | Status: FUNCTIONAL
Brand: GENESIS II

## 2022-09-22 DEVICE — DEV CONTRL TISS STRATAFIX SYMM PDS PLUS VIL CT-1 45CM: Type: IMPLANTABLE DEVICE | Site: KNEE | Status: FUNCTIONAL

## 2022-09-22 DEVICE — IMPLANTABLE DEVICE: Type: IMPLANTABLE DEVICE | Site: KNEE | Status: FUNCTIONAL

## 2022-09-22 DEVICE — GEN II 7.5MM RESUR PAT 32MM
Type: IMPLANTABLE DEVICE | Site: KNEE | Status: FUNCTIONAL
Brand: GENESIS II

## 2022-09-22 DEVICE — DEV CONTRL TISS STRATAFIX SPIRAL PGPCL 2/0FS 30X30CM: Type: IMPLANTABLE DEVICE | Site: KNEE | Status: FUNCTIONAL

## 2022-09-22 DEVICE — PALACOS® R IS A FAST-CURING, RADIOPAQUE, POLY(METHYL METHACRYLATE)-BASED BONE CEMENT.PALACOS ® R CONTAINS THE X-RAY CONTRAST MEDIUM ZIRCONIUM DIOXIDE. TO IMPROVE VISIBILITY IN THE SURGICAL FIELD PALACOS ® R HAS BEEN COLOURED WITH CHLOROPHYLL (E141). THE BONE CEMENT IS PREPARED DIRECTLY BEFORE USE BY MIXING A POLYMER POWDER COMPONENT WITH A LIQUID MONOMER COMPONENT. A DUCTILE DOUGH FORMS WHICH CURES WITHIN A FEW MINUTES.
Type: IMPLANTABLE DEVICE | Site: KNEE | Status: FUNCTIONAL
Brand: PALACOS®

## 2022-09-22 DEVICE — LEGION PS HIGH FLEX XLPE SZ 3-4 10MM
Type: IMPLANTABLE DEVICE | Site: KNEE | Status: FUNCTIONAL
Brand: LEGION

## 2022-09-22 RX ORDER — DOCUSATE SODIUM 100 MG/1
100 CAPSULE, LIQUID FILLED ORAL 2 TIMES DAILY PRN
Status: DISCONTINUED | OUTPATIENT
Start: 2022-09-22 | End: 2022-09-23 | Stop reason: HOSPADM

## 2022-09-22 RX ORDER — OXYCODONE HYDROCHLORIDE 5 MG/1
10 TABLET ORAL EVERY 4 HOURS PRN
Status: DISCONTINUED | OUTPATIENT
Start: 2022-09-22 | End: 2022-09-23 | Stop reason: HOSPADM

## 2022-09-22 RX ORDER — MELOXICAM 15 MG/1
15 TABLET ORAL ONCE
Status: COMPLETED | OUTPATIENT
Start: 2022-09-22 | End: 2022-09-22

## 2022-09-22 RX ORDER — LABETALOL HYDROCHLORIDE 5 MG/ML
5 INJECTION, SOLUTION INTRAVENOUS
Status: DISCONTINUED | OUTPATIENT
Start: 2022-09-22 | End: 2022-09-22

## 2022-09-22 RX ORDER — DROPERIDOL 2.5 MG/ML
INJECTION, SOLUTION INTRAMUSCULAR; INTRAVENOUS
Status: COMPLETED
Start: 2022-09-22 | End: 2022-09-22

## 2022-09-22 RX ORDER — KETOROLAC TROMETHAMINE 30 MG/ML
15 INJECTION, SOLUTION INTRAMUSCULAR; INTRAVENOUS EVERY 6 HOURS PRN
Status: DISCONTINUED | OUTPATIENT
Start: 2022-09-22 | End: 2022-09-23 | Stop reason: HOSPADM

## 2022-09-22 RX ORDER — ONDANSETRON 4 MG/1
4 TABLET, FILM COATED ORAL EVERY 6 HOURS PRN
Status: DISCONTINUED | OUTPATIENT
Start: 2022-09-22 | End: 2022-09-23 | Stop reason: HOSPADM

## 2022-09-22 RX ORDER — ATORVASTATIN CALCIUM 10 MG/1
10 TABLET, FILM COATED ORAL DAILY
Status: DISCONTINUED | OUTPATIENT
Start: 2022-09-23 | End: 2022-09-23 | Stop reason: HOSPADM

## 2022-09-22 RX ORDER — ONDANSETRON 2 MG/ML
4 INJECTION INTRAMUSCULAR; INTRAVENOUS ONCE AS NEEDED
Status: DISCONTINUED | OUTPATIENT
Start: 2022-09-22 | End: 2022-09-22

## 2022-09-22 RX ORDER — ACETAMINOPHEN 500 MG
1000 TABLET ORAL EVERY 6 HOURS
Status: DISCONTINUED | OUTPATIENT
Start: 2022-09-22 | End: 2022-09-23 | Stop reason: HOSPADM

## 2022-09-22 RX ORDER — LOSARTAN POTASSIUM 50 MG/1
100 TABLET ORAL DAILY
Status: DISCONTINUED | OUTPATIENT
Start: 2022-09-22 | End: 2022-09-23 | Stop reason: HOSPADM

## 2022-09-22 RX ORDER — DIPHENHYDRAMINE HYDROCHLORIDE 50 MG/ML
25 INJECTION INTRAMUSCULAR; INTRAVENOUS EVERY 6 HOURS PRN
Status: DISCONTINUED | OUTPATIENT
Start: 2022-09-22 | End: 2022-09-23 | Stop reason: HOSPADM

## 2022-09-22 RX ORDER — HYDROMORPHONE HCL 110MG/55ML
0.5 PATIENT CONTROLLED ANALGESIA SYRINGE INTRAVENOUS
Status: DISCONTINUED | OUTPATIENT
Start: 2022-09-22 | End: 2022-09-23 | Stop reason: HOSPADM

## 2022-09-22 RX ORDER — DEXAMETHASONE SODIUM PHOSPHATE 4 MG/ML
INJECTION, SOLUTION INTRA-ARTICULAR; INTRALESIONAL; INTRAMUSCULAR; INTRAVENOUS; SOFT TISSUE AS NEEDED
Status: DISCONTINUED | OUTPATIENT
Start: 2022-09-22 | End: 2022-09-22 | Stop reason: SURG

## 2022-09-22 RX ORDER — LIDOCAINE HYDROCHLORIDE 10 MG/ML
INJECTION, SOLUTION EPIDURAL; INFILTRATION; INTRACAUDAL; PERINEURAL AS NEEDED
Status: DISCONTINUED | OUTPATIENT
Start: 2022-09-22 | End: 2022-09-22 | Stop reason: SURG

## 2022-09-22 RX ORDER — VENLAFAXINE HYDROCHLORIDE 75 MG/1
150 CAPSULE, EXTENDED RELEASE ORAL DAILY
Status: DISCONTINUED | OUTPATIENT
Start: 2022-09-23 | End: 2022-09-23 | Stop reason: HOSPADM

## 2022-09-22 RX ORDER — BUPIVACAINE HYDROCHLORIDE 2.5 MG/ML
INJECTION, SOLUTION EPIDURAL; INFILTRATION; INTRACAUDAL
Status: COMPLETED | OUTPATIENT
Start: 2022-09-22 | End: 2022-09-22

## 2022-09-22 RX ORDER — FAMOTIDINE 20 MG/1
20 TABLET, FILM COATED ORAL ONCE
Status: COMPLETED | OUTPATIENT
Start: 2022-09-22 | End: 2022-09-22

## 2022-09-22 RX ORDER — LABETALOL HYDROCHLORIDE 5 MG/ML
10 INJECTION, SOLUTION INTRAVENOUS EVERY 4 HOURS PRN
Status: DISCONTINUED | OUTPATIENT
Start: 2022-09-22 | End: 2022-09-23 | Stop reason: HOSPADM

## 2022-09-22 RX ORDER — CEFAZOLIN SODIUM 2 G/100ML
2 INJECTION, SOLUTION INTRAVENOUS ONCE
Status: COMPLETED | OUTPATIENT
Start: 2022-09-22 | End: 2022-09-22

## 2022-09-22 RX ORDER — TRANEXAMIC ACID 10 MG/ML
1000 INJECTION, SOLUTION INTRAVENOUS ONCE
Status: COMPLETED | OUTPATIENT
Start: 2022-09-22 | End: 2022-09-22

## 2022-09-22 RX ORDER — PREGABALIN 75 MG/1
75 CAPSULE ORAL ONCE
Status: DISCONTINUED | OUTPATIENT
Start: 2022-09-22 | End: 2022-09-22 | Stop reason: HOSPADM

## 2022-09-22 RX ORDER — AMOXICILLIN 250 MG
2 CAPSULE ORAL 2 TIMES DAILY PRN
Status: DISCONTINUED | OUTPATIENT
Start: 2022-09-22 | End: 2022-09-23 | Stop reason: HOSPADM

## 2022-09-22 RX ORDER — SODIUM CHLORIDE, SODIUM LACTATE, POTASSIUM CHLORIDE, CALCIUM CHLORIDE 600; 310; 30; 20 MG/100ML; MG/100ML; MG/100ML; MG/100ML
9 INJECTION, SOLUTION INTRAVENOUS CONTINUOUS
Status: DISCONTINUED | OUTPATIENT
Start: 2022-09-22 | End: 2022-09-23 | Stop reason: HOSPADM

## 2022-09-22 RX ORDER — BISACODYL 5 MG/1
10 TABLET, DELAYED RELEASE ORAL DAILY PRN
Status: DISCONTINUED | OUTPATIENT
Start: 2022-09-22 | End: 2022-09-23 | Stop reason: HOSPADM

## 2022-09-22 RX ORDER — BISACODYL 10 MG
10 SUPPOSITORY, RECTAL RECTAL DAILY PRN
Status: DISCONTINUED | OUTPATIENT
Start: 2022-09-22 | End: 2022-09-23 | Stop reason: HOSPADM

## 2022-09-22 RX ORDER — MAGNESIUM HYDROXIDE 1200 MG/15ML
LIQUID ORAL AS NEEDED
Status: DISCONTINUED | OUTPATIENT
Start: 2022-09-22 | End: 2022-09-22 | Stop reason: HOSPADM

## 2022-09-22 RX ORDER — MIDAZOLAM HYDROCHLORIDE 1 MG/ML
0.5 INJECTION INTRAMUSCULAR; INTRAVENOUS
Status: DISCONTINUED | OUTPATIENT
Start: 2022-09-22 | End: 2022-09-22 | Stop reason: HOSPADM

## 2022-09-22 RX ORDER — MELOXICAM 15 MG/1
15 TABLET ORAL DAILY
Status: DISCONTINUED | OUTPATIENT
Start: 2022-09-23 | End: 2022-09-23 | Stop reason: HOSPADM

## 2022-09-22 RX ORDER — CEFAZOLIN SODIUM 2 G/100ML
2 INJECTION, SOLUTION INTRAVENOUS EVERY 8 HOURS
Status: COMPLETED | OUTPATIENT
Start: 2022-09-22 | End: 2022-09-23

## 2022-09-22 RX ORDER — DROPERIDOL 2.5 MG/ML
0.62 INJECTION, SOLUTION INTRAMUSCULAR; INTRAVENOUS ONCE
Status: COMPLETED | OUTPATIENT
Start: 2022-09-22 | End: 2022-09-22

## 2022-09-22 RX ORDER — ASPIRIN 325 MG
325 TABLET, DELAYED RELEASE (ENTERIC COATED) ORAL DAILY
Status: DISCONTINUED | OUTPATIENT
Start: 2022-09-23 | End: 2022-09-23 | Stop reason: HOSPADM

## 2022-09-22 RX ORDER — PROPOFOL 10 MG/ML
VIAL (ML) INTRAVENOUS AS NEEDED
Status: DISCONTINUED | OUTPATIENT
Start: 2022-09-22 | End: 2022-09-22 | Stop reason: SURG

## 2022-09-22 RX ORDER — ONDANSETRON 2 MG/ML
INJECTION INTRAMUSCULAR; INTRAVENOUS AS NEEDED
Status: DISCONTINUED | OUTPATIENT
Start: 2022-09-22 | End: 2022-09-22 | Stop reason: SURG

## 2022-09-22 RX ORDER — ROPIVACAINE HYDROCHLORIDE 2 MG/ML
INJECTION, SOLUTION EPIDURAL; INFILTRATION; PERINEURAL CONTINUOUS
Status: DISCONTINUED | OUTPATIENT
Start: 2022-09-22 | End: 2022-09-23 | Stop reason: HOSPADM

## 2022-09-22 RX ORDER — BUPIVACAINE HYDROCHLORIDE 5 MG/ML
INJECTION, SOLUTION PERINEURAL
Status: COMPLETED | OUTPATIENT
Start: 2022-09-22 | End: 2022-09-22

## 2022-09-22 RX ORDER — NALOXONE HCL 0.4 MG/ML
0.1 VIAL (ML) INJECTION
Status: DISCONTINUED | OUTPATIENT
Start: 2022-09-22 | End: 2022-09-23 | Stop reason: HOSPADM

## 2022-09-22 RX ORDER — LIDOCAINE HYDROCHLORIDE 10 MG/ML
0.5 INJECTION, SOLUTION EPIDURAL; INFILTRATION; INTRACAUDAL; PERINEURAL ONCE AS NEEDED
Status: COMPLETED | OUTPATIENT
Start: 2022-09-22 | End: 2022-09-22

## 2022-09-22 RX ORDER — ONDANSETRON 2 MG/ML
4 INJECTION INTRAMUSCULAR; INTRAVENOUS EVERY 6 HOURS PRN
Status: DISCONTINUED | OUTPATIENT
Start: 2022-09-22 | End: 2022-09-23 | Stop reason: HOSPADM

## 2022-09-22 RX ORDER — OXYCODONE HYDROCHLORIDE 5 MG/1
5 TABLET ORAL EVERY 4 HOURS PRN
Status: DISCONTINUED | OUTPATIENT
Start: 2022-09-22 | End: 2022-09-23 | Stop reason: HOSPADM

## 2022-09-22 RX ORDER — SODIUM CHLORIDE 9 MG/ML
100 INJECTION, SOLUTION INTRAVENOUS CONTINUOUS
Status: DISCONTINUED | OUTPATIENT
Start: 2022-09-22 | End: 2022-09-23 | Stop reason: HOSPADM

## 2022-09-22 RX ORDER — PROPOFOL 10 MG/ML
VIAL (ML) INTRAVENOUS CONTINUOUS PRN
Status: DISCONTINUED | OUTPATIENT
Start: 2022-09-22 | End: 2022-09-22

## 2022-09-22 RX ORDER — FENTANYL CITRATE 50 UG/ML
50 INJECTION, SOLUTION INTRAMUSCULAR; INTRAVENOUS
Status: DISCONTINUED | OUTPATIENT
Start: 2022-09-22 | End: 2022-09-22

## 2022-09-22 RX ORDER — ACETAMINOPHEN 500 MG
1000 TABLET ORAL ONCE
Status: COMPLETED | OUTPATIENT
Start: 2022-09-22 | End: 2022-09-22

## 2022-09-22 RX ORDER — DIPHENHYDRAMINE HCL 25 MG
25 CAPSULE ORAL EVERY 6 HOURS PRN
Status: DISCONTINUED | OUTPATIENT
Start: 2022-09-22 | End: 2022-09-23 | Stop reason: HOSPADM

## 2022-09-22 RX ADMIN — LIDOCAINE HYDROCHLORIDE 50 MG: 10 INJECTION, SOLUTION EPIDURAL; INFILTRATION; INTRACAUDAL; PERINEURAL at 14:08

## 2022-09-22 RX ADMIN — LOSARTAN POTASSIUM 100 MG: 50 TABLET, FILM COATED ORAL at 18:32

## 2022-09-22 RX ADMIN — FAMOTIDINE 20 MG: 20 TABLET ORAL at 13:28

## 2022-09-22 RX ADMIN — SODIUM CHLORIDE, POTASSIUM CHLORIDE, SODIUM LACTATE AND CALCIUM CHLORIDE 9 ML/HR: 600; 310; 30; 20 INJECTION, SOLUTION INTRAVENOUS at 13:10

## 2022-09-22 RX ADMIN — TRANEXAMIC ACID 1000 MG: 10 INJECTION, SOLUTION INTRAVENOUS at 14:21

## 2022-09-22 RX ADMIN — ACETAMINOPHEN 1000 MG: 500 TABLET, FILM COATED ORAL at 13:28

## 2022-09-22 RX ADMIN — CEFAZOLIN SODIUM 2 G: 2 INJECTION, SOLUTION INTRAVENOUS at 21:16

## 2022-09-22 RX ADMIN — CEFAZOLIN SODIUM 2 G: 2 INJECTION, SOLUTION INTRAVENOUS at 14:17

## 2022-09-22 RX ADMIN — TRANEXAMIC ACID 1000 MG: 10 INJECTION, SOLUTION INTRAVENOUS at 15:12

## 2022-09-22 RX ADMIN — ONDANSETRON 4 MG: 2 INJECTION INTRAMUSCULAR; INTRAVENOUS at 15:12

## 2022-09-22 RX ADMIN — PROPOFOL 50 MG: 10 INJECTION, EMULSION INTRAVENOUS at 14:29

## 2022-09-22 RX ADMIN — PROPOFOL 100 MCG/KG/MIN: 10 INJECTION, EMULSION INTRAVENOUS at 14:17

## 2022-09-22 RX ADMIN — DROPERIDOL 0.62 MG: 2.5 INJECTION, SOLUTION INTRAMUSCULAR; INTRAVENOUS at 16:50

## 2022-09-22 RX ADMIN — PROPOFOL 50 MG: 10 INJECTION, EMULSION INTRAVENOUS at 14:08

## 2022-09-22 RX ADMIN — PROPOFOL 50 MG: 10 INJECTION, EMULSION INTRAVENOUS at 14:11

## 2022-09-22 RX ADMIN — PROPOFOL 50 MG: 10 INJECTION, EMULSION INTRAVENOUS at 14:20

## 2022-09-22 RX ADMIN — SODIUM CHLORIDE 100 ML/HR: 9 INJECTION, SOLUTION INTRAVENOUS at 18:34

## 2022-09-22 RX ADMIN — BUPIVACAINE HYDROCHLORIDE 20 ML: 2.5 INJECTION, SOLUTION EPIDURAL; INFILTRATION; INTRACAUDAL at 15:49

## 2022-09-22 RX ADMIN — LIDOCAINE HYDROCHLORIDE 0.2 ML: 10 INJECTION, SOLUTION EPIDURAL; INFILTRATION; INTRACAUDAL; PERINEURAL at 13:10

## 2022-09-22 RX ADMIN — MELOXICAM 15 MG: 15 TABLET ORAL at 13:29

## 2022-09-22 RX ADMIN — ACETAMINOPHEN 1000 MG: 500 TABLET, FILM COATED ORAL at 21:16

## 2022-09-22 RX ADMIN — BUPIVACAINE HYDROCHLORIDE 1.8 ML: 5 INJECTION, SOLUTION PERINEURAL at 14:13

## 2022-09-22 RX ADMIN — Medication 1000 MG: at 15:37

## 2022-09-22 RX ADMIN — DEXAMETHASONE SODIUM PHOSPHATE 8 MG: 4 INJECTION, SOLUTION INTRA-ARTICULAR; INTRALESIONAL; INTRAMUSCULAR; INTRAVENOUS; SOFT TISSUE at 14:17

## 2022-09-22 NOTE — ANESTHESIA POSTPROCEDURE EVALUATION
Patient: Laura Churchill    Procedure Summary     Date: 09/22/22 Room / Location:  [facility] OR  /  [facility] OR    Anesthesia Start: 1404 Anesthesia Stop:     Procedure: RIGHT TOTAL KNEE ARTHROPLASTY (Right Knee) Diagnosis:       Primary localized osteoarthritis of right knee      (right knee osteoarthritis)    Surgeons: Houston Castano MD Provider: Braulio Campos MD    Anesthesia Type: regional, spinal ASA Status: 3          Anesthesia Type: regional, spinal    Vitals  Vitals Value Taken Time   /81 09/22/22 1550   Temp 97.2 °F (36.2 °C) 09/22/22 1538   Pulse 69 09/22/22 1554   Resp 16 09/22/22 1538   SpO2 99 % 09/22/22 1554   Vitals shown include unvalidated device data.        Post Anesthesia Care and Evaluation    Patient location during evaluation: PACU  Patient participation: complete - patient participated  Level of consciousness: awake and alert  Pain score: 0  Pain management: adequate    Airway patency: patent  Anesthetic complications: No anesthetic complications  PONV Status: none  Cardiovascular status: hemodynamically stable and acceptable  Respiratory status: nonlabored ventilation, acceptable and nasal cannula  Hydration status: acceptable    Comments: Pt transferred to PACU with O2. Vital signs stable. Report to PACU RN and care accepted.

## 2022-09-22 NOTE — H&P
Patient Name: Laura Churchill  MRN: [id number]  : 1950  DOS: 2022    Attending: Houston Castano,*    Primary Care Provider: Julius Rivera DO      Chief complaint: Right knee Pain.    Subjective   Patient is a pleasant 71 y.o. female presented for scheduled surgery by Dr. Castano.    Per his note ( Laura is a very pleasant 71-year-old female who has struggled with end-stage activity limiting right knee pain secondary to osteoarthritis.  X-rays in  revealed severe tricompartmental degenerative changes in a varus knee with complete loss of medial joint space and marginal osteophyte formation.  They have failed exhaustive conservative treatment measures including cortisone injections and physical therapy.  History of a left knee replacement in  and a left shoulder replacement in .  She takes Tylenol for her knee pain.  No relief with a cortisone injection in .  After undergoing a shared decision-making process they agreed to proceed with right total knee arthroplasty.  Surgical consent form was signed.).    She underwent right total knee arthroplasty under spinal anesthesia, tolerated surgery well, is admitted for further management.  Adductor canal nerve block catheter to infuse pump was placed by acute pain service.    Seen in her room at surgery, doing fairly well, no complaints of nausea, vomiting, or shortness of breath.    No history of DVT or PE.    Patient is known to me from prior hospitalization in 2020 when she had left total knee arthroplasty.    She has previously also underwent left total shoulder arthroplasty in , and had lumbar spinal surgery including fusion by Dr. Rider in 2020.      Allergies   Allergen Reactions   • Codeine Hives   • Gabapentin Hallucinations     Screaming nightmares   • Sulfa Antibiotics Itching       Meds:  Medications Prior to Admission   Medication Sig Dispense Refill Last Dose   • atorvastatin (LIPITOR) 10 MG tablet Take 1  tablet by mouth Daily. 90 tablet 0 9/22/2022 at 1100   • fexofenadine (Allegra Allergy) 60 MG tablet Take 1 tablet by mouth Daily. 30 tablet 2 9/22/2022 at 1100   • losartan (Cozaar) 100 MG tablet Take 1 tablet by mouth Daily. 90 tablet 0 9/21/2022 at Unknown time   • venlafaxine XR (EFFEXOR-XR) 150 MG 24 hr capsule Take 1 capsule by mouth Daily. 90 capsule 0 9/22/2022 at 1100   • vitamin B-12 (CYANOCOBALAMIN) 1000 MCG tablet Take 1,000 mcg by mouth Daily.   9/22/2022 at 1100   • VITAMIN D PO Take 5,000 Units by mouth Daily.   9/22/2022 at 1100        Past Medical History:   Diagnosis Date   • Anesthesia     low BP with spinal surgery in 2020, patient was hospitalized a few extra days due to low Bp   • Anxiety    • Atrophic vaginitis    • Bilateral hip pain    • Cancer (HCC)    • Hypertension    • Low back pain    • Mixed hyperlipidemia    • Muscle spasm    • Obesity due to excess calories    • MOOKIE (obstructive sleep apnea)     NO CPAP USE   • Osteoarthritis    • PONV (postoperative nausea and vomiting)     preprocedural meds help and are needed   • Tendinitis of right shoulder    • Vertigo      Past Surgical History:   Procedure Laterality Date   • CHOLECYSTECTOMY     • COLONOSCOPY  2013   • ENDOSCOPY     • GASTRIC BANDING REMOVAL     • GASTRIC SLEEVE LAPAROSCOPIC  2011   • HIP PINNING Left     3 pins   • INTERVENTIONAL RADIOLOGY PROCEDURE N/A 5/27/2020    Procedure: IR myelogram, lumbar;  Surgeon: Jason Rodriguez MD;  Location:  [facility];  Service: Interventional Radiology;  Laterality: N/A;   • LAPAROSCOPIC GASTRIC BANDING     • LAPAROTOMY OOPHERECTOMY Right    • LUMBAR DISCECTOMY FUSION INSTRUMENTATION N/A 11/20/2018    Procedure: L3-4 LUMBAR LAMINECTOMY POSTERIOR LUMBAR INTERBODY FUSION PILF;  Surgeon: David Rider MD;  Location: [facility] OR;  Service: Neurosurgery   • LUMBAR DISCECTOMY FUSION INSTRUMENTATION N/A 7/21/2020    Procedure: L2-3 PLIF, exploration of L3-4 fusion, L2-4  "fusion, L2 laminectomy;  Surgeon: David Rider MD;  Location:  [facility] OR;  Service: Neurosurgery;  Laterality: N/A;   • MA RECONSTR TOTAL SHOULDER IMPLANT Left 7/10/2017    Procedure: LEFT TOTAL SHOULDER ARTHROPLASTY ;  Surgeon: Almas Miller MD;  Location:  [facility] OR;  Service: Orthopedics   • TOTAL KNEE ARTHROPLASTY Left 2020    Procedure: TOTAL KNEE REPLACEMENT LEFT;  Surgeon: Houston Castano MD;  Location:  [facility] OR;  Service: Orthopedics   • WISDOM TOOTH EXTRACTION       Family History   Problem Relation Age of Onset   • Ovarian cancer Maternal Aunt 75   • Alzheimer's disease Mother    • Bone cancer Mother    • Melanoma Father    • Prostate cancer Father    • Diabetes Father    • Heart failure Father         congestive   • Breast cancer Neg Hx      Social History     Tobacco Use   • Smoking status: Former Smoker     Packs/day: 0.25     Years: 3.00     Pack years: 0.75     Types: Cigarettes     Start date: 1975     Quit date: 1978     Years since quittin.8   • Smokeless tobacco: Never Used   Vaping Use   • Vaping Use: Never used   Substance Use Topics   • Alcohol use: Yes     Comment: social    • Drug use: No   , retired.    Review of Systems  Pertinent items are noted in HPI, all other systems reviewed and negative    Vital Signs  /86   Pulse 55   Temp 97.2 °F (36.2 °C) (Temporal)   Resp 20   Ht 165.1 cm (65\")   Wt 94.3 kg (208 lb)   LMP  (LMP Unknown)   SpO2 98%   Breastfeeding No   BMI 34.61 kg/m²     Physical Exam:    General Appearance:    Alert, cooperative, in no acute distress   Head:    Normocephalic, without obvious abnormality, atraumatic   Eyes:            Lids and lashes normal, conjunctivae and sclerae normal, no   icterus, no pallor, corneas clear    Ears:    Ears appear intact with no abnormalities noted   Throat:   No oral lesions, no thrush, oral mucosa moist   Neck:   No adenopathy, supple, trachea midline, no thyromegaly  "        Lungs:     Clear to auscultation,respirations regular, even and                   unlabored    Heart:    Regular rhythm and normal rate, normal S1 and S2, no       murmur, no gallop   Abdomen:     Normal bowel sounds, no masses, no organomegaly, soft        non-tender, non-distended, no guarding, no rebound                 tenderness   Genitalia:    Deferred   Extremities:  Right LE, CDI dressing on knee, PNB cath present.    Pulses:   Pulses palpable and equal bilaterally   Skin:   No bleeding, bruising or rash   Neurologic:   Cranial nerves 2 - 12 grossly intact, regaining movement and sensation in lower extremities with subsiding effects of spinal anesthesia.      I reviewed the patient's new clinical results.       Results from last 7 days   Lab Units 09/19/22  1408   WBC 10*3/mm3 7.36   HEMOGLOBIN g/dL 12.1   HEMATOCRIT % 37.3   PLATELETS 10*3/mm3 250     Results from last 7 days   Lab Units 09/19/22  1408   SODIUM mmol/L 143   POTASSIUM mmol/L 4.0   CHLORIDE mmol/L 108*   CO2 mmol/L 25.0   BUN mg/dL 17   CREATININE mg/dL 0.89   CALCIUM mg/dL 9.2   BILIRUBIN mg/dL 0.3   ALK PHOS U/L 75   ALT (SGPT) U/L 10   AST (SGOT) U/L 16   GLUCOSE mg/dL 102*     Lab Results   Component Value Date    HGBA1C 5.30 09/19/2022           Assessment and Plan:       Status post right knee replacement    Essential hypertension    Anxiety and depression    Primary localized osteoarthritis of right knee    Obesity  Hyperlipidemia    Plan  1. PT/OT,  Weight bearing as tolerated right LE  2. Pain control-prns, ACB cath with ropivacaine infusion.  3. IS-encourage  4. DVT proph- Mechanicals and aspirin  5. Bowel regimen  6. Resume home medications as appropriate  7. Monitor post-op labs  8. DC planning for home    - Hypertension:  Resume home medications as appropriate, formulary substitution when indicated.  Holding parameters.  Prn medications for elevated blood pressure.    -Anxiety and depression: Maintained on home  regimen.    -Dyslipidemia:  Resume home regimen statin ( formulary substitution when appropriate).    Discussed with patient and her .    Dragon disclaimer:  Part of this encounter note is an electronic transcription/translation of spoken language to printed text. The electronic translation of spoken language may permit erroneous, or at times, nonsensical words or phrases to be inadvertently transcribed; Although I have reviewed the note for such errors, some may still exist.    Fadumo Corbin MD  09/22/22  16:44 EDT

## 2022-09-22 NOTE — ANESTHESIA PROCEDURE NOTES
Right Adductor Canal Catheter      Patient reassessed immediately prior to procedure    Reason for block: at surgeon's request and post-op pain management  Performed by  CRNA/CAA: Michelle Gutierrez CRNASRNA: Madalyn Mooney SRNA  Preanesthetic Checklist  Completed: patient identified, IV checked, site marked, risks and benefits discussed, surgical consent, monitors and equipment checked, pre-op evaluation and timeout performed  Prep:  Pt Position: supine  Sterile barriers:cap, gloves, mask, sterile barriers and washed/disinfected hands  Prep: ChloraPrep  Patient monitoring: blood pressure monitoring, continuous pulse oximetry and EKG  Procedure  Performed under: spinal  Guidance:ultrasound guided    ULTRASOUND INTERPRETATION. Using ultrasound guidance a 20 G gauge needle was placed in close proximity to the nerve, at which point, under ultrasound guidance anesthetic was injected in the area of the nerve and spread of the anesthesia was seen on ultrasound in close proximity thereto.  There were no abnormalities seen on ultrasound; a digital image was taken; and the patient tolerated the procedure with no complications. Images:still images obtained, printed/placed on chart    Laterality:right  Block Type:adductor canal block  Injection Technique:catheter  Needle Type:Tuohy and echogenic  Needle Gauge:18 G  Resistance on Injection: none  Catheter Size:20 G (20g)  Cath Depth at skin: 11 cm    Medications Used: bupivacaine PF (MARCAINE) 0.25 % injection, 20 mL  Med administered at 9/22/2022 3:49 PM      Post Assessment  Injection Assessment: negative aspiration for heme, incremental injection and no paresthesia on injection  Patient Tolerance:comfortable throughout block  Complications:no  Additional Notes  Procedure:             The pt was placed in the Supine position.  The Insertion site was  prepped and Draped in sterile fashion.  The pt was anesthetized with  IV Sedation( see meds).  Skin and cutaneous tissue was  infiltrated and anesthetized with 1% Lidocaine 3 mls via a 25g needle.  A BBraun 4 inch 18g echogenic needle was then  inserted approximately midline, mid-thigh and advanced In-plane with Ultrasound guidance.  Normal Saline PSF was utilized for hydrodissection of tissue.  The Vastus medialis and Sartorius muscle where visualized and the needle tip was placed in the adductor canal,  lateral to the femoral artery.  LA injection spread was visualized, injection was incremental 1-5ml, injection pressure was normal or little, no intraneural injection, no vascular injection.  LA dose was injected thru the needle(see dose above).  A BBraun 20g wire stylet catheter was placed via the needle with ultrasound visualization and confirmation with NS fluid bolus. The catheter insertion site was sealed with exofin tissue adhesive. The labeled catheter was then coiled and secured to skin with benzoin,  steristrips and CHG transparent dressing.  Appropriate labels were applied.  Thank you.

## 2022-09-22 NOTE — OP NOTE
Preoperative diagnosis:Right knee end stage osteoarthritis    Postoperative diagnosis: Right knee end stage osteoarthritis    Operative procedure: Right total knee arthroplasty    Surgeon: Dr. Kayden Castano    Assistant: NASIMA Lozano.  Necessary during the case for positioning of the patient, exposure, implantation of components and closure.    Anesthesia: Spinal with local and adductor canal catheter    Estimated blood loss: Minimal    Implants: [employer]  posterior stabilized total knee arthroplasty size 5 femur, 4 tibia, 32 thin patella, 10 poly    Tourniquet time: 55 minutes at 300 mmHg    Indications for procedure: Laura is a very pleasant 71-year-old female who has struggled with end-stage activity limiting right knee pain secondary to osteoarthritis.  X-rays in June revealed severe tricompartmental degenerative changes in a varus knee with complete loss of medial joint space and marginal osteophyte formation.  They have failed exhaustive conservative treatment measures including cortisone injections and physical therapy.  History of a left knee replacement in 2020 and a left shoulder replacement in 2017.  She takes Tylenol for her knee pain.  No relief with a cortisone injection in June.  After undergoing a shared decision-making process they agreed to proceed with right total knee arthroplasty.  Surgical consent form was signed.    Description of procedure: The patient was seen in the preoperative holding area and the right leg was signed to confirm the correct operative site.  They were seen by anesthesia.  They received Ancef 2 g IV prophylactic antibiotics within 1 hour of incision time.  They were brought back to the operating room.  Spinal was performed without difficulty and they were given sedation throughout the case.  Patient placed in the supine position and all of  bony prominences were well-padded.  A bump was placed underneath the right hip.  Nonsterile tourniquet was applied to the  thigh.  The right lower extremity was prepped and draped in the usual sterile fashion and timeout was performed to confirm right total knee arthroplasty for patient Laura Churchill.    The right lower extremity was exsanguinated with an Esmarch.  Tourniquet was inflated to 300 mmHg.  With the knee flexed a midline incision was made with a 10 blade scalpel.  Adequate hemostasis maintained with Bovie electrocautery.  Full-thickness medial and lateral flaps were elevated.  Using a fresh 10 blade scalpel I made a medial parapatellar arthrotomy.  The patella was everted.  Patella fat pad and anterior femoral fat pads were excised.  Marginal osteophytes were removed.  Medial release was performed for this varus knee using Bovie electrocautery.  Laura's line was marked.  Z retractors were placed medially and laterally.  The distal femur was then drilled and intramedullary distal femoral cutting guide was pinned in place set at a 5° valgus cut for a 9.5 mm cut.  This cut was then made with the oscillating saw.  The femur was then sized to a size 5 set at 3° of external rotation.  4-in-1 cutting guide was pinned in place.  Anterior and posterior cuts were made as were the chamfer cuts.  Cut bone was removed.  We then turned our attention to the tibia.    The tibia was subluxed anteriorly. PCL retractor was placed as were medial and lateral Hohmann retractors.  We then used the extramedullary tibial cutting guide set at 3° posterior slope for the proximal tibial cut.  5 mm of bone was removed from the low medial side and a centimeter from the high lateral side.  Medial and lateral menisci were then excised as were posterior osteophytes.  Flexion and extension gaps were then checked and with a 10 mm block we achieved full extension and flexion with excellent alignment. The tibia was sized to a 4 tibial tray centered off the medial third of the tibial tubercle.  The tray was pinned in place.  We then impacted the tibial  vanessa.  Size 5 trial femur was then placed and we made the box cut with the reamer and punch. We trialed with a size 10 poly, 5 femur and 4 tibia.  With these trial components in place we achieved full extension and flexion with excellent alignment and stable throughout.  Lug holes for the femur were drilled.    We then turned our attention to the patella.  Patella was sized to 20 mm in thickness and we made a 7 mm patellar cut with the reciprocal saw.  A 32 thin trial was placed and the patella drill holes were made.  With the trial button in place there was excellent tracking with the no thumbs technique.    Trial components were then removed.  Final components were opened on the back table.  Posterior capsule was injected with 20 cc of half percent Ropivicaine and 5 mg of morphine.  Cement was mixed on the back table.  The knee was copiously irrigated with Pulsavac lavage.  The knee was thoroughly dried and a bone plug was placed in the distal femoral drill hole.  Cement was then applied to the tibia and final size 4 tibial tray was impacted in place and excess cement was removed.  Cement was applied to the femur and final size 5 femur was impacted in place and excess cement removed.  We then placed a 10 mm poly-this was seen to be fully seated in the tibial tray.  Knee was then placed in extension and we applied cement to the cut patellar surface and 32 thin patella was held in place with patellar clamp.  All excess cement was removed.  The knee was left in extension while cement cured.    Once the cement was fully cured we irrigated the knee with diluted betadine solution and Pulsavac lavage.  The knee was taken through full range of motion and seen to achieve full extension and flexion with excellent patellar tracking.    We then proceeded with closure.  The deep fascia was closed with a #1 Stratafix barbed suture.  Dermis was closed with 2-0 Vicryl.  Skin was closed with a running 3-0 Stratafix barbed  subcuticular suture.  A sterile dressing was then applied with Pirineo mesh dressing and Dermabond.  We then applied 4 x 4's, ABDs, soft roll and a six-inch Ace bandage.    Tourniquet was deflated at 55 minutes.  Patient was transferred to recovery in stable condition.  All sponge and needle counts were correct ×2.  Patient received first dose of TXA just prior to incision and the second dose 5-10 minutes prior to letting down the tourniquet to minimize bleeding.    Postoperative plan:  Patient will be admitted to Dr. HENRIQUEZ for medical management  Mobilize starting today with PT/OT as tolerated  Start Aspirin for DVT prophylaxis tomorrow   consult for home physical therapy and home equipment needs  Follow-up with me in 2-3 weeks.    Houston Castano MD  09/22/22  15:44 EDT     CC: Dr. Julius Rivera

## 2022-09-22 NOTE — ANESTHESIA PROCEDURE NOTES
Spinal Block    Pre-sedation assessment completed: 9/22/2022 2:10 PM    Patient reassessed immediately prior to procedure    Patient location during procedure: OR  Start Time: 9/22/2022 2:10 PM  Indication:at surgeon's request  Performed ByRICHARD: Madalyn Mooney SRNA  Preanesthetic Checklist  Completed: patient identified, IV checked, site marked, risks and benefits discussed, surgical consent, monitors and equipment checked, pre-op evaluation and timeout performed  Spinal Block Prep:  Patient Position:sitting  Sterile Tech:cap, gloves, sterile barriers and mask  Prep:Chloraprep  Patient Monitoring:blood pressure monitoring, continuous pulse oximetry and EKG  Spinal Block Procedure  Approach:midline  Guidance:landmark technique and palpation technique  Location:L3-L4  Needle Type:Gabino  Needle Gauge:22 G  Placement of Spinal needle event:cerebrospinal fluid aspirated  Paresthesia: no  Fluid Appearance:clear  Medications: bupivacaine (MARCAINE) 0.5 % injection, 1.8 mL  Med Administered at 9/22/2022 2:13 PM   Post Assessment  Patient Tolerance:patient tolerated the procedure well with no apparent complications  Complications no  Additional Notes  Procedure:  Pt assisted to sitting position, with legs in position of comfort over side of bed.  Pt. instructed in optimal spine presentation, the spine was prepped/ Draped and the skin at insertion site was anesthetized with 1% Lidocaine 2 ml.  The spinal needle was then advanced until CSF flow was obtained and LA was injected:

## 2022-09-22 NOTE — ANESTHESIA PREPROCEDURE EVALUATION
Anesthesia Evaluation                  Airway   Mallampati: II  Dental      Pulmonary    Cardiovascular     (+) hypertension,       Neuro/Psych  GI/Hepatic/Renal/Endo    (+) obesity,       Musculoskeletal     Abdominal    Substance History      OB/GYN          Other                        Anesthesia Plan    ASA 3     regional and spinal     intravenous induction     Anesthetic plan, risks, benefits, and alternatives have been provided, discussed and informed consent has been obtained with: patient.        CODE STATUS:

## 2022-09-22 NOTE — BRIEF OP NOTE
TOTAL KNEE ARTHROPLASTY  Progress Note    Laura Churchill  9/22/2022    Pre-op Diagnosis:   right knee osteoarthritis       Post-Op Diagnosis Codes:     * Primary localized osteoarthritis of right knee [M17.11]    Procedure/CPT® Codes:  FL TOTAL KNEE ARTHROPLASTY [36847]      Procedure(s):  RIGHT TOTAL KNEE ARTHROPLASTY    Surgical Approach: Knee Medial Parapatellar      Surgeon(s):  Houston Castano MD     Assistant: NASIMA Lozano    Anesthesia: Spinal, local and adductor canal catheter    Staff:   Circulator: Addie Askew RN; Bonny Rodriges RN  Scrub Person: Yudith Barahona  Vendor Representative: Jeromy Osorio  Nursing Assistant: Destini Barnes PCT  Assistant: Alex Pinzon RNFA  Assistant: Alex Pinzon RNFA      Estimated Blood Loss: minimal    Urine Voided: * No values recorded between 9/22/2022  2:04 PM and 9/22/2022  3:34 PM *    Specimens:                None          Drains: * No LDAs found *    Findings: Right knee severe tricompartmental degenerative changes        Complications: None    Implants: [employer] posterior stabilized total knee arthroplasty with a size 5 femur, 4 tibia, 10 polyethylene and 32 thin patella    Tourniquet time: 55 minutes at 300 mmHg    Assistant: Alex Pinzon RNFA  was responsible for performing the following activities: Retraction, assistance with implantation of components and closure and their skilled assistance was necessary for the success of this case.    Houston Castano MD     Date: 9/22/2022  Time: 15:43 EDT

## 2022-09-22 NOTE — H&P
Pre-Op H&P  Laura Churchill  [id number]  1950    Chief complaint: right knee pain     HPI:    Patient is a 71 y.o.female who presents today for a right total knee arthroplasty for primary osteoarthritis of the right knee. She had a cortisone steroid injection in the knee around June and after about 1 month, her pain returned and was very bothersome.     She denies chest pain or shortness of breath.      Review of Systems:  General ROS: negative for chills, fever or skin lesions;  No changes since last office visit.  Neg for recent sick exposure  Cardiovascular ROS: no chest pain or dyspnea on exertion  Respiratory ROS: no cough, shortness of breath, or wheezing    Allergies:   Allergies   Allergen Reactions   • Codeine Hives   • Gabapentin Hallucinations     Screaming nightmares   • Sulfa Antibiotics Itching       Home Meds:    No current facility-administered medications on file prior to encounter.     Current Outpatient Medications on File Prior to Encounter   Medication Sig Dispense Refill   • atorvastatin (LIPITOR) 10 MG tablet Take 1 tablet by mouth Daily. 90 tablet 0   • fexofenadine (Allegra Allergy) 60 MG tablet Take 1 tablet by mouth Daily. 30 tablet 2   • losartan (Cozaar) 100 MG tablet Take 1 tablet by mouth Daily. 90 tablet 0   • venlafaxine XR (EFFEXOR-XR) 150 MG 24 hr capsule Take 1 capsule by mouth Daily. 90 capsule 0   • VITAMIN D PO Take 5,000 Units by mouth Daily.     • [DISCONTINUED] clindamycin (Clindagel) 1 % gel Apply 1 application topically to the appropriate area as directed 2 (Two) Times a Day. (Patient taking differently: Apply 1 application topically to the appropriate area as directed 2 (Two) Times a Day As Needed (rash on right arm).) 60 g 1   • [DISCONTINUED] diclofenac (VOLTAREN) 75 MG EC tablet Take 1 tablet by mouth 2 (Two) Times a Day. (Patient taking differently: Take 75 mg by mouth Daily.) 60 tablet 11       PMH:   Past Medical History:   Diagnosis Date   • Anesthesia      low BP with spinal surgery in 2020, patient was hospitalized a few extra days due to low Bp   • Anxiety    • Atrophic vaginitis    • Bilateral hip pain    • Cancer (HCC)    • Hypertension    • Low back pain    • Mixed hyperlipidemia    • Muscle spasm    • Obesity due to excess calories    • MOOKIE (obstructive sleep apnea)     NO CPAP USE   • Osteoarthritis    • PONV (postoperative nausea and vomiting)     preprocedural meds help and are needed   • Tendinitis of right shoulder    • Vertigo      PSH:    Past Surgical History:   Procedure Laterality Date   • CHOLECYSTECTOMY     • COLONOSCOPY  2013   • ENDOSCOPY     • GASTRIC BANDING REMOVAL     • GASTRIC SLEEVE LAPAROSCOPIC  2011   • HIP PINNING Left     3 pins   • INTERVENTIONAL RADIOLOGY PROCEDURE N/A 5/27/2020    Procedure: IR myelogram, lumbar;  Surgeon: Jason Rodriguez MD;  Location:  [facility];  Service: Interventional Radiology;  Laterality: N/A;   • LAPAROSCOPIC GASTRIC BANDING     • LAPAROTOMY OOPHERECTOMY Right    • LUMBAR DISCECTOMY FUSION INSTRUMENTATION N/A 11/20/2018    Procedure: L3-4 LUMBAR LAMINECTOMY POSTERIOR LUMBAR INTERBODY FUSION PILF;  Surgeon: David Rider MD;  Location:  [facility] OR;  Service: Neurosurgery   • LUMBAR DISCECTOMY FUSION INSTRUMENTATION N/A 7/21/2020    Procedure: L2-3 PLIF, exploration of L3-4 fusion, L2-4 fusion, L2 laminectomy;  Surgeon: David Rider MD;  Location:  [facility] OR;  Service: Neurosurgery;  Laterality: N/A;   • MO RECONSTR TOTAL SHOULDER IMPLANT Left 7/10/2017    Procedure: LEFT TOTAL SHOULDER ARTHROPLASTY ;  Surgeon: Almas Miller MD;  Location:  [facility] OR;  Service: Orthopedics   • TOTAL KNEE ARTHROPLASTY Left 1/16/2020    Procedure: TOTAL KNEE REPLACEMENT LEFT;  Surgeon: Houston Castano MD;  Location:  [facility] OR;  Service: Orthopedics   • WISDOM TOOTH EXTRACTION         Immunization History:  Influenza: yes  Pneumococcal: yes  Tetanus: yes    Social  "History:   Tobacco:   Social History     Tobacco Use   Smoking Status Former Smoker   • Packs/day: 0.25   • Years: 3.00   • Pack years: 0.75   • Types: Cigarettes   • Start date: 1975   • Quit date: 1978   • Years since quittin.8   Smokeless Tobacco Never Used      Alcohol:     Social History     Substance and Sexual Activity   Alcohol Use Yes    Comment: social        Vitals:           /83 (BP Location: Right arm, Patient Position: Sitting)   Pulse 68   Temp 97 °F (36.1 °C) (Temporal)   Resp 18   Ht 165.1 cm (65\")   Wt 94.3 kg (208 lb)   LMP  (LMP Unknown)   SpO2 97%   Breastfeeding No   BMI 34.61 kg/m²     Physical Exam:  General Appearance:    Alert, cooperative, no distress, appears stated age   Head:    Normocephalic, without obvious abnormality, atraumatic   Lungs:     Clear to auscultation bilaterally, respirations unlabored    Heart:   Regular rate and rhythm, S1 and S2 normal, no murmur, rub    or gallop    Abdomen:    Soft, nontender.   Breast Exam:    deferred   Genitalia:    deferred   Extremities:   Extremities normal, atraumatic, no cyanosis or edema   Skin:   Skin color, texture, turgor normal, no rashes or lesions   Neurologic:   Grossly intact   Results Review  LABS:  Lab Results   Component Value Date    WBC 7.36 2022    HGB 12.1 2022    HCT 37.3 2022    MCV 95.9 2022     2022    NEUTROABS 8.49 (H) 2020    GLUCOSE 102 (H) 2022    BUN 17 2022    CREATININE 0.89 2022    EGFRIFNONA 66 2021     2022    K 4.0 2022     (H) 2022    CO2 25.0 2022    CALCIUM 9.2 2022    ALBUMIN 4.30 2022    AST 16 2022    ALT 10 2022    BILITOT 0.3 2022       RADIOLOGY:  No radiology results for the last 3 days     I reviewed the patient's new clinical results.      Impression:   1. Right knee osteoarthritis    Plan:   1. Right total knee arthroplasty   She was " seen in the office with Dr. Castano and risks and benefits were discussed at that time. Please see office note for details.       RYAN Ramírez   09/22/22   1:21 PM EDT

## 2022-09-23 VITALS
BODY MASS INDEX: 34.66 KG/M2 | OXYGEN SATURATION: 92 % | DIASTOLIC BLOOD PRESSURE: 68 MMHG | HEART RATE: 82 BPM | SYSTOLIC BLOOD PRESSURE: 115 MMHG | HEIGHT: 65 IN | RESPIRATION RATE: 16 BRPM | TEMPERATURE: 98 F | WEIGHT: 208 LBS

## 2022-09-23 PROBLEM — G89.18 POSTOPERATIVE PAIN: Status: ACTIVE | Noted: 2022-09-23

## 2022-09-23 LAB
ANION GAP SERPL CALCULATED.3IONS-SCNC: 7 MMOL/L (ref 5–15)
BASOPHILS # BLD AUTO: 0.01 10*3/MM3 (ref 0–0.2)
BASOPHILS NFR BLD AUTO: 0.1 % (ref 0–1.5)
BUN SERPL-MCNC: 14 MG/DL (ref 8–23)
BUN/CREAT SERPL: 17.9 (ref 7–25)
CALCIUM SPEC-SCNC: 9 MG/DL (ref 8.6–10.5)
CHLORIDE SERPL-SCNC: 106 MMOL/L (ref 98–107)
CO2 SERPL-SCNC: 28 MMOL/L (ref 22–29)
CREAT SERPL-MCNC: 0.78 MG/DL (ref 0.57–1)
DEPRECATED RDW RBC AUTO: 42.5 FL (ref 37–54)
EGFRCR SERPLBLD CKD-EPI 2021: 81.3 ML/MIN/1.73
EOSINOPHIL # BLD AUTO: 0 10*3/MM3 (ref 0–0.4)
EOSINOPHIL NFR BLD AUTO: 0 % (ref 0.3–6.2)
ERYTHROCYTE [DISTWIDTH] IN BLOOD BY AUTOMATED COUNT: 12.1 % (ref 12.3–15.4)
GLUCOSE SERPL-MCNC: 155 MG/DL (ref 65–99)
HCT VFR BLD AUTO: 34 % (ref 34–46.6)
HGB BLD-MCNC: 11.1 G/DL (ref 12–15.9)
IMM GRANULOCYTES # BLD AUTO: 0.12 10*3/MM3 (ref 0–0.05)
IMM GRANULOCYTES NFR BLD AUTO: 1.1 % (ref 0–0.5)
LYMPHOCYTES # BLD AUTO: 0.87 10*3/MM3 (ref 0.7–3.1)
LYMPHOCYTES NFR BLD AUTO: 8.3 % (ref 19.6–45.3)
MCH RBC QN AUTO: 31.2 PG (ref 26.6–33)
MCHC RBC AUTO-ENTMCNC: 32.6 G/DL (ref 31.5–35.7)
MCV RBC AUTO: 95.5 FL (ref 79–97)
MONOCYTES # BLD AUTO: 0.9 10*3/MM3 (ref 0.1–0.9)
MONOCYTES NFR BLD AUTO: 8.6 % (ref 5–12)
NEUTROPHILS NFR BLD AUTO: 8.6 10*3/MM3 (ref 1.7–7)
NEUTROPHILS NFR BLD AUTO: 81.9 % (ref 42.7–76)
NRBC BLD AUTO-RTO: 0 /100 WBC (ref 0–0.2)
PLATELET # BLD AUTO: 226 10*3/MM3 (ref 140–450)
PMV BLD AUTO: 9.6 FL (ref 6–12)
POTASSIUM SERPL-SCNC: 4.5 MMOL/L (ref 3.5–5.2)
RBC # BLD AUTO: 3.56 10*6/MM3 (ref 3.77–5.28)
SODIUM SERPL-SCNC: 141 MMOL/L (ref 136–145)
WBC NRBC COR # BLD: 10.5 10*3/MM3 (ref 3.4–10.8)

## 2022-09-23 PROCEDURE — A9270 NON-COVERED ITEM OR SERVICE: HCPCS | Performed by: INTERNAL MEDICINE

## 2022-09-23 PROCEDURE — 63710000001 ACETAMINOPHEN 500 MG TABLET: Performed by: ORTHOPAEDIC SURGERY

## 2022-09-23 PROCEDURE — 80048 BASIC METABOLIC PNL TOTAL CA: CPT | Performed by: ORTHOPAEDIC SURGERY

## 2022-09-23 PROCEDURE — A9270 NON-COVERED ITEM OR SERVICE: HCPCS | Performed by: ORTHOPAEDIC SURGERY

## 2022-09-23 PROCEDURE — 63710000001 ASPIRIN EC 325 MG TABLET DELAYED-RELEASE: Performed by: ORTHOPAEDIC SURGERY

## 2022-09-23 PROCEDURE — 25010000002 CEFAZOLIN IN DEXTROSE 2-4 GM/100ML-% SOLUTION: Performed by: ORTHOPAEDIC SURGERY

## 2022-09-23 PROCEDURE — 63710000001 VENLAFAXINE XR 75 MG CAPSULE SUSTAINED-RELEASE 24 HR: Performed by: INTERNAL MEDICINE

## 2022-09-23 PROCEDURE — 97116 GAIT TRAINING THERAPY: CPT

## 2022-09-23 PROCEDURE — 63710000001 MELOXICAM 15 MG TABLET: Performed by: ORTHOPAEDIC SURGERY

## 2022-09-23 PROCEDURE — 63710000001 ATORVASTATIN 10 MG TABLET: Performed by: INTERNAL MEDICINE

## 2022-09-23 PROCEDURE — 97165 OT EVAL LOW COMPLEX 30 MIN: CPT

## 2022-09-23 PROCEDURE — 85025 COMPLETE CBC W/AUTO DIFF WBC: CPT | Performed by: ORTHOPAEDIC SURGERY

## 2022-09-23 PROCEDURE — 97535 SELF CARE MNGMENT TRAINING: CPT

## 2022-09-23 PROCEDURE — 97110 THERAPEUTIC EXERCISES: CPT

## 2022-09-23 PROCEDURE — 97161 PT EVAL LOW COMPLEX 20 MIN: CPT

## 2022-09-23 PROCEDURE — 63710000001 LOSARTAN 50 MG TABLET: Performed by: INTERNAL MEDICINE

## 2022-09-23 RX ORDER — ROPIVACAINE HYDROCHLORIDE 2 MG/ML
1 INJECTION, SOLUTION EPIDURAL; INFILTRATION; PERINEURAL CONTINUOUS
Start: 2022-09-23 | End: 2022-11-10

## 2022-09-23 RX ORDER — ASPIRIN 325 MG
325 TABLET, DELAYED RELEASE (ENTERIC COATED) ORAL DAILY
Qty: 30 TABLET | Refills: 0 | Status: SHIPPED | OUTPATIENT
Start: 2022-09-24 | End: 2022-10-24

## 2022-09-23 RX ORDER — OXYCODONE HYDROCHLORIDE 5 MG/1
5 TABLET ORAL EVERY 4 HOURS PRN
Qty: 40 TABLET | Refills: 0 | Status: SHIPPED | OUTPATIENT
Start: 2022-09-23 | End: 2022-11-10

## 2022-09-23 RX ORDER — MELOXICAM 15 MG/1
15 TABLET ORAL DAILY
Qty: 15 TABLET | Refills: 0 | Status: SHIPPED | OUTPATIENT
Start: 2022-09-23 | End: 2022-10-08

## 2022-09-23 RX ORDER — POLYETHYLENE GLYCOL 3350 17 G/17G
17 POWDER, FOR SOLUTION ORAL DAILY
Qty: 15 PACKET | Refills: 0 | Status: SHIPPED | OUTPATIENT
Start: 2022-09-23 | End: 2022-10-08

## 2022-09-23 RX ORDER — ACETAMINOPHEN 500 MG
1000 TABLET ORAL EVERY 8 HOURS
Qty: 42 TABLET | Refills: 0
Start: 2022-09-23 | End: 2022-09-30

## 2022-09-23 RX ADMIN — ATORVASTATIN CALCIUM 10 MG: 10 TABLET, FILM COATED ORAL at 08:18

## 2022-09-23 RX ADMIN — ASPIRIN 325 MG: 325 TABLET, COATED ORAL at 08:18

## 2022-09-23 RX ADMIN — ACETAMINOPHEN 1000 MG: 500 TABLET, FILM COATED ORAL at 02:11

## 2022-09-23 RX ADMIN — CEFAZOLIN SODIUM 2 G: 2 INJECTION, SOLUTION INTRAVENOUS at 08:20

## 2022-09-23 RX ADMIN — ACETAMINOPHEN 1000 MG: 500 TABLET, FILM COATED ORAL at 08:18

## 2022-09-23 RX ADMIN — LOSARTAN POTASSIUM 100 MG: 50 TABLET, FILM COATED ORAL at 08:18

## 2022-09-23 RX ADMIN — MELOXICAM 15 MG: 15 TABLET ORAL at 08:19

## 2022-09-23 RX ADMIN — VENLAFAXINE HYDROCHLORIDE 150 MG: 75 CAPSULE, EXTENDED RELEASE ORAL at 08:18

## 2022-09-23 NOTE — PLAN OF CARE
Goal Outcome Evaluation:  Plan of Care Reviewed With: patient        Progress: no change  Outcome Evaluation: PT eval completed. Pt presents s/p R TKA with decreased functional mobility, decreased RLE strength, and decreased independence. Pt ambulated 200ft with CGA and RW for support. Pt with decreased quad activation but no knee buckling upon initial STS. While ambulating, pt had 5 instances of mild knee buckling 2/2 fatigue. Stair training of 3 steps completed with R handrail, no LOB or knee buckling noted. Recommend continued skilled IP PT interventions. Recommend D/C home with assist and HHPT. Want to work with pt one more time this PM for possible reduction in knee buckling prior to d/c home. PADD score is 8.

## 2022-09-23 NOTE — PROGRESS NOTES
"/82 (BP Location: Right arm, Patient Position: Lying)   Pulse 81   Temp 97.8 °F (36.6 °C) (Oral)   Resp 16   Ht 165.1 cm (65\")   Wt 94.3 kg (208 lb)   LMP  (LMP Unknown)   SpO2 100%   Breastfeeding No   BMI 34.61 kg/m²     Lab Results (last 24 hours)     Procedure Component Value Units Date/Time    Basic Metabolic Panel [[id number]]  (Abnormal) Collected: 09/23/22 0409    Specimen: Blood Updated: 09/23/22 0513     Glucose 155 mg/dL      BUN 14 mg/dL      Creatinine 0.78 mg/dL      Sodium 141 mmol/L      Potassium 4.5 mmol/L      Chloride 106 mmol/L      CO2 28.0 mmol/L      Calcium 9.0 mg/dL      BUN/Creatinine Ratio 17.9     Anion Gap 7.0 mmol/L      eGFR 81.3 mL/min/1.73      Comment: National Kidney Foundation and American Society of Nephrology (ASN) Task Force recommended calculation based on the Chronic Kidney Disease Epidemiology Collaboration (CKD-EPI) equation refit without adjustment for race.       Narrative:      GFR Normal >60  Chronic Kidney Disease <60  Kidney Failure <15      CBC & Differential [[id number]]  (Abnormal) Collected: 09/23/22 0409    Specimen: Blood Updated: 09/23/22 0453    Narrative:      The following orders were created for panel order CBC & Differential.  Procedure                               Abnormality         Status                     ---------                               -----------         ------                     CBC Auto Differential[[id number]]        Abnormal            Final result                 Please view results for these tests on the individual orders.    CBC Auto Differential [[id number]]  (Abnormal) Collected: 09/23/22 0409    Specimen: Blood Updated: 09/23/22 0453     WBC 10.50 10*3/mm3      RBC 3.56 10*6/mm3      Hemoglobin 11.1 g/dL      Hematocrit 34.0 %      MCV 95.5 fL      MCH 31.2 pg      MCHC 32.6 g/dL      RDW 12.1 %      RDW-SD 42.5 fl      MPV 9.6 fL      Platelets 226 10*3/mm3      Neutrophil % 81.9 %      Lymphocyte % 8.3 %      " Monocyte % 8.6 %      Eosinophil % 0.0 %      Basophil % 0.1 %      Immature Grans % 1.1 %      Neutrophils, Absolute 8.60 10*3/mm3      Lymphocytes, Absolute 0.87 10*3/mm3      Monocytes, Absolute 0.90 10*3/mm3      Eosinophils, Absolute 0.00 10*3/mm3      Basophils, Absolute 0.01 10*3/mm3      Immature Grans, Absolute 0.12 10*3/mm3      nRBC 0.0 /100 WBC           Imaging Results (Last 24 Hours)     Procedure Component Value Units Date/Time    XR Knee 1 or 2 View Right [[id number]] Collected: 09/22/22 1657     Updated: 09/22/22 1701    Narrative:      Examination: XR KNEE 1 OR 2 VW RIGHT-     Date of Exam: 9/22/2022 4:34 PM     Indication: Post-Op Knee Arthoplasty.     Comparison: None available.     Technique: Two radiographic views of the right knee were obtained.     Findings:  There is a total right knee prosthesis in place. The prosthesis appears  intact. No fracture or dislocation. There is adjacent soft tissue gas  compatible with recent surgical intervention.          Impression:      Findings of recent right knee arthroplasty without complicating  features.     This report was finalized on 9/22/2022 4:58 PM by Tom Goldberg MD.             Patient Care Team:  Julius Rivera DO as PCP - General (Family Medicine)  Richie Fisher MD as Surgeon (Neurosurgery)  Sukumar Mcwilliams MD as Consulting Physician (Anesthesiology)  Glenn Clayton MD (Inactive) as Consulting Physician (Gynecology)  Shiva Capone MD as Referring Physician (Family Medicine)    SUBJECTIVE: She reports he is doing well.  Pain is well controlled.  She has been up walking with nursing.  Anticipates going home today.    PHYSICAL EXAM  Right knee incision is clean, dry and intact with mesh dressing in place  Thigh and calf soft and nontender  Neurovascular intact distally       Status post right knee replacement    Essential hypertension    Anxiety and depression    Primary localized osteoarthritis of right knee     Obesity      PLAN / DISPOSITION: 71-year-old female postop day #1 status post right total knee arthroplasty  -Continue to mobilize with therapy as tolerated  -Aspirin for DVT prophylaxis  -Okay to shower with mesh dressing in place once nerve catheter removed  -Plan discharge home today.  She is scheduled to start outpatient physical therapy in [place] on Tuesday  -Follow-up in 2 weeks    Houston Castano MD  09/23/22  07:59 EDT

## 2022-09-23 NOTE — PLAN OF CARE
Problem: Adult Inpatient Plan of Care  Goal: Plan of Care Review  Recent Flowsheet Documentation  Taken 9/23/2022 1357 by Whitney Nicole OT  Progress: improving  Plan of Care Reviewed With:   patient   spouse  Outcome Evaluation: OT IE completed. Pt is A/Ox4 and motivated to regain occupational independence. Pt up in room/to bathroom with CGAx1 using RW. Pt reports good pain control and demonstrates good safety awareness. Education completed for precautions and ADL retraining. Mod A LB dressing. OT will d/c at this time. Pt set to d/c home today following afternoon PT session. Spouse available to assist as needed at home.

## 2022-09-23 NOTE — THERAPY DISCHARGE NOTE
Acute Care - Occupational Therapy Discharge  [facility]    Patient Name: Laura Churchill  : 1950    MRN: [id number]                              Today's Date: 2022       Admit Date: 2022    Visit Dx:     ICD-10-CM ICD-9-CM   1. Status post right knee replacement  Z96.651 V43.65     Patient Active Problem List   Diagnosis   • Essential hypertension   • Hyperlipidemia   • Obstructive sleep apnea syndrome   • Osteoarthritis of knee   • Osteoporosis   • Lumbar spondylosis without myelopathy/Lumbar facet arthropathy   • Displacement of lumbar intervertebral disc   • Stenosis of lateral recess of lumbar spine   • Physical deconditioning   • Morbid obesity due to excess calories (HCC)   • Anxiety and depression   • Sacroiliac joint dysfunction of right side   • Greater trochanteric bursitis of right hip   • Iliotibial band syndrome of right side   • Spinal stenosis   • Osteoarthritis   • Obesity due to excess calories   • Menopause   • Mixed hyperlipidemia   • Lumbosacral disc disease   • Low back pain   • Joint pain   • Extremity pain   • Chronic pain disorder   • Back problem   • Vaginal atrophy   • Primary localized osteoarthrosis of shoulder region   • Status post total left shoulder arthroplasty   • Sciatica   • Acquired spondylolisthesis   • Spinal stenosis, lumbar region, with neurogenic claudication   • L3-5 PLIF 2020   • Primary osteoarthritis of left knee   • Status post total left knee replacement   • Leukocytosis, likely reactive   • Acute blood loss anemia, mild, asymptomatic   • Nonintractable episodic headache   • Right thigh pain   • Pseudoarthrosis of lumbar spine   • Primary localized osteoarthritis of right knee   • Status post right knee replacement   • Obesity   • Postoperative pain     Past Medical History:   Diagnosis Date   • Anesthesia     low BP with spinal surgery in , patient was hospitalized a few extra days due to low Bp   • Anxiety    • Atrophic vaginitis    •  Bilateral hip pain    • Cancer (HCC)    • Hypertension    • Low back pain    • Mixed hyperlipidemia    • Muscle spasm    • Obesity due to excess calories    • MOOKIE (obstructive sleep apnea)     NO CPAP USE   • Osteoarthritis    • PONV (postoperative nausea and vomiting)     preprocedural meds help and are needed   • Tendinitis of right shoulder    • Vertigo      Past Surgical History:   Procedure Laterality Date   • CHOLECYSTECTOMY     • COLONOSCOPY  2013   • ENDOSCOPY     • GASTRIC BANDING REMOVAL     • GASTRIC SLEEVE LAPAROSCOPIC  2011   • HIP PINNING Left     3 pins   • INTERVENTIONAL RADIOLOGY PROCEDURE N/A 5/27/2020    Procedure: IR myelogram, lumbar;  Surgeon: Jason Rodriguez MD;  Location:  [facility];  Service: Interventional Radiology;  Laterality: N/A;   • LAPAROSCOPIC GASTRIC BANDING     • LAPAROTOMY OOPHERECTOMY Right    • LUMBAR DISCECTOMY FUSION INSTRUMENTATION N/A 11/20/2018    Procedure: L3-4 LUMBAR LAMINECTOMY POSTERIOR LUMBAR INTERBODY FUSION PILF;  Surgeon: David Rider MD;  Location: [email or web address] OR;  Service: Neurosurgery   • LUMBAR DISCECTOMY FUSION INSTRUMENTATION N/A 7/21/2020    Procedure: L2-3 PLIF, exploration of L3-4 fusion, L2-4 fusion, L2 laminectomy;  Surgeon: David Rider MD;  Location: [email or web address] OR;  Service: Neurosurgery;  Laterality: N/A;   • AL RECONSTR TOTAL SHOULDER IMPLANT Left 7/10/2017    Procedure: LEFT TOTAL SHOULDER ARTHROPLASTY ;  Surgeon: Almas Miller MD;  Location: [facility] OR;  Service: Orthopedics   • TOTAL KNEE ARTHROPLASTY Left 1/16/2020    Procedure: TOTAL KNEE REPLACEMENT LEFT;  Surgeon: Houston Castano MD;  Location: [facility] OR;  Service: Orthopedics   • TOTAL KNEE ARTHROPLASTY Right 9/22/2022    Procedure: TOTAL KNEE ARTHROPLASTY RIGHT;  Surgeon: Houston Castano MD;  Location: [facility] OR;  Service: Orthopedics;  Laterality: Right;   • WISDOM TOOTH EXTRACTION        General Information     Row Name 09/23/22  1154          OT Time and Intention    Document Type discharge evaluation/summary;discharge treatment  -TB     Mode of Treatment occupational therapy;individual therapy  -TB     Row Name 09/23/22 1154          General Information    Patient Profile Reviewed yes  -TB     Prior Level of Function independent:;all household mobility;transfer;ADL's  Limited by pain  -TB     Existing Precautions/Restrictions fall  RLE TKA, RLE WBAT; R adductor nerve cath  -TB     Barriers to Rehab previous functional deficit  -TB     Row Name 09/23/22 1154          Occupational Profile    Reason for Services/Referral (Occupational Profile) Occupational decline  -TB     Environmental Supports and Barriers (Occupational Profile) Pt lives with her spouse in a 2-story home with 3 steps to enter. Master bed & bathroom are upstairs. Pt will remain on first floor at d/c. Walk-in shower with seat. BSC to place over commode. RW.  -TB     Row Name 09/23/22 1154          Living Environment    People in Home spouse;other relative(s)  -TB     Row Name 09/23/22 1154          Home Main Entrance    Number of Stairs, Main Entrance three  -TB     Stair Railings, Main Entrance railings safe and in good condition  -TB     Row Name 09/23/22 1154          Stairs Within Home, Primary    Stairs, Within Home, Primary Master bed & bathroom upstairs  -TB     Number of Stairs, Within Home, Primary twelve  -TB     Stair Railings, Within Home, Primary railings safe and in good condition  -TB     Row Name 09/23/22 1154          Cognition    Orientation Status (Cognition) oriented x 4  -TB     Row Name 09/23/22 1154          Safety Issues, Functional Mobility    Safety Issues Affecting Function (Mobility) insight into deficits/self-awareness;awareness of need for assistance;safety precaution awareness;safety precautions follow-through/compliance;sequencing abilities;positioning of assistive device  New learning deficits only  -TB     Impairments Affecting Function  (Mobility) balance;endurance/activity tolerance;pain;strength;range of motion (ROM)  -           User Key  (r) = Recorded By, (t) = Taken By, (c) = Cosigned By    Initials Name Provider Type    TB Whitney Nicole, OT Occupational Therapist               Mobility/ADL's     Row Name 09/23/22 [facility]          Bed Mobility    Comment, (Bed Mobility) UIC  -     Row Name 09/23/22 [facility]          Transfers    Transfers sit-stand transfer;toilet transfer  -     Sit-Stand [place] (Transfers) contact guard;1 person assist;verbal cues  -     [place] Level (Toilet Transfer) contact guard;1 person assist;verbal cues  -     Assistive Device (Toilet Transfer) raised toilet seat;grab bars/safety frame;walker, front-wheeled  -[facility] Name 09/23/22 [facility]          Sit-Stand Transfer    Assistive Device (Sit-Stand Transfers) walker, front-wheeled  -     Comment, (Sit-Stand Transfer) Cues for hand placement  -[facility] Name 09/23/22 [facility]          Toilet Transfer    Type (Toilet Transfer) sit-stand;stand-sit  -TB     Row Name 09/23/22 [facility]          Functional Mobility    Functional Mobility- Ind. Level contact guard assist;verbal cues required;1 person  -     Functional Mobility- Device walker, front-wheeled  -TB     Functional Mobility- Safety Issues sequencing ability decreased;balance decreased during turns  -     Functional Mobility- Comment Good effort. Mild knee buckling x1  -[facility] Name 09/23/22 [facility]          Activities of Daily Living    BADL Assessment/Intervention bathing;upper body dressing;lower body dressing;grooming;feeding;toileting  -     Row Name 09/23/22 [facility]          Mobility    Extremity Weight-bearing Status right lower extremity  -TB     Right Lower Extremity (Weight-bearing Status) weight-bearing as tolerated (WBAT)  -     Row Name 09/23/22 [facility]          Bathing Assessment/Intervention    [place] Level (Bathing) set up;distal lower extremities/feet  -     Position  (Bathing) unsupported sitting  -TB     Comment, (Bathing) Education completed for precautions and ADL retraining. No showers until nerve block removed. Spouse present for teaching.  -TB     Row Name 09/23/22 [facility]          Upper Body Dressing Assessment/Training    [place] Level (Upper Body Dressing) doff;pajama/robe;don;pull-over garment;front opening garment;minimum assist (75% patient effort)  -TB     Position (Upper Body Dressing) supported standing  -TB     Row Name 09/23/22 [facility]2          Lower Body Dressing Assessment/Training    [place]enn Level (Lower Body Dressing) doff;don;socks;moderate assist (50% patient effort);verbal cues  -TB     Position (Lower Body Dressing) unsupported sitting  -TB     Comment, (Lower Body Dressing) Education completed for precautions and ADL retraining. Spouse present for teaching.  -TB     Row Name 09/23/22 1352          Grooming Assessment/Training    Glenn Level (Grooming) standby assist  -TB     Position (Grooming) sink side  -TB     Comment, (Grooming) wash/dry hands at sink  -TB     Row Name 09/23/22 [facility]2          Self-Feeding Assessment/Training    [place]enn Level (Feeding) independent;scoop food and bring to mouth;liquids to mouth  -TB     Position (Self-Feeding) supported sitting  -TB     Row Name 09/23/22 [facility]          Toileting Assessment/Training    [place]enn Level (Toileting) standby assist;adjust/manage clothing;independent;perform perineal hygiene  -TB     Position (Toileting) supported standing;unsupported sitting  -TB           User Key  (r) = Recorded By, (t) = Taken By, (c) = Cosigned By    Initials Name Provider Type    Whitney Pendleton OT Occupational Therapist               Obj/Interventions     Row Name 09/23/22 1355          Sensory Assessment (Somatosensory)    Sensory Assessment (Somatosensory) UE sensation intact  -TB     Row Name 09/23/22 1355          Vision Assessment/Intervention    Visual Impairment/Limitations  WFL;corrective lenses for reading  -TB     Row Name 09/23/22 1355          Range of Motion Comprehensive    General Range of Motion bilateral upper extremity ROM WFL  -TB     Comment, General Range of Motion BUE AROM WFL for self-care  -TB     Row Name 09/23/22 1355          Strength Comprehensive (MMT)    Comment, General Manual Muscle Testing (MMT) Assessment Generalized weakness. BUE intact  -TB     Row Name 09/23/22 1355          Balance    Balance Assessment sitting dynamic balance;sit to stand dynamic balance;standing dynamic balance  -TB     Dynamic Sitting Balance independent  -TB     Position, Sitting Balance unsupported;sitting in chair  commode  -TB     Sit to Stand Dynamic Balance contact guard;1-person assist;verbal cues  -TB     Dynamic Standing Balance contact guard;1-person assist;verbal cues  -TB     Position/Device Used, Standing Balance walker, front-wheeled  -TB     Balance Interventions sitting;standing;sit to stand;supported;dynamic;dynamic reaching;occupation based/functional task;UE activity with balance activity  -TB           User Key  (r) = Recorded By, (t) = Taken By, (c) = Cosigned By    Initials Name Provider Type    TB Whitney Nicole, OT Occupational Therapist               Goals/Plan    No documentation.                Clinical Impression     Row Name 09/23/22 1357          Pain Assessment    Pretreatment Pain Rating 2/10  -TB     Posttreatment Pain Rating 3/10  -TB     Pain Location - Side/Orientation Right  -TB     Pain Location anterior  -TB     Pain Location - knee  -TB     Pre/Posttreatment Pain Comment Pt tolerates dynamic EOB/OOB activity well  -TB     Pain Intervention(s) Ambulation/increased activity;Repositioned;Cold applied;Other (Comment)  Nerve block infusing  -TB     Row Name 09/23/22 7887          Plan of Care Review    Plan of Care Reviewed With patient;spouse  -TB     Progress improving  -TB     Outcome Evaluation OT IE completed. Pt is A/Ox4 and motivated to  regain occupational independence. Pt up in room/to bathroom with CGAx1 using RW. Pt reports good pain control and demonstrates good safety awareness. Education completed for precautions and ADL retraining. Mod A LB dressing. OT will d/c at this time. Pt set to d/c home today following afternoon PT session. Spouse available to assist as needed at home.  -TB     Row Name 09/23/22 1357          Therapy Assessment/Plan (OT)    Therapy Frequency (OT) evaluation only  -TB     Row Name 09/23/22 1357          Therapy Plan Review/Discharge Plan (OT)    Anticipated Discharge Disposition (OT) home with assist  -TB     Row Name 09/23/22 1357          Vital Signs    Pre Systolic BP Rehab --  RN cleared OT  -TB     Pre SpO2 (%) 97  -TB     O2 Delivery Pre Treatment room air  -TB     O2 Delivery Intra Treatment room air  -TB     O2 Delivery Post Treatment room air  -TB     Pre Patient Position Sitting  -TB     Intra Patient Position Standing  -TB     Post Patient Position Sitting  -TB     Row Name 09/23/22 1357          Positioning and Restraints    Pre-Treatment Position sitting in chair/recliner  -TB     Post Treatment Position chair  -TB     In Chair notified nsg;reclined;call light within reach;encouraged to call for assist;exit alarm on;legs elevated  -TB           User Key  (r) = Recorded By, (t) = Taken By, (c) = Cosigned By    Initials Name Provider Type    TB Whitney Nicole, OT Occupational Therapist               Outcome Measures     Row Name 09/23/22 1400          How much help from another is currently needed...    Putting on and taking off regular lower body clothing? 2  -TB     Bathing (including washing, rinsing, and drying) 3  -TB     Toileting (which includes using toilet bed pan or urinal) 3  -TB     Putting on and taking off regular upper body clothing 4  -TB     Taking care of personal grooming (such as brushing teeth) 3  -TB     Eating meals 4  -TB     AM-PAC 6 Clicks Score (OT) 19  -TB     Row Name  09/23/22 1353 09/23/22 1040       How much help from another person do you currently need...    Turning from your back to your side while in flat bed without using bedrails? 3  -AE 3  -AE    Moving from lying on back to sitting on the side of a flat bed without bedrails? 3  -AE 3  -AE    Moving to and from a bed to a chair (including a wheelchair)? 3  -AE 3  -AE    Standing up from a chair using your arms (e.g., wheelchair, bedside chair)? 3  -AE 3  -AE    Climbing 3-5 steps with a railing? 3  -AE 3  -AE    To walk in hospital room? 3  -AE 3  -AE    AM-PAC 6 Clicks Score (PT) 18  -AE 18  -AE    Highest level of mobility 6 --> Walked 10 steps or more  -AE 6 --> Walked 10 steps or more  -AE    Row Name 09/23/22 0800          How much help from another person do you currently need...    Turning from your back to your side while in flat bed without using bedrails? 3  -AP     Moving from lying on back to sitting on the side of a flat bed without bedrails? 3  -AP     Moving to and from a bed to a chair (including a wheelchair)? 3  -AP     Standing up from a chair using your arms (e.g., wheelchair, bedside chair)? 3  -AP     Climbing 3-5 steps with a railing? 3  -AP     To walk in hospital room? 3  -AP     AM-PAC 6 Clicks Score (PT) 18  -AP     Highest level of mobility 6 --> Walked 10 steps or more  -AP     Row Name 09/23/22 1400 09/23/22 1353       Functional Assessment    Outcome Measure Options AM-PAC 6 Clicks Daily Activity (OT)  -TB AM-PAC 6 Clicks Basic Mobility (PT)  -AE    Row Name 09/23/22 1040          Functional Assessment    Outcome Measure Options AM-PAC 6 Clicks Basic Mobility (PT);PADD  -AE           User Key  (r) = Recorded By, (t) = Taken By, (c) = Cosigned By    Initials Name Provider Type    Whitney Pendleton, OT Occupational Therapist    AP Massimo Subramanian RN Registered Nurse    Luis Fernando Baird, PT Physical Therapist              Occupational Therapy Education                 Title: PT  OT SLP Therapies (In Progress)     Topic: Occupational Therapy (In Progress)     Point: ADL training (Done)     Description:   Instruct learner(s) on proper safety adaptation and remediation techniques during self care or transfers.   Instruct in proper use of assistive devices.              Learning Progress Summary           Patient Acceptance, E,D, VU,DU by TB at 9/23/2022 1400   Family Acceptance, E,D, VU,DU by TB at 9/23/2022 1400                   Point: Home exercise program (Not Started)     Description:   Instruct learner(s) on appropriate technique for monitoring, assisting and/or progressing therapeutic exercises/activities.              Learner Progress:  Not documented in this visit.          Point: Precautions (Done)     Description:   Instruct learner(s) on prescribed precautions during self-care and functional transfers.              Learning Progress Summary           Patient Acceptance, E,D, VU,DU by TB at 9/23/2022 1400   Family Acceptance, E,D, VU,DU by TB at 9/23/2022 1400                   Point: Body mechanics (Not Started)     Description:   Instruct learner(s) on proper positioning and spine alignment during self-care, functional mobility activities and/or exercises.              Learner Progress:  Not documented in this visit.                      User Key     Initials Effective Dates Name Provider Type Discipline     06/16/21 -  Whitney Nicole OT Occupational Therapist OT              OT Recommendation and Plan  Therapy Frequency (OT): evaluation only  Plan of Care Review  Plan of Care Reviewed With: patient, spouse  Progress: improving  Outcome Evaluation: OT IE completed. Pt is A/Ox4 and motivated to regain occupational independence. Pt up in room/to bathroom with CGAx1 using RW. Pt reports good pain control and demonstrates good safety awareness. Education completed for precautions and ADL retraining. Mod A LB dressing. OT will d/c at this time. Pt set to d/c home today  following afternoon PT session. Spouse available to assist as needed at home.  Plan of Care Reviewed With: patient, spouse  Outcome Evaluation: OT IE completed. Pt is A/Ox4 and motivated to regain occupational independence. Pt up in room/to bathroom with CGAx1 using RW. Pt reports good pain control and demonstrates good safety awareness. Education completed for precautions and ADL retraining. Mod A LB dressing. OT will d/c at this time. Pt set to d/c home today following afternoon PT session. Spouse available to assist as needed at home.     Time Calculation:    Time Calculation- OT     Row Name 09/23/22 1354 09/23/22 1058 09/23/22 1042       Time Calculation- OT    OT Start Time -- 1058  -TB --    OT Received On -- 09/23/22  -TB --       Timed Charges    84177 - Gait Training Minutes  13  -AE -- 15  -AE    97936 - OT Self Care/Mgmt Minutes -- 25  -TB --       Untimed Charges    OT Eval/Re-eval Minutes -- 45  -TB --       Total Minutes    Timed Charges Total Minutes 13  -AE 25  -TB 15  -AE    Untimed Charges Total Minutes -- 45  -TB --     Total Minutes 13  -AE 70  -TB 15  -AE          User Key  (r) = Recorded By, (t) = Taken By, (c) = Cosigned By    Initials Name Provider Type    TB Whitney Nicole OT Occupational Therapist    AE Luis Fernando Mckeon, PT Physical Therapist              Therapy Charges for Today     Code Description Service Date Service Provider Modifiers Qty    15525133923 HC OT SELF CARE/MGMT/TRAIN EA 15 MIN 9/23/2022 Whitney Nicole OT GO 2    18181644455 HC OT EVAL LOW COMPLEXITY 3 9/23/2022 Whitney Nicole OT GO 1             OT Discharge Summary  Anticipated Discharge Disposition (OT): home with assist    Whitney Nicole OT  9/23/2022

## 2022-09-23 NOTE — DISCHARGE SUMMARY
Patient Name: Laura Churchill  MRN: [id number]  : 1950  DOS: 2022    Attending: Houston Castano,*    Primary Care Provider: Julius Rivera DO    Date of Admission:.2022 12:00 PM    Date of Discharge:  2022    Discharge Diagnosis:     Status post right knee replacement    Essential hypertension    Anxiety and depression    Primary localized osteoarthritis of right knee    Obesity    Postoperative pain      Hospital Course    At admit:    Patient is a pleasant 71 y.o. female presented for scheduled surgery by Dr. Castano.     Per his note ( Laura is a very pleasant 71-year-old female who has struggled with end-stage activity limiting right knee pain secondary to osteoarthritis.  X-rays in  revealed severe tricompartmental degenerative changes in a varus knee with complete loss of medial joint space and marginal osteophyte formation.  They have failed exhaustive conservative treatment measures including cortisone injections and physical therapy.  History of a left knee replacement in  and a left shoulder replacement in .  She takes Tylenol for her knee pain.  No relief with a cortisone injection in .  After undergoing a shared decision-making process they agreed to proceed with right total knee arthroplasty.  Surgical consent form was signed.).     She underwent right total knee arthroplasty under spinal anesthesia, tolerated surgery well, is admitted for further management.  Adductor canal nerve block catheter to infuse pump was placed by acute pain service.     Seen in her room at surgery, doing fairly well, no complaints of nausea, vomiting, or shortness of breath.     No history of DVT or PE.     Patient is known to me from prior hospitalization in 2020 when she had left total knee arthroplasty.     She has previously also underwent left total shoulder arthroplasty in , and had lumbar spinal surgery including fusion by Dr. Rider in 2020.     After  admit:    Patient was provided pain medications as needed for pain control, along with adductor canal nerve block infusion of Ropivacaine.      Adjustments were made to pain medications to optimize postop pain management. Risks and benefits of opiate medications discussed with patient. [facility]UART report was reviewed.    She was seen by PT and OT and has progressed well over her stay.    She used an IS for atelectasis prophylaxis and ASA along with mechanicals for DVT prophylaxis.    Home medications were resumed as appropriate, and labs were monitored and remained fairly stable.     With the progress she has made,  is ready for DC home today.      She will have an Infu pump ( instructed on it during this admit).    Discussed with patient regarding plan and she shows understanding and agreement.      Patient will have outpt PT following discharge.        Procedures Performed  Procedure(s):  TOTAL KNEE ARTHROPLASTY RIGHT       Pertinent Test Results:    I reviewed the patient's new clinical results.   Results from last 7 days   Lab Units 09/23/22  0409 09/19/22  1408   WBC 10*3/mm3 10.50 7.36   HEMOGLOBIN g/dL 11.1* 12.1   HEMATOCRIT % 34.0 37.3   PLATELETS 10*3/mm3 226 250     Results from last 7 days   Lab Units 09/23/22  0409 09/19/22  1408   SODIUM mmol/L 141 143   POTASSIUM mmol/L 4.5 4.0   CHLORIDE mmol/L 106 108*   CO2 mmol/L 28.0 25.0   BUN mg/dL 14 17   CREATININE mg/dL 0.78 0.89   CALCIUM mg/dL 9.0 9.2   BILIRUBIN mg/dL  --  0.3   ALK PHOS U/L  --  75   ALT (SGPT) U/L  --  10   AST (SGOT) U/L  --  16   GLUCOSE mg/dL 155* 102*     I reviewed the patient's new imaging including images and reports.      Physical therapy  Progress: improving  Outcome Evaluation: Pt improved ambulation distance to 350ft with CGA and RW for support. Pt demo overall improved endurance with activity and strength of RLE. Pt demo no knee buckling this session and decreased c/o pain in R knee. Pt demo good safety awareness  "overall with mobility. Pt/spouse educated on HEP, transfers, and overall safety awareness with mobility. Anticipate pt to d/c home with assist this date, will sign off.    Discharge Assessment:       Visit Vitals  /82 (BP Location: Right arm, Patient Position: Lying)   Pulse 81   Temp 97.8 °F (36.6 °C) (Oral)   Resp 16   Ht 165.1 cm (65\")   Wt 94.3 kg (208 lb)   LMP  (LMP Unknown)   SpO2 100%   Breastfeeding No   BMI 34.61 kg/m²     Temp (24hrs), Av.3 °F (36.3 °C), Min:96.9 °F (36.1 °C), Max:97.8 °F (36.6 °C)      General Appearance:    Alert, cooperative, in no acute distress   Lungs:     Clear to auscultation,respirations regular, even and                   unlabored    Heart:    Regular rhythm and normal rate, normal S1 and S2   Abdomen:     Normal bowel sounds, no masses, no organomegaly, soft        non-tender, non-distended, no guarding, no rebound                 tenderness   Extremities:   CDI dressing surgical knee . ACB cath present, arrow pump.   Pulses:   Pulses palpable and equal bilaterally   Skin:   No bleeding, bruising or rash   Neurologic:   Cranial nerves 2 - 12 grossly intact, sensation intact, Flexion and dorsiflexion intact bilateral feet.         Discharge Disposition: Home.          Discharge Medications      New Medications      Instructions Start Date   acetaminophen 500 MG tablet  Commonly known as: TYLENOL   1,000 mg, Oral, Every 8 Hours, Take every 8 hours  as needed after 1 week      aspirin  MG tablet   325 mg, Oral, Daily   Start Date: 2022     meloxicam 15 MG tablet  Commonly known as: MOBIC   15 mg, Oral, Daily      oxyCODONE 5 MG immediate release tablet  Commonly known as: Roxicodone   5 mg, Oral, Every 4 Hours PRN      polyethylene glycol 17 g packet  Commonly known as: MIRALAX   17 g, Oral, Daily      ropivacaine 0.2 % infusion (INFUSYSTEM)  Commonly known as: NAROPIN   1 mL/hr (2 mg/hr), Peripheral Nerve, Continuous         Continue These " Medications      Instructions Start Date   atorvastatin 10 MG tablet  Commonly known as: LIPITOR   10 mg, Oral, Daily      fexofenadine 60 MG tablet  Commonly known as: Allegra Allergy   60 mg, Oral, Daily      losartan 100 MG tablet  Commonly known as: Cozaar   100 mg, Oral, Daily      venlafaxine  MG 24 hr capsule  Commonly known as: EFFEXOR-XR   150 mg, Oral, Daily      vitamin B-12 1000 MCG tablet  Commonly known as: CYANOCOBALAMIN   1,000 mcg, Oral, Daily      VITAMIN D PO   5,000 Units, Oral, Daily             Discharge Diet: resume prior.    Activity at Discharge:  Weight bearing as tolerated      Follow-up Appointments  Houston Castano MD per his orders.         Dragon disclaimer:  Part of this encounter note is an electronic transcription/translation of spoken language to printed text. The electronic translation of spoken language may permit erroneous, or at times, nonsensical words or phrases to be inadvertently transcribed; Although I have reviewed the note for such errors, some may still exist.       Fadumo Corbin MD  09/23/22  10:57 EDT

## 2022-09-23 NOTE — PROGRESS NOTES
[facility]    Acute pain service Inpatient Progress Note    Patient Name: Laura Churchill  :  1950  MRN:  [id number]        Acute Pain  Service Inpatient Progress Note:    Analgesia:Good  Pain Score:4/10  LOC: alert and awake  Resp Status: room air  Cardiac: VS stable  Side Effects:None  Catheter Site:clean, dressing intact and dry  Cath type: peripheral nerve cath with ON Q  Volume: 1mL,8ml, 8ml InfuSystem Pump.  Dosing/Volume: ropivacaine 0.2%  Catheter Plan:Catheter to remain Insitu and Continue catheter infusion rate unchanged  Comments:    dc home today

## 2022-09-23 NOTE — PLAN OF CARE
Goal Outcome Evaluation:  Plan of Care Reviewed With: patient, spouse        Progress: improving  Outcome Evaluation: Pt improved ambulation distance to 350ft with CGA and RW for support. Pt demo overall improved endurance with activity and strength of RLE. Pt demo no knee buckling this session and decreased c/o pain in R knee. Pt demo good safety awareness overall with mobility. Pt/spouse educated on HEP, transfers, and overall safety awareness with mobility. Anticipate pt to d/c home with assist this date, will sign off.

## 2022-09-23 NOTE — THERAPY TREATMENT NOTE
Patient Name: Laura Churchill  : 1950    MRN: [id number]                              Today's Date: 2022       Admit Date: 2022    Visit Dx:     ICD-10-CM ICD-9-CM   1. Status post right knee replacement  Z96.651 V43.65     Patient Active Problem List   Diagnosis   • Essential hypertension   • Hyperlipidemia   • Obstructive sleep apnea syndrome   • Osteoarthritis of knee   • Osteoporosis   • Lumbar spondylosis without myelopathy/Lumbar facet arthropathy   • Displacement of lumbar intervertebral disc   • Stenosis of lateral recess of lumbar spine   • Physical deconditioning   • Morbid obesity due to excess calories ([facility])   • Anxiety and depression   • Sacroiliac joint dysfunction of right side   • Greater trochanteric bursitis of right hip   • Iliotibial band syndrome of right side   • Spinal stenosis   • Osteoarthritis   • Obesity due to excess calories   • Menopause   • Mixed hyperlipidemia   • Lumbosacral disc disease   • Low back pain   • Joint pain   • Extremity pain   • Chronic pain disorder   • Back problem   • Vaginal atrophy   • Primary localized osteoarthrosis of shoulder region   • Status post total left shoulder arthroplasty   • Sciatica   • Acquired spondylolisthesis   • Spinal stenosis, lumbar region, with neurogenic claudication   • L3-5 PLIF 2020   • Primary osteoarthritis of left knee   • Status post total left knee replacement   • Leukocytosis, likely reactive   • Acute blood loss anemia, mild, asymptomatic   • Nonintractable episodic headache   • Right thigh pain   • Pseudoarthrosis of lumbar spine   • Primary localized osteoarthritis of right knee   • Status post right knee replacement   • Obesity   • Postoperative pain     Past Medical History:   Diagnosis Date   • Anesthesia     low BP with spinal surgery in , patient was hospitalized a few extra days due to low Bp   • Anxiety    • Atrophic vaginitis    • Bilateral hip pain    • Cancer (HCC)    • Hypertension    • Low  back pain    • Mixed hyperlipidemia    • Muscle spasm    • Obesity due to excess calories    • MOOKIE (obstructive sleep apnea)     NO CPAP USE   • Osteoarthritis    • PONV (postoperative nausea and vomiting)     preprocedural meds help and are needed   • Tendinitis of right shoulder    • Vertigo      Past Surgical History:   Procedure Laterality Date   • CHOLECYSTECTOMY     • COLONOSCOPY  2013   • ENDOSCOPY     • GASTRIC BANDING REMOVAL     • GASTRIC SLEEVE LAPAROSCOPIC  2011   • HIP PINNING Left     3 pins   • INTERVENTIONAL RADIOLOGY PROCEDURE N/A 5/27/2020    Procedure: IR myelogram, lumbar;  Surgeon: Jason Rodriguez MD;  Location:  [facility];  Service: Interventional Radiology;  Laterality: N/A;   • LAPAROSCOPIC GASTRIC BANDING     • LAPAROTOMY OOPHERECTOMY Right    • LUMBAR DISCECTOMY FUSION INSTRUMENTATION N/A 11/20/2018    Procedure: L3-4 LUMBAR LAMINECTOMY POSTERIOR LUMBAR INTERBODY FUSION PILF;  Surgeon: David Rider MD;  Location:  [facility] OR;  Service: Neurosurgery   • LUMBAR DISCECTOMY FUSION INSTRUMENTATION N/A 7/21/2020    Procedure: L2-3 PLIF, exploration of L3-4 fusion, L2-4 fusion, L2 laminectomy;  Surgeon: David Rider MD;  Location:  [facility] OR;  Service: Neurosurgery;  Laterality: N/A;   • WA RECONSTR TOTAL SHOULDER IMPLANT Left 7/10/2017    Procedure: LEFT TOTAL SHOULDER ARTHROPLASTY ;  Surgeon: Almas Miller MD;  Location:  [facility] OR;  Service: Orthopedics   • TOTAL KNEE ARTHROPLASTY Left 1/16/2020    Procedure: TOTAL KNEE REPLACEMENT LEFT;  Surgeon: Houston Castano MD;  Location:  [facility] OR;  Service: Orthopedics   • TOTAL KNEE ARTHROPLASTY Right 9/22/2022    Procedure: TOTAL KNEE ARTHROPLASTY RIGHT;  Surgeon: Houston Castano MD;  Location: [facility] OR;  Service: Orthopedics;  Laterality: Right;   • WISDOM TOOTH EXTRACTION        General Information     Row Name 09/23/22 1341 09/23/22 1020       Physical Therapy Time and Intention     Document Type discharge treatment  -AE evaluation  -AE    Mode of Treatment physical therapy  -AE physical therapy  -AE    Row Name 09/23/22 1341 09/23/22 1020       General Information    Patient Profile Reviewed yes  -AE yes  -AE    Prior Level of Function -- independent:;all household mobility;gait;transfer;bed mobility;ADL's;dressing;bathing  -AE    Existing Precautions/Restrictions fall;other (see comments)  R TKA, RLE WBAT; R adductor nerve cath  -AE fall;other (see comments)  RLE TKA, RLE WBAT; R adductor nerve cath  -AE    Barriers to Rehab previous functional deficit  -AE medically complex;previous functional deficit  -AE    Row Name 09/23/22 1020          Living Environment    People in Home spouse;other relative(s)  -AE     Row Name 09/23/22 1020          Home Main Entrance    Number of Stairs, Main Entrance three  -AE     Stair Railings, Main Entrance railings on both sides of stairs  B handrail but pt only able to use one handrail d/t distance between them.  -AE     Row Name 09/23/22 1020          Stairs Within Home, Primary    Stairs, Within Home, Primary Flight of stairs to bedroom but pt able to stay on main level initially.  -AE     Number of Stairs, Within Home, Primary twelve  -AE     Stair Railings, Within Home, Primary railings safe and in good condition  -AE     Row Name 09/23/22 1341 09/23/22 1020       Cognition    Orientation Status (Cognition) oriented x 4  -AE oriented x 4  -AE    Row Name 09/23/22 1341 09/23/22 1020       Safety Issues, Functional Mobility    Safety Issues Affecting Function (Mobility) awareness of need for assistance;insight into deficits/self-awareness;safety precaution awareness;sequencing abilities  -AE insight into deficits/self-awareness;safety precaution awareness;safety precautions follow-through/compliance;sequencing abilities  -AE    Impairments Affecting Function (Mobility) balance;endurance/activity tolerance;strength;range of motion (ROM);pain  -AE  balance;endurance/activity tolerance;shortness of breath;strength;pain;postural/trunk control  -AE          User Key  (r) = Recorded By, (t) = Taken By, (c) = Cosigned By    Initials Name Provider Type    AE Luis Fernando Mckeon, PT Physical Therapist               Mobility     Row Name 09/23/22 1344 09/23/22 1024       Bed Mobility    Bed Mobility -- scooting/bridging;supine-sit  -AE    Scooting/Bridging [place] (Bed Mobility) -- contact guard;1 person assist  -AE    Supine-Sit [place] (Bed Mobility) -- contact guard;1 person assist;verbal cues  -AE    Comment, (Bed Mobility) Pt received and left UIC.  -AE Pt able to complete bed mobility with CGA and no use of bed rails. Pt requires increased time to complete bed mobility.  -AE    Row Name 09/23/22 1344 09/23/22 1024       Transfers    Comment, (Transfers) VCs for hand placement and sequencing. Pt demo improved independence with STS, still requires cues for line management with nerve cath to avoid dislodgement.  -AE VCs for hand placement and sequencing. Pt demo decreased quad activation while sitting EOB but demo no knee buckling upon initial STS.  -AE    Row Name 09/23/22 1344 09/23/22 1024       Sit-Stand Transfer    Sit-Stand [place] (Transfers) contact guard;1 person assist;verbal cues  -AE contact guard;1 person assist;verbal cues  -AE    Assistive Device (Sit-Stand Transfers) walker, front-wheeled  -AE walker, front-wheeled  -AE    Row Name 09/23/22 1344 09/23/22 1024       Gait/Stairs (Locomotion)    [place] Level (Gait) contact guard;1 person assist;verbal cues  -AE contact guard;1 person assist;verbal cues  -AE    Assistive Device (Gait) walker, front-wheeled  -AE walker, front-wheeled  -AE    Ambulated day of surgery or within 4 hours of PACU discharge -- --  -AE    Reason Patient was unable to Ambulate -- --  -AE    Distance in Feet (Gait) 350  -  -AE    Deviations/Abnormal Patterns (Gait) bilateral deviations;base of support,  narrow;nancy decreased;gait speed decreased;stride length decreased;weight shifting decreased  -AE bilateral deviations;base of support, narrow;antalgic;nancy decreased;gait speed decreased;stride length decreased;weight shifting decreased  -AE    Bilateral Gait Deviations forward flexed posture;heel strike decreased  -AE forward flexed posture;heel strike decreased  -AE    Right Sided Gait Deviations weight shift ability decreased  -AE weight shift ability decreased  -AE    [place] Level (Stairs) -- contact guard  -AE    Assistive Device (Stairs) -- cane, straight  -AE    Handrail Location (Stairs) -- right side (descending)  -AE    Number of Steps (Stairs) -- 3  -AE    Ascending Technique (Stairs) -- step-to-step  -AE    Descending Technique (Stairs) -- step-to-step  -AE    Stairs, Safety Issues -- weight-shifting ability decreased  -AE    Comment, (Gait/Stairs) Pt demo step through gait pattern with slowed nancy, decreased step length, and decreased weight shifting onto RLE. Pt demo improved endurance overall with no knee buckling this session, improved R quad control, and c/o decreased pain of RLE.  -AE Pt demo step to gait pattern initially but able to improve with cues. Pt then began experiencing mild knee buckling that caused pt to revert to step to gait pattern. Pt c/o increased R knee pain which limited WB on RLE. Overall pt experienced 5 instances of mild knee buckling throughout session. Pt completed stair training of 3 steps with step to step pattern, no LOB or knee buckling noted during stair training.  -AE    Row Name 09/23/22 1344 09/23/22 [facility]       Mobility    Extremity Weight-bearing Status right lower extremity  -AE right lower extremity  -AE    Right Lower Extremity (Weight-bearing Status) weight-bearing as tolerated (WBAT)  -AE weight-bearing as tolerated (WBAT)  -AE          User Key  (r) = Recorded By, (t) = Taken By, (c) = Cosigned By    Initials Name Provider Type    AE Mike,  JUAN DANIEL Gould Physical Therapist               Obj/Interventions     Row Name 09/23/22 1030          Range of Motion Comprehensive    Comment, General Range of Motion R knee ROM 5-85; LLE ROM WFL  -AE     Row Name 09/23/22 1030          Strength Comprehensive (MMT)    General Manual Muscle Testing (MMT) Assessment lower extremity strength deficits identified  -AE     Comment, General Manual Muscle Testing (MMT) Assessment RLE grossly 3+/5, LLE grossly 4/5  -AE     Row Name 09/23/22 1349 09/23/22 1030       Motor Skills    Therapeutic Exercise hip;knee;ankle  -AE hip;knee;ankle  -AE    Row Name 09/23/22 1349 09/23/22 1030       Hip (Therapeutic Exercise)    Hip (Therapeutic Exercise) strengthening exercise  -AE strengthening exercise  -AE    Hip Strengthening (Therapeutic Exercise) right;heel slides;5 repetitions;sitting  -AE right;heel slides;supine;5 repetitions  -AE    Row Name 09/23/22 1349 09/23/22 1030       Knee (Therapeutic Exercise)    Knee (Therapeutic Exercise) isometric exercises;strengthening exercise  -AE isometric exercises;strengthening exercise  -AE    Knee Isometrics (Therapeutic Exercise) right;quad sets;sitting;5 repetitions;3 second hold  -AE right;quad sets;supine;5 repetitions;3 second hold  -AE    Knee Strengthening (Therapeutic Exercise) right;flexion;extension;SLR (straight leg raise);SAQ (short arc quad);LAQ (long arc quad);sitting;5 repetitions  -AE right;flexion;extension;SLR (straight leg raise);SAQ (short arc quad);LAQ (long arc quad);heel slides;supine;5 repetitions  -AE    Row Name 09/23/22 1349 09/23/22 1030       Ankle (Therapeutic Exercise)    Ankle (Therapeutic Exercise) AROM (active range of motion)  -AE AROM (active range of motion)  -AE    Ankle AROM (Therapeutic Exercise) bilateral;dorsiflexion;plantarflexion;10 repetitions;sitting  -AE bilateral;dorsiflexion;plantarflexion;10 repetitions;supine  -AE    Row Name 09/23/22 1349 09/23/22 1030       Balance    Balance Assessment  sitting static balance;sitting dynamic balance;sit to stand dynamic balance;standing static balance;standing dynamic balance  -AE sitting static balance;sitting dynamic balance;sit to stand dynamic balance;standing static balance;standing dynamic balance  -AE    Static Sitting Balance standby assist  -AE standby assist  -AE    Dynamic Sitting Balance standby assist  -AE contact guard  -AE    Position, Sitting Balance unsupported;sitting in chair  -AE unsupported;sitting edge of bed  -AE    Sit to Stand Dynamic Balance contact guard;1-person assist;verbal cues  -AE contact guard;1-person assist;verbal cues  -AE    Static Standing Balance contact guard  -AE contact guard  -AE    Dynamic Standing Balance contact guard  -AE contact guard  -AE    Position/Device Used, Standing Balance supported;walker, front-wheeled  -AE supported;walker, front-wheeled  -AE    Row Name 09/23/22 1030          Sensory Assessment (Somatosensory)    Sensory Assessment (Somatosensory) LE sensation intact  -AE           User Key  (r) = Recorded By, (t) = Taken By, (c) = Cosigned By    Initials Name Provider Type    AE Luis Fernando Mckeon, PT Physical Therapist               Goals/Plan     Row Name 09/23/22 1038          Bed Mobility Goal 1 (PT)    Activity/Assistive Device (Bed Mobility Goal 1, PT) sit to supine/supine to sit  -AE     [place] Level/Cues Needed (Bed Mobility Goal 1, PT) modified independence  -AE     Time Frame (Bed Mobility Goal 1, PT) long term goal (LTG);5 days  -AE     Progress/Outcomes (Bed Mobility Goal 1, PT) goal ongoing  -AE     Row Name 09/23/22 1038          Transfer Goal 1 (PT)    Activity/Assistive Device (Transfer Goal 1, PT) sit-to-stand/stand-to-sit;bed-to-chair/chair-to-bed  -AE     [place] Level/Cues Needed (Transfer Goal 1, PT) modified independence  -AE     Time Frame (Transfer Goal 1, PT) long term goal (LTG);5 days  -AE     Progress/Outcome (Transfer Goal 1, PT) goal ongoing  -AE     Row Name  09/23/22 1038          Gait Training Goal 1 (PT)    Activity/Assistive Device (Gait Training Goal 1, PT) gait (walking locomotion);assistive device use  -AE     [place] Level (Gait Training Goal 1, PT) standby assist  -AE     Distance (Gait Training Goal 1, PT) 400ft  -AE     Time Frame (Gait Training Goal 1, PT) long term goal (LTG);5 days  -AE     Row Name 09/23/22 1038          Stairs Goal 1 (PT)    Activity/Assistive Device (Stairs Goal 1, PT) ascending stairs;descending stairs;step-to-step  -AE     [place] Level/Cues Needed (Stairs Goal 1, PT) contact guard required  -AE     Number of Stairs (Stairs Goal 1, PT) 3  -AE     Time Frame (Stairs Goal 1, PT) long term goal (LTG);5 days  -AE     Progress/Outcome (Stairs Goal 1, PT) goal met  -AE     Row Name 09/23/22 1038          Therapy Assessment/Plan (PT)    Planned Therapy Interventions (PT) balance training;bed mobility training;gait training;home exercise program;patient/family education;postural re-education;ROM (range of motion);stair training;transfer training;strengthening  -AE           User Key  (r) = Recorded By, (t) = Taken By, (c) = Cosigned By    Initials Name Provider Type    AE Luis Fernando Mckeon, PT Physical Therapist               Clinical Impression     Row Name 09/23/22 1350 09/23/22 1033       Pain    Pretreatment Pain Rating 2/10  -AE 3/10  -AE    Posttreatment Pain Rating 3/10  -AE 6/10  -AE    Pain Location - Side/Orientation Right  -AE Right  -AE    Pain Location -- anterior  -AE    Pain Location - knee  -AE knee  -AE    Pre/Posttreatment Pain Comment -- tolerated  -AE    Pain Intervention(s) Repositioned;Ambulation/increased activity;Cold applied  -AE Repositioned;Cold pack;Ambulation/increased activity  -AE    Row Name 09/23/22 1350 09/23/22 1033       Plan of Care Review    Plan of Care Reviewed With patient;spouse  -AE patient  -AE    Progress improving  -AE no change  -AE    Outcome Evaluation Pt improved ambulation distance to  350ft with CGA and RW for support. Pt demo overall improved endurance with activity and strength of RLE. Pt demo no knee buckling this session and decreased c/o pain in R knee. Pt demo good safety awareness overall with mobility. Pt/spouse educated on HEP, transfers, and overall safety awareness with mobility. Anticipate pt to d/c home with assist this date, will sign off.  -AE PT eval completed. Pt presents s/p R TKA with decreased functional mobility, decreased RLE strength, and decreased independence. Pt ambulated 200ft with CGA and RW for support. Pt with decreased quad activation but no knee buckling upon initial STS. While ambulating, pt had 5 instances of mild knee buckling 2/2 fatigue. Stair training of 3 steps completed with R handrail, no LOB or knee buckling noted. Recommend continued skilled IP PT interventions. Recommend D/C home with assist and HHPT. Want to work with pt one more time this PM for possible reduction in knee buckling prior to d/c home. PADD score is 8.  -AE    Row Name 09/23/22 1033          Therapy Assessment/Plan (PT)    Patient/Family Therapy Goals Statement (PT) Return home  -AE     Rehab Potential (PT) good, to achieve stated therapy goals  -AE     Criteria for Skilled Interventions Met (PT) yes  -AE     Therapy Frequency (PT) 2 times/day  -AE     Row Name 09/23/22 1350 09/23/22 1033       Vital Signs    Pre Systolic BP Rehab --  VSS; RN cleared pt for treatment  -  -AE    Pre Treatment Diastolic BP -- 82  -AE    Pretreatment Heart Rate (beats/min) -- 83  -AE    Posttreatment Heart Rate (beats/min) -- 78  -AE    Pre SpO2 (%) -- 96  -AE    O2 Delivery Pre Treatment -- room air  -AE    O2 Delivery Intra Treatment -- room air  -AE    Post SpO2 (%) -- 97  -AE    O2 Delivery Post Treatment -- room air  -AE    Pre Patient Position -- Supine  -AE    Intra Patient Position -- Standing  -AE    Post Patient Position -- Sitting  -AE    Row Name 09/23/22 1350 09/23/22 1033        Positioning and Restraints    Pre-Treatment Position sitting in chair/recliner  -AE in bed  -AE    Post Treatment Position chair  -AE chair  -AE    In Chair notified nsg;reclined;call light within reach;encouraged to call for assist;with family/caregiver;exit alarm on;legs elevated  -AE notified nsg;reclined;call light within reach;encouraged to call for assist;exit alarm on;compression device;legs elevated  -AE          User Key  (r) = Recorded By, (t) = Taken By, (c) = Cosigned By    Initials Name Provider Type    Luis Fernando Baird, PT Physical Therapist               Outcome Measures     Row Name 09/23/22 1353 09/23/22 1040       How much help from another person do you currently need...    Turning from your back to your side while in flat bed without using bedrails? 3  -AE 3  -AE    Moving from lying on back to sitting on the side of a flat bed without bedrails? 3  -AE 3  -AE    Moving to and from a bed to a chair (including a wheelchair)? 3  -AE 3  -AE    Standing up from a chair using your arms (e.g., wheelchair, bedside chair)? 3  -AE 3  -AE    Climbing 3-5 steps with a railing? 3  -AE 3  -AE    To walk in hospital room? 3  -AE 3  -AE    AM-PAC 6 Clicks Score (PT) 18  -AE 18  -AE    Highest level of mobility 6 --> Walked 10 steps or more  -AE 6 --> Walked 10 steps or more  -AE    Row Name 09/23/22 0800          How much help from another person do you currently need...    Turning from your back to your side while in flat bed without using bedrails? 3  -AP     Moving from lying on back to sitting on the side of a flat bed without bedrails? 3  -AP     Moving to and from a bed to a chair (including a wheelchair)? 3  -AP     Standing up from a chair using your arms (e.g., wheelchair, bedside chair)? 3  -AP     Climbing 3-5 steps with a railing? 3  -AP     To walk in hospital room? 3  -AP     AM-PAC 6 Clicks Score (PT) 18  -AP     Highest level of mobility 6 --> Walked 10 steps or more  -AP     Row Name 09/23/22  1040          PADD    Diagnosis 1  -AE     Gender 1  -AE     Age Group 1  -AE     Gait Distance 1  -AE     Assist Level 1  -AE     Home Support 3  -AE     PADD Score 8  -AE     Patient Preference home with home health  -AE     Prediction by PADD Score directly home (with home health or out-patient rehab)  -AE     Row Name 09/23/22 1353 09/23/22 1040       Functional Assessment    Outcome Measure Options AM-PAC 6 Clicks Basic Mobility (PT)  -AE AM-PAC 6 Clicks Basic Mobility (PT);PADD  -AE          User Key  (r) = Recorded By, (t) = Taken By, (c) = Cosigned By    Initials Name Provider Type    Massimo Hyman RN Registered Nurse    Luis Fernando Baird, JUAN DANIEL Physical Therapist                             Physical Therapy Education                 Title: PT OT SLP Therapies (In Progress)     Topic: Physical Therapy (Done)     Point: Mobility training (Done)     Learning Progress Summary           Patient Acceptance, E, VU by AE at 9/23/2022 1259    Acceptance, E, VU by AE at 9/23/2022 0855    Comment: Pt educated on HEP, transfers, safety awareness, and overall mobility.   Family Acceptance, E, VU by AE at 9/23/2022 1259                   Point: Home exercise program (Done)     Learning Progress Summary           Patient Acceptance, E, VU by AE at 9/23/2022 1259    Acceptance, E, VU by AE at 9/23/2022 0855    Comment: Pt educated on HEP, transfers, safety awareness, and overall mobility.   Family Acceptance, E, VU by AE at 9/23/2022 1259                   Point: Body mechanics (Done)     Learning Progress Summary           Patient Acceptance, E, VU by AE at 9/23/2022 1259    Acceptance, E, VU by AE at 9/23/2022 0855    Comment: Pt educated on HEP, transfers, safety awareness, and overall mobility.   Family Acceptance, E, VU by AE at 9/23/2022 1259                   Point: Precautions (Done)     Learning Progress Summary           Patient Acceptance, E, VU by AE at 9/23/2022 1259    Acceptance, E, VU by AE at  9/23/2022 0855    Comment: Pt educated on HEP, transfers, safety awareness, and overall mobility.   Family Acceptance, E, VU by AE at 9/23/2022 1259                               User Key     Initials Effective Dates Name Provider Type Discipline    AE 09/21/21 -  Luis Fernando Mckeon PT Physical Therapist PT              PT Recommendation and Plan  Planned Therapy Interventions (PT): balance training, bed mobility training, gait training, home exercise program, patient/family education, postural re-education, ROM (range of motion), stair training, transfer training, strengthening  Plan of Care Reviewed With: patient, spouse  Progress: improving  Outcome Evaluation: Pt improved ambulation distance to 350ft with CGA and RW for support. Pt demo overall improved endurance with activity and strength of RLE. Pt demo no knee buckling this session and decreased c/o pain in R knee. Pt demo good safety awareness overall with mobility. Pt/spouse educated on HEP, transfers, and overall safety awareness with mobility. Anticipate pt to d/c home with assist this date, will sign off.     Time Calculation:    PT Charges     Row Name 09/23/22 1354 09/23/22 1042          Time Calculation    Start Time 1259  -AE 0855  -AE     PT Received On 09/23/22  -AE 09/23/22  -AE     PT Goal Re-Cert Due Date 10/03/22  -AE 10/03/22  -AE            Time Calculation- PT    Total Timed Code Minutes- PT 23 minute(s)  -AE 25 minute(s)  -AE            Timed Charges    71832 - PT Therapeutic Exercise Minutes 10  -AE 10  -AE     77862 - Gait Training Minutes  13  -AE 15  -AE            Untimed Charges    PT Eval/Re-eval Minutes -- 50  -AE            Total Minutes    Timed Charges Total Minutes 23  -AE 25  -AE     Untimed Charges Total Minutes -- 50  -AE      Total Minutes 23  -AE 75  -AE           User Key  (r) = Recorded By, (t) = Taken By, (c) = Cosigned By    Initials Name Provider Type    AE Luis Fernando Mckeon PT Physical Therapist              Therapy  Charges for Today     Code Description Service Date Service Provider Modifiers Qty    91106240930 HC PT THER PROC EA 15 MIN 9/23/2022 Luis Fernando Mckeon, PT GP 1    52914244310 HC GAIT TRAINING EA 15 MIN 9/23/2022 Luis Fernando Mckeon, PT GP 1    [id number] HC PT EVAL LOW COMPLEXITY 4 9/23/2022 Luis Fernando Mckeon, PT GP 1    [id number] HC PT THER PROC EA 15 MIN 9/23/2022 Luis Fernando Mckeon, PT GP 1    [id number] HC GAIT TRAINING EA 15 MIN 9/23/2022 Luis Fernando Mckeon, PT GP 1          PT G-Codes  Outcome Measure Options: AM-PAC 6 Clicks Basic Mobility (PT)  AM-PAC 6 Clicks Score (PT): 18    Luis Fernando Mckeon, JUAN DANIEL  9/23/2022

## 2022-09-23 NOTE — THERAPY EVALUATION
Patient Name: Laura Churchill  : 1950    MRN: [id number]                              Today's Date: 2022       Admit Date: 2022    Visit Dx: No diagnosis found.  Patient Active Problem List   Diagnosis   • Essential hypertension   • Hyperlipidemia   • Obstructive sleep apnea syndrome   • Osteoarthritis of knee   • Osteoporosis   • Lumbar spondylosis without myelopathy/Lumbar facet arthropathy   • Displacement of lumbar intervertebral disc   • Stenosis of lateral recess of lumbar spine   • Physical deconditioning   • Morbid obesity due to excess calories ([facility])   • Anxiety and depression   • Sacroiliac joint dysfunction of right side   • Greater trochanteric bursitis of right hip   • Iliotibial band syndrome of right side   • Spinal stenosis   • Osteoarthritis   • Obesity due to excess calories   • Menopause   • Mixed hyperlipidemia   • Lumbosacral disc disease   • Low back pain   • Joint pain   • Extremity pain   • Chronic pain disorder   • Back problem   • Vaginal atrophy   • Primary localized osteoarthrosis of shoulder region   • Status post total left shoulder arthroplasty   • Sciatica   • Acquired spondylolisthesis   • Spinal stenosis, lumbar region, with neurogenic claudication   • L3-5 PLIF 2020   • Primary osteoarthritis of left knee   • Status post total left knee replacement   • Leukocytosis, likely reactive   • Acute blood loss anemia, mild, asymptomatic   • Nonintractable episodic headache   • Right thigh pain   • Pseudoarthrosis of lumbar spine   • Primary localized osteoarthritis of right knee   • Status post right knee replacement   • Obesity     Past Medical History:   Diagnosis Date   • Anesthesia     low BP with spinal surgery in , patient was hospitalized a few extra days due to low Bp   • Anxiety    • Atrophic vaginitis    • Bilateral hip pain    • Cancer (HCC)    • Hypertension    • Low back pain    • Mixed hyperlipidemia    • Muscle spasm    • Obesity due to excess  calories    • MOOKIE (obstructive sleep apnea)     NO CPAP USE   • Osteoarthritis    • PONV (postoperative nausea and vomiting)     preprocedural meds help and are needed   • Tendinitis of right shoulder    • Vertigo      Past Surgical History:   Procedure Laterality Date   • CHOLECYSTECTOMY     • COLONOSCOPY  2013   • ENDOSCOPY     • GASTRIC BANDING REMOVAL     • GASTRIC SLEEVE LAPAROSCOPIC  2011   • HIP PINNING Left     3 pins   • INTERVENTIONAL RADIOLOGY PROCEDURE N/A 5/27/2020    Procedure: IR myelogram, lumbar;  Surgeon: Jason Rodriguez MD;  Location:  [facility]ATH [facility];  Service: Interventional Radiology;  Laterality: N/A;   • LAPAROSCOPIC GASTRIC BANDING     • LAPAROTOMY OOPHERECTOMY Right    • LUMBAR DISCECTOMY FUSION INSTRUMENTATION N/A 11/20/2018    Procedure: L3-4 LUMBAR LAMINECTOMY POSTERIOR LUMBAR INTERBODY FUSION PILF;  Surgeon: David Rider MD;  Location:  [facility] OR;  Service: Neurosurgery   • LUMBAR DISCECTOMY FUSION INSTRUMENTATION N/A 7/21/2020    Procedure: L2-3 PLIF, exploration of L3-4 fusion, L2-4 fusion, L2 laminectomy;  Surgeon: David Rider MD;  Location:  [facility] OR;  Service: Neurosurgery;  Laterality: N/A;   • [place] RECONSTR TOTAL SHOULDER IMPLANT Left 7/10/2017    Procedure: LEFT TOTAL SHOULDER ARTHROPLASTY ;  Surgeon: Almas Miller MD;  Location:  [facility] OR;  Service: Orthopedics   • TOTAL KNEE ARTHROPLASTY Left 1/16/2020    Procedure: TOTAL KNEE REPLACEMENT LEFT;  Surgeon: Houston Castano MD;  Location:  ED OR;  Service: Orthopedics   • TOTAL KNEE ARTHROPLASTY Right 9/22/2022    Procedure: TOTAL KNEE ARTHROPLASTY RIGHT;  Surgeon: Houston Castano MD;  Location:  [facility] OR;  Service: Orthopedics;  Laterality: Right;   • WISDOM TOOTH EXTRACTION        General Information     Row Name 09/23/22 1020          Physical Therapy Time and Intention    Document Type evaluation  -AE     Mode of Treatment physical therapy  -AE     Row Name  09/23/22 1020          General Information    Patient Profile Reviewed yes  -AE     Prior Level of Function independent:;all household mobility;gait;transfer;bed mobility;ADL's;dressing;bathing  -AE     Existing Precautions/Restrictions fall;other (see comments)  RLE TKA, RLE WBAT; R adductor nerve cath  -AE     Barriers to Rehab medically complex;previous functional deficit  -AE     Row Name 09/23/22 1020          Living Environment    People in Home spouse;other relative(s)  -AE     Row Name 09/23/22 1020          Home Main Entrance    Number of Stairs, Main Entrance three  -AE     Stair Railings, Main Entrance railings on both sides of stairs  B handrail but pt only able to use one handrail d/t distance between them.  -AE     Row Name 09/23/22 1020          Stairs Within Home, Primary    Stairs, Within Home, Primary Flight of stairs to bedroom but pt able to stay on main level initially.  -AE     Number of Stairs, Within Home, Primary twelve  -AE     Stair Railings, Within Home, Primary railings safe and in good condition  -AE     Row Name 09/23/22 1020          Cognition    Orientation Status (Cognition) oriented x 4  -AE     Row Name 09/23/22 1020          Safety Issues, Functional Mobility    Safety Issues Affecting Function (Mobility) insight into deficits/self-awareness;safety precaution awareness;safety precautions follow-through/compliance;sequencing abilities  -AE     Impairments Affecting Function (Mobility) balance;endurance/activity tolerance;shortness of breath;strength;pain;postural/trunk control  -AE           User Key  (r) = Recorded By, (t) = Taken By, (c) = Cosigned By    Initials Name Provider Type    AE Luis Fernando Mckeon, PT Physical Therapist               Mobility     Row Name 09/23/22 1024          Bed Mobility    Bed Mobility scooting/bridging;supine-sit  -AE     Scooting/Bridging [place] (Bed Mobility) contact guard;1 person assist  -AE     Supine-Sit [place] (Bed Mobility)  contact guard;1 person assist;verbal cues  -AE     Comment, (Bed Mobility) Pt able to complete bed mobility with CGA and no use of bed rails. Pt requires increased time to complete bed mobility.  -AE     Row Name 09/23/22 1024          Transfers    Comment, (Transfers) VCs for hand placement and sequencing. Pt demo decreased quad activation while sitting EOB but demo no knee buckling upon initial STS.  -AE     Row Name 09/23/22 1024          Sit-Stand Transfer    Sit-Stand [place] (Transfers) contact guard;1 person assist;verbal cues  -AE     Assistive Device (Sit-Stand Transfers) walker, front-wheeled  -AE     Row Name 09/23/22 1024          Gait/Stairs (Locomotion)    [place] Level (Gait) contact guard;1 person assist;verbal cues  -AE     Assistive Device (Gait) walker, front-wheeled  -AE     Ambulated day of surgery or within 4 hours of PACU discharge --  -AE     Reason Patient was unable to Ambulate --  -AE     Distance in Feet (Gait) 200  -AE     Deviations/Abnormal Patterns (Gait) bilateral deviations;base of support, narrow;antalgic;nancy decreased;gait speed decreased;stride length decreased;weight shifting decreased  -AE     Bilateral Gait Deviations forward flexed posture;heel strike decreased  -AE     Right Sided Gait Deviations weight shift ability decreased  -AE     [place] Level (Stairs) contact guard  -AE     Assistive Device (Stairs) cane, straight  -AE     Handrail Location (Stairs) right side (descending)  -AE     Number of Steps (Stairs) 3  -AE     Ascending Technique (Stairs) step-to-step  -AE     Descending Technique (Stairs) step-to-step  -AE     Stairs, Safety Issues weight-shifting ability decreased  -AE     Comment, (Gait/Stairs) Pt demo step to gait pattern initially but able to improve with cues. Pt then began experiencing mild knee buckling that caused pt to revert to step to gait pattern. Pt c/o increased R knee pain which limited WB on RLE. Overall pt experienced 5  instances of mild knee buckling throughout session. Pt completed stair training of 3 steps with step to step pattern, no LOB or knee buckling noted during stair training.  -AE     Row Name 09/23/22 1024          Mobility    Extremity Weight-bearing Status right lower extremity  -AE     Right Lower Extremity (Weight-bearing Status) weight-bearing as tolerated (WBAT)  -AE           User Key  (r) = Recorded By, (t) = Taken By, (c) = Cosigned By    Initials Name Provider Type    AE Luis Fernando Mckeon PT Physical Therapist               Obj/Interventions     Row Name 09/23/22 1030          Range of Motion Comprehensive    Comment, General Range of Motion R knee ROM 5-85; LLE ROM WFL  -AE     Row Name 09/23/22 1030          Strength Comprehensive (MMT)    General Manual Muscle Testing (MMT) Assessment lower extremity strength deficits identified  -AE     Comment, General Manual Muscle Testing (MMT) Assessment RLE grossly 3+/5, LLE grossly 4/5  -AE     Row Name 09/23/22 1030          Motor Skills    Therapeutic Exercise hip;knee;ankle  -AE     Row Name 09/23/22 1030          Hip (Therapeutic Exercise)    Hip (Therapeutic Exercise) strengthening exercise  -AE     Hip Strengthening (Therapeutic Exercise) right;heel slides;supine;5 repetitions  -AE     Row Name 09/23/22 1030          Knee (Therapeutic Exercise)    Knee (Therapeutic Exercise) isometric exercises;strengthening exercise  -AE     Knee Isometrics (Therapeutic Exercise) right;quad sets;supine;5 repetitions;3 second hold  -AE     Knee Strengthening (Therapeutic Exercise) right;flexion;extension;SLR (straight leg raise);SAQ (short arc quad);LAQ (long arc quad);heel slides;supine;5 repetitions  -AE     Row Name 09/23/22 1030          Ankle (Therapeutic Exercise)    Ankle (Therapeutic Exercise) AROM (active range of motion)  -AE     Ankle AROM (Therapeutic Exercise) bilateral;dorsiflexion;plantarflexion;10 repetitions;supine  -AE     Row Name 09/23/22 1030           Balance    Balance Assessment sitting static balance;sitting dynamic balance;sit to stand dynamic balance;standing static balance;standing dynamic balance  -AE     Static Sitting Balance standby assist  -AE     Dynamic Sitting Balance contact guard  -AE     Position, Sitting Balance unsupported;sitting edge of bed  -AE     Sit to Stand Dynamic Balance contact guard;1-person assist;verbal cues  -AE     Static Standing Balance contact guard  -AE     Dynamic Standing Balance contact guard  -AE     Position/Device Used, Standing Balance supported;walker, front-wheeled  -AE     Row Name 09/23/22 1030          Sensory Assessment (Somatosensory)    Sensory Assessment (Somatosensory) LE sensation intact  -AE           User Key  (r) = Recorded By, (t) = Taken By, (c) = Cosigned By    Initials Name Provider Type    AE Luis Fernando Mckeon, PT Physical Therapist               Goals/Plan     Row Name 09/23/22 1038          Bed Mobility Goal 1 (PT)    Activity/Assistive Device (Bed Mobility Goal 1, PT) sit to supine/supine to sit  -AE     [place] Level/Cues Needed (Bed Mobility Goal 1, PT) modified independence  -AE     Time Frame (Bed Mobility Goal 1, PT) long term goal (LTG);5 days  -AE     Progress/Outcomes (Bed Mobility Goal 1, PT) goal ongoing  -AE     Row Name 09/23/22 1038          Transfer Goal 1 (PT)    Activity/Assistive Device (Transfer Goal 1, PT) sit-to-stand/stand-to-sit;bed-to-chair/chair-to-bed  -AE     [place] Level/Cues Needed (Transfer Goal 1, PT) modified independence  -AE     Time Frame (Transfer Goal 1, PT) long term goal (LTG);5 days  -AE     Progress/Outcome (Transfer Goal 1, PT) goal ongoing  -AE     Row Name 09/23/22 1038          Gait Training Goal 1 (PT)    Activity/Assistive Device (Gait Training Goal 1, PT) gait (walking locomotion);assistive device use  -AE     [place] Level (Gait Training Goal 1, PT) standby assist  -AE     Distance (Gait Training Goal 1, PT) 400ft  -AE     Time Frame  (Gait Training Goal 1, PT) long term goal (LTG);5 days  -AE     Row Name 09/23/22 1038          Stairs Goal 1 (PT)    Activity/Assistive Device (Stairs Goal 1, PT) ascending stairs;descending stairs;step-to-step  -AE     [place] Level/Cues Needed (Stairs Goal 1, PT) contact guard required  -AE     Number of Stairs (Stairs Goal 1, PT) 3  -AE     Time Frame (Stairs Goal 1, PT) long term goal (LTG);5 days  -AE     Progress/Outcome (Stairs Goal 1, PT) goal met  -AE     Row Name 09/23/22 1038          Therapy Assessment/Plan (PT)    Planned Therapy Interventions (PT) balance training;bed mobility training;gait training;home exercise program;patient/family education;postural re-education;ROM (range of motion);stair training;transfer training;strengthening  -AE           User Key  (r) = Recorded By, (t) = Taken By, (c) = Cosigned By    Initials Name Provider Type    AE Luis Fernando Mckeon, PT Physical Therapist               Clinical Impression     Row Name 09/23/22 1033          Pain    Pretreatment Pain Rating 3/10  -AE     Posttreatment Pain Rating 6/10  -AE     Pain Location - Side/Orientation Right  -AE     Pain Location anterior  -AE     Pain Location - knee  -AE     Pre/Posttreatment Pain Comment tolerated  -AE     Pain Intervention(s) Repositioned;Cold pack;Ambulation/increased activity  -AE     Row Name 09/23/22 1033          Plan of Care Review    Plan of Care Reviewed With patient  -AE     Progress no change  -AE     Outcome Evaluation PT eval completed. Pt presents s/p R TKA with decreased functional mobility, decreased RLE strength, and decreased independence. Pt ambulated 200ft with CGA and RW for support. Pt with decreased quad activation but no knee buckling upon initial STS. While ambulating, pt had 5 instances of mild knee buckling 2/2 fatigue. Stair training of 3 steps completed with R handrail, no LOB or knee buckling noted. Recommend continued skilled IP PT interventions. Recommend D/C home with  assist and HHPT. Want to work with pt one more time this PM for possible reduction in knee buckling prior to d/c home. PADD score is 8.  -AE     Row Name 09/23/22 1033          Therapy Assessment/Plan (PT)    Patient/Family Therapy Goals Statement (PT) Return home  -AE     Rehab Potential (PT) good, to achieve stated therapy goals  -AE     Criteria for Skilled Interventions Met (PT) yes  -AE     Therapy Frequency (PT) 2 times/day  -AE     Row Name 09/23/22 1033          Vital Signs    Pre Systolic BP Rehab 135  -AE     Pre Treatment Diastolic BP 82  -AE     Pretreatment Heart Rate (beats/min) 83  -AE     Posttreatment Heart Rate (beats/min) 78  -AE     Pre SpO2 (%) 96  -AE     O2 Delivery Pre Treatment room air  -AE     O2 Delivery Intra Treatment room air  -AE     Post SpO2 (%) 97  -AE     O2 Delivery Post Treatment room air  -AE     Pre Patient Position Supine  -AE     Intra Patient Position Standing  -AE     Post Patient Position Sitting  -AE     Row Name 09/23/22 1033          Positioning and Restraints    Pre-Treatment Position in bed  -AE     Post Treatment Position chair  -AE     In Chair notified nsg;reclined;call light within reach;encouraged to call for assist;exit alarm on;compression device;legs elevated  -AE           User Key  (r) = Recorded By, (t) = Taken By, (c) = Cosigned By    Initials Name Provider Type    AE Luis Fernando Mckeon, PT Physical Therapist               Outcome Measures     Row Name 09/23/22 1040          How much help from another person do you currently need...    Turning from your back to your side while in flat bed without using bedrails? 3  -AE     Moving from lying on back to sitting on the side of a flat bed without bedrails? 3  -AE     Moving to and from a bed to a chair (including a wheelchair)? 3  -AE     Standing up from a chair using your arms (e.g., wheelchair, bedside chair)? 3  -AE     Climbing 3-5 steps with a railing? 3  -AE     To walk in hospital room? 3  -AE      AM-PAC 6 Clicks Score (PT) 18  -AE     Highest level of mobility 6 --> Walked 10 steps or more  -AE     Row Name 09/23/22 1040          PADD    Diagnosis 1  -AE     Gender 1  -AE     Age Group 1  -AE     Gait Distance 1  -AE     Assist Level 1  -AE     Home Support 3  -AE     PADD Score 8  -AE     Patient Preference home with home health  -AE     Prediction by PADD Score directly home (with home health or out-patient rehab)  -AE     Row Name 09/23/22 1040          Functional Assessment    Outcome Measure Options AM-PAC 6 Clicks Basic Mobility (PT);PADD  -AE           User Key  (r) = Recorded By, (t) = Taken By, (c) = Cosigned By    Initials Name Provider Type    AE Luis Fernando Mckeon PT Physical Therapist                             Physical Therapy Education                 Title: PT OT SLP Therapies (In Progress)     Topic: Physical Therapy (Done)     Point: Mobility training (Done)     Learning Progress Summary           Patient Acceptance, E, VU by AE at 9/23/2022 0855    Comment: Pt educated on HEP, transfers, safety awareness, and overall mobility.                   Point: Home exercise program (Done)     Learning Progress Summary           Patient Acceptance, E, VU by AE at 9/23/2022 0855    Comment: Pt educated on HEP, transfers, safety awareness, and overall mobility.                   Point: Body mechanics (Done)     Learning Progress Summary           Patient Acceptance, E, VU by AE at 9/23/2022 0855    Comment: Pt educated on HEP, transfers, safety awareness, and overall mobility.                   Point: Precautions (Done)     Learning Progress Summary           Patient Acceptance, E, VU by AE at 9/23/2022 0855    Comment: Pt educated on HEP, transfers, safety awareness, and overall mobility.                               User Key     Initials Effective Dates Name Provider Type Discipline    AE 09/21/21 -  Luis Fernando Mckeon PT Physical Therapist PT              PT Recommendation and Plan  Planned Therapy  Interventions (PT): balance training, bed mobility training, gait training, home exercise program, patient/family education, postural re-education, ROM (range of motion), stair training, transfer training, strengthening  Plan of Care Reviewed With: patient  Progress: no change  Outcome Evaluation: PT eval completed. Pt presents s/p R TKA with decreased functional mobility, decreased RLE strength, and decreased independence. Pt ambulated 200ft with CGA and RW for support. Pt with decreased quad activation but no knee buckling upon initial STS. While ambulating, pt had 5 instances of mild knee buckling 2/2 fatigue. Stair training of 3 steps completed with R handrail, no LOB or knee buckling noted. Recommend continued skilled IP PT interventions. Recommend D/C home with assist and HHPT. Want to work with pt one more time this PM for possible reduction in knee buckling prior to d/c home. PADD score is 8.     Time Calculation:    PT Charges     Row Name 09/23/22 1042             Time Calculation    Start Time 0855  -AE      PT Received On 09/23/22  -AE      PT Goal Re-Cert Due Date 10/03/22  -AE              Time Calculation- PT    Total Timed Code Minutes- PT 25 minute(s)  -AE              Timed Charges    33370 - PT Therapeutic Exercise Minutes 10  -AE      39827 - Gait Training Minutes  15  -AE              Untimed Charges    PT Eval/Re-eval Minutes 50  -AE              Total Minutes    Timed Charges Total Minutes 25  -AE      Untimed Charges Total Minutes 50  -AE       Total Minutes 75  -AE            User Key  (r) = Recorded By, (t) = Taken By, (c) = Cosigned By    Initials Name Provider Type    AE Luis Fernando Mckeon PT Physical Therapist              Therapy Charges for Today     Code Description Service Date Service Provider Modifiers Qty    93771300007 HC PT THER PROC EA 15 MIN 9/23/2022 Luis Fernando Mckeon, PT GP 1    20363525598 HC GAIT TRAINING EA 15 MIN 9/23/2022 Luis Fernando Mckeon, PT GP 1    92750101711 HC PT  EVAL LOW COMPLEXITY 4 9/23/2022 Luis Fernando Mckeon, PT GP 1          PT G-Codes  Outcome Measure Options: AM-PAC 6 Clicks Basic Mobility (PT), PADD  AM-PAC 6 Clicks Score (PT): 18    Luis Fernando Mckeon, PT  9/23/2022

## 2022-09-23 NOTE — CASE MANAGEMENT/SOCIAL WORK
Case Management Discharge Note      Final Note: Mrs. Churchill has a rolling walker at home and is scheduled to start outpatient physical therapy on Tuesday 9/27/22. She will be transported home by her  at the time of discharge. No additional discharge needs identified at this time.         Selected Continued Care - Discharged on 9/23/2022 Admission date: 9/22/2022 - Discharge disposition: Home or Self Care    Destination    No services have been selected for the patient.              Durable Medical Equipment    No services have been selected for the patient.              Dialysis/Infusion    No services have been selected for the patient.              Home Medical Care    No services have been selected for the patient.              Therapy    No services have been selected for the patient.              Community Resources    No services have been selected for the patient.              Community & DME    No services have been selected for the patient.                       Final Discharge Disposition Code: 01 - home or self-care

## 2022-09-23 NOTE — PLAN OF CARE
Goal Outcome Evaluation:  Plan of Care Reviewed With: patient        Progress: improving A&OX4 VSS. Dressing to right knee C/D/I Tenderness present in right knee and numbnessin right t with InFU pump in place. Voiding without difficulty.

## 2022-10-12 ENCOUNTER — TELEPHONE (OUTPATIENT)
Dept: FAMILY MEDICINE CLINIC | Facility: CLINIC | Age: 72
End: 2022-10-12

## 2022-10-12 DIAGNOSIS — F41.9 ANXIETY: ICD-10-CM

## 2022-10-12 RX ORDER — VENLAFAXINE HYDROCHLORIDE 150 MG/1
150 CAPSULE, EXTENDED RELEASE ORAL DAILY
Qty: 90 CAPSULE | Refills: 0 | Status: CANCELLED | OUTPATIENT
Start: 2022-10-12

## 2022-10-12 NOTE — TELEPHONE ENCOUNTER
Caller: Laura Churchill    Relationship: Self    Best call back number: [phone]    Requested Prescriptions:   Requested Prescriptions     Pending Prescriptions Disp Refills   • venlafaxine XR (EFFEXOR-XR) 150 MG 24 hr capsule 90 capsule 0     Sig: Take 1 capsule by mouth Daily.        Pharmacy where request should be sent: [facility] [address] [phone] [facility] [phone] FX     Additional details provided by patient: PATIENT REQUESTED TO HAVE A 3 MONTH SUPPLY SENT IN.    Does the patient have less than a 3 day supply:  [x] Yes  [] No    Susan Sun Rep   10/12/22 13:54 EDT

## 2022-10-14 DIAGNOSIS — F41.9 ANXIETY: ICD-10-CM

## 2022-10-14 RX ORDER — VENLAFAXINE HYDROCHLORIDE 150 MG/1
150 CAPSULE, EXTENDED RELEASE ORAL DAILY
Qty: 90 CAPSULE | Refills: 2 | Status: SHIPPED | OUTPATIENT
Start: 2022-10-14 | End: 2022-11-10 | Stop reason: SDUPTHER

## 2022-10-17 DIAGNOSIS — L29.9 ITCHING: ICD-10-CM

## 2022-10-17 RX ORDER — FEXOFENADINE HYDROCHLORIDE 60 MG/1
60 TABLET, FILM COATED ORAL DAILY
Qty: 30 TABLET | Refills: 2 | Status: SHIPPED | OUTPATIENT
Start: 2022-10-17 | End: 2023-01-23 | Stop reason: SDUPTHER

## 2022-10-17 NOTE — TELEPHONE ENCOUNTER
Rx Refill Note  Requested Prescriptions     Pending Prescriptions Disp Refills   • fexofenadine (Allegra Allergy) 60 MG tablet 30 tablet 2     Sig: Take 1 tablet by mouth Daily.      Last office visit with prescribing clinician: 8/17/2022      Next office visit with prescribing clinician: 11/10/2022            Valentina Ramirez  10/17/22, 10:10 EDT

## 2022-10-17 NOTE — TELEPHONE ENCOUNTER
Caller: Laura Churchill    Relationship: Self    Best call back number: [phone]    Requested Prescriptions:   Requested Prescriptions     Pending Prescriptions Disp Refills   • fexofenadine (Allegra Allergy) 60 MG tablet 30 tablet 2     Sig: Take 1 tablet by mouth Daily.        Pharmacy where request should be sent: [facility] [id number] [address] & [facility] - [phone] [facility] [phone] FX     Additional details provided by patient: WOULD LIKE THIS TODAY    Does the patient have less than a 3 day supply:  [x] Yes  [] No    Aure Traylor, PCT   10/17/22 08:36 EDT

## 2022-11-10 ENCOUNTER — LAB (OUTPATIENT)
Dept: LAB | Facility: HOSPITAL | Age: 72
End: 2022-11-10

## 2022-11-10 ENCOUNTER — OFFICE VISIT (OUTPATIENT)
Dept: FAMILY MEDICINE CLINIC | Facility: CLINIC | Age: 72
End: 2022-11-10

## 2022-11-10 VITALS
SYSTOLIC BLOOD PRESSURE: 132 MMHG | DIASTOLIC BLOOD PRESSURE: 84 MMHG | BODY MASS INDEX: 34.66 KG/M2 | WEIGHT: 208 LBS | OXYGEN SATURATION: 99 % | HEIGHT: 65 IN | TEMPERATURE: 97.5 F | HEART RATE: 87 BPM

## 2022-11-10 DIAGNOSIS — E78.41 ELEVATED LIPOPROTEIN(A): ICD-10-CM

## 2022-11-10 DIAGNOSIS — R73.03 PREDIABETES: ICD-10-CM

## 2022-11-10 DIAGNOSIS — I10 ESSENTIAL HYPERTENSION: ICD-10-CM

## 2022-11-10 DIAGNOSIS — F41.9 ANXIETY: ICD-10-CM

## 2022-11-10 DIAGNOSIS — M75.02 ADHESIVE CAPSULITIS OF LEFT SHOULDER: ICD-10-CM

## 2022-11-10 DIAGNOSIS — Z00.00 MEDICARE ANNUAL WELLNESS VISIT, SUBSEQUENT: Primary | ICD-10-CM

## 2022-11-10 LAB — HBA1C MFR BLD: 5.3 % (ref 4.8–5.6)

## 2022-11-10 PROCEDURE — 1160F RVW MEDS BY RX/DR IN RCRD: CPT | Performed by: FAMILY MEDICINE

## 2022-11-10 PROCEDURE — 83036 HEMOGLOBIN GLYCOSYLATED A1C: CPT

## 2022-11-10 PROCEDURE — G0439 PPPS, SUBSEQ VISIT: HCPCS | Performed by: FAMILY MEDICINE

## 2022-11-10 PROCEDURE — 1170F FXNL STATUS ASSESSED: CPT | Performed by: FAMILY MEDICINE

## 2022-11-10 PROCEDURE — 80053 COMPREHEN METABOLIC PANEL: CPT

## 2022-11-10 PROCEDURE — 80061 LIPID PANEL: CPT

## 2022-11-10 RX ORDER — LOSARTAN POTASSIUM 100 MG/1
100 TABLET ORAL DAILY
Qty: 90 TABLET | Refills: 3 | Status: SHIPPED | OUTPATIENT
Start: 2022-11-10

## 2022-11-10 RX ORDER — VENLAFAXINE HYDROCHLORIDE 150 MG/1
150 CAPSULE, EXTENDED RELEASE ORAL DAILY
Qty: 90 CAPSULE | Refills: 3 | Status: SHIPPED | OUTPATIENT
Start: 2022-11-10

## 2022-11-10 RX ORDER — ATORVASTATIN CALCIUM 10 MG/1
10 TABLET, FILM COATED ORAL DAILY
Qty: 90 TABLET | Refills: 3 | Status: SHIPPED | OUTPATIENT
Start: 2022-11-10

## 2022-11-10 NOTE — PROGRESS NOTES
The ABCs of the Annual Wellness Visit  Subsequent Medicare Wellness Visit    Chief Complaint   Patient presents with   • Medicare Wellness-subsequent     Had knee replacement in September.      Subjective    History of Present Illness:  Laura Churchill is a 72 y.o. female who presents for a Subsequent Medicare Wellness Visit.    The following portions of the patient's history were reviewed and   updated as appropriate: allergies, current medications, past family history, past medical history, past social history, past surgical history and problem list.    Compared to one year ago, the patient feels her physical   health is better.    Compared to one year ago, the patient feels her mental   health is better.    Recent Hospitalizations:  She was admitted within the past 365 days at [facility], [place].       Current Medical Providers:  Patient Care Team:  Julius Rivera DO as PCP - General (Family Medicine)  Richie Fisher MD as Surgeon (Neurosurgery)  Sukumar Mcwilliams MD as Consulting Physician (Anesthesiology)  Glenn Clayton MD (Inactive) as Consulting Physician (Gynecology)  Shiva Capone MD as Referring Physician (Family Medicine)    Outpatient Medications Prior to Visit   Medication Sig Dispense Refill   • fexofenadine (Allegra Allergy) 60 MG tablet Take 1 tablet by mouth Daily. 30 tablet 2   • vitamin B-12 (CYANOCOBALAMIN) 1000 MCG tablet Take 1,000 mcg by mouth Daily.     • VITAMIN D PO Take 5,000 Units by mouth Daily.     • atorvastatin (LIPITOR) 10 MG tablet Take 1 tablet by mouth Daily. 90 tablet 0   • losartan (Cozaar) 100 MG tablet Take 1 tablet by mouth Daily. 90 tablet 0   • venlafaxine XR (EFFEXOR-XR) 150 MG 24 hr capsule Take 1 capsule by mouth Daily. 90 capsule 2   • oxyCODONE (Roxicodone) 5 MG immediate release tablet Take 1 tablet by mouth Every 4 (Four) Hours As Needed for Moderate Pain. 40 tablet 0   • ropivacaine (NAROPIN) 0.2 % infusion (INFUSYSTEM) 2 mg/hr by Peripheral Nerve  route Continuous.       No facility-administered medications prior to visit.       No opioid medication identified on active medication list. I have reviewed chart for other potential  high risk medication/s and harmful drug interactions in the elderly.          Aspirin is not on active medication list.  Aspirin use is not indicated based on review of current medical condition/s. Risk of harm outweighs potential benefits.  .    Patient Active Problem List   Diagnosis   • Essential hypertension   • Hyperlipidemia   • Obstructive sleep apnea syndrome   • Osteoarthritis of knee   • Osteoporosis   • Lumbar spondylosis without myelopathy/Lumbar facet arthropathy   • Displacement of lumbar intervertebral disc   • Stenosis of lateral recess of lumbar spine   • Physical deconditioning   • Morbid obesity due to excess calories ([facility])   • Anxiety and depression   • Sacroiliac joint dysfunction of right side   • Greater trochanteric bursitis of right hip   • Iliotibial band syndrome of right side   • Spinal stenosis   • Osteoarthritis   • Obesity due to excess calories   • Menopause   • Mixed hyperlipidemia   • Lumbosacral disc disease   • Low back pain   • Joint pain   • Extremity pain   • Chronic pain disorder   • Back problem   • Vaginal atrophy   • Primary localized osteoarthrosis of shoulder region   • Status post total left shoulder arthroplasty   • Anxiety   • Sciatica   • Acquired spondylolisthesis   • Spinal stenosis, lumbar region, with neurogenic claudication   • L3-5 PLIF 7/2020   • Primary osteoarthritis of left knee   • Status post total left knee replacement   • Leukocytosis, likely reactive   • Acute blood loss anemia, mild, asymptomatic   • Nonintractable episodic headache   • Right thigh pain   • Pseudoarthrosis of lumbar spine   • Primary localized osteoarthritis of right knee   • Status post right knee replacement   • Obesity   • Postoperative pain   • Prediabetes     Advance Care Planning  Advance Directive  "is not on file.  ACP discussion was held with the patient during this visit. Patient does not have an advance directive, information provided.    Review of Systems   Musculoskeletal: Positive for arthralgias, back pain, gait problem, joint swelling and myalgias.        Objective    Vitals:    11/10/22 0927   BP: 132/84   Pulse: 87   Temp: 97.5 °F (36.4 °C)   SpO2: 99%   Weight: 94.3 kg (208 lb)   Height: 165.1 cm (65\")     Estimated body mass index is 34.61 kg/m² as calculated from the following:    Height as of this encounter: 165.1 cm (65\").    Weight as of this encounter: 94.3 kg (208 lb).    BMI is >= 30 and <35. (Class 1 Obesity). The following options were offered after discussion;: exercise counseling/recommendations and nutrition counseling/recommendations      Does the patient have evidence of cognitive impairment? No    Physical Exam  Vitals and nursing note reviewed.   HENT:      Head: Normocephalic.      Right Ear: External ear normal.      Left Ear: External ear normal.      Nose: Nose normal.      Mouth/Throat:      Mouth: Mucous membranes are moist.   Eyes:      Pupils: Pupils are equal, round, and reactive to light.   Cardiovascular:      Rate and Rhythm: Normal rate and regular rhythm.      Heart sounds: No murmur heard.  Pulmonary:      Effort: Pulmonary effort is normal. No respiratory distress.      Breath sounds: Normal breath sounds.   Abdominal:      General: Abdomen is flat.   Musculoskeletal:         General: Swelling and tenderness present.      Cervical back: Neck supple.      Comments: Left shoulder with restricted range of motion compared to right   Skin:     Capillary Refill: Capillary refill takes less than 2 seconds.      Coloration: Skin is not jaundiced.   Neurological:      General: No focal deficit present.      Mental Status: She is alert. Mental status is at baseline.   Psychiatric:         Mood and Affect: Mood normal.       Lab Results   Component Value Date    HGBA1C 5.30 " 2022            HEALTH RISK ASSESSMENT    Smoking Status:  Social History     Tobacco Use   Smoking Status Former   • Packs/day: 0.25   • Years: 0.50   • Pack years: 0.13   • Types: Cigarettes   • Start date: 1977   • Quit date: 1978   • Years since quittin.8   Smokeless Tobacco Never     Alcohol Consumption:  Social History     Substance and Sexual Activity   Alcohol Use Yes   • Alcohol/week: 1.0 standard drink   • Types: 1 Glasses of wine per week    Comment: I seldom have a drink of any kind.     Fall Risk Screen:    ROMIE Fall Risk Assessment was completed, and patient is at LOW risk for falls.Assessment completed on:11/10/2022    Depression Screening:  PHQ-2/PHQ-9 Depression Screening 11/10/2022   Retired PHQ-9 Total Score -   Retired Total Score -   Little Interest or Pleasure in Doing Things 0-->not at all   Feeling Down, Depressed or Hopeless 0-->not at all   PHQ-9: Brief Depression Severity Measure Score 0       Health Habits and Functional and Cognitive Screening:  Functional & Cognitive Status 11/10/2022   Do you have difficulty preparing food and eating? No   Do you have difficulty bathing yourself, getting dressed or grooming yourself? No   Do you have difficulty using the toilet? No   Do you have difficulty moving around from place to place? -   Do you have trouble with steps or getting out of a bed or a chair? -   Current Diet Well Balanced Diet   Dental Exam Up to date   Eye Exam Up to date   Exercise (times per week) 3 times per week   Current Exercises Include Light Weights   Current Exercise Activities Include -   Do you need help using the phone?  No   Are you deaf or do you have serious difficulty hearing?  No   Do you need help with transportation? No   Do you need help shopping? No   Do you need help preparing meals?  No   Do you need help with housework?  No   Do you need help with laundry? No   Do you need help taking your medications? No   Do you need help managing  money? No   Do you ever drive or ride in a car without wearing a seat belt? No   Have you felt unusual stress, anger or loneliness in the last month? No   Who do you live with? Spouse   If you need help, do you have trouble finding someone available to you? No   Have you been bothered in the last four weeks by sexual problems? No   Do you have difficulty concentrating, remembering or making decisions? No       Age-appropriate Screening Schedule:  Refer to the list below for future screening recommendations based on patient's age, sex and/or medical conditions. Orders for these recommended tests are listed in the plan section. The patient has been provided with a written plan.    Health Maintenance   Topic Date Due   • LIPID PANEL  09/28/2022   • DXA SCAN  10/08/2023   • MAMMOGRAM  02/07/2024   • INFLUENZA VACCINE  Completed   • ZOSTER VACCINE  Completed   • PAP SMEAR  Discontinued   • TDAP/TD VACCINES  Discontinued              Assessment & Plan   CMS Preventative Services Quick Reference  Risk Factors Identified During Encounter  Inadequate Social Support, Isolation, Loneliness, Lack of Transportation, Financial Difficulties, or Caregiver Stress   Inactivity/Sedentary  Obesity/Overweight   The above risks/problems have been discussed with the patient.  Follow up actions/plans if indicated are seen below in the Assessment/Plan Section.  Pertinent information has been shared with the patient in the After Visit Summary.    Diagnoses and all orders for this visit:    1. Medicare annual wellness visit, subsequent (Primary)    2. Essential hypertension  -     losartan (Cozaar) 100 MG tablet; Take 1 tablet by mouth Daily.  Dispense: 90 tablet; Refill: 3  -     Comprehensive Metabolic Panel; Future    3. Anxiety  -     venlafaxine XR (EFFEXOR-XR) 150 MG 24 hr capsule; Take 1 capsule by mouth Daily.  Dispense: 90 capsule; Refill: 3    4. Elevated lipoprotein(a)  -     Comprehensive Metabolic Panel; Future  -     Lipid  Panel; Future  -     atorvastatin (LIPITOR) 10 MG tablet; Take 1 tablet by mouth Daily.  Dispense: 90 tablet; Refill: 3    5. Prediabetes  -     Hemoglobin A1c; Future  -     Comprehensive Metabolic Panel; Future    6. Adhesive capsulitis of left shoulder      Patient needs to follow-up with orthopedics regarding her left frozen shoulder.  Will worsen if she does not get treatment.  Could also be a torn rotator cuff so recommend MRI and physical therapy.  Patient can call for PT and MRI orders if needed    Chronic conditions stable.  Continue current treatment plan as above.  Recheck monitoring labs as above.  Patient follows up once a year.        Follow Up:   Return in about 1 year (around 11/10/2023) for Medicare Wellness, Labs today.     An After Visit Summary and PPPS were made available to the patient.

## 2022-11-11 LAB
ALBUMIN SERPL-MCNC: 4.4 G/DL (ref 3.5–5.2)
ALBUMIN/GLOB SERPL: 1.6 G/DL
ALP SERPL-CCNC: 96 U/L (ref 39–117)
ALT SERPL W P-5'-P-CCNC: 6 U/L (ref 1–33)
ANION GAP SERPL CALCULATED.3IONS-SCNC: 11.6 MMOL/L (ref 5–15)
AST SERPL-CCNC: 17 U/L (ref 1–32)
BILIRUB SERPL-MCNC: 0.2 MG/DL (ref 0–1.2)
BUN SERPL-MCNC: 13 MG/DL (ref 8–23)
BUN/CREAT SERPL: 16.7 (ref 7–25)
CALCIUM SPEC-SCNC: 9.8 MG/DL (ref 8.6–10.5)
CHLORIDE SERPL-SCNC: 106 MMOL/L (ref 98–107)
CHOLEST SERPL-MCNC: 154 MG/DL (ref 0–200)
CO2 SERPL-SCNC: 23.4 MMOL/L (ref 22–29)
CREAT SERPL-MCNC: 0.78 MG/DL (ref 0.57–1)
EGFRCR SERPLBLD CKD-EPI 2021: 80.8 ML/MIN/1.73
GLOBULIN UR ELPH-MCNC: 2.8 GM/DL
GLUCOSE SERPL-MCNC: 81 MG/DL (ref 65–99)
HDLC SERPL-MCNC: 54 MG/DL (ref 40–60)
LDLC SERPL CALC-MCNC: 83 MG/DL (ref 0–100)
LDLC/HDLC SERPL: 1.51 {RATIO}
POTASSIUM SERPL-SCNC: 4.4 MMOL/L (ref 3.5–5.2)
PROT SERPL-MCNC: 7.2 G/DL (ref 6–8.5)
SODIUM SERPL-SCNC: 141 MMOL/L (ref 136–145)
TRIGL SERPL-MCNC: 92 MG/DL (ref 0–150)
VLDLC SERPL-MCNC: 17 MG/DL (ref 5–40)

## 2022-11-29 ENCOUNTER — OFFICE VISIT (OUTPATIENT)
Dept: OBSTETRICS AND GYNECOLOGY | Facility: CLINIC | Age: 72
End: 2022-11-29

## 2022-11-29 VITALS
DIASTOLIC BLOOD PRESSURE: 80 MMHG | SYSTOLIC BLOOD PRESSURE: 150 MMHG | HEIGHT: 65 IN | WEIGHT: 208.4 LBS | BODY MASS INDEX: 34.72 KG/M2

## 2022-11-29 DIAGNOSIS — N95.2 ATROPHY OF VAGINA: ICD-10-CM

## 2022-11-29 DIAGNOSIS — Z01.411 ENCOUNTER FOR GYNECOLOGICAL EXAMINATION WITH ABNORMAL FINDING: Primary | ICD-10-CM

## 2022-11-29 PROCEDURE — 99397 PER PM REEVAL EST PAT 65+ YR: CPT | Performed by: NURSE PRACTITIONER

## 2022-11-29 NOTE — PROGRESS NOTES
Chief Complaint  Laura Churchill is a 72 y.o.  female presenting for Gynecologic Exam    History of Present Illness  Laura is a very pleasant 71yo woman, , here for gyn exam.  She denies any gyn problems.  Still SA without any dyspareunia or postcoital bleeding.  Only a tiny bit of urinary incontinence occasionally, and it does not interfere with ADL.  She has continued to eat properly, and has lost another 5#/ past yr.  (She is down > 80 # since bariatric surgery.)  Praised for this!  HTN is well controlled.  She takes a statin for lipids.  Last A1C was excellent at 5.3  All preventive health procedures are up to date.  (Mammo will be due in Feb)    The following portions of the patient's history were reviewed and updated as appropriate: allergies, current medications, past family history, past medical history, past social history, past surgical history and problem list.    Allergies   Allergen Reactions   • Codeine Hives   • Gabapentin Hallucinations     Screaming nightmares   • Sulfa Antibiotics Itching         Current Outpatient Medications:   •  atorvastatin (LIPITOR) 10 MG tablet, Take 1 tablet by mouth Daily., Disp: 90 tablet, Rfl: 3  •  fexofenadine (Allegra Allergy) 60 MG tablet, Take 1 tablet by mouth Daily., Disp: 30 tablet, Rfl: 2  •  losartan (Cozaar) 100 MG tablet, Take 1 tablet by mouth Daily., Disp: 90 tablet, Rfl: 3  •  venlafaxine XR (EFFEXOR-XR) 150 MG 24 hr capsule, Take 1 capsule by mouth Daily., Disp: 90 capsule, Rfl: 3  •  vitamin B-12 (CYANOCOBALAMIN) 1000 MCG tablet, Take 1,000 mcg by mouth Daily., Disp: , Rfl:   •  VITAMIN D PO, Take 5,000 Units by mouth Daily., Disp: , Rfl:     Past Medical History:   Diagnosis Date   • Allergic    • Anesthesia     low BP with spinal surgery in , patient was hospitalized a few extra days due to low Bp   • Anxiety    • Atrophic vaginitis    • Bilateral hip pain    • Cancer (HCC)    • Cholelithiasis not sure   • Depression    • Hypertension     • Low back pain    • Mixed hyperlipidemia    • Muscle spasm    • Obesity due to excess calories    • MOOKIE (obstructive sleep apnea)     NO CPAP USE   • Osteoarthritis    • PONV (postoperative nausea and vomiting)     preprocedural meds help and are needed   • Tendinitis of right shoulder    • Vertigo         Past Surgical History:   Procedure Laterality Date   • BARIATRIC SURGERY  2015   • CHOLECYSTECTOMY     • COLONOSCOPY  2013   • ENDOSCOPY     • GASTRIC BANDING REMOVAL     • GASTRIC SLEEVE LAPAROSCOPIC  2011   • HIP PINNING Left     3 pins   • INTERVENTIONAL RADIOLOGY PROCEDURE N/A 05/27/2020    Procedure: IR myelogram, lumbar;  Surgeon: Jason Rodriguez MD;  Location:  [facility];  Service: Interventional Radiology;  Laterality: N/A;   • JOINT REPLACEMENT  Left shoulder replacement    2017   • LAPAROSCOPIC GASTRIC BANDING     • LAPAROTOMY OOPHERECTOMY Right    • LUMBAR DISCECTOMY FUSION INSTRUMENTATION N/A 11/20/2018    Procedure: L3-4 LUMBAR LAMINECTOMY POSTERIOR LUMBAR INTERBODY FUSION PILF;  Surgeon: David Rider MD;  Location: [facility] OR;  Service: Neurosurgery   • LUMBAR DISCECTOMY FUSION INSTRUMENTATION N/A 07/21/2020    Procedure: L2-3 PLIF, exploration of L3-4 fusion, L2-4 fusion, L2 laminectomy;  Surgeon: David Rider MD;  Location: [facility] OR;  Service: Neurosurgery;  Laterality: N/A;   • NM RECONSTR TOTAL SHOULDER IMPLANT Left 07/10/2017    Procedure: LEFT TOTAL SHOULDER ARTHROPLASTY ;  Surgeon: Almas Miller MD;  Location: [facility] ED OR;  Service: Orthopedics   • SPINE SURGERY  L3 fused to L4,5    2018   • TOTAL KNEE ARTHROPLASTY Left 01/16/2020    Procedure: TOTAL KNEE REPLACEMENT LEFT;  Surgeon: Houston Castano MD;  Location: [facility] ED OR;  Service: Orthopedics   • TOTAL KNEE ARTHROPLASTY Right 09/22/2022    Procedure: TOTAL KNEE ARTHROPLASTY RIGHT;  Surgeon: Houston Castano MD;  Location: [facility] ED OR;  Service: Orthopedics;  Laterality:  "Right;   • WISDOM TOOTH EXTRACTION         Objective  /80   Ht 165.1 cm (65\")   Wt 94.5 kg (208 lb 6.4 oz)   LMP  (LMP Unknown)   Breastfeeding No   BMI 34.68 kg/m²     Physical Exam  Vitals and nursing note reviewed. Exam conducted with a chaperone present.   Constitutional:       Appearance: Normal appearance.   HENT:      Head: Normocephalic.   Neck:      Thyroid: No thyroid mass or thyromegaly.   Cardiovascular:      Rate and Rhythm: Normal rate and regular rhythm.      Heart sounds: Normal heart sounds. No murmur heard.  Pulmonary:      Effort: Pulmonary effort is normal. No respiratory distress.      Breath sounds: Normal breath sounds.   Chest:   Breasts:     Right: No inverted nipple, mass or nipple discharge.      Left: No inverted nipple, mass or nipple discharge.   Abdominal:      Palpations: Abdomen is soft. There is no mass.      Tenderness: There is no abdominal tenderness.   Genitourinary:     General: Normal vulva.      Labia:         Right: No rash, tenderness or lesion.         Left: No rash, tenderness or lesion.       Vagina: Erythema present. No vaginal discharge.      Cervix: No discharge, lesion or erythema.      Uterus: Not enlarged and not tender.       Adnexa:         Right: No mass or tenderness.          Left: No mass or tenderness.        Comments: Minimal erythema, but much pallor of vaginal mucosa.  No abnormal discharge.  Anus appears wnl.  No rectal exam performed.  Lymphadenopathy:      Upper Body:      Right upper body: No supraclavicular or axillary adenopathy.      Left upper body: No supraclavicular or axillary adenopathy.   Skin:     General: Skin is warm and dry.   Neurological:      Mental Status: She is alert and oriented to person, place, and time.   Psychiatric:         Mood and Affect: Mood normal.         Behavior: Behavior normal.         Assessment/Plan   Diagnoses and all orders for this visit:    1. Encounter for gynecological examination with abnormal " finding (Primary)    2. Atrophy of vagina    She is asymptomatic.  Will notify me promptly if things change, and if she feels the need for local vaginal ERTx.      Procedures    40 to 64: Counseling/Anticipatory Guidance Discussed: nutrition, physical activity, screenings and self-breast exam    Return in about 1 year (around 11/29/2023) for Annual physical.    Carol Gruber, APRN  11/29/2022

## 2022-12-05 ENCOUNTER — TELEPHONE (OUTPATIENT)
Dept: OBSTETRICS AND GYNECOLOGY | Facility: CLINIC | Age: 72
End: 2022-12-05

## 2022-12-05 NOTE — TELEPHONE ENCOUNTER
Caller: Laura Churchill    Relationship to patient: Self    Best call back number: [phone]    Patient is needing: PT IS WANTING TO KNOW THE NAME OF THE UTI GEL THAT NORMA WAS SUGGESTING SHE WANTED TO GIVE HER FRIEND A RECOMMENDATION OF THE MEDICATION

## 2022-12-05 NOTE — TELEPHONE ENCOUNTER
I discussed estradiol cream, and its role in the prevention of UTIs.  (But, not for treatment of them.)  Via phone conversation with Perla

## 2023-01-11 ENCOUNTER — TRANSCRIBE ORDERS (OUTPATIENT)
Dept: ADMINISTRATIVE | Facility: HOSPITAL | Age: 73
End: 2023-01-11
Payer: MEDICARE

## 2023-01-11 DIAGNOSIS — Z12.31 VISIT FOR SCREENING MAMMOGRAM: Primary | ICD-10-CM

## 2023-01-23 DIAGNOSIS — L29.9 ITCHING: ICD-10-CM

## 2023-01-23 RX ORDER — FEXOFENADINE HYDROCHLORIDE 60 MG/1
60 TABLET, FILM COATED ORAL DAILY
Qty: 90 TABLET | Refills: 3 | Status: SHIPPED | OUTPATIENT
Start: 2023-01-23

## 2023-01-23 NOTE — TELEPHONE ENCOUNTER
Caller: Laura Churchill    Relationship: Self    Best call back number: [phone]    Requested Prescriptions:   Requested Prescriptions     Pending Prescriptions Disp Refills   • fexofenadine (Allegra Allergy) 60 MG tablet 30 tablet 2     Sig: Take 1 tablet by mouth Daily.        Pharmacy where request should be sent: [facility] [address] [phone] [facility] [phone]      Additional details provided by patient:  PATIENT ADVISED [facility] COULD NO LONGER FILL THIS PRESCRIPTION AND WOULD LIKE IT SENT TO [facility] THREE MONTHS AT A TIME    Does the patient have less than a 3 day supply:  [] Yes  [x] No      Susan Priest Rep   01/23/23 14:29 EST

## 2023-01-24 ENCOUNTER — TRANSCRIBE ORDERS (OUTPATIENT)
Dept: ADMINISTRATIVE | Facility: HOSPITAL | Age: 73
End: 2023-01-24
Payer: MEDICARE

## 2023-01-24 DIAGNOSIS — Z96.612 PRESENCE OF LEFT ARTIFICIAL SHOULDER JOINT: Primary | ICD-10-CM

## 2023-01-24 DIAGNOSIS — R52 PAIN: ICD-10-CM

## 2023-01-30 ENCOUNTER — HOSPITAL ENCOUNTER (OUTPATIENT)
Dept: GENERAL RADIOLOGY | Facility: HOSPITAL | Age: 73
Discharge: HOME | End: 2023-01-30
Admitting: ORTHOPAEDIC SURGERY
Payer: MEDICARE

## 2023-01-30 DIAGNOSIS — Z96.612 PRESENCE OF LEFT ARTIFICIAL SHOULDER JOINT: ICD-10-CM

## 2023-01-30 DIAGNOSIS — R52 PAIN: ICD-10-CM

## 2023-01-30 PROCEDURE — 77002 NEEDLE LOCALIZATION BY XRAY: CPT

## 2023-01-30 PROCEDURE — 0 LIDOCAINE 1 % SOLUTION: Performed by: RADIOLOGY

## 2023-01-30 RX ORDER — LIDOCAINE HYDROCHLORIDE 10 MG/ML
5 INJECTION, SOLUTION INFILTRATION; PERINEURAL ONCE
Status: COMPLETED | OUTPATIENT
Start: 2023-01-30 | End: 2023-01-30

## 2023-01-30 RX ADMIN — LIDOCAINE HYDROCHLORIDE 5 ML: 10 INJECTION, SOLUTION INFILTRATION; PERINEURAL at 09:15

## 2023-02-28 ENCOUNTER — HOSPITAL ENCOUNTER (OUTPATIENT)
Dept: MAMMOGRAPHY | Facility: HOSPITAL | Age: 73
Discharge: HOME | End: 2023-02-28
Admitting: NURSE PRACTITIONER
Payer: MEDICARE

## 2023-02-28 DIAGNOSIS — Z12.31 VISIT FOR SCREENING MAMMOGRAM: ICD-10-CM

## 2023-02-28 PROCEDURE — 77063 BREAST TOMOSYNTHESIS BI: CPT

## 2023-02-28 PROCEDURE — 77067 SCR MAMMO BI INCL CAD: CPT | Performed by: RADIOLOGY

## 2023-02-28 PROCEDURE — 77067 SCR MAMMO BI INCL CAD: CPT

## 2023-02-28 PROCEDURE — 77063 BREAST TOMOSYNTHESIS BI: CPT | Performed by: RADIOLOGY

## 2023-03-14 ENCOUNTER — HOSPITAL ENCOUNTER (OUTPATIENT)
Dept: ULTRASOUND IMAGING | Facility: HOSPITAL | Age: 73
Discharge: HOME | End: 2023-03-14
Payer: MEDICARE

## 2023-03-14 ENCOUNTER — HOSPITAL ENCOUNTER (OUTPATIENT)
Dept: MAMMOGRAPHY | Facility: HOSPITAL | Age: 73
Discharge: HOME | End: 2023-03-14
Payer: MEDICARE

## 2023-03-14 DIAGNOSIS — R92.8 ABNORMAL MAMMOGRAM: ICD-10-CM

## 2023-03-14 PROCEDURE — G0279 TOMOSYNTHESIS, MAMMO: HCPCS

## 2023-03-14 PROCEDURE — G0279 TOMOSYNTHESIS, MAMMO: HCPCS | Performed by: RADIOLOGY

## 2023-03-14 PROCEDURE — 76642 ULTRASOUND BREAST LIMITED: CPT | Performed by: RADIOLOGY

## 2023-03-14 PROCEDURE — 19083 BX BREAST 1ST LESION US IMAG: CPT | Performed by: RADIOLOGY

## 2023-03-14 PROCEDURE — 88360 TUMOR IMMUNOHISTOCHEM/MANUAL: CPT | Performed by: RADIOLOGY

## 2023-03-14 PROCEDURE — 77065 DX MAMMO INCL CAD UNI: CPT

## 2023-03-14 PROCEDURE — 88305 TISSUE EXAM BY PATHOLOGIST: CPT | Performed by: RADIOLOGY

## 2023-03-14 PROCEDURE — 76642 ULTRASOUND BREAST LIMITED: CPT

## 2023-03-14 PROCEDURE — 88342 IMHCHEM/IMCYTCHM 1ST ANTB: CPT | Performed by: RADIOLOGY

## 2023-03-14 PROCEDURE — A4648 IMPLANTABLE TISSUE MARKER: HCPCS

## 2023-03-14 PROCEDURE — 77065 DX MAMMO INCL CAD UNI: CPT | Performed by: RADIOLOGY

## 2023-03-14 PROCEDURE — 0 LIDOCAINE 1 % SOLUTION: Performed by: RADIOLOGY

## 2023-03-14 RX ORDER — LIDOCAINE HYDROCHLORIDE AND EPINEPHRINE 10; 10 MG/ML; UG/ML
5 INJECTION, SOLUTION INFILTRATION; PERINEURAL ONCE
Status: COMPLETED | OUTPATIENT
Start: 2023-03-14 | End: 2023-03-14

## 2023-03-14 RX ORDER — LIDOCAINE HYDROCHLORIDE 10 MG/ML
5 INJECTION, SOLUTION INFILTRATION; PERINEURAL ONCE
Status: COMPLETED | OUTPATIENT
Start: 2023-03-14 | End: 2023-03-14

## 2023-03-14 RX ADMIN — Medication 5 ML: at 15:37

## 2023-03-14 RX ADMIN — LIDOCAINE HYDROCHLORIDE,EPINEPHRINE BITARTRATE 1 ML: 10; .01 INJECTION, SOLUTION INFILTRATION; PERINEURAL at 15:37

## 2023-03-14 NOTE — PROGRESS NOTES
Alert and orientated. Denies discomfort. No active bleeding. Steri-strips not visualized, gauze dressing intact. Cold packs given. Verbalizes and demonstrates understanding of post-care instructions, written copy given. Patient verbalized understanding.  Patient desires first available consult appointment between Dr MARY Saleh or Dr HANNAH Chowdhury. Questions answered & support given. Patient verbalized understanding.   Encouraged to call back with further questions or concerns.

## 2023-03-17 ENCOUNTER — TELEPHONE (OUTPATIENT)
Dept: MAMMOGRAPHY | Facility: HOSPITAL | Age: 73
End: 2023-03-17
Payer: MEDICARE

## 2023-03-17 NOTE — TELEPHONE ENCOUNTER
Referring provider notified via Epic basket message pathology returned as cancer & patient will be notified.     Patient notified of pathology results and recommendation. Verbalizes understanding. Denies discomfort. Denies signs and symptoms of infection.     Patient desires first available surgeon for surgical consult. Patient notified of appointment with Dr. Saleh on 3/22/23 @ 8056. Patient verbalized understanding.    Patient given office contact & location information. Told to bring photo ID, list of prescription & OTC medications and insurance information. Reviewed what would be discussed at surgical consult visit, including detailed explanation of pathology report & imaging reports; treatment options & pros/cons, availability of Breast Nurse Navigator. Patient encouraged to call back or contact Breast Nurse Navigator, with any questions or concerns. Patient information sent to Genetics/Navigator pool for evaluation & possible referral for genetic counseling.    Breast cancer information packet offered and accepted.

## 2023-03-20 LAB
CYTO UR: NORMAL
LAB AP CASE REPORT: NORMAL
LAB AP CLINICAL INFORMATION: NORMAL
LAB AP DIAGNOSIS COMMENT: NORMAL
PATH REPORT.ADDENDUM SPEC: NORMAL
PATH REPORT.FINAL DX SPEC: NORMAL
PATH REPORT.GROSS SPEC: NORMAL

## 2023-03-21 ENCOUNTER — TRANSCRIBE ORDERS (OUTPATIENT)
Dept: PHYSICAL THERAPY | Facility: HOSPITAL | Age: 73
End: 2023-03-21
Payer: MEDICARE

## 2023-03-21 DIAGNOSIS — C50.911 MALIGNANT NEOPLASM OF RIGHT FEMALE BREAST, UNSPECIFIED ESTROGEN RECEPTOR STATUS, UNSPECIFIED SITE OF BREAST: Primary | ICD-10-CM

## 2023-03-21 NOTE — PROGRESS NOTES
"  Subjective     PROBLEM LIST:  1. [id number] ER+ (50%) FL negative Her2 positive (3+) Invasive ductal carcinoma of the right breast  A) biopsy of a 1.2 cm mass in the lower outer quadrant on 3/14/23 showed a high grade IDC.  No concerning lymph nodes on imaging.  2. Hypertension  3. Depression  4. MOOKIE  5. Hyperlipidemia    CHIEF COMPLAINT: breast cancer      HISTORY OF PRESENT ILLNESS:  The patient is a 72 y.o. female, referred for evaluation of a recently diagnosed breast cancer.    She presented with an abnormality on screening mammogram.  She had otherwise been feeling well and had no new symptoms or complaints.  She gets her mammograms on a regular yearly basis.    She lives with her  in [place].  They live with her sister who she helps to care for.  She is a retired .    REVIEW OF SYSTEMS:  A 14 point review of systems was performed and is negative except as noted above.    Past Medical History:   Diagnosis Date   • Allergic    • Anesthesia     low BP with spinal surgery in 2020, patient was hospitalized a few extra days due to low Bp   • Anxiety    • Atrophic vaginitis    • Bilateral hip pain    • Cancer (HCC)    • Cholelithiasis not sure   • Depression    • Hypertension    • Low back pain    • Mixed hyperlipidemia    • Muscle spasm    • Obesity due to excess calories    • MOOKIE (obstructive sleep apnea)     NO CPAP USE   • Osteoarthritis    • PONV (postoperative nausea and vomiting)     preprocedural meds help and are needed   • Tendinitis of right shoulder    • Vertigo                Objective     /59   Pulse 83   Resp 18   Ht 165.1 cm (65\")   Wt 94.3 kg (208 lb)   LMP  (LMP Unknown)   BMI 34.61 kg/m²   Performance Status:  ECOG score: 1           General: well appearing female in no acute distress  Neuro: alert and oriented  HEENT: sclerae anicteric, oropharynx clear  Extremities: no lower extremity edema  Skin: no rashes, lesions, bruising, or petechiae  Psych: " mood and affect appropriate    Lab Results   Component Value Date    WBC 10.50 09/23/2022    HGB 11.1 (L) 09/23/2022    HCT 34.0 09/23/2022    MCV 95.5 09/23/2022     09/23/2022     Lab Results   Component Value Date    GLUCOSE 81 11/10/2022    BUN 13 11/10/2022    CREATININE 0.78 11/10/2022    EGFRIFNONA 66 09/28/2021    BCR 16.7 11/10/2022    K 4.4 11/10/2022    CO2 23.4 11/10/2022    CALCIUM 9.8 11/10/2022    ALBUMIN 4.40 11/10/2022    AST 17 11/10/2022    ALT 6 11/10/2022                 ASSESSMENT AND PLAN:     Laura Churchill is a 72 y.o. female with a [id number] Er+ Her2+ invasive ductal carcinoma of the right breast.    We discussed the option of neoadjuvant chemotherapy for a Her2 positive breast cancer.  In her case I would suggest neoadjuvant chemotherapy with taxol and herceptin, and we may consider addition of perjeta if breast MRI shows more extensive disease.   We did review potential side effects of this regimen including cardiomyopathy, neuropathy, myelosuppression, infusion reaction, nausea or vomiting, and fatigue.    Based on her pathologic response to neoadjuvant chemotherapy, we will continue HER2 targeted treatment with either Herceptin or Kadcyla.  She is planning on lumpectomy at this point and would be a candidate for radiation, as well as endocrine therapy with anastrozole following surgery.    I will order a baseline echocardiogram and we will monitor this during treatment.    She will need port placement.  We will tentatively plan to start treatment in 2 weeks.             A total greater than 60 mins minutes was spent in face to face patient time, examination, counseling, charting, reviewing test results, and reviewing outside records.    Sharonda Hopkins MD    3/22/2023

## 2023-03-22 ENCOUNTER — HOSPITAL ENCOUNTER (OUTPATIENT)
Dept: PHYSICAL THERAPY | Facility: HOSPITAL | Age: 73
Setting detail: THERAPIES SERIES
Discharge: HOME | End: 2023-03-22
Payer: MEDICARE

## 2023-03-22 ENCOUNTER — PATIENT OUTREACH (OUTPATIENT)
Dept: OTHER | Facility: HOSPITAL | Age: 73
End: 2023-03-22
Payer: MEDICARE

## 2023-03-22 ENCOUNTER — CONSULT (OUTPATIENT)
Dept: ONCOLOGY | Facility: CLINIC | Age: 73
End: 2023-03-22
Payer: MEDICARE

## 2023-03-22 VITALS
WEIGHT: 208 LBS | BODY MASS INDEX: 34.66 KG/M2 | HEART RATE: 83 BPM | SYSTOLIC BLOOD PRESSURE: 121 MMHG | DIASTOLIC BLOOD PRESSURE: 59 MMHG | RESPIRATION RATE: 18 BRPM | HEIGHT: 65 IN

## 2023-03-22 VITALS — HEIGHT: 65 IN | WEIGHT: 208 LBS | BODY MASS INDEX: 34.66 KG/M2

## 2023-03-22 DIAGNOSIS — C50.911 MALIGNANT NEOPLASM OF RIGHT BREAST IN FEMALE, ESTROGEN RECEPTOR POSITIVE, UNSPECIFIED SITE OF BREAST: Primary | ICD-10-CM

## 2023-03-22 DIAGNOSIS — Z17.0 MALIGNANT NEOPLASM OF RIGHT BREAST IN FEMALE, ESTROGEN RECEPTOR POSITIVE, UNSPECIFIED SITE OF BREAST: Primary | ICD-10-CM

## 2023-03-22 DIAGNOSIS — C50.911 MALIGNANT NEOPLASM OF RIGHT BREAST IN FEMALE, ESTROGEN RECEPTOR POSITIVE, UNSPECIFIED SITE OF BREAST: ICD-10-CM

## 2023-03-22 DIAGNOSIS — I42.7 CARDIOMYOPATHY DUE TO CHEMOTHERAPY: Primary | ICD-10-CM

## 2023-03-22 DIAGNOSIS — T45.1X5A CARDIOMYOPATHY DUE TO CHEMOTHERAPY: Primary | ICD-10-CM

## 2023-03-22 DIAGNOSIS — Z17.0 MALIGNANT NEOPLASM OF RIGHT BREAST IN FEMALE, ESTROGEN RECEPTOR POSITIVE, UNSPECIFIED SITE OF BREAST: ICD-10-CM

## 2023-03-22 DIAGNOSIS — C50.911 MALIGNANT NEOPLASM OF RIGHT FEMALE BREAST, UNSPECIFIED ESTROGEN RECEPTOR STATUS, UNSPECIFIED SITE OF BREAST: Primary | ICD-10-CM

## 2023-03-22 PROCEDURE — 93702 BIS XTRACELL FLUID ANALYSIS: CPT

## 2023-03-22 PROCEDURE — 3078F DIAST BP <80 MM HG: CPT | Performed by: INTERNAL MEDICINE

## 2023-03-22 PROCEDURE — 3074F SYST BP LT 130 MM HG: CPT | Performed by: INTERNAL MEDICINE

## 2023-03-22 PROCEDURE — 97162 PT EVAL MOD COMPLEX 30 MIN: CPT

## 2023-03-22 PROCEDURE — 1126F AMNT PAIN NOTED NONE PRSNT: CPT | Performed by: INTERNAL MEDICINE

## 2023-03-22 PROCEDURE — 99205 OFFICE O/P NEW HI 60 MIN: CPT | Performed by: INTERNAL MEDICINE

## 2023-03-22 RX ORDER — FAMOTIDINE 10 MG/ML
20 INJECTION, SOLUTION INTRAVENOUS AS NEEDED
OUTPATIENT
Start: 2023-05-10

## 2023-03-22 RX ORDER — SODIUM CHLORIDE 9 MG/ML
250 INJECTION, SOLUTION INTRAVENOUS ONCE
OUTPATIENT
Start: 2023-04-26

## 2023-03-22 RX ORDER — FAMOTIDINE 10 MG/ML
20 INJECTION, SOLUTION INTRAVENOUS ONCE
OUTPATIENT
Start: 2023-04-12

## 2023-03-22 RX ORDER — DIPHENHYDRAMINE HYDROCHLORIDE 50 MG/ML
50 INJECTION INTRAMUSCULAR; INTRAVENOUS AS NEEDED
OUTPATIENT
Start: 2023-04-19

## 2023-03-22 RX ORDER — DIPHENHYDRAMINE HYDROCHLORIDE 50 MG/ML
50 INJECTION INTRAMUSCULAR; INTRAVENOUS AS NEEDED
OUTPATIENT
Start: 2023-04-05

## 2023-03-22 RX ORDER — DIPHENHYDRAMINE HYDROCHLORIDE 50 MG/ML
50 INJECTION INTRAMUSCULAR; INTRAVENOUS AS NEEDED
OUTPATIENT
Start: 2023-05-10

## 2023-03-22 RX ORDER — DIPHENHYDRAMINE HYDROCHLORIDE 50 MG/ML
50 INJECTION INTRAMUSCULAR; INTRAVENOUS AS NEEDED
OUTPATIENT
Start: 2023-04-26

## 2023-03-22 RX ORDER — FAMOTIDINE 10 MG/ML
20 INJECTION, SOLUTION INTRAVENOUS ONCE
OUTPATIENT
Start: 2023-05-10

## 2023-03-22 RX ORDER — FAMOTIDINE 10 MG/ML
20 INJECTION, SOLUTION INTRAVENOUS AS NEEDED
OUTPATIENT
Start: 2023-04-05

## 2023-03-22 RX ORDER — SODIUM CHLORIDE 9 MG/ML
250 INJECTION, SOLUTION INTRAVENOUS ONCE
OUTPATIENT
Start: 2023-04-12

## 2023-03-22 RX ORDER — FAMOTIDINE 10 MG/ML
20 INJECTION, SOLUTION INTRAVENOUS AS NEEDED
OUTPATIENT
Start: 2023-04-26

## 2023-03-22 RX ORDER — FAMOTIDINE 10 MG/ML
20 INJECTION, SOLUTION INTRAVENOUS AS NEEDED
OUTPATIENT
Start: 2023-04-12

## 2023-03-22 RX ORDER — DIPHENHYDRAMINE HYDROCHLORIDE 50 MG/ML
50 INJECTION INTRAMUSCULAR; INTRAVENOUS AS NEEDED
OUTPATIENT
Start: 2023-05-03

## 2023-03-22 RX ORDER — SODIUM CHLORIDE 9 MG/ML
250 INJECTION, SOLUTION INTRAVENOUS ONCE
OUTPATIENT
Start: 2023-04-19

## 2023-03-22 RX ORDER — SODIUM CHLORIDE 9 MG/ML
250 INJECTION, SOLUTION INTRAVENOUS ONCE
OUTPATIENT
Start: 2023-05-10

## 2023-03-22 RX ORDER — FAMOTIDINE 10 MG/ML
20 INJECTION, SOLUTION INTRAVENOUS AS NEEDED
OUTPATIENT
Start: 2023-05-03

## 2023-03-22 RX ORDER — SODIUM CHLORIDE 9 MG/ML
250 INJECTION, SOLUTION INTRAVENOUS ONCE
OUTPATIENT
Start: 2023-04-05

## 2023-03-22 RX ORDER — DIPHENHYDRAMINE HYDROCHLORIDE 50 MG/ML
50 INJECTION INTRAMUSCULAR; INTRAVENOUS AS NEEDED
OUTPATIENT
Start: 2023-04-12

## 2023-03-22 RX ORDER — FAMOTIDINE 10 MG/ML
20 INJECTION, SOLUTION INTRAVENOUS ONCE
OUTPATIENT
Start: 2023-04-26

## 2023-03-22 RX ORDER — FAMOTIDINE 10 MG/ML
20 INJECTION, SOLUTION INTRAVENOUS ONCE
OUTPATIENT
Start: 2023-04-05

## 2023-03-22 RX ORDER — SODIUM CHLORIDE 9 MG/ML
250 INJECTION, SOLUTION INTRAVENOUS ONCE
OUTPATIENT
Start: 2023-05-03

## 2023-03-22 RX ORDER — FAMOTIDINE 10 MG/ML
20 INJECTION, SOLUTION INTRAVENOUS ONCE
OUTPATIENT
Start: 2023-05-03

## 2023-03-22 RX ORDER — FAMOTIDINE 10 MG/ML
20 INJECTION, SOLUTION INTRAVENOUS ONCE
OUTPATIENT
Start: 2023-04-19

## 2023-03-22 RX ORDER — FAMOTIDINE 10 MG/ML
20 INJECTION, SOLUTION INTRAVENOUS AS NEEDED
OUTPATIENT
Start: 2023-04-19

## 2023-03-22 NOTE — THERAPY DISCHARGE NOTE
Outpatient Physical Therapy Lymphedema Initial Evaluation & Discharge   [place]     Patient Name: Laura Churchill  : 1950  MRN: [id number]  Today's Date: 3/22/2023      Visit Date: 2023    Visit Dx:    ICD-10-CM ICD-9-CM   1. Malignant neoplasm of right female breast, unspecified estrogen receptor status, unspecified site of breast (Formerly [facility])  C50.911 174.9       Patient Active Problem List   Diagnosis   • Essential hypertension   • Hyperlipidemia   • Obstructive sleep apnea syndrome   • Osteoarthritis of knee   • Osteoporosis   • Lumbar spondylosis without myelopathy/Lumbar facet arthropathy   • Displacement of lumbar intervertebral disc   • Stenosis of lateral recess of lumbar spine   • Physical deconditioning   • Morbid obesity due to excess calories (Formerly [facility] [place])   • Anxiety and depression   • Sacroiliac joint dysfunction of right side   • Greater trochanteric bursitis of right hip   • Iliotibial band syndrome of right side   • Spinal stenosis   • Osteoarthritis   • Obesity due to excess calories   • Menopause   • Mixed hyperlipidemia   • Lumbosacral disc disease   • Low back pain   • Joint pain   • Extremity pain   • Chronic pain disorder   • Back problem   • Vaginal atrophy   • Primary localized osteoarthrosis of shoulder region   • Status post total left shoulder arthroplasty   • Anxiety   • Sciatica   • Acquired spondylolisthesis   • Spinal stenosis, lumbar region, with neurogenic claudication   • L3-5 PLIF 2020   • Primary osteoarthritis of left knee   • Status post total left knee replacement   • Leukocytosis, likely reactive   • Acute blood loss anemia, mild, asymptomatic   • Nonintractable episodic headache   • Right thigh pain   • Pseudoarthrosis of lumbar spine   • Primary localized osteoarthritis of right knee   • Status post right knee replacement   • Obesity   • Postoperative pain   • Prediabetes        Past Medical History:   Diagnosis Date   • Allergic    • Anesthesia     low BP  with spinal surgery in 2020, patient was hospitalized a few extra days due to low Bp   • Anxiety    • Atrophic vaginitis    • Bilateral hip pain    • Cancer (HCC)    • Cholelithiasis not sure   • Depression    • Hypertension    • Low back pain    • Mixed hyperlipidemia    • Muscle spasm    • Obesity due to excess calories    • MOOKIE (obstructive sleep apnea)     NO CPAP USE   • Osteoarthritis    • PONV (postoperative nausea and vomiting)     preprocedural meds help and are needed   • Tendinitis of right shoulder    • Vertigo         Past Surgical History:   Procedure Laterality Date   • BARIATRIC SURGERY  2015   • CHOLECYSTECTOMY     • COLONOSCOPY  2013   • ENDOSCOPY     • GASTRIC BANDING REMOVAL     • GASTRIC SLEEVE LAPAROSCOPIC  2011   • HIP PINNING Left     3 pins   • INTERVENTIONAL RADIOLOGY PROCEDURE N/A 05/27/2020    Procedure: IR myelogram, lumbar;  Surgeon: Jason Rodriguez MD;  Location:  [facility];  Service: Interventional Radiology;  Laterality: N/A;   • JOINT REPLACEMENT  Left shoulder replacement    2017   • LAPAROSCOPIC GASTRIC BANDING     • LAPAROTOMY OOPHERECTOMY Right    • LUMBAR DISCECTOMY FUSION INSTRUMENTATION N/A 11/20/2018    Procedure: L3-4 LUMBAR LAMINECTOMY POSTERIOR LUMBAR INTERBODY FUSION PILF;  Surgeon: David Rider MD;  Location:  [facility] OR;  Service: Neurosurgery   • LUMBAR DISCECTOMY FUSION INSTRUMENTATION N/A 07/21/2020    Procedure: L2-3 PLIF, exploration of L3-4 fusion, L2-4 fusion, L2 laminectomy;  Surgeon: David Rider MD;  Location:  [facility] OR;  Service: Neurosurgery;  Laterality: N/A;   • NV ARTHROPLASTY GLENOHUMERAL JOINT TOTAL SHOULDER Left 07/10/2017    Procedure: LEFT TOTAL SHOULDER ARTHROPLASTY ;  Surgeon: Almas Miller MD;  Location:  [facility] OR;  Service: Orthopedics   • SPINE SURGERY  L3 fused to L4,5    2018   • TOTAL KNEE ARTHROPLASTY Left 01/16/2020    Procedure: TOTAL KNEE REPLACEMENT LEFT;  Surgeon: Houston Castano  MD Joe;  Location:  [facility] OR;  Service: Orthopedics   • TOTAL KNEE ARTHROPLASTY Right 09/22/2022    Procedure: TOTAL KNEE ARTHROPLASTY RIGHT;  Surgeon: Houston Castano MD;  Location:  [facility] OR;  Service: Orthopedics;  Laterality: Right;   • WISDOM TOOTH EXTRACTION              Lymphedema     Row Name 03/22/23 1030             Subjective Pain    Able to rate subjective pain? yes  -KG      Pre-Treatment Pain Level 0  -KG      Post-Treatment Pain Level 0  -KG         Subjective Comments    Subjective Comments Pt is 71 yo female with R BrCA with upcoming chemo, lumpectomy and SLNB.  Pt enjoys time with her , dogs and being active at [employer].  Has LUE RTC tear that limits her, but no deficits RUE currently.  -KG         Lymphedema Assessment    Lymphedema Assessment Comments RUE at risk  -KG         Posture/Observations    Posture- WNL Posture is WNL  -KG         General ROM    GENERAL ROM COMMENTS WNL  -KG         MMT (Manual Muscle Testing)    General MMT Comments wnl  -KG         Lymphedema Edema Assessment    Edema Assessment Comment no current edema  -KG         Skin Changes/Observations    Location/Assessment Upper Quadrant;Upper Extremity  -KG      Upper Extremity Conditions bilateral:;normal  -KG      Upper Extremity Color/Pigment bilateral:;normal  -KG      Upper Quadrant Conditions bilateral:;normal  -KG      Upper Quadrant Color/Pigment bilateral:;normal  -KG         Lymphedema Sensation    Lymphedema Sensation Comments normal  -KG         L-Dex Bioimpedence Screening    L-Dex Measurement Extremity RUE  -KG      L-Dex Patient Position Standing  -KG      L-Dex UE Dominate Side Right  -KG      L-Dex UE At Risk Side Right  -KG      L-Dex UE Baseline Score -7  -KG      L-Dex UE Comment The patient had SOZO measurement which I reviewed today. The score is in normal limits, see scanned to EMR. Bioimpedance spectroscopy helps identify the onset of lymphedema in an arm or leg before patients  "experience noticeable swelling. Research has shown that the early detection of lymphedema using L-Dex combined with treatment can reduce progression to chronic lymphedema by 95% in breast cancer patients. Whenever possible, patients are tested for baseline L-Dex score before cancer treatment begins and then are reassessed during regular follow-up visits using the SOZO device. Otherwise, this can be started postoperatively and continued during regular follow-up visits. If the patient’s L-Dex score increases above normal levels, that is a sign that lymphedema is developing and a referral is made to occupational or physical therapy for further evaluation and early compression treatment. Lymphedema assessment with the SOZO L-Dex score is recommended to be done every 3 months for the first 3 years and then every 6 months for years 4 and 5 followed by annually afterwards.  -KG      Skeletal Muscle Mass (%) 19.1 %  -KG      Hy-dex -6.2  -KG      Height 165.1 cm (65\")  -KG      Weight 94.3 kg (208 lb)  -KG      BMI (Calculated) 34.6  -KG            User Key  (r) = Recorded By, (t) = Taken By, (c) = Cosigned By    Initials Name Provider Type    KG Mouna Olea Physical Therapist                                   Therapy Education  Education Details: Pt edu on prehab evaluation assessments including bioimpedance. Pt was provided w/ HABS materials to learn more about lymphedema, exercise, etc.  Given: Posture/body mechanics, Symptoms/condition management  Program: New  How Provided: Verbal  Provided to: Patient  Level of Understanding: Verbalized     OP Exercises     Row Name 03/22/23 1030             Subjective Comments    Subjective Comments Pt is 71 yo female with R BrCA with upcoming chemo, lumpectomy and SLNB.  Pt enjoys time with her , dogs and being active at [employer].  Has LUE RTC tear that limits her, but no deficits RUE currently.  -KG         Subjective Pain    Able to rate subjective pain? yes  -KG      " Pre-Treatment Pain Level 0  -KG      Post-Treatment Pain Level 0  -KG            User Key  (r) = Recorded By, (t) = Taken By, (c) = Cosigned By    Initials Name Provider Type    Mouna Frank Physical Therapist                               PT OP Goals     Row Name 03/22/23 1030          PT Short Term Goals    STG Date to Achieve 04/21/23  -KG     STG 1 Goal 1: Pt demonstrates awareness of post-operative movement restrictions and HEP to facilitate lymphatic regeneration and reduce the risk of seroma formation, axillary web syndrome, and lymphedema while ensuring shoulder joint mobility.  -KG     STG 1 Progress Met  -KG     STG 2 goal 2: pt demonstrates understeanding of post-operative basic lymphedema precautions  -KG     STG 2 Progress Met  -KG           User Key  (r) = Recorded By, (t) = Taken By, (c) = Cosigned By    Initials Name Provider Type    Mouna Frank Physical Therapist                 PT Assessment/Plan     Row Name 03/22/23 1030          PT Assessment    Assessment Comments Ms. Churchill presents to PT pre-operatively for planned BrCA surgery scheduled in a couple weeks. Baseline ROM, postural, and bioimpedance measurements were taken today to be compared to measurements retaken 3-4 weeks post surgery. At  that time, any reduced movement, decline in function, or postural issues will be addressed with skilled care and new goals will be established. Personal risk factors for lymphedema post-operatively for the R* upper extremity and trunk quadrant were also assessed today and basic lymphedema precautions were discussed. A more detailed discussion regarding personal lymphedema risk factors will take place post-operatively once the number of lymph nodes removed and the plan for further medical care is known  -KG     Please refer to paper survey for additional self-reported information Yes  -KG     Rehab Potential Good  -KG     Patient/caregiver participated in establishment of treatment plan  and goals Yes  -KG     Patient would benefit from skilled therapy intervention Yes  -KG        PT Plan    Planned CPT's? PT EVAL MOD COMPLELITY: 25013;PT BIS XTRACELL FLUID ANALYSIS: 12751  -KG     Physical Therapy Interventions (Optional Details) patient/family education;postural re-education;ROM (Range of Motion)  -KG     PT Plan Comments Ms. Churchill may return to PT 3-4 weeks post operatively for re-evaluation measurements to be compared to measurements taken today, at her pre-operative evaluation. In addition, she will be examined for possible post-BrCA surgery sequelae such as axillary web syndrome, scar adhesions, edema, worsened posture, scapular winging, pain, and reduced ROM and function. At that time, a future plan and goals will be established and skilled care continued if indicated. Currently, Ms. Churchill has been provided with additional information for healing after BrCA surgery in order to facilitate recovery and reduce the risk of post-operative sequelae  -KG           User Key  (r) = Recorded By, (t) = Taken By, (c) = Cosigned By    Initials Name Provider Type    KG Muona Olea Physical Therapist                 Outcome Measure Options: Quick DASH  Quick DASH  Open a tight or new jar.: No Difficulty  Do heavy household chores (e.g., wash walls, wash floors): No Difficulty  Carry a shopping bag or briefcase: Mild Difficulty  Wash your back: No Difficulty  Use a knife to cut food: No Difficulty  Recreational activities in which you take some force or impact through your arm, should or hand (e.g. golf, hammering, tennis, etc.): Moderate Difficulty  During the past week, to what extent has your arm, shoulder, or hand problem interfered with your normal social activites with family, friends, neighbors or groups?: Not at all  During the past week, were you limited in your work or other regular daily activities as a result of your arm, shoulder or hand problem?: Not limited at all  Arm, Shoulder, or  hand pain: None  Tingling (pins and needles) in your arm, shoulder, or hand: None  During the past week, how much difficulty have you had sleeping because of the pain in your arm, shoulder or hand?: No difficulty  Number of Questions Answered: 11  Quick DASH Score: 6.82         Time Calculation:   Start Time: 1030     Therapy Charges for Today     Code Description Service Date Service Provider Modifiers Qty    97590800353 HC PT EVAL MOD COMPLEXITY 4 3/22/2023 Mouna Olea GP 1    04460009653 HC PT BIS XTRACELL FLUID ANALYSIS 3/22/2023 Mouna Olea  1          PT G-Codes  Outcome Measure Options: Quick DASH  Quick DASH Score: 6.82            Mouna Olea  3/22/2023

## 2023-03-23 ENCOUNTER — DOCUMENTATION (OUTPATIENT)
Dept: NUTRITION | Facility: HOSPITAL | Age: 73
End: 2023-03-23
Payer: MEDICARE

## 2023-03-23 DIAGNOSIS — Z17.0 MALIGNANT NEOPLASM OF RIGHT BREAST IN FEMALE, ESTROGEN RECEPTOR POSITIVE, UNSPECIFIED SITE OF BREAST: Primary | ICD-10-CM

## 2023-03-23 DIAGNOSIS — C50.911 MALIGNANT NEOPLASM OF RIGHT BREAST IN FEMALE, ESTROGEN RECEPTOR POSITIVE, UNSPECIFIED SITE OF BREAST: Primary | ICD-10-CM

## 2023-03-23 RX ORDER — ONDANSETRON HYDROCHLORIDE 8 MG/1
8 TABLET, FILM COATED ORAL 3 TIMES DAILY PRN
Qty: 30 TABLET | Refills: 5 | Status: SHIPPED | OUTPATIENT
Start: 2023-03-23

## 2023-03-23 RX ORDER — LIDOCAINE AND PRILOCAINE 25; 25 MG/G; MG/G
1 CREAM TOPICAL AS NEEDED
Qty: 30 G | Refills: 3 | Status: SHIPPED | OUTPATIENT
Start: 2023-03-23

## 2023-03-23 NOTE — PROGRESS NOTES
Outpatient Oncology Nutrition     Reason for Visit:     Oncology Nutrition Screening and Patient Education / Referral  from Haroldo Menendez    Patient Name:  Laura Churchill    :  1950    MRN:  [id number]    Date of Encounter: 2023    Nutrition Assessment     Diagnosis:  [id number] ER+ (50%) RI negative Her2 positive (3+) Invasive ductal carcinoma of the right breast    Surgery:  Consideration for lumpectomy following neoadjuvant chemo    Chemotherapy:  OP BREAST PACLitaxel / Trastuzumab (Weekly x 12) then Trastuzumab every 21 days) - start date 23    Radiation:  Consideration for radiation following neoadjuvant chemo and lumpectomy     Patient Active Problem List   Diagnosis   • Essential hypertension   • Hyperlipidemia   • Obstructive sleep apnea syndrome   • Osteoarthritis of knee   • Osteoporosis   • Lumbar spondylosis without myelopathy/Lumbar facet arthropathy   • Displacement of lumbar intervertebral disc   • Stenosis of lateral recess of lumbar spine   • Physical deconditioning   • Morbid obesity due to excess calories (HCC)   • Anxiety and depression   • Sacroiliac joint dysfunction of right side   • Greater trochanteric bursitis of right hip   • Iliotibial band syndrome of right side   • Spinal stenosis   • Osteoarthritis   • Obesity due to excess calories   • Menopause   • Mixed hyperlipidemia   • Lumbosacral disc disease   • Low back pain   • Joint pain   • Extremity pain   • Chronic pain disorder   • Back problem   • Vaginal atrophy   • Primary localized osteoarthrosis of shoulder region   • Status post total left shoulder arthroplasty   • Anxiety   • Sciatica   • Acquired spondylolisthesis   • Spinal stenosis, lumbar region, with neurogenic claudication   • L3-5 PLIF 2020   • Primary osteoarthritis of left knee   • Status post total left knee replacement   • Leukocytosis, likely reactive   • Acute blood loss anemia, mild, asymptomatic   • Nonintractable episodic headache   • Right  "thigh pain   • Pseudoarthrosis of lumbar spine   • Primary localized osteoarthritis of right knee   • Status post right knee replacement   • Obesity   • Postoperative pain   • Prediabetes   • Malignant neoplasm of right breast in female, estrogen receptor positive (HCC)       Food / Nutrition Related History       Hydration Status     Goal: 85 ounces    Enteral Feeding   NA    Anthropometric Measurements     Height:    Ht Readings from Last 1 Encounters:   03/22/23 165.1 cm (65\")       Weight:    Wt Readings from Last 1 Encounters:   03/22/23 94.3 kg (208 lb)       BMI:  34.61 / obesity    Weight Change: Stable weight range of 200-210 lbs for past 2 years      Review of Lab Data (Time Frame - 1 month / 2 month)     Component  Ref Range & Units 4 mo ago  (11/10/22) 6 mo ago  (9/23/22) 6 mo ago  (9/19/22) 7 mo ago  (8/17/22) 1 yr ago  (9/28/21) 2 yr ago  (9/30/20) 2 yr ago  (7/21/20)   Glucose  65 - 99 mg/dL 81  155 High   102 High   99  83  85  95 R    BUN  8 - 23 mg/dL 13  14  17  14  21  19     Creatinine  0.57 - 1.00 mg/dL 0.78  0.78  0.89  0.79  0.85  0.79     Sodium  136 - 145 mmol/L 141  141  143  140  140  140     Potassium  3.5 - 5.2 mmol/L 4.4  4.5  4.0  4.2  4.6  4.5     Chloride  98 - 107 mmol/L 106  106  108 High   103  105  104     CO2  22.0 - 29.0 mmol/L 23.4  28.0  25.0  26.2  24.9  26.2     Calcium  8.6 - 10.5 mg/dL 9.8  9.0  9.2  9.1  9.6  9.4     Total Protein  6.0 - 8.5 g/dL 7.2   6.6  6.5  6.7  7.2     Albumin  3.50 - 5.20 g/dL 4.40   4.30  4.40  4.60  4.60     ALT (SGPT)  1 - 33 U/L 6   10  13  13  14     AST (SGOT)  1 - 32 U/L 17   16  17  22  23     Alkaline Phosphatase  39 - 117 U/L 96   75  74  72  105     Total Bilirubin  0.0 - 1.2 mg/dL 0.2   0.3  0.3  0.2  0.2     Globulin  gm/dL 2.8   2.3 CM  2.1  2.1  2.6     A/G Ratio  g/dL 1.6   1.9  2.1  2.2  1.8     BUN/Creatinine Ratio  7.0 - 25.0 16.7  17.9  19.1  17.7  24.7  24.1     Anion Gap  5.0 - 15.0 mmol/L 11.6  7.0  10.0  10.8  10.1  9.8   "   eGFR  >60.0 mL/min/1.73 80.8  81.3 CM  69.4 CM  80.1             Medication Review   Reviewed 3/23/23    Nutrition Focused Physical Findings       Nutrition Impact Symptoms       Physical Activity       Current Nutritional Intake     Oral diet:  Regular    Oral nutritional supplements:    Intake:    Malnutrition Risk Assessment     Recent weight loss over the past 6 months:  0 = No    How much weight loss:      Eating poorly because of a decreased appetite:  0 = No    Malnutrition Screening Score:     MST = 0 or 1 Patient not at risk for malnutrition    Nutrition Diagnosis     Problem    Etiology    Signs / Symptoms      Nutrition Intervention   Referral received from Haroldo Menendez RN Breast NN regarding patient's request for consultation with Nutritional Services and concern for weight gain during chemo.      Nutrition screening initiated; phone call to patient; no answer.  VM left stating that Nutrition Services would be consulting with the patient at her first chemo appointment scheduled for April 5, 2023.  She was encouraged to call for any concerns that needed to be addressed prior to that appointment / contact information provided.    Goal       Monitoring / Evaluation

## 2023-03-27 ENCOUNTER — TELEPHONE (OUTPATIENT)
Dept: ONCOLOGY | Facility: CLINIC | Age: 73
End: 2023-03-27
Payer: MEDICARE

## 2023-03-27 DIAGNOSIS — Z17.0 MALIGNANT NEOPLASM OF RIGHT BREAST IN FEMALE, ESTROGEN RECEPTOR POSITIVE, UNSPECIFIED SITE OF BREAST: Primary | ICD-10-CM

## 2023-03-27 DIAGNOSIS — C50.911 MALIGNANT NEOPLASM OF RIGHT BREAST IN FEMALE, ESTROGEN RECEPTOR POSITIVE, UNSPECIFIED SITE OF BREAST: Primary | ICD-10-CM

## 2023-03-27 DIAGNOSIS — U07.1 COVID-19: ICD-10-CM

## 2023-03-27 NOTE — TELEPHONE ENCOUNTER
Received msg from Breast Navigator Haroldo Menendez RN that patient notified her she tested positive for covid on Saturday.  I left msg with patient that we would have to move all her appts out by at least 10 days from her positive test and that someone from scheduling would call. Also, per Dr. Hopkins, we did send in paxlovid for patient due to her age over 65 and cancer diagnosis.  Had to leave voice message as patient did not answer.

## 2023-04-06 ENCOUNTER — TRANSCRIBE ORDERS (OUTPATIENT)
Dept: ADMINISTRATIVE | Facility: HOSPITAL | Age: 73
End: 2023-04-06
Payer: MEDICARE

## 2023-04-06 DIAGNOSIS — C50.911 MALIGNANT NEOPLASM OF RIGHT FEMALE BREAST, UNSPECIFIED ESTROGEN RECEPTOR STATUS, UNSPECIFIED SITE OF BREAST: Primary | ICD-10-CM

## 2023-04-07 ENCOUNTER — HOSPITAL ENCOUNTER (OUTPATIENT)
Dept: GENERAL RADIOLOGY | Facility: HOSPITAL | Age: 73
Discharge: HOME | End: 2023-04-07

## 2023-04-07 DIAGNOSIS — C50.911 MALIGNANT NEOPLASM OF RIGHT FEMALE BREAST, UNSPECIFIED ESTROGEN RECEPTOR STATUS, UNSPECIFIED SITE OF BREAST: ICD-10-CM

## 2023-04-07 PROCEDURE — 71045 X-RAY EXAM CHEST 1 VIEW: CPT

## 2023-04-10 ENCOUNTER — HOSPITAL ENCOUNTER (OUTPATIENT)
Dept: ONCOLOGY | Facility: HOSPITAL | Age: 73
Discharge: HOME | End: 2023-04-10
Payer: MEDICARE

## 2023-04-10 ENCOUNTER — OFFICE VISIT (OUTPATIENT)
Dept: ONCOLOGY | Facility: CLINIC | Age: 73
End: 2023-04-10
Payer: MEDICARE

## 2023-04-10 ENCOUNTER — EDUCATION (OUTPATIENT)
Dept: ONCOLOGY | Facility: HOSPITAL | Age: 73
End: 2023-04-10
Payer: MEDICARE

## 2023-04-10 ENCOUNTER — HOSPITAL ENCOUNTER (OUTPATIENT)
Dept: MRI IMAGING | Facility: HOSPITAL | Age: 73
Discharge: HOME | End: 2023-04-10
Payer: MEDICARE

## 2023-04-10 VITALS
HEIGHT: 65 IN | WEIGHT: 213 LBS | DIASTOLIC BLOOD PRESSURE: 74 MMHG | TEMPERATURE: 97 F | SYSTOLIC BLOOD PRESSURE: 165 MMHG | RESPIRATION RATE: 18 BRPM | BODY MASS INDEX: 35.49 KG/M2 | HEART RATE: 75 BPM

## 2023-04-10 VITALS
BODY MASS INDEX: 35.47 KG/M2 | RESPIRATION RATE: 18 BRPM | OXYGEN SATURATION: 97 % | HEIGHT: 65 IN | SYSTOLIC BLOOD PRESSURE: 126 MMHG | DIASTOLIC BLOOD PRESSURE: 82 MMHG | HEART RATE: 79 BPM | TEMPERATURE: 96.9 F | WEIGHT: 212.9 LBS

## 2023-04-10 DIAGNOSIS — Z17.0 MALIGNANT NEOPLASM OF RIGHT BREAST IN FEMALE, ESTROGEN RECEPTOR POSITIVE, UNSPECIFIED SITE OF BREAST: Primary | ICD-10-CM

## 2023-04-10 DIAGNOSIS — C50.511 MALIGNANT NEOPLASM OF LOWER-OUTER QUADRANT OF RIGHT FEMALE BREAST: ICD-10-CM

## 2023-04-10 DIAGNOSIS — Z17.0 MALIGNANT NEOPLASM OF RIGHT BREAST IN FEMALE, ESTROGEN RECEPTOR POSITIVE, UNSPECIFIED SITE OF BREAST: ICD-10-CM

## 2023-04-10 DIAGNOSIS — L64.0 DRUG-INDUCED ANDROGENIC ALOPECIA: Primary | ICD-10-CM

## 2023-04-10 DIAGNOSIS — C50.911 MALIGNANT NEOPLASM OF RIGHT BREAST IN FEMALE, ESTROGEN RECEPTOR POSITIVE, UNSPECIFIED SITE OF BREAST: ICD-10-CM

## 2023-04-10 DIAGNOSIS — C50.911 MALIGNANT NEOPLASM OF RIGHT BREAST IN FEMALE, ESTROGEN RECEPTOR POSITIVE, UNSPECIFIED SITE OF BREAST: Primary | ICD-10-CM

## 2023-04-10 DIAGNOSIS — Z45.2 ENCOUNTER FOR CARE RELATED TO PORT-A-CATH: ICD-10-CM

## 2023-04-10 LAB
ALBUMIN SERPL-MCNC: 4.3 G/DL (ref 3.5–5.2)
ALBUMIN/GLOB SERPL: 1.7 G/DL
ALP SERPL-CCNC: 91 U/L (ref 39–117)
ALT SERPL W P-5'-P-CCNC: 10 U/L (ref 1–33)
ANION GAP SERPL CALCULATED.3IONS-SCNC: 9 MMOL/L (ref 5–15)
AST SERPL-CCNC: 19 U/L (ref 1–32)
BASOPHILS # BLD AUTO: 0.02 10*3/MM3 (ref 0–0.2)
BASOPHILS NFR BLD AUTO: 0.3 % (ref 0–1.5)
BILIRUB SERPL-MCNC: 0.3 MG/DL (ref 0–1.2)
BUN SERPL-MCNC: 17 MG/DL (ref 8–23)
BUN/CREAT SERPL: 21 (ref 7–25)
CALCIUM SPEC-SCNC: 8.9 MG/DL (ref 8.6–10.5)
CHLORIDE SERPL-SCNC: 108 MMOL/L (ref 98–107)
CO2 SERPL-SCNC: 25 MMOL/L (ref 22–29)
CREAT SERPL-MCNC: 0.81 MG/DL (ref 0.57–1)
DEPRECATED RDW RBC AUTO: 45.4 FL (ref 37–54)
EGFRCR SERPLBLD CKD-EPI 2021: 77.2 ML/MIN/1.73
EOSINOPHIL # BLD AUTO: 0.07 10*3/MM3 (ref 0–0.4)
EOSINOPHIL NFR BLD AUTO: 1.1 % (ref 0.3–6.2)
ERYTHROCYTE [DISTWIDTH] IN BLOOD BY AUTOMATED COUNT: 12.8 % (ref 12.3–15.4)
GLOBULIN UR ELPH-MCNC: 2.6 GM/DL
GLUCOSE SERPL-MCNC: 93 MG/DL (ref 65–99)
HCT VFR BLD AUTO: 40.1 % (ref 34–46.6)
HGB BLD-MCNC: 12.8 G/DL (ref 12–15.9)
IMM GRANULOCYTES # BLD AUTO: 0.01 10*3/MM3 (ref 0–0.05)
IMM GRANULOCYTES NFR BLD AUTO: 0.2 % (ref 0–0.5)
LYMPHOCYTES # BLD AUTO: 1.43 10*3/MM3 (ref 0.7–3.1)
LYMPHOCYTES NFR BLD AUTO: 22.3 % (ref 19.6–45.3)
MCH RBC QN AUTO: 30.3 PG (ref 26.6–33)
MCHC RBC AUTO-ENTMCNC: 31.9 G/DL (ref 31.5–35.7)
MCV RBC AUTO: 94.8 FL (ref 79–97)
MONOCYTES # BLD AUTO: 0.67 10*3/MM3 (ref 0.1–0.9)
MONOCYTES NFR BLD AUTO: 10.5 % (ref 5–12)
NEUTROPHILS NFR BLD AUTO: 4.2 10*3/MM3 (ref 1.7–7)
NEUTROPHILS NFR BLD AUTO: 65.6 % (ref 42.7–76)
PLATELET # BLD AUTO: 230 10*3/MM3 (ref 140–450)
PMV BLD AUTO: 8.9 FL (ref 6–12)
POTASSIUM SERPL-SCNC: 4.4 MMOL/L (ref 3.5–5.2)
PROT SERPL-MCNC: 6.9 G/DL (ref 6–8.5)
RBC # BLD AUTO: 4.23 10*6/MM3 (ref 3.77–5.28)
SODIUM SERPL-SCNC: 142 MMOL/L (ref 136–145)
WBC NRBC COR # BLD: 6.4 10*3/MM3 (ref 3.4–10.8)

## 2023-04-10 PROCEDURE — C8937 CAD BREAST MRI: HCPCS

## 2023-04-10 PROCEDURE — 3079F DIAST BP 80-89 MM HG: CPT | Performed by: NURSE PRACTITIONER

## 2023-04-10 PROCEDURE — 96523 IRRIG DRUG DELIVERY DEVICE: CPT

## 2023-04-10 PROCEDURE — 3074F SYST BP LT 130 MM HG: CPT | Performed by: NURSE PRACTITIONER

## 2023-04-10 PROCEDURE — 80053 COMPREHEN METABOLIC PANEL: CPT | Performed by: INTERNAL MEDICINE

## 2023-04-10 PROCEDURE — 1160F RVW MEDS BY RX/DR IN RCRD: CPT | Performed by: NURSE PRACTITIONER

## 2023-04-10 PROCEDURE — A9577 INJ MULTIHANCE: HCPCS | Performed by: SURGERY

## 2023-04-10 PROCEDURE — 25010000002 HEPARIN LOCK FLUSH PER 10 UNITS: Performed by: INTERNAL MEDICINE

## 2023-04-10 PROCEDURE — 99213 OFFICE O/P EST LOW 20 MIN: CPT | Performed by: NURSE PRACTITIONER

## 2023-04-10 PROCEDURE — C8908 MRI W/O FOL W/CONT, BREAST,: HCPCS

## 2023-04-10 PROCEDURE — 0 GADOBENATE DIMEGLUMINE 529 MG/ML SOLUTION: Performed by: SURGERY

## 2023-04-10 PROCEDURE — 1159F MED LIST DOCD IN RCRD: CPT | Performed by: NURSE PRACTITIONER

## 2023-04-10 PROCEDURE — 85025 COMPLETE CBC W/AUTO DIFF WBC: CPT | Performed by: INTERNAL MEDICINE

## 2023-04-10 PROCEDURE — 1126F AMNT PAIN NOTED NONE PRSNT: CPT | Performed by: NURSE PRACTITIONER

## 2023-04-10 PROCEDURE — 77049 MRI BREAST C-+ W/CAD BI: CPT | Performed by: RADIOLOGY

## 2023-04-10 PROCEDURE — 36415 COLL VENOUS BLD VENIPUNCTURE: CPT

## 2023-04-10 RX ORDER — SODIUM CHLORIDE 0.9 % (FLUSH) 0.9 %
10 SYRINGE (ML) INJECTION AS NEEDED
Status: DISCONTINUED | OUTPATIENT
Start: 2023-04-10 | End: 2023-04-11 | Stop reason: HOSPADM

## 2023-04-10 RX ORDER — HEPARIN SODIUM (PORCINE) LOCK FLUSH IV SOLN 100 UNIT/ML 100 UNIT/ML
500 SOLUTION INTRAVENOUS AS NEEDED
Status: DISCONTINUED | OUTPATIENT
Start: 2023-04-10 | End: 2023-04-11 | Stop reason: HOSPADM

## 2023-04-10 RX ORDER — HEPARIN SODIUM (PORCINE) LOCK FLUSH IV SOLN 100 UNIT/ML 100 UNIT/ML
500 SOLUTION INTRAVENOUS AS NEEDED
Status: CANCELLED | OUTPATIENT
Start: 2023-04-10

## 2023-04-10 RX ORDER — SODIUM CHLORIDE 0.9 % (FLUSH) 0.9 %
10 SYRINGE (ML) INJECTION AS NEEDED
Status: CANCELLED | OUTPATIENT
Start: 2023-04-10

## 2023-04-10 RX ADMIN — GADOBENATE DIMEGLUMINE 19 ML: 529 INJECTION, SOLUTION INTRAVENOUS at 14:32

## 2023-04-10 RX ADMIN — HEPARIN SODIUM (PORCINE) LOCK FLUSH IV SOLN 100 UNIT/ML 500 UNITS: 100 SOLUTION at 10:20

## 2023-04-10 RX ADMIN — Medication 10 ML: at 10:20

## 2023-04-10 NOTE — PROGRESS NOTES
"CHEMOTHERAPY PREPARATION    Laura Churchill  [id number]  1950    Subjective   Chief Complaint: Treatment Preparation and Needs Assessment    History of present illness:  Laura Churchill is a 72 y.o. year old female who is here today for chemotherapy preparation and needs assessment. The patient has been diagnosed with breast cancer and is scheduled to begin treatment with Taxol/Herceptin.     Oncology History:    Oncology/Hematology History   Malignant neoplasm of right breast in female, estrogen receptor positive   3/22/2023 Initial Diagnosis    Malignant neoplasm of right breast in female, estrogen receptor positive (HCC)     3/22/2023 Cancer Staged    Staging form: Breast, AJCC 8th Edition  - Clinical: Stage IA (cT1c, cN0, cM0, G2, ER+, DE-, HER2+) - Signed by Sharonda Hopkins MD on 3/22/2023     4/12/2023 -  Chemotherapy    OP BREAST PACLitaxel / Trastuzumab (Weekly x 12) then Trastuzumab every 21 days)          The current medication list and allergy list were reviewed and reconciled.     Past Medical History, Past Surgical History, Social History, Family History have been reviewed and are without significant changes except as mentioned.    Review of Systems   Constitutional: Negative.    HENT: Negative.    Eyes: Negative.    Respiratory: Negative.    Cardiovascular: Negative.    Gastrointestinal: Negative.    Endocrine: Negative.    Genitourinary: Negative.    Musculoskeletal: Negative.    Skin: Negative.    Allergic/Immunologic: Negative.    Neurological: Negative.    Hematological: Negative.    Psychiatric/Behavioral: Negative.        Objective   Physical Exam  Vital Signs: /82   Pulse 79   Temp 96.9 °F (36.1 °C) (Infrared)   Resp 18   Ht 165.1 cm (65\")   Wt 96.6 kg (212 lb 14.4 oz)   LMP  (LMP Unknown)   SpO2 97%   BMI 35.43 kg/m²   Vitals:    04/10/23 1054   PainSc: 0-No pain           General Appearance:  alert, cooperative, no apparent distress and appears stated age "   Neurologic/Psychiatric: A&O x 3, gait steady, appropriate affect   HEENT:  Normocephalic, without obvious abnormality, mucous membranes moist   Lungs:   Clear to auscultation bilaterally; respirations regular, even, and unlabored bilaterally   Heart:  Regular rate and rhythm, no murmurs appreciated   Extremities: Normal, atraumatic; no clubbing, cyanosis, or edema    Skin: No rashes, lesions, or abnormal coloration noted     ECOG Performance Status: 0 - Asymptomatic            NEEDS ASSESSMENTS    Genetics  The patient's new diagnosis and family history have been reviewed for genetic counseling needs. A genetic referral is recommended. Genetics appointment already scheduled.       Psychosocial  The patient has completed a PHQ-9 Depression Screening and the Distress Thermometer (DT) today.   PHQ-9 Total Score:1  . PHQ-9 results show 1-4 (Minimal Depression). The patient scored their distress today as 1 on a scale of 0-10 with 0 being no distress and 10 being extreme distress.   Problems marked by the patient as being an issue for them within the last week include practical problems.   Results were reviewed along with psychosocial resources offered by our cancer center. Our oncology social worker will be flagged for a DT score of 4 or above, and a same day call will be made for a score of 9 or 10. A mental health referral is not recommended at this time. The patient is not accepting of a referral to [facility].   Copies of patient's questionnaires will be scanned into EMR for details and further reference.    Barriers to care  A barriers form was also completed by the patient today. We discussed services offered by our facility to help her have adequate access to care. The patient was given the name and card for our Oncology Social Worker. Based upon barriers assessment today, the patient will require a follow-up call from the  to further discuss needs.   A copy of the barriers form will also be  "scanned into EMR for details and further reference.     VAD Assessment  The patient and I discussed planned intervenous chemotherapy as well as other IV treatments that are often needed throughout the course of treatment. These may include, but are not limited to blood transfusions, antibiotics, and IV hydration. The patient has already had Port-A-Cath insertion.      Advance Care Planning   ACP discussion was held with the patient during this visit. Patient does not have an advance directive, information provided.  The patient and I discussed advanced care planning, \"Conversations that Matter\".   This service was offered, free of charge, for development of advance directives with a certified ACP facilitator.  They would like to meet with the  to have advance directives completed.        Palliative Care  The patient and I discussed palliative care services. Palliative care is not the same as Hospice care. This is specialized medical care for people living with serious illness with the goal of improving quality of life for the patient and their family. [facility] has partnered with [facility] to offer our patients outpatient palliative care early along with their treatment to assist in coordination of care, symptom management, pain management, and medical decision making.  Oncology criteria for palliative care referral is not met at this time. The patient is not interested in a palliative care consultation.     Additional Referral needs  none      CHEMOTHERAPY EDUCATION    Booklets Given: Chemotherapy and You [x]  Eating Hints [x]    Sexuality/Fertility Books []      Chemotherapy/Biotherapy Education Sheets: (list all that apply)  nausea management, acid reflux management, diarrhea management, Cancer resourse contacts information, skin and mouth care and vaccination information                                                                                                                      "                                            Chemotherapy Regimen:   Treatment Plans     Name Type Plan Dates Plan Provider         Active    OP BREAST PACLitaxel / Trastuzumab (Weekly x 12) then Trastuzumab every 21 days)  ONCOLOGY TREATMENT  3/23/2023 - Present Sharonda Hopkins MD                    DETAILED CHEMOTHERAPY TEACHING COMPLETED BY PHARMACY. CHEMOTHERAPY CONSENT COMPLETED BY PHARMACY. SEE PHARMACY EDUCATION FOR DOCUMENTATION.     Chemotherapy education comprehension reviewed. Questions answered and additional information discussed on topics including:  Anemia, Thrombocytopenia, Neutropenia, Nutrition and appetite changes, Constipation, Nausea & vomiting, Mouth sores, Alopecia, Nervous system changes, Pain, Skin & nail changes and Organ toxicities        Assessment and Plan:    Diagnoses and all orders for this visit:    1. Drug-induced androgenic alopecia (Primary)  -     Nursing communication    2. Malignant neoplasm of right breast in female, estrogen receptor positive, unspecified site of breast  -     Ambulatory Referral to ONC Social Work           The patient and I have reviewed their new cancer diagnosis and scheduled treatment plan. Needs assessment was completed including genetics, psychosocial needs, barriers to care, VAD evaluation, advanced care planning, and palliative care services. Referrals have been ordered as appropriate based upon our evaluation and patient desires.     Chemotherapy teaching was also completed today as documented above. Adequate time was given to answer all questions to her satisfaction. Patient and family are aware of their care team members and contact information if they have questions or problems throughout the treatment course. Needs assessments and education has been completed. The patient is adequately prepared to begin treatment as scheduled.        Joanna Campos, APRN      04/10/23

## 2023-04-10 NOTE — PLAN OF CARE
Outpatient Infusion • [address] • [address] • [place] [id number] • [phone]      CHEMOTHERAPY EDUCATION    NAME:  Laura Churchill      : 1950           DATE: 04/10/23    Medication Education Sheets:    Paclitaxel and Trastuzumab    Other Education Sheets:   CINV, Diarrhea and Symptom Tracker Sheet and RAAD Information    Chemotherapy Regimen:   OP BREAST PACLitaxel / Trastuzumab (Weekly x 12) then Trastuzumab every 21 days)         TOPICS EDUCATION PROVIDED COMMENTS   ANEMIA:  role of RBC, cause, s/s, ways to manage, role of transfusion [x] Reviewed the role of RBC and the use of transfusions if hemoglobin decreases too much.  Patient to notify us if they experience shortness of breath, dizziness, or palpitations.  Also let patient know they could feel more tired than usual and to try to stay active, but rest if they need to.    THROMBOCYTOPENIA:  role of platelet, cause, s/s, ways to prevent bleeding, things to avoid, when to seek help [x] Reviewed the role of platelets in blood clotting and when to call clinic (bloody nose that bleeds for 5 mins despite pressure, a cut that won't stop bleeding despite pressure, gums that bleed excessively with brushing or flossing). Recommended using an electric razor, soft bristle toothbrush, and blowing your nose gently.    NEUTROPENIA:  role of WBC, cause, infection precautions, s/s of infection, when to call MD [x] Reviewed the role of WBC, good infection prevention practices, and when to call the clinic (temperature 100.4F, sore throat, burning urination, etc)  COVID Vaccines: All available (6 doses)   Flu Vaccine:  flu vaccine received   NUTRITION & APPETITE CHANGES:  importance of maintaining healthy diet & weight, ways to manage to improve intake, dietary consult, exercise regimen, electrolyte and/or blood glucose abnormalities  While not a expected risk of this medication, patient and family member expressed concerns relating to appetite and  weight. Discussed the following related to decreased appetite. Recommended eating smaller, more frequent meals. Instructed the patient to contact clinic if they were losing weight or having difficulty eating enough to maintain their energy level.   DIARRHEA:  causes, s/s of dehydration, ways to manage, dietary changes, when to call MD [x] Chemotherapy - Discussed risk of diarrhea. Instructed patient that they can use OTC loperamide at first presentation of diarrhea, but call MD if 4-6 episodes in 24 hours not relieved by OTC loperamide.   CONSTIPATION:  causes, ways to manage, dietary changes, when to call MD     NAUSEA & VOMITING:  cause, use of antiemetics, dietary changes, when to call MD    Paclitaxel - Low     Trastuzumab - minimal  [x] • Emetic risk: Low    • PRN home meds: Ondansetron  • Pharmacy home meds sent to: [facility]       Instructed the patient to take a dose of the PRN medication at the first onset of nausea and if it's not working to call us for additional medications.  Also provided non-drug measures to mitigate nausea.   MOUTH SORES:  causes, oral care, ways to manage [x] Mouth sores can be prevented by making a mouth wash mixture of salt, baking soda, and water. The patient was instructed to swish and spit four times daily after meals and before bedtime.  Use of a soft bristle toothbrush was recommended.  The patient was instructed to avoid alcohol-containing OTC mouthwashes.    ALOPECIA:  cause, ways to manage, resources [x] Discussed the possibility of hair loss with the patient. Informed patient that they could request a prescription for a wig if desired and most of the cost is usually covered by insurance. Recommended covering the head with a hat and/or protecting the skin on the head with SPF 30 or higher    NERVOUS SYSTEM CHANGES:  causes, s/s, neuropathies, cognitive changes, ways to manage [x] discussed the adverse effect of peripheral neuropathy and signs/symptoms associated  with this adverse effect. Instructed patient to call if symptoms become more frequent or worsen.    PAIN:  causes, ways to manage [x] Chemo - Discussed muscle and joint aches/pains with chemotherapy, and recommended the use of OTC pain relief with ibuprofen or acetaminophen if needed.   SKIN & NAIL CHANGES:  cause, s/s, ways to manage [x] Chemotherapy - Informed patient that they may see dark or white lines on finger and toenails during treatment, and that their nails may become brittle and even fall off in extreme cases. But that this would likely reverse after treatment concludes.    ORGAN TOXICITIES:  cause, s/s, need for diagnostic tests, labs, when to notify MD [x] Discussed potential effects on organ systems, monitoring, diagnostic tests, labs, and when to notify their MD. Discussed the signs/symptoms of the following: cardiotoxicity, hepatotoxicity and lung changes   INFUSION RELATED REACTIONS or INJECTION-SITE REACTION:  Cause, s/s, anaphylaxis, monitoring, etc. [x] Premeds: Dexamethasone, Diphenhydramine and Famotidine      Discussed the risk of an infusion reaction and symptoms such as: fever, chills, dizziness, itchiness or rash, flushing, trouble breathing, wheezing, sudden back pain, or feeling faint.  Instructed the patient to notify their nurse if they start feeling weird at any point during their infusion.    Discussed the rate of infusion will be slower with the initial dose and can be increased for subsequent doses if the patient tolerated the first dose without a reaction, 1st dose will be given over 90 mins for Trastuzumab and Future doses will be given over 30 minustes      Reviewed how infusion reactions are managed.   MISCELLANEOUS:  drug interactions, administration, labs, etc. [x] Discussed chemotherapy schedule, lab draws, infusion times, and total expected visit time.   • DDIs: Paclitaxel - Losartan: may enhance the hypotensive effects counseld on the signs of hypotension such as  dizziness, discussed caution when changing positions from seated to upright.   • Drug-food interactions: eating grapefruit and drinking grapefruit juice  • Lab draws: Weekly, on days of infusion or no sooner than 3 days prior to infusion   • Miscellaneous:     INFERTILITY & SEXUALITY:    causes, fertility preservation options, sexuality changes, ways to manage, importance of birth control [x] IV Oncology Therapy: Reviewed safe sex practices and minimizing exposure to body fluids for 48 hours after each dose of IV oncology therapy. and The patient is not of childbearing potential.       HOME CARE:  storing of oral chemo, how to manage bodily fluids [x] IV - Counseled on management of soiled linens and proper flush technique.  Discussed how to manage all the side effects at home and advised when to contact the MD office   SURVIVORSHIP:  distress, distress assessment, secondary malignancies, early/late effects, follow-up, social issues, social support       Medications:  Prior to Admission medications    Medication Sig Start Date End Date Taking? Authorizing Provider   atorvastatin (LIPITOR) 10 MG tablet Take 1 tablet by mouth Daily. 11/10/22   Julius Rivera DO   fexofenadine (Allegra Allergy) 60 MG tablet Take 1 tablet by mouth Daily. 1/23/23   Julius Rivera DO   lidocaine-prilocaine (EMLA) 2.5-2.5 % cream Apply 1 application topically to the appropriate area as directed As Needed (prior to port access). 3/23/23   Sharonda Hopkins MD   losartan (Cozaar) 100 MG tablet Take 1 tablet by mouth Daily. 11/10/22   Julius Rivera DO   ondansetron (ZOFRAN) 8 MG tablet Take 1 tablet by mouth 3 (Three) Times a Day As Needed for Nausea or Vomiting. 3/23/23   Sharonda Hopkins MD   venlafaxine XR (EFFEXOR-XR) 150 MG 24 hr capsule Take 1 capsule by mouth Daily. 11/10/22   Julius Rivera DO   vitamin B-12 (CYANOCOBALAMIN) 1000 MCG tablet Take 1 tablet by mouth Daily.    Provider, MD Chaim   VITAMIN D PO Take 5,000 Units by mouth  Daily.    Provider, Historical, MD       Notes: All questions and concerns were addressed. Provided a personalized treatment calendar to patient (includes treatment and lab schedule). Provided patient with contact information for the pharmacist and clinic while instructing them to call if any questions or concerns arise. Informed consent for treatment was obtained. Patient was receptive to information and expressed understanding.     Cheyenne Brock, Pharmacy Intern     4/10/2023  10:29 EDT

## 2023-04-11 ENCOUNTER — TELEPHONE (OUTPATIENT)
Dept: MRI IMAGING | Facility: HOSPITAL | Age: 73
End: 2023-04-11
Payer: MEDICARE

## 2023-04-11 ENCOUNTER — HOSPITAL ENCOUNTER (OUTPATIENT)
Dept: CARDIOLOGY | Facility: HOSPITAL | Age: 73
Discharge: HOME | End: 2023-04-11
Admitting: INTERNAL MEDICINE
Payer: MEDICARE

## 2023-04-11 VITALS
WEIGHT: 212 LBS | DIASTOLIC BLOOD PRESSURE: 88 MMHG | BODY MASS INDEX: 35.32 KG/M2 | HEIGHT: 65 IN | SYSTOLIC BLOOD PRESSURE: 159 MMHG

## 2023-04-11 DIAGNOSIS — I42.7 CARDIOMYOPATHY DUE TO CHEMOTHERAPY: ICD-10-CM

## 2023-04-11 DIAGNOSIS — T45.1X5A CARDIOMYOPATHY DUE TO CHEMOTHERAPY: ICD-10-CM

## 2023-04-11 LAB
BH CV ECHO LEFT VENTRICLE GLOBAL LONGITUDINAL STRAIN: -17.5 %
BH CV ECHO MEAS - AO MAX PG: 14.8 MMHG
BH CV ECHO MEAS - AO MEAN PG: 7.4 MMHG
BH CV ECHO MEAS - AO ROOT DIAM: 2.8 CM
BH CV ECHO MEAS - AO V2 MAX: 192.2 CM/SEC
BH CV ECHO MEAS - AO V2 VTI: 46.4 CM
BH CV ECHO MEAS - AVA(I,D): 2.33 CM2
BH CV ECHO MEAS - EDV(CUBED): 56.4 ML
BH CV ECHO MEAS - EDV(MOD-SP4): 85.5 ML
BH CV ECHO MEAS - EF(MOD-BP): 59 %
BH CV ECHO MEAS - EF(MOD-SP4): 59.3 %
BH CV ECHO MEAS - EF_3D-VOL: 54 %
BH CV ECHO MEAS - ESV(CUBED): 7.5 ML
BH CV ECHO MEAS - ESV(MOD-SP4): 34.8 ML
BH CV ECHO MEAS - FS: 48.9 %
BH CV ECHO MEAS - IVS/LVPW: 0.8 CM
BH CV ECHO MEAS - IVSD: 0.98 CM
BH CV ECHO MEAS - LA DIMENSION: 3.6 CM
BH CV ECHO MEAS - LAT PEAK E' VEL: 6.9 CM/SEC
BH CV ECHO MEAS - LV DIASTOLIC VOL/BSA (35-75): 42.2 CM2
BH CV ECHO MEAS - LV MASS(C)D: 136.6 GRAMS
BH CV ECHO MEAS - LV MAX PG: 5.1 MMHG
BH CV ECHO MEAS - LV MEAN PG: 3 MMHG
BH CV ECHO MEAS - LV SYSTOLIC VOL/BSA (12-30): 17.2 CM2
BH CV ECHO MEAS - LV V1 MAX: 113.2 CM/SEC
BH CV ECHO MEAS - LV V1 VTI: 34 CM
BH CV ECHO MEAS - LVIDD: 3.8 CM
BH CV ECHO MEAS - LVIDS: 1.96 CM
BH CV ECHO MEAS - LVOT AREA: 3.2 CM2
BH CV ECHO MEAS - LVOT DIAM: 2.01 CM
BH CV ECHO MEAS - LVPWD: 1.22 CM
BH CV ECHO MEAS - MED PEAK E' VEL: 5.2 CM/SEC
BH CV ECHO MEAS - MV A MAX VEL: 108.3 CM/SEC
BH CV ECHO MEAS - MV DEC SLOPE: 428.3 CM/SEC2
BH CV ECHO MEAS - MV DEC TIME: 0.25 MSEC
BH CV ECHO MEAS - MV E MAX VEL: 106 CM/SEC
BH CV ECHO MEAS - MV E/A: 0.98
BH CV ECHO MEAS - MV MAX PG: 5.9 MMHG
BH CV ECHO MEAS - MV MEAN PG: 2.3 MMHG
BH CV ECHO MEAS - MV V2 VTI: 42.1 CM
BH CV ECHO MEAS - MVA(VTI): 2.6 CM2
BH CV ECHO MEAS - PA ACC TIME: 0.16 SEC
BH CV ECHO MEAS - PA PR(ACCEL): 7.7 MMHG
BH CV ECHO MEAS - PA V2 MAX: 83.8 CM/SEC
BH CV ECHO MEAS - RAP SYSTOLE: 8 MMHG
BH CV ECHO MEAS - RVSP: 31 MMHG
BH CV ECHO MEAS - SI(MOD-SP4): 25 ML/M2
BH CV ECHO MEAS - SV(LVOT): 108.3 ML
BH CV ECHO MEAS - SV(MOD-SP4): 50.7 ML
BH CV ECHO MEAS - TAPSE (>1.6): 2.6 CM
BH CV ECHO MEAS - TR MAX PG: 23.2 MMHG
BH CV ECHO MEAS - TR MAX VEL: 240.3 CM/SEC
BH CV ECHO MEASUREMENTS AVERAGE E/E' RATIO: 17.52
BH CV XLRA - RV BASE: 2.8 CM
BH CV XLRA - RV LENGTH: 6.3 CM
BH CV XLRA - RV MID: 2.31 CM
BH CV XLRA - TDI S': 12.3 CM/SEC
LEFT ATRIUM VOLUME INDEX: 35.7 ML/M2
LV EF 2D ECHO EST: 55 %
MAXIMAL PREDICTED HEART RATE: 148 BPM
STRESS TARGET HR: 126 BPM

## 2023-04-11 PROCEDURE — 93306 TTE W/DOPPLER COMPLETE: CPT

## 2023-04-11 PROCEDURE — 93356 MYOCRD STRAIN IMG SPCKL TRCK: CPT

## 2023-04-11 NOTE — TELEPHONE ENCOUNTER
Called patient with MRI Breast results. Recommended surgical follow up. Pt was then transferred to the HELP line after questions were asked and answered. An email was sent to the Nurse Navigator. Pt expressed understanding and was encouraged to call with any further questions or concerns.

## 2023-04-12 ENCOUNTER — DOCUMENTATION (OUTPATIENT)
Dept: NUTRITION | Facility: HOSPITAL | Age: 73
End: 2023-04-12
Payer: MEDICARE

## 2023-04-12 ENCOUNTER — HOSPITAL ENCOUNTER (OUTPATIENT)
Dept: ONCOLOGY | Facility: HOSPITAL | Age: 73
Discharge: HOME | End: 2023-04-12
Admitting: INTERNAL MEDICINE
Payer: MEDICARE

## 2023-04-12 VITALS
SYSTOLIC BLOOD PRESSURE: 165 MMHG | RESPIRATION RATE: 16 BRPM | HEIGHT: 65 IN | HEART RATE: 77 BPM | WEIGHT: 214 LBS | DIASTOLIC BLOOD PRESSURE: 85 MMHG | TEMPERATURE: 97.4 F | BODY MASS INDEX: 35.65 KG/M2

## 2023-04-12 DIAGNOSIS — Z17.0 MALIGNANT NEOPLASM OF RIGHT BREAST IN FEMALE, ESTROGEN RECEPTOR POSITIVE, UNSPECIFIED SITE OF BREAST: Primary | ICD-10-CM

## 2023-04-12 DIAGNOSIS — C50.911 MALIGNANT NEOPLASM OF RIGHT BREAST IN FEMALE, ESTROGEN RECEPTOR POSITIVE, UNSPECIFIED SITE OF BREAST: Primary | ICD-10-CM

## 2023-04-12 DIAGNOSIS — Z45.2 ENCOUNTER FOR CARE RELATED TO PORT-A-CATH: ICD-10-CM

## 2023-04-12 PROCEDURE — 25010000002 HEPARIN LOCK FLUSH PER 10 UNITS: Performed by: INTERNAL MEDICINE

## 2023-04-12 PROCEDURE — 96417 CHEMO IV INFUS EACH ADDL SEQ: CPT

## 2023-04-12 PROCEDURE — 96413 CHEMO IV INFUSION 1 HR: CPT

## 2023-04-12 PROCEDURE — 96375 TX/PRO/DX INJ NEW DRUG ADDON: CPT

## 2023-04-12 PROCEDURE — 25010000002 PACLITAXEL PER 1 MG: Performed by: INTERNAL MEDICINE

## 2023-04-12 PROCEDURE — 25010000002 ALTEPLASE 2 MG RECONSTITUTED SOLUTION: Performed by: INTERNAL MEDICINE

## 2023-04-12 PROCEDURE — 96374 THER/PROPH/DIAG INJ IV PUSH: CPT

## 2023-04-12 PROCEDURE — 25010000002 DIPHENHYDRAMINE PER 50 MG: Performed by: INTERNAL MEDICINE

## 2023-04-12 PROCEDURE — 25010000002 TRASTUZUMAB-QYYP 420 MG RECONSTITUTED SOLUTION 1 EACH VIAL: Performed by: INTERNAL MEDICINE

## 2023-04-12 PROCEDURE — 36593 DECLOT VASCULAR DEVICE: CPT

## 2023-04-12 PROCEDURE — 25010000002 DEXAMETHASONE SODIUM PHOSPHATE 100 MG/10ML SOLUTION: Performed by: INTERNAL MEDICINE

## 2023-04-12 RX ORDER — SODIUM CHLORIDE 0.9 % (FLUSH) 0.9 %
10 SYRINGE (ML) INJECTION AS NEEDED
Status: CANCELLED | OUTPATIENT
Start: 2023-04-12

## 2023-04-12 RX ORDER — HEPARIN SODIUM (PORCINE) LOCK FLUSH IV SOLN 100 UNIT/ML 100 UNIT/ML
500 SOLUTION INTRAVENOUS AS NEEDED
Status: CANCELLED | OUTPATIENT
Start: 2023-04-12

## 2023-04-12 RX ORDER — HEPARIN SODIUM (PORCINE) LOCK FLUSH IV SOLN 100 UNIT/ML 100 UNIT/ML
500 SOLUTION INTRAVENOUS AS NEEDED
Status: DISCONTINUED | OUTPATIENT
Start: 2023-04-12 | End: 2023-04-13 | Stop reason: HOSPADM

## 2023-04-12 RX ORDER — FAMOTIDINE 10 MG/ML
20 INJECTION, SOLUTION INTRAVENOUS ONCE
Status: COMPLETED | OUTPATIENT
Start: 2023-04-12 | End: 2023-04-12

## 2023-04-12 RX ORDER — SODIUM CHLORIDE 9 MG/ML
250 INJECTION, SOLUTION INTRAVENOUS ONCE
Status: COMPLETED | OUTPATIENT
Start: 2023-04-12 | End: 2023-04-12

## 2023-04-12 RX ADMIN — HEPARIN 500 UNITS: 100 SYRINGE at 13:23

## 2023-04-12 RX ADMIN — PACLITAXEL 160 MG: 6 INJECTION, SOLUTION INTRAVENOUS at 12:17

## 2023-04-12 RX ADMIN — SODIUM CHLORIDE 380 MG: 9 INJECTION, SOLUTION INTRAVENOUS at 09:16

## 2023-04-12 RX ADMIN — FAMOTIDINE 20 MG: 10 INJECTION INTRAVENOUS at 11:00

## 2023-04-12 RX ADMIN — DEXAMETHASONE SODIUM PHOSPHATE 12 MG: 10 INJECTION, SOLUTION INTRAMUSCULAR; INTRAVENOUS at 11:16

## 2023-04-12 RX ADMIN — DIPHENHYDRAMINE HYDROCHLORIDE 50 MG: 50 INJECTION, SOLUTION INTRAMUSCULAR; INTRAVENOUS at 11:15

## 2023-04-12 RX ADMIN — ALTEPLASE: 2.2 INJECTION, POWDER, LYOPHILIZED, FOR SOLUTION INTRAVENOUS at 08:39

## 2023-04-12 RX ADMIN — SODIUM CHLORIDE 250 ML: 9 INJECTION, SOLUTION INTRAVENOUS at 09:16

## 2023-04-12 NOTE — PROGRESS NOTES
"Onc Nutrition    Patient: Laura Churchill  YOB: 1950    Diagnosis:  [id number] ER+ (50%) DC negative Her2 positive (3+) Invasive ductal carcinoma of the right breast     Surgery:  Consideration for lumpectomy following neoadjuvant chemo     Chemotherapy: Neoadjuvant PACLitaxel / Trastuzumab - weekly x 12 then Maintenance Trastuzumab - every 21 days      Radiation:  Consideration for radiation following neoadjuvant chemo and lumpectomy    Weight - 214#   Weight Change - stable weight between 203-214# for the past 2 years     Met with patient during her initial chemotherapy infusion appointment.  Patient reports her appetite has been normal but states she has been eating more than her usual amount, specifically sweets, since diagnosis.  She denies nutritional complaints at this time, however she is concerned about weight gain during treatment.  MAR and labs from 4/10/23 reviewed.    Discussed the importance of good nutrition during her treatment course focusing on adequate calorie, protein, nutrient and fluid intake.  Advised her to be consuming smaller more frequent meals/snacks throughout the day to aid with potential nausea management.  Emphasized the importance of protein and its role in the diet; reviewed high protein foods; and recommended she have a protein source at each meal/snack.  Offered several high protein snack ideas she may find more appealing during treatment.  Encouraged her to be choosing healthful foods and to avoid / limit intake of foods made with refined white flour and high in added sugar.  Also emphasized the importance of hydration; reviewed good hydrating fluid options; and recommended she drink 64 - 80 ounces daily.  Provided and reviewed written diet materials \"Soft and Moist High Protein Menu Ideas\" and \"Increasing Fluid Intake\" to reinforce information discussed.  Also provided and reviewed written diet material \"Nutritional Considerations in Breast Cancer\" and discussed " the basics of survivorship nutrition.    Answered her questions and she voiced understanding of information discussed.  RD's contact information provided and encouraged to call with questions.  Will follow up as indicated.    Mouna Barahona RD  04/12/23

## 2023-04-18 ENCOUNTER — TELEPHONE (OUTPATIENT)
Dept: ONCOLOGY | Facility: CLINIC | Age: 73
End: 2023-04-18
Payer: MEDICARE

## 2023-04-18 NOTE — TELEPHONE ENCOUNTER
SW contacted pt to discuss setting up apt for ACP. SW left voicemail with contact information for pt to return call at her convenience.

## 2023-04-19 ENCOUNTER — HOSPITAL ENCOUNTER (OUTPATIENT)
Dept: ONCOLOGY | Facility: HOSPITAL | Age: 73
Discharge: HOME | End: 2023-04-19
Admitting: INTERNAL MEDICINE
Payer: MEDICARE

## 2023-04-19 ENCOUNTER — HOSPITAL ENCOUNTER (OUTPATIENT)
Dept: ONCOLOGY | Facility: HOSPITAL | Age: 73
End: 2023-04-19
Payer: MEDICARE

## 2023-04-19 VITALS
BODY MASS INDEX: 35.49 KG/M2 | HEIGHT: 65 IN | DIASTOLIC BLOOD PRESSURE: 94 MMHG | HEART RATE: 84 BPM | WEIGHT: 213 LBS | SYSTOLIC BLOOD PRESSURE: 162 MMHG | RESPIRATION RATE: 18 BRPM | TEMPERATURE: 97.2 F

## 2023-04-19 DIAGNOSIS — Z45.2 ENCOUNTER FOR CARE RELATED TO PORT-A-CATH: ICD-10-CM

## 2023-04-19 DIAGNOSIS — Z17.0 MALIGNANT NEOPLASM OF RIGHT BREAST IN FEMALE, ESTROGEN RECEPTOR POSITIVE, UNSPECIFIED SITE OF BREAST: Primary | ICD-10-CM

## 2023-04-19 DIAGNOSIS — C50.911 MALIGNANT NEOPLASM OF RIGHT BREAST IN FEMALE, ESTROGEN RECEPTOR POSITIVE, UNSPECIFIED SITE OF BREAST: Primary | ICD-10-CM

## 2023-04-19 LAB
ALBUMIN SERPL-MCNC: 4.1 G/DL (ref 3.5–5.2)
ALBUMIN/GLOB SERPL: 1.5 G/DL
ALP SERPL-CCNC: 92 U/L (ref 39–117)
ALT SERPL W P-5'-P-CCNC: 9 U/L (ref 1–33)
ANION GAP SERPL CALCULATED.3IONS-SCNC: 9 MMOL/L (ref 5–15)
AST SERPL-CCNC: 16 U/L (ref 1–32)
BASOPHILS # BLD AUTO: 0.01 10*3/MM3 (ref 0–0.2)
BASOPHILS NFR BLD AUTO: 0.2 % (ref 0–1.5)
BILIRUB SERPL-MCNC: 0.3 MG/DL (ref 0–1.2)
BUN SERPL-MCNC: 13 MG/DL (ref 8–23)
BUN/CREAT SERPL: 13.7 (ref 7–25)
CALCIUM SPEC-SCNC: 9 MG/DL (ref 8.6–10.5)
CHLORIDE SERPL-SCNC: 108 MMOL/L (ref 98–107)
CO2 SERPL-SCNC: 25 MMOL/L (ref 22–29)
CREAT SERPL-MCNC: 0.95 MG/DL (ref 0.57–1)
DEPRECATED RDW RBC AUTO: 44.2 FL (ref 37–54)
EGFRCR SERPLBLD CKD-EPI 2021: 63.8 ML/MIN/1.73
EOSINOPHIL # BLD AUTO: 0.07 10*3/MM3 (ref 0–0.4)
EOSINOPHIL NFR BLD AUTO: 1.6 % (ref 0.3–6.2)
ERYTHROCYTE [DISTWIDTH] IN BLOOD BY AUTOMATED COUNT: 12.6 % (ref 12.3–15.4)
GLOBULIN UR ELPH-MCNC: 2.7 GM/DL
GLUCOSE SERPL-MCNC: 97 MG/DL (ref 65–99)
HCT VFR BLD AUTO: 37.9 % (ref 34–46.6)
HGB BLD-MCNC: 12.1 G/DL (ref 12–15.9)
IMM GRANULOCYTES # BLD AUTO: 0.02 10*3/MM3 (ref 0–0.05)
IMM GRANULOCYTES NFR BLD AUTO: 0.4 % (ref 0–0.5)
LYMPHOCYTES # BLD AUTO: 0.97 10*3/MM3 (ref 0.7–3.1)
LYMPHOCYTES NFR BLD AUTO: 21.7 % (ref 19.6–45.3)
MCH RBC QN AUTO: 30.1 PG (ref 26.6–33)
MCHC RBC AUTO-ENTMCNC: 31.9 G/DL (ref 31.5–35.7)
MCV RBC AUTO: 94.3 FL (ref 79–97)
MONOCYTES # BLD AUTO: 0.38 10*3/MM3 (ref 0.1–0.9)
MONOCYTES NFR BLD AUTO: 8.5 % (ref 5–12)
NEUTROPHILS NFR BLD AUTO: 3.03 10*3/MM3 (ref 1.7–7)
NEUTROPHILS NFR BLD AUTO: 67.6 % (ref 42.7–76)
PLATELET # BLD AUTO: 196 10*3/MM3 (ref 140–450)
PMV BLD AUTO: 9.1 FL (ref 6–12)
POTASSIUM SERPL-SCNC: 4.5 MMOL/L (ref 3.5–5.2)
PROT SERPL-MCNC: 6.8 G/DL (ref 6–8.5)
RBC # BLD AUTO: 4.02 10*6/MM3 (ref 3.77–5.28)
SODIUM SERPL-SCNC: 142 MMOL/L (ref 136–145)
WBC NRBC COR # BLD: 4.48 10*3/MM3 (ref 3.4–10.8)

## 2023-04-19 PROCEDURE — 85025 COMPLETE CBC W/AUTO DIFF WBC: CPT | Performed by: INTERNAL MEDICINE

## 2023-04-19 PROCEDURE — 25010000002 DIPHENHYDRAMINE PER 50 MG: Performed by: INTERNAL MEDICINE

## 2023-04-19 PROCEDURE — 80053 COMPREHEN METABOLIC PANEL: CPT | Performed by: INTERNAL MEDICINE

## 2023-04-19 PROCEDURE — 36593 DECLOT VASCULAR DEVICE: CPT

## 2023-04-19 PROCEDURE — 25010000002 TRASTUZUMAB-QYYP 420 MG RECONSTITUTED SOLUTION 1 EACH VIAL: Performed by: INTERNAL MEDICINE

## 2023-04-19 PROCEDURE — 96413 CHEMO IV INFUSION 1 HR: CPT

## 2023-04-19 PROCEDURE — 96375 TX/PRO/DX INJ NEW DRUG ADDON: CPT

## 2023-04-19 PROCEDURE — 25010000002 ALTEPLASE 2 MG RECONSTITUTED SOLUTION: Performed by: INTERNAL MEDICINE

## 2023-04-19 PROCEDURE — 96417 CHEMO IV INFUS EACH ADDL SEQ: CPT

## 2023-04-19 PROCEDURE — 25010000002 HEPARIN LOCK FLUSH PER 10 UNITS: Performed by: INTERNAL MEDICINE

## 2023-04-19 PROCEDURE — 25010000002 DEXAMETHASONE SODIUM PHOSPHATE 100 MG/10ML SOLUTION: Performed by: INTERNAL MEDICINE

## 2023-04-19 PROCEDURE — 25010000002 PACLITAXEL PER 1 MG: Performed by: INTERNAL MEDICINE

## 2023-04-19 RX ORDER — SODIUM CHLORIDE 9 MG/ML
250 INJECTION, SOLUTION INTRAVENOUS ONCE
Status: COMPLETED | OUTPATIENT
Start: 2023-04-19 | End: 2023-04-19

## 2023-04-19 RX ORDER — FAMOTIDINE 10 MG/ML
20 INJECTION, SOLUTION INTRAVENOUS ONCE
Status: COMPLETED | OUTPATIENT
Start: 2023-04-19 | End: 2023-04-19

## 2023-04-19 RX ORDER — SODIUM CHLORIDE 0.9 % (FLUSH) 0.9 %
10 SYRINGE (ML) INJECTION AS NEEDED
OUTPATIENT
Start: 2023-04-19

## 2023-04-19 RX ORDER — HEPARIN SODIUM (PORCINE) LOCK FLUSH IV SOLN 100 UNIT/ML 100 UNIT/ML
500 SOLUTION INTRAVENOUS AS NEEDED
Status: DISCONTINUED | OUTPATIENT
Start: 2023-04-19 | End: 2023-04-20 | Stop reason: HOSPADM

## 2023-04-19 RX ORDER — HEPARIN SODIUM (PORCINE) LOCK FLUSH IV SOLN 100 UNIT/ML 100 UNIT/ML
500 SOLUTION INTRAVENOUS AS NEEDED
OUTPATIENT
Start: 2023-04-19

## 2023-04-19 RX ADMIN — PACLITAXEL 160 MG: 6 INJECTION, SOLUTION INTRAVENOUS at 12:09

## 2023-04-19 RX ADMIN — DEXAMETHASONE SODIUM PHOSPHATE 12 MG: 10 INJECTION, SOLUTION INTRAMUSCULAR; INTRAVENOUS at 11:25

## 2023-04-19 RX ADMIN — FAMOTIDINE 20 MG: 10 INJECTION INTRAVENOUS at 11:30

## 2023-04-19 RX ADMIN — DIPHENHYDRAMINE HYDROCHLORIDE 25 MG: 50 INJECTION INTRAMUSCULAR; INTRAVENOUS at 11:25

## 2023-04-19 RX ADMIN — HEPARIN SODIUM (PORCINE) LOCK FLUSH IV SOLN 100 UNIT/ML 500 UNITS: 100 SOLUTION at 13:34

## 2023-04-19 RX ADMIN — SODIUM CHLORIDE 190 MG: 9 INJECTION, SOLUTION INTRAVENOUS at 10:45

## 2023-04-19 RX ADMIN — SODIUM CHLORIDE 250 ML: 9 INJECTION, SOLUTION INTRAVENOUS at 10:10

## 2023-04-19 RX ADMIN — ALTEPLASE: 2.2 INJECTION, POWDER, LYOPHILIZED, FOR SOLUTION INTRAVENOUS at 08:39

## 2023-04-26 ENCOUNTER — HOSPITAL ENCOUNTER (OUTPATIENT)
Dept: ONCOLOGY | Facility: HOSPITAL | Age: 73
Discharge: HOME | End: 2023-04-26
Admitting: INTERNAL MEDICINE
Payer: MEDICARE

## 2023-04-26 ENCOUNTER — OFFICE VISIT (OUTPATIENT)
Dept: ONCOLOGY | Facility: CLINIC | Age: 73
End: 2023-04-26
Payer: MEDICARE

## 2023-04-26 VITALS
WEIGHT: 214 LBS | HEIGHT: 65 IN | BODY MASS INDEX: 35.65 KG/M2 | OXYGEN SATURATION: 98 % | RESPIRATION RATE: 18 BRPM | SYSTOLIC BLOOD PRESSURE: 141 MMHG | HEART RATE: 94 BPM | DIASTOLIC BLOOD PRESSURE: 81 MMHG | TEMPERATURE: 98 F

## 2023-04-26 VITALS — DIASTOLIC BLOOD PRESSURE: 84 MMHG | SYSTOLIC BLOOD PRESSURE: 147 MMHG | HEART RATE: 86 BPM

## 2023-04-26 DIAGNOSIS — Z17.0 MALIGNANT NEOPLASM OF RIGHT BREAST IN FEMALE, ESTROGEN RECEPTOR POSITIVE, UNSPECIFIED SITE OF BREAST: Primary | ICD-10-CM

## 2023-04-26 DIAGNOSIS — Z45.2 ENCOUNTER FOR CARE RELATED TO PORT-A-CATH: ICD-10-CM

## 2023-04-26 DIAGNOSIS — C50.911 MALIGNANT NEOPLASM OF RIGHT BREAST IN FEMALE, ESTROGEN RECEPTOR POSITIVE, UNSPECIFIED SITE OF BREAST: Primary | ICD-10-CM

## 2023-04-26 LAB
ALBUMIN SERPL-MCNC: 4.3 G/DL (ref 3.5–5.2)
ALBUMIN/GLOB SERPL: 1.7 G/DL
ALP SERPL-CCNC: 92 U/L (ref 39–117)
ALT SERPL W P-5'-P-CCNC: 10 U/L (ref 1–33)
ANION GAP SERPL CALCULATED.3IONS-SCNC: 10 MMOL/L (ref 5–15)
AST SERPL-CCNC: 15 U/L (ref 1–32)
BASOPHILS # BLD AUTO: 0.03 10*3/MM3 (ref 0–0.2)
BASOPHILS NFR BLD AUTO: 0.6 % (ref 0–1.5)
BILIRUB SERPL-MCNC: 0.4 MG/DL (ref 0–1.2)
BUN SERPL-MCNC: 13 MG/DL (ref 8–23)
BUN/CREAT SERPL: 16 (ref 7–25)
CALCIUM SPEC-SCNC: 9.2 MG/DL (ref 8.6–10.5)
CHLORIDE SERPL-SCNC: 105 MMOL/L (ref 98–107)
CO2 SERPL-SCNC: 25 MMOL/L (ref 22–29)
CREAT SERPL-MCNC: 0.81 MG/DL (ref 0.57–1)
DEPRECATED RDW RBC AUTO: 45.8 FL (ref 37–54)
EGFRCR SERPLBLD CKD-EPI 2021: 77.2 ML/MIN/1.73
EOSINOPHIL # BLD AUTO: 0.08 10*3/MM3 (ref 0–0.4)
EOSINOPHIL NFR BLD AUTO: 1.5 % (ref 0.3–6.2)
ERYTHROCYTE [DISTWIDTH] IN BLOOD BY AUTOMATED COUNT: 13 % (ref 12.3–15.4)
GLOBULIN UR ELPH-MCNC: 2.6 GM/DL
GLUCOSE SERPL-MCNC: 104 MG/DL (ref 65–99)
HCT VFR BLD AUTO: 38.7 % (ref 34–46.6)
HGB BLD-MCNC: 12.4 G/DL (ref 12–15.9)
IMM GRANULOCYTES # BLD AUTO: 0.06 10*3/MM3 (ref 0–0.05)
IMM GRANULOCYTES NFR BLD AUTO: 1.1 % (ref 0–0.5)
LYMPHOCYTES # BLD AUTO: 1.5 10*3/MM3 (ref 0.7–3.1)
LYMPHOCYTES NFR BLD AUTO: 28.2 % (ref 19.6–45.3)
MCH RBC QN AUTO: 30.5 PG (ref 26.6–33)
MCHC RBC AUTO-ENTMCNC: 32 G/DL (ref 31.5–35.7)
MCV RBC AUTO: 95.3 FL (ref 79–97)
MONOCYTES # BLD AUTO: 0.39 10*3/MM3 (ref 0.1–0.9)
MONOCYTES NFR BLD AUTO: 7.3 % (ref 5–12)
NEUTROPHILS NFR BLD AUTO: 3.25 10*3/MM3 (ref 1.7–7)
NEUTROPHILS NFR BLD AUTO: 61.3 % (ref 42.7–76)
PLATELET # BLD AUTO: 289 10*3/MM3 (ref 140–450)
PMV BLD AUTO: 8.9 FL (ref 6–12)
POTASSIUM SERPL-SCNC: 4.4 MMOL/L (ref 3.5–5.2)
PROT SERPL-MCNC: 6.9 G/DL (ref 6–8.5)
RBC # BLD AUTO: 4.06 10*6/MM3 (ref 3.77–5.28)
SODIUM SERPL-SCNC: 140 MMOL/L (ref 136–145)
WBC NRBC COR # BLD: 5.31 10*3/MM3 (ref 3.4–10.8)

## 2023-04-26 PROCEDURE — 25010000002 DIPHENHYDRAMINE PER 50 MG: Performed by: INTERNAL MEDICINE

## 2023-04-26 PROCEDURE — 25010000002 ALTEPLASE 2 MG RECONSTITUTED SOLUTION: Performed by: INTERNAL MEDICINE

## 2023-04-26 PROCEDURE — 36593 DECLOT VASCULAR DEVICE: CPT

## 2023-04-26 PROCEDURE — 96413 CHEMO IV INFUSION 1 HR: CPT

## 2023-04-26 PROCEDURE — 25010000002 HEPARIN LOCK FLUSH PER 10 UNITS: Performed by: INTERNAL MEDICINE

## 2023-04-26 PROCEDURE — 85025 COMPLETE CBC W/AUTO DIFF WBC: CPT | Performed by: INTERNAL MEDICINE

## 2023-04-26 PROCEDURE — 96375 TX/PRO/DX INJ NEW DRUG ADDON: CPT

## 2023-04-26 PROCEDURE — 3077F SYST BP >= 140 MM HG: CPT | Performed by: INTERNAL MEDICINE

## 2023-04-26 PROCEDURE — 3079F DIAST BP 80-89 MM HG: CPT | Performed by: INTERNAL MEDICINE

## 2023-04-26 PROCEDURE — 36415 COLL VENOUS BLD VENIPUNCTURE: CPT

## 2023-04-26 PROCEDURE — 1126F AMNT PAIN NOTED NONE PRSNT: CPT | Performed by: INTERNAL MEDICINE

## 2023-04-26 PROCEDURE — 96417 CHEMO IV INFUS EACH ADDL SEQ: CPT

## 2023-04-26 PROCEDURE — 25010000002 PACLITAXEL PER 1 MG: Performed by: INTERNAL MEDICINE

## 2023-04-26 PROCEDURE — 96365 THER/PROPH/DIAG IV INF INIT: CPT

## 2023-04-26 PROCEDURE — 25010000002 DEXAMETHASONE SODIUM PHOSPHATE 100 MG/10ML SOLUTION: Performed by: INTERNAL MEDICINE

## 2023-04-26 PROCEDURE — 80053 COMPREHEN METABOLIC PANEL: CPT | Performed by: INTERNAL MEDICINE

## 2023-04-26 PROCEDURE — 25010000002 TRASTUZUMAB-QYYP 420 MG RECONSTITUTED SOLUTION 1 EACH VIAL: Performed by: INTERNAL MEDICINE

## 2023-04-26 PROCEDURE — 99214 OFFICE O/P EST MOD 30 MIN: CPT | Performed by: INTERNAL MEDICINE

## 2023-04-26 RX ORDER — DIPHENHYDRAMINE HYDROCHLORIDE 50 MG/ML
50 INJECTION INTRAMUSCULAR; INTRAVENOUS AS NEEDED
OUTPATIENT
Start: 2023-06-21

## 2023-04-26 RX ORDER — FAMOTIDINE 10 MG/ML
20 INJECTION, SOLUTION INTRAVENOUS AS NEEDED
OUTPATIENT
Start: 2023-05-31

## 2023-04-26 RX ORDER — FAMOTIDINE 10 MG/ML
20 INJECTION, SOLUTION INTRAVENOUS ONCE
OUTPATIENT
Start: 2023-05-31

## 2023-04-26 RX ORDER — HEPARIN SODIUM (PORCINE) LOCK FLUSH IV SOLN 100 UNIT/ML 100 UNIT/ML
500 SOLUTION INTRAVENOUS AS NEEDED
Status: DISCONTINUED | OUTPATIENT
Start: 2023-04-26 | End: 2023-04-27 | Stop reason: HOSPADM

## 2023-04-26 RX ORDER — SODIUM CHLORIDE 9 MG/ML
250 INJECTION, SOLUTION INTRAVENOUS ONCE
OUTPATIENT
Start: 2023-05-24

## 2023-04-26 RX ORDER — FAMOTIDINE 10 MG/ML
20 INJECTION, SOLUTION INTRAVENOUS AS NEEDED
OUTPATIENT
Start: 2023-06-07

## 2023-04-26 RX ORDER — SODIUM CHLORIDE 0.9 % (FLUSH) 0.9 %
10 SYRINGE (ML) INJECTION AS NEEDED
Status: DISCONTINUED | OUTPATIENT
Start: 2023-04-26 | End: 2023-04-27 | Stop reason: HOSPADM

## 2023-04-26 RX ORDER — DIPHENHYDRAMINE HYDROCHLORIDE 50 MG/ML
50 INJECTION INTRAMUSCULAR; INTRAVENOUS AS NEEDED
OUTPATIENT
Start: 2023-05-24

## 2023-04-26 RX ORDER — FAMOTIDINE 10 MG/ML
20 INJECTION, SOLUTION INTRAVENOUS ONCE
Status: COMPLETED | OUTPATIENT
Start: 2023-04-26 | End: 2023-04-26

## 2023-04-26 RX ORDER — SODIUM CHLORIDE 9 MG/ML
250 INJECTION, SOLUTION INTRAVENOUS ONCE
Status: COMPLETED | OUTPATIENT
Start: 2023-04-26 | End: 2023-04-26

## 2023-04-26 RX ORDER — FAMOTIDINE 10 MG/ML
20 INJECTION, SOLUTION INTRAVENOUS ONCE
OUTPATIENT
Start: 2023-06-21

## 2023-04-26 RX ORDER — HEPARIN SODIUM (PORCINE) LOCK FLUSH IV SOLN 100 UNIT/ML 100 UNIT/ML
500 SOLUTION INTRAVENOUS AS NEEDED
OUTPATIENT
Start: 2023-04-26

## 2023-04-26 RX ORDER — DIPHENHYDRAMINE HYDROCHLORIDE 50 MG/ML
50 INJECTION INTRAMUSCULAR; INTRAVENOUS AS NEEDED
OUTPATIENT
Start: 2023-06-28

## 2023-04-26 RX ORDER — SODIUM CHLORIDE 9 MG/ML
250 INJECTION, SOLUTION INTRAVENOUS ONCE
OUTPATIENT
Start: 2023-06-07

## 2023-04-26 RX ORDER — DIPHENHYDRAMINE HYDROCHLORIDE 50 MG/ML
50 INJECTION INTRAMUSCULAR; INTRAVENOUS AS NEEDED
OUTPATIENT
Start: 2023-05-31

## 2023-04-26 RX ORDER — DIPHENHYDRAMINE HYDROCHLORIDE 50 MG/ML
50 INJECTION INTRAMUSCULAR; INTRAVENOUS AS NEEDED
OUTPATIENT
Start: 2023-06-07

## 2023-04-26 RX ORDER — SODIUM CHLORIDE 9 MG/ML
250 INJECTION, SOLUTION INTRAVENOUS ONCE
OUTPATIENT
Start: 2023-06-14

## 2023-04-26 RX ORDER — FAMOTIDINE 10 MG/ML
20 INJECTION, SOLUTION INTRAVENOUS ONCE
OUTPATIENT
Start: 2023-06-28

## 2023-04-26 RX ORDER — FAMOTIDINE 10 MG/ML
20 INJECTION, SOLUTION INTRAVENOUS AS NEEDED
OUTPATIENT
Start: 2023-06-28

## 2023-04-26 RX ORDER — FAMOTIDINE 10 MG/ML
20 INJECTION, SOLUTION INTRAVENOUS ONCE
OUTPATIENT
Start: 2023-06-07

## 2023-04-26 RX ORDER — SODIUM CHLORIDE 9 MG/ML
250 INJECTION, SOLUTION INTRAVENOUS ONCE
OUTPATIENT
Start: 2023-05-31

## 2023-04-26 RX ORDER — SODIUM CHLORIDE 9 MG/ML
250 INJECTION, SOLUTION INTRAVENOUS ONCE
OUTPATIENT
Start: 2023-06-28

## 2023-04-26 RX ORDER — SODIUM CHLORIDE 0.9 % (FLUSH) 0.9 %
10 SYRINGE (ML) INJECTION AS NEEDED
OUTPATIENT
Start: 2023-04-26

## 2023-04-26 RX ORDER — FAMOTIDINE 10 MG/ML
20 INJECTION, SOLUTION INTRAVENOUS AS NEEDED
OUTPATIENT
Start: 2023-05-24

## 2023-04-26 RX ORDER — FAMOTIDINE 10 MG/ML
20 INJECTION, SOLUTION INTRAVENOUS AS NEEDED
OUTPATIENT
Start: 2023-06-21

## 2023-04-26 RX ORDER — FAMOTIDINE 10 MG/ML
20 INJECTION, SOLUTION INTRAVENOUS ONCE
OUTPATIENT
Start: 2023-05-24

## 2023-04-26 RX ORDER — SODIUM CHLORIDE 9 MG/ML
250 INJECTION, SOLUTION INTRAVENOUS ONCE
OUTPATIENT
Start: 2023-06-21

## 2023-04-26 RX ORDER — FAMOTIDINE 10 MG/ML
20 INJECTION, SOLUTION INTRAVENOUS AS NEEDED
OUTPATIENT
Start: 2023-06-14

## 2023-04-26 RX ORDER — FAMOTIDINE 10 MG/ML
20 INJECTION, SOLUTION INTRAVENOUS ONCE
OUTPATIENT
Start: 2023-06-14

## 2023-04-26 RX ORDER — DIPHENHYDRAMINE HYDROCHLORIDE 50 MG/ML
50 INJECTION INTRAMUSCULAR; INTRAVENOUS AS NEEDED
OUTPATIENT
Start: 2023-06-14

## 2023-04-26 RX ADMIN — Medication 10 ML: at 13:08

## 2023-04-26 RX ADMIN — DEXAMETHASONE SODIUM PHOSPHATE 12 MG: 10 INJECTION, SOLUTION INTRAMUSCULAR; INTRAVENOUS at 10:29

## 2023-04-26 RX ADMIN — SODIUM CHLORIDE 250 ML: 9 INJECTION, SOLUTION INTRAVENOUS at 10:28

## 2023-04-26 RX ADMIN — SODIUM CHLORIDE 160 MG: 9 INJECTION, SOLUTION INTRAVENOUS at 11:59

## 2023-04-26 RX ADMIN — DIPHENHYDRAMINE HYDROCHLORIDE 25 MG: 50 INJECTION INTRAMUSCULAR; INTRAVENOUS at 10:34

## 2023-04-26 RX ADMIN — HEPARIN 500 UNITS: 100 SYRINGE at 13:08

## 2023-04-26 RX ADMIN — FAMOTIDINE 20 MG: 10 INJECTION INTRAVENOUS at 10:31

## 2023-04-26 RX ADMIN — SODIUM CHLORIDE 190 MG: 9 INJECTION, SOLUTION INTRAVENOUS at 11:20

## 2023-04-26 RX ADMIN — ALTEPLASE: 2.2 INJECTION, POWDER, LYOPHILIZED, FOR SOLUTION INTRAVENOUS at 08:42

## 2023-04-26 NOTE — PROGRESS NOTES
"      PROBLEM LIST:  1. [id number] ER+ (50%) KS negative Her2 positive (3+) Invasive ductal carcinoma of the right breast  A) biopsy of a 1.2 cm mass in the lower outer quadrant on 3/14/23 showed a high grade IDC.  No concerning lymph nodes on imaging.  B) neoadjuvant chemotherapy with Taxol and Herceptin started on 4/12/2023  2. Hypertension  3. Depression  4. MOOKIE  5. Hyperlipidemia    Subjective     CHIEF COMPLAINT: breast cancer    HISTORY OF PRESENT ILLNESS:   Laura Churchill returns for follow-up.   She says that she did well with the chemotherapy.  She did have a few days of diarrhea and then a little nausea but no vomiting.  The Imodium helped with diarrhea, and the Zofran helped with the nausea although she only took it a few times.      Objective      /81   Pulse 94   Temp 98 °F (36.7 °C)   Resp 18   Ht 165.1 cm (65\")   Wt 97.1 kg (214 lb)   LMP  (LMP Unknown)   SpO2 98%   BMI 35.61 kg/m²      Performance Status:  ECOG score: 1             General: well appearing female in no acute distress  Neuro: alert and oriented  HEENT: sclera anicteric, oropharynx clear  Lymphatics: no cervical, supraclavicular, or axillary adenopathy  Breast: In the right breast at about 9:00 3 cm from the nipple there is a deep approximately 1 cm movable mass, indistinct  Cardiovascular: regular rate and rhythm, no murmurs  Lungs: clear to auscultation bilaterally  Abdomen: soft, nontender, nondistended.  No palpable organomegaly  Extremeties: no lower extremity edema  Skin: no rashes, lesions, bruising, or petechiae  Psych: mood and affect appropriate            RECENT LABS:  Lab Results   Component Value Date    WBC 5.31 04/26/2023    HGB 12.4 04/26/2023    HCT 38.7 04/26/2023    MCV 95.3 04/26/2023     04/26/2023       Lab Results   Component Value Date    GLUCOSE 97 04/19/2023    BUN 13 04/19/2023    CREATININE 0.95 04/19/2023    EGFRIFNONA 66 09/28/2021    BCR 13.7 04/19/2023    K 4.5 04/19/2023    CO2 " 25.0 04/19/2023    CALCIUM 9.0 04/19/2023    ALBUMIN 4.1 04/19/2023    AST 16 04/19/2023    ALT 9 04/19/2023       Adult Transthoracic Echo Complete W/ Cont if Necessary Per Protocol  Addendum: •  Left ventricular ejection fraction appears to be 56 - 60%.   •  Left ventricular wall thickness is consistent with mild concentric  hypertrophy.   •  Left ventricular diastolic function is consistent with (grade I)  impaired relaxation.   •  The aortic valve exhibits sclerosis.   •  Trace to mild aortic valve regurgitation is present   •  Estimated right ventricular systolic pressure from tricuspid  regurgitation is normal (<35 mmHg).   •  GLS -17.5%  Narrative: •  Left ventricular ejection fraction appears to be 56 - 60%.  •  Left ventricular wall thickness is consistent with mild concentric   hypertrophy.  •  Left ventricular diastolic function is consistent with (grade I)   impaired relaxation.  •  The aortic valve exhibits sclerosis.  •  Trace to mild aortic valve regurgitation is present  •  Estimated right ventricular systolic pressure from tricuspid   regurgitation is normal (<35 mmHg).              ASSESSMENT AND PLAN:     Laura Churchill is a 72 y.o. female with a [id number] Er+ Her2+ invasive ductal carcinoma of the right breast.    She has started neoadjuvant chemotherapy with Taxol and Herceptin.  She is tolerating this very well so far.  We will continue with cycle 1 day 15.  Because her tumor is deep and difficult to palpate, we will plan to repeat mammogram and/or ultrasound following 6 to 7 weeks of treatment.  This can be ordered at her next visit.    Following neoadjuvant chemotherapy she will be a candidate for radiation if she has a lumpectomy, as well as endocrine therapy.  We will plan to continue postoperative HER2 targeted therapy with either Herceptin or Kadcyla based on her pathology results.    Her port has not been functioning since it was first placed.  They have been able to use it once after  Activase but otherwise she has been treated peripherally.  We will order a port study, and consider replacing it since she still has almost a full year of treatment remaining.    Follow-up in 3 weeks.                        Sharonda Hopkins MD  Psychiatric Hematology and Oncology    4/26/2023          CC:

## 2023-04-27 ENCOUNTER — TELEPHONE (OUTPATIENT)
Dept: ONCOLOGY | Facility: CLINIC | Age: 73
End: 2023-04-27
Payer: MEDICARE

## 2023-04-27 ENCOUNTER — HOSPITAL ENCOUNTER (OUTPATIENT)
Dept: GENERAL RADIOLOGY | Facility: HOSPITAL | Age: 73
Discharge: HOME | End: 2023-04-27
Payer: MEDICARE

## 2023-04-27 DIAGNOSIS — Z17.0 MALIGNANT NEOPLASM OF RIGHT BREAST IN FEMALE, ESTROGEN RECEPTOR POSITIVE, UNSPECIFIED SITE OF BREAST: ICD-10-CM

## 2023-04-27 DIAGNOSIS — Z17.0 MALIGNANT NEOPLASM OF RIGHT BREAST IN FEMALE, ESTROGEN RECEPTOR POSITIVE, UNSPECIFIED SITE OF BREAST: Primary | ICD-10-CM

## 2023-04-27 DIAGNOSIS — C50.911 MALIGNANT NEOPLASM OF RIGHT BREAST IN FEMALE, ESTROGEN RECEPTOR POSITIVE, UNSPECIFIED SITE OF BREAST: Primary | ICD-10-CM

## 2023-04-27 DIAGNOSIS — C50.911 MALIGNANT NEOPLASM OF RIGHT BREAST IN FEMALE, ESTROGEN RECEPTOR POSITIVE, UNSPECIFIED SITE OF BREAST: ICD-10-CM

## 2023-04-27 PROCEDURE — 25510000001 IOPAMIDOL 61 % SOLUTION: Performed by: INTERNAL MEDICINE

## 2023-04-27 PROCEDURE — 36598 INJ W/FLUOR EVAL CV DEVICE: CPT

## 2023-04-27 RX ADMIN — IOPAMIDOL 5 ML: 612 INJECTION, SOLUTION INTRAVENOUS at 13:45

## 2023-04-27 NOTE — TELEPHONE ENCOUNTER
I called patient back and she just finished her portagram and was told by int rad that she needed a new port.  She said that they suggested she get it replaced by interventional radiology as they do it a bit differently than the surgeons.  I talked with Dr Hopkins and she said that we could put an order for int rad to replace the port.  I sent msg to Marii in scheduling.  The port has not worked since patient had it placed April 7.

## 2023-04-27 NOTE — TELEPHONE ENCOUNTER
Caller: Laura Churchill    Relationship: Self    Best call back number: [phone]    What is the best time to reach you: ANY    Who are you requesting to speak with (clinical staff, provider,  specific staff member): CLINICAL    What was the call regarding: WANTING TO SPEAK TO SOMEONE REGARDING HER PORT, ITS IS NOT WORKING CORRECTLY,SHE HAD AN XRAY AND THEY SAID IT NEEDED TO BE REPLACED.  LAURA IS NEEDING TO SPEAK TO SOMEONE TO PUT IN A NEW PORT    Do you require a callback: YES

## 2023-04-28 ENCOUNTER — CLINICAL SUPPORT (OUTPATIENT)
Dept: GENETICS | Facility: HOSPITAL | Age: 73
End: 2023-04-28

## 2023-04-28 DIAGNOSIS — Z80.41 FAMILY HISTORY OF MALIGNANT NEOPLASM OF OVARY: ICD-10-CM

## 2023-04-28 DIAGNOSIS — Z17.0 MALIGNANT NEOPLASM OF RIGHT BREAST IN FEMALE, ESTROGEN RECEPTOR POSITIVE, UNSPECIFIED SITE OF BREAST: ICD-10-CM

## 2023-04-28 DIAGNOSIS — C50.911 MALIGNANT NEOPLASM OF RIGHT BREAST IN FEMALE, ESTROGEN RECEPTOR POSITIVE, UNSPECIFIED SITE OF BREAST: ICD-10-CM

## 2023-04-28 DIAGNOSIS — Z13.79 GENETIC TESTING: Primary | ICD-10-CM

## 2023-04-28 DIAGNOSIS — Z80.42 FAMILY HISTORY OF MALIGNANT NEOPLASM OF PROSTATE: ICD-10-CM

## 2023-04-28 PROCEDURE — 96040: CPT | Performed by: GENETIC COUNSELOR, MS

## 2023-04-28 NOTE — PROGRESS NOTES
Laura Churchill, a 72-year-old female, was seen for genetic counseling due to a personal history of breast cancer. Ms. Churchill was recently diagnosed with a HER2 positive right breast cancer and is undergoing neoadjuvant treatment. Ms. Churchill’s right ovary was removed at age 30; she retains her uterus and left ovary. She does not report a personal history of colon polyps. She was interested in discussing her risk for a hereditary cancer syndrome.  Ms. Churchill was interested in pursuing a multi-gene panel to evaluate her risk, therefore the CancerNext panel was ordered through [employer] which analyzes BRCA1/2 and 34 additional genes associated with an increased cancer risk. Results are expected in 2-3 weeks.    PERTINENT FAMILY HISTORY: (See attached pedigree)   Mat. Aunt:  Ovarian cancer, 70s  Father:   Prostate cancer, 70s  Kidney cancer, 70s    Records regarding the family history were not available for review.     RISK ASSESSMENT:  Ms. Churchill’s personal and family history of cancer led to concern for a hereditary cancer syndrome. We discussed BRCA1/2 testing as well as the option of pursuing a panel that would test for other genes known to impact cancer risk in addition to BRCA1/2.  She meets NCCN criteria for BRCA1/2 testing based on her personal history of breast cancer and family history of ovarian cancer. These risk assessments are based on the family history information provided at the time of the appointment, and could change in the future should new information be obtained.    GENETIC COUNSELING: (30 minutes) We reviewed the family history information in detail. Cases of cancer follow three general patterns: sporadic, familial, and hereditary.  While most cancer is sporadic, some cases appear to occur in family clusters.  These cases are said to be familial and account for 10-20% of cancer cases.  Familial cases may be due to a combination of shared genes and environmental factors among family  members.  In even fewer families, the cancer is said to be inherited, and the genes responsible for the cancer are known.      Family histories typical of hereditary cancer syndromes usually include multiple first- and second-degree relatives diagnosed with cancer types that define a syndrome.  These cases tend to be diagnosed at younger-than-expected ages and can be bilateral or multifocal.  The cancer in these families follows an autosomal dominant inheritance pattern, which indicates the likely presence of a mutation in a cancer susceptibility gene.  Children and siblings of an individual believed to carry this mutation have a 50% chance of inheriting that mutation, thereby inheriting the increased risk to develop cancer.  These mutations can be passed down from the maternal or the paternal lineage.    Hereditary breast cancer accounts for 5-10% of all cases of breast cancer.  A significant proportion of hereditary breast and ovarian cancer can be attributed to mutations in the BRCA1 and BRCA2 genes.  Mutations in these genes confer an increased risk for breast cancer, ovarian cancer, male breast cancer, prostate cancer, and pancreatic cancer. Women with a BRCA1 or BRCA2 mutation who have already been diagnosed with breast cancer have a 40-60% lifetime risk of a second breast cancer. Women with a BRCA1 or BRCA2 mutation have up to a 44% risk of ovarian cancer.     There are other genes that are known to be associated with an increased risk for cancer.  Some of these genes have well defined cancer risks and established management guidelines.  Other genes that can be tested for have been more recently described, and there may be less data regarding the risks and therefore may not have established management guidelines. We reviewed that in some cases, the identification of a genetic mutation may impact treatment options for some types of cancer. We discussed these limitations at length.  Based on Ms. Terrells  desire to get as much information as possible regarding her personal risks and potential risks for her family, she opted to pursue testing through a panel that would look at several other genes known to increase the risk for cancer.    GENETIC TESTING:  The risks, benefits and limitations of genetic testing and implications for clinical management following testing were reviewed.  DNA test results can influence decisions regarding screening, prevention and surgical management.  Genetic testing can have significant psychological implications for both individuals and families.  Also discussed was the possibility of employment and insurance discrimination based on genetic test results and the laws in place to prevent this ([place]AND[place]).    We discussed panel testing, which would involve testing for BRCA1/2 as well as 34 additional genes that are associated with increased cancer risk. The benefits and limitations of genetic testing were discussed and Ms. Churchill decided to pursue testing via the panel. The implications of a positive or negative test result were discussed. We discussed the possibility that, in some cases, genetic test results may be informative or may be ambiguous due to the identification of a genetic variant. These variants may or may not be associated with an increased cancer risk.  With multigene panel testing, it is not uncommon for a variant of uncertain significance (VUS) to be identified.  If a VUS is identified, testing family members is typically not recommended and screening recommendations are made based on the family history.  The laboratories that perform genetic testing work to reclassify the VUS and send out an amended report if and when a VUS is reclassified.  The majority of variant findings are ultimately reclassified to a negative result.  Given her personal and family history, a negative test result would not eliminate all cancer risk to her relatives, although the risk would not be as high  as it would with positive genetic testing.      PLAN: Genetic testing via the CancerNext panel through [email or web address] was ordered. Her blood will be drawn on 5/3/2023 since she is scheduled for labs that day for infusion. Results are expected 2-3 weeks from that date. Ms. Churchill is welcome to contact us in the meantime with any questions she may have at [phone].      Jocelynn Bonilla MS, [facility] [place] [facility]  Licensed Certified Genetic Counselor

## 2023-05-03 ENCOUNTER — DOCUMENTATION (OUTPATIENT)
Dept: ONCOLOGY | Facility: CLINIC | Age: 73
End: 2023-05-03
Payer: MEDICARE

## 2023-05-03 ENCOUNTER — TELEPHONE (OUTPATIENT)
Dept: ONCOLOGY | Facility: CLINIC | Age: 73
End: 2023-05-03
Payer: MEDICARE

## 2023-05-03 ENCOUNTER — HOSPITAL ENCOUNTER (OUTPATIENT)
Dept: ONCOLOGY | Facility: HOSPITAL | Age: 73
Discharge: HOME | End: 2023-05-03
Admitting: INTERNAL MEDICINE
Payer: MEDICARE

## 2023-05-03 VITALS
SYSTOLIC BLOOD PRESSURE: 146 MMHG | RESPIRATION RATE: 18 BRPM | TEMPERATURE: 97 F | HEART RATE: 75 BPM | DIASTOLIC BLOOD PRESSURE: 87 MMHG | BODY MASS INDEX: 35.82 KG/M2 | HEIGHT: 65 IN | WEIGHT: 215 LBS

## 2023-05-03 DIAGNOSIS — C50.911 MALIGNANT NEOPLASM OF RIGHT BREAST IN FEMALE, ESTROGEN RECEPTOR POSITIVE, UNSPECIFIED SITE OF BREAST: Primary | ICD-10-CM

## 2023-05-03 DIAGNOSIS — Z17.0 MALIGNANT NEOPLASM OF RIGHT BREAST IN FEMALE, ESTROGEN RECEPTOR POSITIVE, UNSPECIFIED SITE OF BREAST: Primary | ICD-10-CM

## 2023-05-03 LAB
ALBUMIN SERPL-MCNC: 4.2 G/DL (ref 3.5–5.2)
ALBUMIN/GLOB SERPL: 1.6 G/DL
ALP SERPL-CCNC: 79 U/L (ref 39–117)
ALT SERPL W P-5'-P-CCNC: 8 U/L (ref 1–33)
ANION GAP SERPL CALCULATED.3IONS-SCNC: 9 MMOL/L (ref 5–15)
AST SERPL-CCNC: 14 U/L (ref 1–32)
BASOPHILS # BLD AUTO: 0.02 10*3/MM3 (ref 0–0.2)
BASOPHILS NFR BLD AUTO: 0.4 % (ref 0–1.5)
BILIRUB SERPL-MCNC: 0.3 MG/DL (ref 0–1.2)
BUN SERPL-MCNC: 14 MG/DL (ref 8–23)
BUN/CREAT SERPL: 16.3 (ref 7–25)
CALCIUM SPEC-SCNC: 9.1 MG/DL (ref 8.6–10.5)
CHLORIDE SERPL-SCNC: 106 MMOL/L (ref 98–107)
CO2 SERPL-SCNC: 25 MMOL/L (ref 22–29)
CREAT SERPL-MCNC: 0.86 MG/DL (ref 0.57–1)
DEPRECATED RDW RBC AUTO: 46 FL (ref 37–54)
EGFRCR SERPLBLD CKD-EPI 2021: 71.9 ML/MIN/1.73
EOSINOPHIL # BLD AUTO: 0.06 10*3/MM3 (ref 0–0.4)
EOSINOPHIL NFR BLD AUTO: 1.1 % (ref 0.3–6.2)
ERYTHROCYTE [DISTWIDTH] IN BLOOD BY AUTOMATED COUNT: 13.2 % (ref 12.3–15.4)
GLOBULIN UR ELPH-MCNC: 2.6 GM/DL
GLUCOSE SERPL-MCNC: 93 MG/DL (ref 65–99)
HCT VFR BLD AUTO: 37.8 % (ref 34–46.6)
HGB BLD-MCNC: 12.2 G/DL (ref 12–15.9)
IMM GRANULOCYTES # BLD AUTO: 0.06 10*3/MM3 (ref 0–0.05)
IMM GRANULOCYTES NFR BLD AUTO: 1.1 % (ref 0–0.5)
LYMPHOCYTES # BLD AUTO: 1.63 10*3/MM3 (ref 0.7–3.1)
LYMPHOCYTES NFR BLD AUTO: 30 % (ref 19.6–45.3)
MCH RBC QN AUTO: 30.7 PG (ref 26.6–33)
MCHC RBC AUTO-ENTMCNC: 32.3 G/DL (ref 31.5–35.7)
MCV RBC AUTO: 95.2 FL (ref 79–97)
MONOCYTES # BLD AUTO: 0.43 10*3/MM3 (ref 0.1–0.9)
MONOCYTES NFR BLD AUTO: 7.9 % (ref 5–12)
NEUTROPHILS NFR BLD AUTO: 3.23 10*3/MM3 (ref 1.7–7)
NEUTROPHILS NFR BLD AUTO: 59.5 % (ref 42.7–76)
PLATELET # BLD AUTO: 310 10*3/MM3 (ref 140–450)
PMV BLD AUTO: 9 FL (ref 6–12)
POTASSIUM SERPL-SCNC: 4.2 MMOL/L (ref 3.5–5.2)
PROT SERPL-MCNC: 6.8 G/DL (ref 6–8.5)
RBC # BLD AUTO: 3.97 10*6/MM3 (ref 3.77–5.28)
SODIUM SERPL-SCNC: 140 MMOL/L (ref 136–145)
WBC NRBC COR # BLD: 5.43 10*3/MM3 (ref 3.4–10.8)

## 2023-05-03 PROCEDURE — 96375 TX/PRO/DX INJ NEW DRUG ADDON: CPT

## 2023-05-03 PROCEDURE — 25010000002 PACLITAXEL PER 1 MG: Performed by: INTERNAL MEDICINE

## 2023-05-03 PROCEDURE — 25010000002 DEXAMETHASONE SODIUM PHOSPHATE 100 MG/10ML SOLUTION: Performed by: INTERNAL MEDICINE

## 2023-05-03 PROCEDURE — 96417 CHEMO IV INFUS EACH ADDL SEQ: CPT

## 2023-05-03 PROCEDURE — 25010000002 DIPHENHYDRAMINE PER 50 MG

## 2023-05-03 PROCEDURE — 25010000002 TRASTUZUMAB-QYYP 420 MG RECONSTITUTED SOLUTION 1 EACH VIAL: Performed by: INTERNAL MEDICINE

## 2023-05-03 PROCEDURE — 85025 COMPLETE CBC W/AUTO DIFF WBC: CPT | Performed by: INTERNAL MEDICINE

## 2023-05-03 PROCEDURE — 80053 COMPREHEN METABOLIC PANEL: CPT | Performed by: INTERNAL MEDICINE

## 2023-05-03 PROCEDURE — 96413 CHEMO IV INFUSION 1 HR: CPT

## 2023-05-03 RX ORDER — FAMOTIDINE 10 MG/ML
20 INJECTION, SOLUTION INTRAVENOUS ONCE
Status: COMPLETED | OUTPATIENT
Start: 2023-05-03 | End: 2023-05-03

## 2023-05-03 RX ORDER — SODIUM CHLORIDE 9 MG/ML
250 INJECTION, SOLUTION INTRAVENOUS ONCE
Status: COMPLETED | OUTPATIENT
Start: 2023-05-03 | End: 2023-05-03

## 2023-05-03 RX ADMIN — SODIUM CHLORIDE 190 MG: 9 INJECTION, SOLUTION INTRAVENOUS at 12:14

## 2023-05-03 RX ADMIN — SODIUM CHLORIDE 250 ML: 9 INJECTION, SOLUTION INTRAVENOUS at 12:14

## 2023-05-03 RX ADMIN — DEXAMETHASONE SODIUM PHOSPHATE 12 MG: 10 INJECTION, SOLUTION INTRAMUSCULAR; INTRAVENOUS at 12:55

## 2023-05-03 RX ADMIN — SODIUM CHLORIDE 160 MG: 9 INJECTION, SOLUTION INTRAVENOUS at 13:46

## 2023-05-03 RX ADMIN — DIPHENHYDRAMINE HYDROCHLORIDE 25 MG: 50 INJECTION INTRAMUSCULAR; INTRAVENOUS at 13:13

## 2023-05-03 RX ADMIN — FAMOTIDINE 20 MG: 10 INJECTION INTRAVENOUS at 12:53

## 2023-05-03 NOTE — TELEPHONE ENCOUNTER
I was called by infusion to talk with patient about the abnormal appearance of her stools.  She said that she has had light colored, grayish stool that appears to have lots of mucous.  She said that sometimes she has a bit of abdominal pain when she has a bowel movement.  I talked with Dr Hopkins and she reviewed patient's cmp and it is completely within normal limits.  Dr. Hopkins said we can offer bentyl if patient feels she is having abd. Spasms.  I talked with patient at length and told her that labs are normal and that the color of her stool is generally related to bile but that diet changes and chemo can cause discoloration and extra mucous.  I asked if patient had to strain and she denies straining or spasms.  I told her to continue to monitor and let us know if anything changes or worsens.  She verbalized understanding.

## 2023-05-03 NOTE — PROGRESS NOTES
JESUS met with pt in infusion to provide $20 in [employer] gas cards per request. JESUS also provided pt with Living Will Document as well as Conversations that matter paperwork to look over. Jesus will meet with pt and her  on 2/17 to complete ACP documentation. PT expressed gratitude for the assistance. JESUS encouraged pt to reach out ongoing, to which she was agreeable.

## 2023-05-09 ENCOUNTER — HOSPITAL ENCOUNTER (OUTPATIENT)
Dept: INTERVENTIONAL RADIOLOGY/VASCULAR | Facility: HOSPITAL | Age: 73
Discharge: HOME | End: 2023-05-09
Payer: MEDICARE

## 2023-05-09 VITALS
OXYGEN SATURATION: 97 % | DIASTOLIC BLOOD PRESSURE: 100 MMHG | HEART RATE: 104 BPM | WEIGHT: 212 LBS | BODY MASS INDEX: 36.19 KG/M2 | TEMPERATURE: 97.6 F | RESPIRATION RATE: 16 BRPM | SYSTOLIC BLOOD PRESSURE: 146 MMHG | HEIGHT: 64 IN

## 2023-05-09 DIAGNOSIS — Z17.0 MALIGNANT NEOPLASM OF RIGHT BREAST IN FEMALE, ESTROGEN RECEPTOR POSITIVE, UNSPECIFIED SITE OF BREAST: ICD-10-CM

## 2023-05-09 DIAGNOSIS — C50.911 MALIGNANT NEOPLASM OF RIGHT BREAST IN FEMALE, ESTROGEN RECEPTOR POSITIVE, UNSPECIFIED SITE OF BREAST: ICD-10-CM

## 2023-05-09 LAB
ALBUMIN SERPL-MCNC: 4.3 G/DL (ref 3.5–5.2)
ALBUMIN/GLOB SERPL: 2.3 G/DL
ALP SERPL-CCNC: 80 U/L (ref 39–117)
ALT SERPL W P-5'-P-CCNC: 7 U/L (ref 1–33)
ANION GAP SERPL CALCULATED.3IONS-SCNC: 16 MMOL/L (ref 5–15)
ANION GAP SERPL CALCULATED.3IONS-SCNC: 8 MMOL/L (ref 5–15)
AST SERPL-CCNC: 13 U/L (ref 1–32)
BASOPHILS # BLD AUTO: 0.03 10*3/MM3 (ref 0–0.2)
BASOPHILS NFR BLD AUTO: 0.7 % (ref 0–1.5)
BILIRUB SERPL-MCNC: 0.3 MG/DL (ref 0–1.2)
BUN SERPL-MCNC: 18 MG/DL (ref 8–23)
BUN SERPL-MCNC: 19 MG/DL (ref 8–23)
BUN/CREAT SERPL: 22.5 (ref 7–25)
BUN/CREAT SERPL: 23.2 (ref 7–25)
CALCIUM SPEC-SCNC: 9.4 MG/DL (ref 8.6–10.5)
CALCIUM SPEC-SCNC: 9.7 MG/DL (ref 8.6–10.5)
CHLORIDE SERPL-SCNC: 106 MMOL/L (ref 98–107)
CHLORIDE SERPL-SCNC: 109 MMOL/L (ref 98–107)
CO2 SERPL-SCNC: 20 MMOL/L (ref 22–29)
CO2 SERPL-SCNC: 27 MMOL/L (ref 22–29)
CREAT SERPL-MCNC: 0.8 MG/DL (ref 0.57–1)
CREAT SERPL-MCNC: 0.82 MG/DL (ref 0.57–1)
DEPRECATED RDW RBC AUTO: 46 FL (ref 37–54)
EGFRCR SERPLBLD CKD-EPI 2021: 76.1 ML/MIN/1.73
EGFRCR SERPLBLD CKD-EPI 2021: 78.4 ML/MIN/1.73
EOSINOPHIL # BLD AUTO: 0.04 10*3/MM3 (ref 0–0.4)
EOSINOPHIL NFR BLD AUTO: 0.9 % (ref 0.3–6.2)
ERYTHROCYTE [DISTWIDTH] IN BLOOD BY AUTOMATED COUNT: 13.3 % (ref 12.3–15.4)
GLOBULIN UR ELPH-MCNC: 1.9 GM/DL
GLUCOSE SERPL-MCNC: 102 MG/DL (ref 65–99)
GLUCOSE SERPL-MCNC: 103 MG/DL (ref 65–99)
HCT VFR BLD AUTO: 37.2 % (ref 34–46.6)
HGB BLD-MCNC: 11.9 G/DL (ref 12–15.9)
IMM GRANULOCYTES # BLD AUTO: 0.04 10*3/MM3 (ref 0–0.05)
IMM GRANULOCYTES NFR BLD AUTO: 0.9 % (ref 0–0.5)
INR PPP: 1.01 (ref 0.89–1.12)
LYMPHOCYTES # BLD AUTO: 1.38 10*3/MM3 (ref 0.7–3.1)
LYMPHOCYTES NFR BLD AUTO: 32.4 % (ref 19.6–45.3)
MCH RBC QN AUTO: 30.3 PG (ref 26.6–33)
MCHC RBC AUTO-ENTMCNC: 32 G/DL (ref 31.5–35.7)
MCV RBC AUTO: 94.7 FL (ref 79–97)
MONOCYTES # BLD AUTO: 0.33 10*3/MM3 (ref 0.1–0.9)
MONOCYTES NFR BLD AUTO: 7.7 % (ref 5–12)
NEUTROPHILS NFR BLD AUTO: 2.44 10*3/MM3 (ref 1.7–7)
NEUTROPHILS NFR BLD AUTO: 57.4 % (ref 42.7–76)
NRBC BLD AUTO-RTO: 0 /100 WBC (ref 0–0.2)
PLATELET # BLD AUTO: 272 10*3/MM3 (ref 140–450)
PMV BLD AUTO: 9.2 FL (ref 6–12)
POTASSIUM SERPL-SCNC: 4.7 MMOL/L (ref 3.5–5.2)
POTASSIUM SERPL-SCNC: 4.9 MMOL/L (ref 3.5–5.2)
PROT SERPL-MCNC: 6.2 G/DL (ref 6–8.5)
PROTHROMBIN TIME: 13.4 SECONDS (ref 12.2–14.5)
RBC # BLD AUTO: 3.93 10*6/MM3 (ref 3.77–5.28)
SODIUM SERPL-SCNC: 141 MMOL/L (ref 136–145)
SODIUM SERPL-SCNC: 145 MMOL/L (ref 136–145)
WBC NRBC COR # BLD: 4.26 10*3/MM3 (ref 3.4–10.8)

## 2023-05-09 PROCEDURE — C1788 PORT, INDWELLING, IMP: HCPCS

## 2023-05-09 PROCEDURE — 25010000002 MIDAZOLAM PER 1 MG: Performed by: RADIOLOGY

## 2023-05-09 PROCEDURE — 85610 PROTHROMBIN TIME: CPT | Performed by: RADIOLOGY

## 2023-05-09 PROCEDURE — 85025 COMPLETE CBC W/AUTO DIFF WBC: CPT | Performed by: RADIOLOGY

## 2023-05-09 PROCEDURE — 80053 COMPREHEN METABOLIC PANEL: CPT | Performed by: RADIOLOGY

## 2023-05-09 PROCEDURE — 25510000001 IOPAMIDOL 61 % SOLUTION: Performed by: INTERNAL MEDICINE

## 2023-05-09 PROCEDURE — 99153 MOD SED SAME PHYS/QHP EA: CPT

## 2023-05-09 PROCEDURE — 25010000002 FENTANYL CITRATE (PF) 50 MCG/ML SOLUTION: Performed by: RADIOLOGY

## 2023-05-09 PROCEDURE — 99152 MOD SED SAME PHYS/QHP 5/>YRS: CPT

## 2023-05-09 PROCEDURE — C1769 GUIDE WIRE: HCPCS

## 2023-05-09 RX ORDER — FENTANYL CITRATE 50 UG/ML
INJECTION, SOLUTION INTRAMUSCULAR; INTRAVENOUS AS NEEDED
Status: COMPLETED | OUTPATIENT
Start: 2023-05-09 | End: 2023-05-09

## 2023-05-09 RX ORDER — SODIUM CHLORIDE 0.9 % (FLUSH) 0.9 %
3 SYRINGE (ML) INJECTION EVERY 12 HOURS SCHEDULED
Status: DISCONTINUED | OUTPATIENT
Start: 2023-05-09 | End: 2023-05-10 | Stop reason: HOSPADM

## 2023-05-09 RX ORDER — HEPARIN SODIUM (PORCINE) LOCK FLUSH IV SOLN 100 UNIT/ML 100 UNIT/ML
SOLUTION INTRAVENOUS
Status: DISCONTINUED
Start: 2023-05-09 | End: 2023-05-10 | Stop reason: HOSPADM

## 2023-05-09 RX ORDER — MIDAZOLAM HYDROCHLORIDE 1 MG/ML
INJECTION INTRAMUSCULAR; INTRAVENOUS
Status: DISCONTINUED
Start: 2023-05-09 | End: 2023-05-10 | Stop reason: HOSPADM

## 2023-05-09 RX ORDER — HEPARIN SODIUM 200 [USP'U]/100ML
INJECTION, SOLUTION INTRAVENOUS
Status: DISCONTINUED
Start: 2023-05-09 | End: 2023-05-10 | Stop reason: HOSPADM

## 2023-05-09 RX ORDER — SODIUM CHLORIDE 0.9 % (FLUSH) 0.9 %
10 SYRINGE (ML) INJECTION AS NEEDED
Status: DISCONTINUED | OUTPATIENT
Start: 2023-05-09 | End: 2023-05-10 | Stop reason: HOSPADM

## 2023-05-09 RX ORDER — LIDOCAINE HYDROCHLORIDE AND EPINEPHRINE 10; 10 MG/ML; UG/ML
INJECTION, SOLUTION INFILTRATION; PERINEURAL
Status: COMPLETED
Start: 2023-05-09 | End: 2023-05-09

## 2023-05-09 RX ORDER — MIDAZOLAM HYDROCHLORIDE 1 MG/ML
INJECTION INTRAMUSCULAR; INTRAVENOUS AS NEEDED
Status: COMPLETED | OUTPATIENT
Start: 2023-05-09 | End: 2023-05-09

## 2023-05-09 RX ORDER — FENTANYL CITRATE 50 UG/ML
INJECTION, SOLUTION INTRAMUSCULAR; INTRAVENOUS
Status: DISCONTINUED
Start: 2023-05-09 | End: 2023-05-10 | Stop reason: HOSPADM

## 2023-05-09 RX ADMIN — FENTANYL CITRATE 50 MCG: 50 INJECTION, SOLUTION INTRAMUSCULAR; INTRAVENOUS at 13:43

## 2023-05-09 RX ADMIN — FENTANYL CITRATE 50 MCG: 50 INJECTION, SOLUTION INTRAMUSCULAR; INTRAVENOUS at 13:40

## 2023-05-09 RX ADMIN — MIDAZOLAM HYDROCHLORIDE 1 MG: 1 INJECTION, SOLUTION INTRAMUSCULAR; INTRAVENOUS at 13:40

## 2023-05-09 RX ADMIN — LIDOCAINE HYDROCHLORIDE AND EPINEPHRINE 10 ML: 10; 10 INJECTION, SOLUTION INFILTRATION; PERINEURAL at 14:14

## 2023-05-09 RX ADMIN — MIDAZOLAM HYDROCHLORIDE 0.5 MG: 1 INJECTION, SOLUTION INTRAMUSCULAR; INTRAVENOUS at 13:43

## 2023-05-09 RX ADMIN — IOPAMIDOL 10 ML: 612 INJECTION, SOLUTION INTRAVENOUS at 14:14

## 2023-05-09 NOTE — DISCHARGE INSTRUCTIONS
Avoid any strenuous activities, pulling, tugging, straining or lifting anything over 10 pounds for the next 5-6 days.    You may remove the bandaid (if you have one) tomorrow and shower tomorrow; but you will need to avoid tub baths or any situation where your are submersed in water for the next 4 or 5 days to avoid the risk of infection.     DO NOT DRIVE ON THE DAY OF YOUR PROCEDURE.    If you have any problems or concern please contact your physician's office.      SEEK IMMEDIATE MEDICAL ATTENTION FOR ANY UNCONTROLLED PAIN, DIFFICULTY BREATHING, OR ANY UNUSUALLY BRUISING, BLEEDING OR SWELLING AT OR NEAR SITE.

## 2023-05-09 NOTE — NURSING NOTE
LEFT subclavian port removed. Image guided 8 English Smartport placed to LEFT IJ by Dr Lutz in Interventional Radiology.  Patient tolerated procedure well. Patient given 100 mcg Fentanyl & 1.5 mg Versed with a sedation time of 35 minutes. Report called to nurse.

## 2023-05-09 NOTE — PRE-PROCEDURE NOTE
[facility]   Vascular Interventional Radiology  History & Physicial    Pertinent information including components of history, physical examination, review of systems, lab and imaging data, and impression and plan from this source was also reviewed for the creation of the following pre-procedural note: Progress Notes by Sharonda Hopkins MD (2023 09:15)         Patient Name:Laura Churchill    : 1950    MRN: [id number]    Primary Care Physician: Julius Rivera DO    Referring Physician: Sharonda Hopkins MD     Date of admission: 2023    Subjective     Reason for Consult: Port replacement    History of Present Illness     Laura Churchill is a 72 y.o. female referred to IR as noted above.      Active Hospital Problems:  There are no active hospital problems to display for this patient.      Personal History     Past Medical History:   Diagnosis Date   • Allergic    • Anesthesia     low BP with spinal surgery in , patient was hospitalized a few extra days due to low Bp   • Anxiety    • Atrophic vaginitis    • Bilateral hip pain    • Cancer    • Cholelithiasis not sure   • Depression    • Hypertension    • Low back pain    • Malignant neoplasm of right breast in female, estrogen receptor positive 3/22/2023   • Mixed hyperlipidemia    • Muscle spasm    • Obesity due to excess calories    • MOOKIE (obstructive sleep apnea)     NO CPAP USE   • Osteoarthritis    • PONV (postoperative nausea and vomiting)     preprocedural meds help and are needed   • Tendinitis of right shoulder    • Vertigo        Past Surgical History:   Procedure Laterality Date   • BARIATRIC SURGERY     • CHOLECYSTECTOMY     • COLONOSCOPY     • ENDOSCOPY     • GASTRIC BANDING REMOVAL     • GASTRIC SLEEVE LAPAROSCOPIC     • HIP PINNING Left     3 pins   • INTERVENTIONAL RADIOLOGY PROCEDURE N/A 2020    Procedure: IR myelogram, lumbar;  Surgeon: Jason Rodriguez MD;  Location: [facility] INVASIVE LOCATION;  Service:  Interventional Radiology;  Laterality: N/A;   • JOINT REPLACEMENT  Left shoulder replacement    2017   • LAPAROSCOPIC GASTRIC BANDING     • LAPAROTOMY OOPHERECTOMY Right    • LUMBAR DISCECTOMY FUSION INSTRUMENTATION N/A 11/20/2018    Procedure: L3-4 LUMBAR LAMINECTOMY POSTERIOR LUMBAR INTERBODY FUSION PILF;  Surgeon: David Rider MD;  Location:  [facility] OR;  Service: Neurosurgery   • LUMBAR DISCECTOMY FUSION INSTRUMENTATION N/A 07/21/2020    Procedure: L2-3 PLIF, exploration of L3-4 fusion, L2-4 fusion, L2 laminectomy;  Surgeon: David Rider MD;  Location:  [facility] OR;  Service: Neurosurgery;  Laterality: N/A;   • MA ARTHROPLASTY GLENOHUMERAL JOINT TOTAL SHOULDER Left 07/10/2017    Procedure: LEFT TOTAL SHOULDER ARTHROPLASTY ;  Surgeon: Almas Miller MD;  Location: "[facility] ED OR;  Service: Orthopedics   • SPINE SURGERY  L3 fused to L4,5    2018   • TOTAL KNEE ARTHROPLASTY Left 01/16/2020    Procedure: TOTAL KNEE REPLACEMENT LEFT;  Surgeon: Houston Castano MD;  Location:  [facility] OR;  Service: Orthopedics   • TOTAL KNEE ARTHROPLASTY Right 09/22/2022    Procedure: TOTAL KNEE ARTHROPLASTY RIGHT;  Surgeon: Houston Castano MD;  Location:  [facility] OR;  Service: Orthopedics;  Laterality: Right;   • WISDOM TOOTH EXTRACTION         Family History: Her family history includes Alzheimer's disease in her mother; Arthritis in her father and sister; Bone cancer in her mother; Cancer in her father; Depression in her sister; Diabetes in her father; Heart failure in her father; Hyperlipidemia in her father and sister; Kidney disease in her father; Melanoma in her father; Other in her mother; Ovarian cancer (age of onset: 75) in her maternal aunt; Prostate cancer in her father.     Social History: She  reports that she quit smoking about 45 years ago. Her smoking use included cigarettes. She started smoking about 45 years ago. She has a 0.13 pack-year smoking history. She has never used  "smokeless tobacco. She reports current alcohol use of about 1.0 standard drink per week. She reports that she does not use drugs.    Home Medications:  Vitamin D, atorvastatin, fexofenadine, lidocaine-prilocaine, losartan, ondansetron, venlafaxine XR, and vitamin B-12    Current Medications:  •  sodium chloride  •  sodium chloride     Allergies:  She is allergic to codeine, gabapentin, and sulfa antibiotics.    Review of Systems    IR Procedure pertinent significant findings are mentioned in the PMH and PSH above.    Objective     Visit Vitals  /98   Pulse 73   Temp 97.6 °F (36.4 °C)   Resp 16   Ht 162.6 cm (64\")   Wt 96.2 kg (212 lb)   LMP  (LMP Unknown)   SpO2 96%   BMI 36.39 kg/m²        Physical Exam    A&Ox3.   Able to communicate  No Apparent Distress  Average physique   CVS: Regular rate and rhythm  Respiratory: Non labored breathing. No signs of respiratory compromise.    Result Review      I have personally reviewed the results from the time of this admission to 5/9/2023 12:59 EDT and agree with these findings.  [x]  Laboratory  []  Microbiology  [x]  Radiology  []  EKG/Telemetry   []  Cardiology/Vascular   []  Pathology  []  Old records  []  Other:    Most notable findings include: As noted:    Results from last 7 days   Lab Units 05/09/23  1047 05/03/23  0922   INR  1.01  --    HEMOGLOBIN g/dL 11.9* 12.2   HEMATOCRIT % 37.2 37.8   PLATELETS 10*3/mm3 272 310       Estimated Creatinine Clearance: 69.8 mL/min (by C-G formula based on SCr of 0.82 mg/dL).   Creatinine   Date Value Ref Range Status   05/09/2023 0.82 0.57 - 1.00 mg/dL Final       COVID19   Date Value Ref Range Status   07/19/2020 Not Detected Not Detected - Ref. Range Final        No results found for: PREGTESTUR, PREGSERUM, HCG, HCGQUANT     ASA SCALE ASSESSMENT (applicable ONLY if sedation planned):   3     MALLAMPATI CLASSIFICATION (applicable ONLY if sedation planned):   2    Assessment / Plan     Laura Churchill is a 72 y.o. female " referred to the IR service with above problem.    Plan:   As above.    Notice: The note was created before the performance of the procedure. It might have been left in the pending status and signed off after the procedure. The time stamp on the note may be misleading.    Juice Lutz MD   Vascular Interventional Radiology  05/09/23   12:59 PM EDT

## 2023-05-10 ENCOUNTER — HOSPITAL ENCOUNTER (OUTPATIENT)
Dept: ONCOLOGY | Facility: HOSPITAL | Age: 73
Discharge: HOME | End: 2023-05-10
Payer: MEDICARE

## 2023-05-10 ENCOUNTER — TELEPHONE (OUTPATIENT)
Dept: INFUSION THERAPY | Facility: HOSPITAL | Age: 73
End: 2023-05-10
Payer: MEDICARE

## 2023-05-10 VITALS
TEMPERATURE: 97.7 F | RESPIRATION RATE: 16 BRPM | BODY MASS INDEX: 36.37 KG/M2 | HEIGHT: 64 IN | SYSTOLIC BLOOD PRESSURE: 146 MMHG | WEIGHT: 213 LBS | DIASTOLIC BLOOD PRESSURE: 78 MMHG | HEART RATE: 97 BPM

## 2023-05-10 DIAGNOSIS — Z45.2 ENCOUNTER FOR CARE RELATED TO PORT-A-CATH: Primary | ICD-10-CM

## 2023-05-10 DIAGNOSIS — C50.911 MALIGNANT NEOPLASM OF RIGHT BREAST IN FEMALE, ESTROGEN RECEPTOR POSITIVE, UNSPECIFIED SITE OF BREAST: ICD-10-CM

## 2023-05-10 DIAGNOSIS — Z17.0 MALIGNANT NEOPLASM OF RIGHT BREAST IN FEMALE, ESTROGEN RECEPTOR POSITIVE, UNSPECIFIED SITE OF BREAST: ICD-10-CM

## 2023-05-10 PROCEDURE — 25010000002 DIPHENHYDRAMINE PER 50 MG

## 2023-05-10 PROCEDURE — 96417 CHEMO IV INFUS EACH ADDL SEQ: CPT

## 2023-05-10 PROCEDURE — 25010000002 TRASTUZUMAB-QYYP 420 MG RECONSTITUTED SOLUTION 1 EACH VIAL: Performed by: INTERNAL MEDICINE

## 2023-05-10 PROCEDURE — 96413 CHEMO IV INFUSION 1 HR: CPT

## 2023-05-10 PROCEDURE — 96375 TX/PRO/DX INJ NEW DRUG ADDON: CPT

## 2023-05-10 PROCEDURE — 25010000002 HEPARIN LOCK FLUSH PER 10 UNITS: Performed by: INTERNAL MEDICINE

## 2023-05-10 PROCEDURE — 25010000002 DEXAMETHASONE SODIUM PHOSPHATE 100 MG/10ML SOLUTION: Performed by: INTERNAL MEDICINE

## 2023-05-10 PROCEDURE — 25010000002 PACLITAXEL PER 1 MG: Performed by: INTERNAL MEDICINE

## 2023-05-10 RX ORDER — FAMOTIDINE 10 MG/ML
20 INJECTION, SOLUTION INTRAVENOUS AS NEEDED
Status: DISCONTINUED | OUTPATIENT
Start: 2023-05-10 | End: 2023-05-11 | Stop reason: HOSPADM

## 2023-05-10 RX ORDER — HEPARIN SODIUM (PORCINE) LOCK FLUSH IV SOLN 100 UNIT/ML 100 UNIT/ML
500 SOLUTION INTRAVENOUS AS NEEDED
OUTPATIENT
Start: 2023-05-10

## 2023-05-10 RX ORDER — SODIUM CHLORIDE 9 MG/ML
250 INJECTION, SOLUTION INTRAVENOUS ONCE
Status: COMPLETED | OUTPATIENT
Start: 2023-05-10 | End: 2023-05-10

## 2023-05-10 RX ORDER — DIPHENHYDRAMINE HYDROCHLORIDE 50 MG/ML
50 INJECTION INTRAMUSCULAR; INTRAVENOUS AS NEEDED
Status: DISCONTINUED | OUTPATIENT
Start: 2023-05-10 | End: 2023-05-11 | Stop reason: HOSPADM

## 2023-05-10 RX ORDER — FAMOTIDINE 10 MG/ML
20 INJECTION, SOLUTION INTRAVENOUS ONCE
Status: COMPLETED | OUTPATIENT
Start: 2023-05-10 | End: 2023-05-10

## 2023-05-10 RX ORDER — HEPARIN SODIUM (PORCINE) LOCK FLUSH IV SOLN 100 UNIT/ML 100 UNIT/ML
500 SOLUTION INTRAVENOUS AS NEEDED
Status: DISCONTINUED | OUTPATIENT
Start: 2023-05-10 | End: 2023-05-11 | Stop reason: HOSPADM

## 2023-05-10 RX ORDER — SODIUM CHLORIDE 0.9 % (FLUSH) 0.9 %
10 SYRINGE (ML) INJECTION AS NEEDED
OUTPATIENT
Start: 2023-05-10

## 2023-05-10 RX ADMIN — DEXAMETHASONE SODIUM PHOSPHATE 12 MG: 10 INJECTION, SOLUTION INTRAMUSCULAR; INTRAVENOUS at 11:17

## 2023-05-10 RX ADMIN — HEPARIN 500 UNITS: 100 SYRINGE at 13:10

## 2023-05-10 RX ADMIN — FAMOTIDINE 20 MG: 10 INJECTION INTRAVENOUS at 11:17

## 2023-05-10 RX ADMIN — PACLITAXEL 160 MG: 6 INJECTION, SOLUTION INTRAVENOUS at 12:02

## 2023-05-10 RX ADMIN — SODIUM CHLORIDE 250 ML: 9 INJECTION, SOLUTION INTRAVENOUS at 10:41

## 2023-05-10 RX ADMIN — DIPHENHYDRAMINE HYDROCHLORIDE 25 MG: 50 INJECTION INTRAMUSCULAR; INTRAVENOUS at 11:17

## 2023-05-10 RX ADMIN — SODIUM CHLORIDE 190 MG: 9 INJECTION, SOLUTION INTRAVENOUS at 10:42

## 2023-05-15 ENCOUNTER — TELEPHONE (OUTPATIENT)
Dept: GENETICS | Facility: HOSPITAL | Age: 73
End: 2023-05-15
Payer: MEDICARE

## 2023-05-15 ENCOUNTER — DOCUMENTATION (OUTPATIENT)
Dept: GENETICS | Facility: HOSPITAL | Age: 73
End: 2023-05-15
Payer: MEDICARE

## 2023-05-15 ENCOUNTER — TELEPHONE (OUTPATIENT)
Dept: ONCOLOGY | Facility: CLINIC | Age: 73
End: 2023-05-15
Payer: MEDICARE

## 2023-05-15 NOTE — TELEPHONE ENCOUNTER
I called patient back and per Dr. Hopkins's last office note, a followup scan will be ordered at this appt.  I told patient that REGINO Darling will be ordering it after she evaluates patient Wednesday.  Patient verbalized understanding.

## 2023-05-15 NOTE — PROGRESS NOTES
Laura Churchill, a 72-year-old female, was seen for genetic counseling due to a personal history of breast cancer. Ms. Churchill was recently diagnosed with a HER2 positive right breast cancer and is undergoing neoadjuvant treatment. Ms. Churchill’s right ovary was removed at age 30; she retains her uterus and left ovary. She does not report a personal history of colon polyps. She was interested in discussing her risk for a hereditary cancer syndrome.  Ms. Churchill was interested in pursuing a multi-gene panel to evaluate her risk, therefore the CancerNext panel was ordered through [employer] which analyzes BRCA1/2 and 34 additional genes associated with an increased cancer risk. The genes on this panel include APC, NITA, AXIN2, BARD1, BMPR1A, BRCA1, BRCA2, BRIP1, CDH1, CDK4, CDKN2A, CHEK2, DICER1, EPCAM, GREM1, HOXB13, MLH1, MSH2, MSH3, MSH6, MUTYH, NBN, NF1, NTHL1, PALB2, PMS2, POLD1, POLE, PTEN, RAD51C, RAD51D, RECQL, SMAD4, SMARCA4, STK11, and TP53. Genetic testing was negative for pathogenic mutations in BRCA1/2 and 34 additional genes on the CancerNext panel.  These normal results were discussed with Ms. Churchill by telephone on 5/15/2023.    PERTINENT FAMILY HISTORY: (See attached pedigree)   Mat. Aunt:  Ovarian cancer, 70s  Father:   Prostate cancer, 70s  Kidney cancer, 70s    Records regarding the family history were not available for review.     RISK ASSESSMENT:  Ms. Churchill’s personal and family history of cancer led to concern for a hereditary cancer syndrome. We discussed BRCA1/2 testing as well as the option of pursuing a panel that would test for other genes known to impact cancer risk in addition to BRCA1/2.  She meets NCCN criteria for BRCA1/2 testing based on her personal history of breast cancer and family history of ovarian cancer. These risk assessments are based on the family history information provided at the time of the appointment, and could change in the future should new information be  obtained.    GENETIC COUNSELING: We reviewed the family history information in detail. Cases of cancer follow three general patterns: sporadic, familial, and hereditary.  While most cancer is sporadic, some cases appear to occur in family clusters.  These cases are said to be familial and account for 10-20% of cancer cases.  Familial cases may be due to a combination of shared genes and environmental factors among family members.  In even fewer families, the cancer is said to be inherited, and the genes responsible for the cancer are known.      Family histories typical of hereditary cancer syndromes usually include multiple first- and second-degree relatives diagnosed with cancer types that define a syndrome.  These cases tend to be diagnosed at younger-than-expected ages and can be bilateral or multifocal.  The cancer in these families follows an autosomal dominant inheritance pattern, which indicates the likely presence of a mutation in a cancer susceptibility gene.  Children and siblings of an individual believed to carry this mutation have a 50% chance of inheriting that mutation, thereby inheriting the increased risk to develop cancer.  These mutations can be passed down from the maternal or the paternal lineage.    Hereditary breast cancer accounts for 5-10% of all cases of breast cancer.  A significant proportion of hereditary breast and ovarian cancer can be attributed to mutations in the BRCA1 and BRCA2 genes.  Mutations in these genes confer an increased risk for breast cancer, ovarian cancer, male breast cancer, prostate cancer, and pancreatic cancer. Women with a BRCA1 or BRCA2 mutation who have already been diagnosed with breast cancer have a 40-60% lifetime risk of a second breast cancer. Women with a BRCA1 or BRCA2 mutation have up to a 44% risk of ovarian cancer.     There are other genes that are known to be associated with an increased risk for cancer.  Some of these genes have well defined cancer  risks and established management guidelines.  Other genes that can be tested for have been more recently described, and there may be less data regarding the risks and therefore may not have established management guidelines. We reviewed that in some cases, the identification of a genetic mutation may impact treatment options for some types of cancer. We discussed these limitations at length.  Based on Ms. Churchill’s desire to get as much information as possible regarding her personal risks and potential risks for her family, she opted to pursue testing through a panel that would look at several other genes known to increase the risk for cancer.    GENETIC TESTING:  The risks, benefits and limitations of genetic testing and implications for clinical management following testing were reviewed.  DNA test results can influence decisions regarding screening, prevention and surgical management.  Genetic testing can have significant psychological implications for both individuals and families.  Also discussed was the possibility of employment and insurance discrimination based on genetic test results and the laws in place to prevent this ([place]AND[place]).    We discussed panel testing, which would involve testing for BRCA1/2 as well as 34 additional genes that are associated with increased cancer risk. The benefits and limitations of genetic testing were discussed and Ms. Churchill decided to pursue testing via the panel. The implications of a positive or negative test result were discussed. We discussed the possibility that, in some cases, genetic test results may be informative or may be ambiguous due to the identification of a genetic variant. These variants may or may not be associated with an increased cancer risk.  With multigene panel testing, it is not uncommon for a variant of uncertain significance (VUS) to be identified.  If a VUS is identified, testing family members is typically not recommended and screening  recommendations are made based on the family history.  The laboratories that perform genetic testing work to reclassify the VUS and send out an amended report if and when a VUS is reclassified.  The majority of variant findings are ultimately reclassified to a negative result.  Given her personal and family history, a negative test result would not eliminate all cancer risk to her relatives, although the risk would not be as high as it would with positive genetic testing.      TEST RESULTS:  Genetic testing was negative for known pathogenic mutations by sequencing, rearrangement testing, and RNA analysis for the genes on the CancerNext panel (see attached results). This negative result greatly lowers but does not eliminate the risk of a hereditary cancer syndrome for Ms. Churchill. This assessment is based on the information provided at time of consultation.    CANCER SCREENING: Ms. Churchill’s surveillance and management should be determined by her oncology team. Despite the negative genetic test results, Ms. Churchill’s female relatives may have a somewhat increased lifetime risk for breast cancer based on family history. Female relatives could have a risk assessment performed using a family history-based model, such as the Tyrer-Cuzick model, to determine their individual risks.      PLAN: Genetic counseling remains available to Ms. Churchill. She is welcome to contact us with any questions or concerns at [phone].      Jocelynn Bonilla MS, [facility] [place], [facility]  Licensed Certified Genetic Counselor       Cc: MD Sharonda Aguila MD

## 2023-05-15 NOTE — TELEPHONE ENCOUNTER
Caller: Laura Churchill    Relationship: Self    Best call back number:[phone]    What is the best time to reach you: ASAP    Who are you requesting to speak with (clinical staff, provider,  specific staff member): CLINICAL        What was the call regarding: PT WAS TOLD AFTER 6 TREATMENTS SHE WOULD HAVE A SCAN TO CHECK THE GROWTH OR NON-GROWTH, HASN'T HEARD ABOUT THAT YET, AND THIS WEEK WILL BE HER 6TH TREATMENT.    Do you require a callback: YES PLEASE CALL PT BACK TO ADVISE IF THE SCAN WILL BE SCHEDULED SOON, OR IF IT WILL NOT BE SCHEDULED UNTIL AFTER HER TREATMENT THIS WEEK.

## 2023-05-17 ENCOUNTER — HOSPITAL ENCOUNTER (OUTPATIENT)
Dept: ONCOLOGY | Facility: HOSPITAL | Age: 73
Discharge: HOME | End: 2023-05-17
Admitting: INTERNAL MEDICINE
Payer: MEDICARE

## 2023-05-17 ENCOUNTER — OFFICE VISIT (OUTPATIENT)
Dept: ONCOLOGY | Facility: CLINIC | Age: 73
End: 2023-05-17
Payer: MEDICARE

## 2023-05-17 VITALS
HEIGHT: 64 IN | SYSTOLIC BLOOD PRESSURE: 157 MMHG | TEMPERATURE: 98 F | DIASTOLIC BLOOD PRESSURE: 65 MMHG | RESPIRATION RATE: 18 BRPM | WEIGHT: 213 LBS | BODY MASS INDEX: 36.37 KG/M2 | HEART RATE: 89 BPM | OXYGEN SATURATION: 99 %

## 2023-05-17 VITALS — DIASTOLIC BLOOD PRESSURE: 88 MMHG | SYSTOLIC BLOOD PRESSURE: 147 MMHG | HEART RATE: 76 BPM

## 2023-05-17 DIAGNOSIS — C50.911 MALIGNANT NEOPLASM OF RIGHT BREAST IN FEMALE, ESTROGEN RECEPTOR POSITIVE, UNSPECIFIED SITE OF BREAST: Primary | ICD-10-CM

## 2023-05-17 DIAGNOSIS — Z17.0 MALIGNANT NEOPLASM OF RIGHT BREAST IN FEMALE, ESTROGEN RECEPTOR POSITIVE, UNSPECIFIED SITE OF BREAST: Primary | ICD-10-CM

## 2023-05-17 DIAGNOSIS — R92.8 ABNORMAL MAMMOGRAM: ICD-10-CM

## 2023-05-17 DIAGNOSIS — Z45.2 ENCOUNTER FOR CARE RELATED TO PORT-A-CATH: ICD-10-CM

## 2023-05-17 LAB
ALBUMIN SERPL-MCNC: 4.2 G/DL (ref 3.5–5.2)
ALBUMIN/GLOB SERPL: 1.8 G/DL
ALP SERPL-CCNC: 76 U/L (ref 39–117)
ALT SERPL W P-5'-P-CCNC: 7 U/L (ref 1–33)
ANION GAP SERPL CALCULATED.3IONS-SCNC: 8 MMOL/L (ref 5–15)
AST SERPL-CCNC: 15 U/L (ref 1–32)
BASOPHILS # BLD AUTO: 0.02 10*3/MM3 (ref 0–0.2)
BASOPHILS NFR BLD AUTO: 0.4 % (ref 0–1.5)
BILIRUB SERPL-MCNC: 0.4 MG/DL (ref 0–1.2)
BUN SERPL-MCNC: 15 MG/DL (ref 8–23)
BUN/CREAT SERPL: 17.6 (ref 7–25)
CALCIUM SPEC-SCNC: 9.1 MG/DL (ref 8.6–10.5)
CHLORIDE SERPL-SCNC: 107 MMOL/L (ref 98–107)
CO2 SERPL-SCNC: 26 MMOL/L (ref 22–29)
CREAT SERPL-MCNC: 0.85 MG/DL (ref 0.57–1)
DEPRECATED RDW RBC AUTO: 49.4 FL (ref 37–54)
EGFRCR SERPLBLD CKD-EPI 2021: 72.9 ML/MIN/1.73
EOSINOPHIL # BLD AUTO: 0.08 10*3/MM3 (ref 0–0.4)
EOSINOPHIL NFR BLD AUTO: 1.5 % (ref 0.3–6.2)
ERYTHROCYTE [DISTWIDTH] IN BLOOD BY AUTOMATED COUNT: 13.7 % (ref 12.3–15.4)
GLOBULIN UR ELPH-MCNC: 2.4 GM/DL
GLUCOSE SERPL-MCNC: 128 MG/DL (ref 65–99)
HCT VFR BLD AUTO: 37 % (ref 34–46.6)
HGB BLD-MCNC: 11.8 G/DL (ref 12–15.9)
IMM GRANULOCYTES # BLD AUTO: 0.03 10*3/MM3 (ref 0–0.05)
IMM GRANULOCYTES NFR BLD AUTO: 0.5 % (ref 0–0.5)
LYMPHOCYTES # BLD AUTO: 1.39 10*3/MM3 (ref 0.7–3.1)
LYMPHOCYTES NFR BLD AUTO: 25.3 % (ref 19.6–45.3)
MCH RBC QN AUTO: 31 PG (ref 26.6–33)
MCHC RBC AUTO-ENTMCNC: 31.9 G/DL (ref 31.5–35.7)
MCV RBC AUTO: 97.1 FL (ref 79–97)
MONOCYTES # BLD AUTO: 0.33 10*3/MM3 (ref 0.1–0.9)
MONOCYTES NFR BLD AUTO: 6 % (ref 5–12)
NEUTROPHILS NFR BLD AUTO: 3.65 10*3/MM3 (ref 1.7–7)
NEUTROPHILS NFR BLD AUTO: 66.3 % (ref 42.7–76)
PLATELET # BLD AUTO: 266 10*3/MM3 (ref 140–450)
PMV BLD AUTO: 9.5 FL (ref 6–12)
POTASSIUM SERPL-SCNC: 4 MMOL/L (ref 3.5–5.2)
PROT SERPL-MCNC: 6.6 G/DL (ref 6–8.5)
RBC # BLD AUTO: 3.81 10*6/MM3 (ref 3.77–5.28)
SODIUM SERPL-SCNC: 141 MMOL/L (ref 136–145)
WBC NRBC COR # BLD: 5.5 10*3/MM3 (ref 3.4–10.8)

## 2023-05-17 PROCEDURE — 99213 OFFICE O/P EST LOW 20 MIN: CPT | Performed by: NURSE PRACTITIONER

## 2023-05-17 PROCEDURE — 25010000002 HEPARIN LOCK FLUSH PER 10 UNITS: Performed by: INTERNAL MEDICINE

## 2023-05-17 PROCEDURE — 96375 TX/PRO/DX INJ NEW DRUG ADDON: CPT

## 2023-05-17 PROCEDURE — 3078F DIAST BP <80 MM HG: CPT | Performed by: NURSE PRACTITIONER

## 2023-05-17 PROCEDURE — 25010000002 DEXAMETHASONE SODIUM PHOSPHATE 100 MG/10ML SOLUTION: Performed by: INTERNAL MEDICINE

## 2023-05-17 PROCEDURE — 1126F AMNT PAIN NOTED NONE PRSNT: CPT | Performed by: NURSE PRACTITIONER

## 2023-05-17 PROCEDURE — 96413 CHEMO IV INFUSION 1 HR: CPT

## 2023-05-17 PROCEDURE — 85025 COMPLETE CBC W/AUTO DIFF WBC: CPT | Performed by: INTERNAL MEDICINE

## 2023-05-17 PROCEDURE — 96417 CHEMO IV INFUS EACH ADDL SEQ: CPT

## 2023-05-17 PROCEDURE — 25010000002 DIPHENHYDRAMINE PER 50 MG

## 2023-05-17 PROCEDURE — 25010000002 PACLITAXEL PER 1 MG: Performed by: INTERNAL MEDICINE

## 2023-05-17 PROCEDURE — 3077F SYST BP >= 140 MM HG: CPT | Performed by: NURSE PRACTITIONER

## 2023-05-17 PROCEDURE — 25010000002 TRASTUZUMAB-QYYP 420 MG RECONSTITUTED SOLUTION 1 EACH VIAL: Performed by: INTERNAL MEDICINE

## 2023-05-17 PROCEDURE — 80053 COMPREHEN METABOLIC PANEL: CPT | Performed by: INTERNAL MEDICINE

## 2023-05-17 RX ORDER — HEPARIN SODIUM (PORCINE) LOCK FLUSH IV SOLN 100 UNIT/ML 100 UNIT/ML
500 SOLUTION INTRAVENOUS AS NEEDED
OUTPATIENT
Start: 2023-05-17

## 2023-05-17 RX ORDER — HEPARIN SODIUM (PORCINE) LOCK FLUSH IV SOLN 100 UNIT/ML 100 UNIT/ML
500 SOLUTION INTRAVENOUS AS NEEDED
Status: DISCONTINUED | OUTPATIENT
Start: 2023-05-17 | End: 2023-05-18 | Stop reason: HOSPADM

## 2023-05-17 RX ORDER — SODIUM CHLORIDE 0.9 % (FLUSH) 0.9 %
10 SYRINGE (ML) INJECTION AS NEEDED
OUTPATIENT
Start: 2023-05-17

## 2023-05-17 RX ORDER — FAMOTIDINE 10 MG/ML
20 INJECTION, SOLUTION INTRAVENOUS ONCE
Status: COMPLETED | OUTPATIENT
Start: 2023-05-17 | End: 2023-05-17

## 2023-05-17 RX ORDER — ALPRAZOLAM 0.5 MG/1
0.5 TABLET ORAL 3 TIMES DAILY PRN
Qty: 30 TABLET | Refills: 0 | Status: SHIPPED | OUTPATIENT
Start: 2023-05-17

## 2023-05-17 RX ORDER — SODIUM CHLORIDE 9 MG/ML
250 INJECTION, SOLUTION INTRAVENOUS ONCE
Status: COMPLETED | OUTPATIENT
Start: 2023-05-17 | End: 2023-05-17

## 2023-05-17 RX ADMIN — HEPARIN 500 UNITS: 100 SYRINGE at 14:24

## 2023-05-17 RX ADMIN — SODIUM CHLORIDE 250 ML: 9 INJECTION, SOLUTION INTRAVENOUS at 11:40

## 2023-05-17 RX ADMIN — DIPHENHYDRAMINE HYDROCHLORIDE 25 MG: 50 INJECTION INTRAMUSCULAR; INTRAVENOUS at 12:24

## 2023-05-17 RX ADMIN — DEXAMETHASONE SODIUM PHOSPHATE 12 MG: 10 INJECTION, SOLUTION INTRAMUSCULAR; INTRAVENOUS at 12:19

## 2023-05-17 RX ADMIN — SODIUM CHLORIDE 160 MG: 9 INJECTION, SOLUTION INTRAVENOUS at 13:20

## 2023-05-17 RX ADMIN — SODIUM CHLORIDE 190 MG: 9 INJECTION, SOLUTION INTRAVENOUS at 11:46

## 2023-05-17 RX ADMIN — FAMOTIDINE 20 MG: 10 INJECTION INTRAVENOUS at 12:19

## 2023-05-17 NOTE — PROGRESS NOTES
"      PROBLEM LIST:  1. [id number] ER+ (50%) NC negative Her2 positive (3+) Invasive ductal carcinoma of the right breast  A) biopsy of a 1.2 cm mass in the lower outer quadrant on 3/14/23 showed a high grade IDC.  No concerning lymph nodes on imaging.  B) neoadjuvant chemotherapy with Taxol and Herceptin started on 4/12/2023  2. Hypertension  3. Depression  4. MOOKIE  5. Hyperlipidemia    Subjective     CHIEF COMPLAINT: breast cancer    HISTORY OF PRESENT ILLNESS:   Laura Churchill returns for follow-up.  She continues on Taxol and Herceptin.  She is tolerating treatment relatively well.  She does have some increased fatigue.  She has some very mild nausea that is controlled with Zofran.  She has noticed some mild intermittent tingling in her hands.  No numbness or tingling in her feet.    She has had some increased anxiety which is causing her difficulty falling asleep.    She had her old port removed and a new port placed on 5/9/2023.      Objective      /65   Pulse 89   Temp 98 °F (36.7 °C)   Resp 18   Ht 162.6 cm (64\")   Wt 96.6 kg (213 lb)   LMP  (LMP Unknown)   SpO2 99%   BMI 36.56 kg/m²    Vitals:    05/17/23 0916   PainSc: 0-No pain               ECOG score: 1             General: well appearing female in no acute distress  Neuro: alert and oriented  HEENT: sclera anicteric, oropharynx clear  Lymphatics: no cervical, supraclavicular, or axillary adenopathy  Breast: In the right breast at about 9:00 3 cm from the nipple there is a deep less than 1 cm movable mass, indistinct  Cardiovascular: regular rate and rhythm, no murmurs  Lungs: clear to auscultation bilaterally  Abdomen: soft, nontender, nondistended.  No palpable organomegaly  Extremeties: no lower extremity edema  Skin: no rashes, lesions, bruising, or petechiae  Psych: mood and affect appropriate            RECENT LABS:  Lab Results   Component Value Date    WBC 5.50 05/17/2023    HGB 11.8 (L) 05/17/2023    HCT 37.0 05/17/2023    MCV " 97.1 (H) 05/17/2023     05/17/2023       Lab Results   Component Value Date    GLUCOSE 128 (H) 05/17/2023    BUN 15 05/17/2023    CREATININE 0.85 05/17/2023    EGFRIFNONA 66 09/28/2021    BCR 17.6 05/17/2023    K 4.0 05/17/2023    CO2 26.0 05/17/2023    CALCIUM 9.1 05/17/2023    ALBUMIN 4.2 05/17/2023    AST 15 05/17/2023    ALT 7 05/17/2023       IR Removal Tunnel CV With Port Different Site  Narrative: IR REMOVAL TUNNEL CV W PORT DIFFERENT SITE, IR PORT PLACEMENT                                                      History: A previously placed left subclavian Port-A-Cath as never functioned. Request was made to place a new port. The patient has right breast cancer and would prefer the port to be placed at the same site.       : Juice Lutz MD.                                                                                Modality: Sonography and fluoroscopy                   DOSE REDUCTION: The examination was performed according to departmental dose-optimization program which includes automated exposure control, adjustment of the mA and/or kV.     Fluoro time: 2.9 minutes.    Radiation dose: 25.6 mGy air Kerma.      Sedation: SEDATION: Moderate sedation was administered. 1.5 milligram of Versed and 100 micrograms of fentanyl IV was used for moderate sedation. Total intra service time of sedation was 26 minutes. The sedation was administered and the patient's vital   signs monitored throughout the procedure and recorded in the patient's medical record by the nurse under my direct supervision.                                                                                 Anesthesia: Lidocaine with epinephrine local infiltration.           Medicines: None          Estimated blood loss:  < 5 cc.             Technique:   A thorough discussion of the risks, benefits, and alternatives of the procedure, and if applicable, moderate sedation, was carried out with the patient. They were encouraged  to ask any questions. Any questions were answered. They verbalized   understanding. A written informed consent was then signed.    A multi-component timeout was performed prior to starting the procedure using the departmental protocol.     The procedure room personnel used personal protective equipment. The operators used sterile gloves and if indicated, sterile gowns. The surgical site was prepped with chlorhexidine gluconate  and draped in the maximal applicable sterile fashion.    A preliminary ultrasonogram was performed of the neck that revealed a patent and compressible internal jugular vein. Pertinent ultrasound images were stored in the PACS for documentation.    A  radiograph was obtained.    Local anesthetic was infiltrated at the expected left internal jugular venous access site, along the new port tunnel and along the existing port reservoir pocket.    The pocket was incised over the previous scar and the existing port removed along with the catheter in its entirety. Hemostasis in the subclavian vein was obtained by manual compression.    Using aseptic precautions, real-time ultrasound guidance, the internal jugular vein was accessed after local anesthetic infiltration and dermatotomy with a micropuncture needle. A 018 guidewire was advanced into the central venous system under   fluoroscopic guidance. The wire would not go beyond the left innominate vein. Over the wire a 3 Georgian micropuncture dilator was placed. There was excellent return of venous blood. Through the dilator contrast injection was performed for performance of   central venography. It showed a small caliber vein with to and fro blood flow. Over the wire, a micropuncture sheath was placed. Through the micropuncture sheath, a 035 glide wire was advanced successfully across the left innominate vein into the SVC and   right atrium and finally into the IVC. Over the wire a peel-away sheath was placed.    The port catheter was advanced  through the peel-away sheath into the central venous system. The catheter was then tunneled from the venotomy site to the port reservoir wire pocket. The catheter tip was positioned in the cavoatrial junction and the   external length of the catheter was trimmed. The catheter was connected to the port reservoir wire using the standard technique. The the reservoir was placed in the reservoir pocket.     The port aspirated and flushed well and was terminally packed with heparin 100 units per cc, total 500 units.    The port reservoir was irrigated with saline. The incision was closed in layers using absorbable suture and Dermabond. The venotomy site was closed using Dermabond. An aseptic dressing was applied using the protocol for Dermabond.    The patient was transferred to the recovery area and was discharged from the department in stable condition.                        Complications: None immediate.        Findings: Patent and compressible internal jugular vein. The  radiograph shows the previous subclavian route catheter to have an acute kink in its course and with the tip in the left innominate vein. Final image shows the new left IJ route xiao   catheter to be in good position with the catheter tip at the cavoatrial junction/right atrium, an excellent position for use. There is no immediate complication.                                                              Impression: Impression:      Successful ultrasound and fluoroscopic guided left internal jugular vein route single lumen Port-A-Cath placement as described above, using a new venous access, a new catheter tunnel, and modification of the existing reservoir pocket.    Successful removal of an existing nonfunctional left subclavian Port-A-Cath.    Central venography showing possibly stenotic left innominate vein.    Thank you for the opportunity to assist in the care of your patient.    Electronically Signed: Juice Lutz    5/11/2023 5:17 PM EDT     Workstation ID: [id number]  IR Port Placement  Narrative: IR REMOVAL TUNNEL CV W PORT DIFFERENT SITE, IR PORT PLACEMENT                                                      History: A previously placed left subclavian Port-A-Cath as never functioned. Request was made to place a new port. The patient has right breast cancer and would prefer the port to be placed at the same site.       : Juice Lutz MD.                                                                                Modality: Sonography and fluoroscopy                   DOSE REDUCTION: The examination was performed according to departmental dose-optimization program which includes automated exposure control, adjustment of the mA and/or kV.     Fluoro time: 2.9 minutes.    Radiation dose: 25.6 mGy air Kerma.      Sedation: SEDATION: Moderate sedation was administered. 1.5 milligram of Versed and 100 micrograms of fentanyl IV was used for moderate sedation. Total intra service time of sedation was 26 minutes. The sedation was administered and the patient's vital   signs monitored throughout the procedure and recorded in the patient's medical record by the nurse under my direct supervision.                                                                                 Anesthesia: Lidocaine with epinephrine local infiltration.           Medicines: None          Estimated blood loss:  < 5 cc.             Technique:   A thorough discussion of the risks, benefits, and alternatives of the procedure, and if applicable, moderate sedation, was carried out with the patient. They were encouraged to ask any questions. Any questions were answered. They verbalized   understanding. A written informed consent was then signed.    A multi-component timeout was performed prior to starting the procedure using the departmental protocol.     The procedure room personnel used personal protective equipment. The operators used sterile gloves and if  indicated, sterile gowns. The surgical site was prepped with chlorhexidine gluconate  and draped in the maximal applicable sterile fashion.    A preliminary ultrasonogram was performed of the neck that revealed a patent and compressible internal jugular vein. Pertinent ultrasound images were stored in the PACS for documentation.    A  radiograph was obtained.    Local anesthetic was infiltrated at the expected left internal jugular venous access site, along the new port tunnel and along the existing port reservoir pocket.    The pocket was incised over the previous scar and the existing port removed along with the catheter in its entirety. Hemostasis in the subclavian vein was obtained by manual compression.    Using aseptic precautions, real-time ultrasound guidance, the internal jugular vein was accessed after local anesthetic infiltration and dermatotomy with a micropuncture needle. A 018 guidewire was advanced into the central venous system under   fluoroscopic guidance. The wire would not go beyond the left innominate vein. Over the wire a 3 Czech micropuncture dilator was placed. There was excellent return of venous blood. Through the dilator contrast injection was performed for performance of   central venography. It showed a small caliber vein with to and fro blood flow. Over the wire, a micropuncture sheath was placed. Through the micropuncture sheath, a 035 glide wire was advanced successfully across the left innominate vein into the SVC and   right atrium and finally into the IVC. Over the wire a peel-away sheath was placed.    The port catheter was advanced through the peel-away sheath into the central venous system. The catheter was then tunneled from the venotomy site to the port reservoir wire pocket. The catheter tip was positioned in the cavoatrial junction and the   external length of the catheter was trimmed. The catheter was connected to the port reservoir wire using the standard technique.  The the reservoir was placed in the reservoir pocket.     The port aspirated and flushed well and was terminally packed with heparin 100 units per cc, total 500 units.    The port reservoir was irrigated with saline. The incision was closed in layers using absorbable suture and Dermabond. The venotomy site was closed using Dermabond. An aseptic dressing was applied using the protocol for Dermabond.    The patient was transferred to the recovery area and was discharged from the department in stable condition.                        Complications: None immediate.        Findings: Patent and compressible internal jugular vein. The  radiograph shows the previous subclavian route catheter to have an acute kink in its course and with the tip in the left innominate vein. Final image shows the new left IJ route xiao   catheter to be in good position with the catheter tip at the cavoatrial junction/right atrium, an excellent position for use. There is no immediate complication.                                                              Impression: Impression:      Successful ultrasound and fluoroscopic guided left internal jugular vein route single lumen Port-A-Cath placement as described above, using a new venous access, a new catheter tunnel, and modification of the existing reservoir pocket.    Successful removal of an existing nonfunctional left subclavian Port-A-Cath.    Central venography showing possibly stenotic left innominate vein.    Thank you for the opportunity to assist in the care of your patient.    Electronically Signed: Juice Lutz    5/11/2023 5:17 PM EDT    Workstation ID: [id number]              ASSESSMENT AND PLAN:     Laura Churchill is a 72 y.o. female with a [id number] Er+ Her2+ invasive ductal carcinoma of the right breast.    She continues on neoadjuvant chemotherapy with Taxol and Herceptin.  She is due for cycle 2-day 15 today.  So far she is tolerating treatment relatively well.  She has  some mild intermittent peripheral neuropathy that we will continue to monitor.  Because her tumor is deep and difficult to palpate, we will plan to repeat mammogram and ultrasound prior to return to evaluate response to treatment.    Following neoadjuvant chemotherapy she will be a candidate for radiation if she has a lumpectomy, as well as endocrine therapy.  We will plan to continue postoperative HER2 targeted therapy with either Herceptin or Kadcyla based on her pathology results.    Anxiety related to breast cancer and treatment: I will prescribe her Xanax 0.5 mg to be used at bedtime as needed.  I did explain we would not continue this following treatment.    Follow-up in 3 weeks.                        Joanna Campos, REGINO  [facility]    5/17/2023          CC:

## 2023-05-22 ENCOUNTER — HOSPITAL ENCOUNTER (OUTPATIENT)
Dept: MAMMOGRAPHY | Facility: HOSPITAL | Age: 73
Discharge: HOME | End: 2023-05-22
Payer: MEDICARE

## 2023-05-22 ENCOUNTER — HOSPITAL ENCOUNTER (OUTPATIENT)
Dept: ULTRASOUND IMAGING | Facility: HOSPITAL | Age: 73
Discharge: HOME | End: 2023-05-22
Payer: MEDICARE

## 2023-05-22 DIAGNOSIS — Z17.0 MALIGNANT NEOPLASM OF RIGHT BREAST IN FEMALE, ESTROGEN RECEPTOR POSITIVE, UNSPECIFIED SITE OF BREAST: ICD-10-CM

## 2023-05-22 DIAGNOSIS — C50.911 MALIGNANT NEOPLASM OF RIGHT BREAST IN FEMALE, ESTROGEN RECEPTOR POSITIVE, UNSPECIFIED SITE OF BREAST: ICD-10-CM

## 2023-05-22 DIAGNOSIS — R92.8 ABNORMAL MAMMOGRAM: ICD-10-CM

## 2023-05-22 PROCEDURE — 76642 ULTRASOUND BREAST LIMITED: CPT

## 2023-05-22 PROCEDURE — 77065 DX MAMMO INCL CAD UNI: CPT

## 2023-05-22 PROCEDURE — 77065 DX MAMMO INCL CAD UNI: CPT | Performed by: RADIOLOGY

## 2023-05-22 PROCEDURE — G0279 TOMOSYNTHESIS, MAMMO: HCPCS

## 2023-05-22 PROCEDURE — 76642 ULTRASOUND BREAST LIMITED: CPT | Performed by: RADIOLOGY

## 2023-05-22 PROCEDURE — G0279 TOMOSYNTHESIS, MAMMO: HCPCS | Performed by: RADIOLOGY

## 2023-05-24 ENCOUNTER — HOSPITAL ENCOUNTER (OUTPATIENT)
Dept: ONCOLOGY | Facility: HOSPITAL | Age: 73
Discharge: HOME | End: 2023-05-24
Admitting: INTERNAL MEDICINE
Payer: MEDICARE

## 2023-05-24 ENCOUNTER — DOCUMENTATION (OUTPATIENT)
Dept: HOSPICE | Facility: HOSPITAL | Age: 73
End: 2023-05-24
Payer: MEDICARE

## 2023-05-24 VITALS
HEART RATE: 77 BPM | WEIGHT: 210 LBS | DIASTOLIC BLOOD PRESSURE: 80 MMHG | TEMPERATURE: 97.4 F | SYSTOLIC BLOOD PRESSURE: 149 MMHG | HEIGHT: 64 IN | BODY MASS INDEX: 35.85 KG/M2 | RESPIRATION RATE: 18 BRPM

## 2023-05-24 DIAGNOSIS — Z17.0 MALIGNANT NEOPLASM OF RIGHT BREAST IN FEMALE, ESTROGEN RECEPTOR POSITIVE, UNSPECIFIED SITE OF BREAST: Primary | ICD-10-CM

## 2023-05-24 DIAGNOSIS — Z45.2 ENCOUNTER FOR CARE RELATED TO PORT-A-CATH: ICD-10-CM

## 2023-05-24 DIAGNOSIS — C50.911 MALIGNANT NEOPLASM OF RIGHT BREAST IN FEMALE, ESTROGEN RECEPTOR POSITIVE, UNSPECIFIED SITE OF BREAST: Primary | ICD-10-CM

## 2023-05-24 LAB
ALBUMIN SERPL-MCNC: 4.3 G/DL (ref 3.5–5.2)
ALBUMIN/GLOB SERPL: 1.7 G/DL
ALP SERPL-CCNC: 81 U/L (ref 39–117)
ALT SERPL W P-5'-P-CCNC: 8 U/L (ref 1–33)
ANION GAP SERPL CALCULATED.3IONS-SCNC: 12 MMOL/L (ref 5–15)
AST SERPL-CCNC: 15 U/L (ref 1–32)
BASOPHILS # BLD AUTO: 0.03 10*3/MM3 (ref 0–0.2)
BASOPHILS NFR BLD AUTO: 0.5 % (ref 0–1.5)
BILIRUB SERPL-MCNC: 0.4 MG/DL (ref 0–1.2)
BUN SERPL-MCNC: 22 MG/DL (ref 8–23)
BUN/CREAT SERPL: 24.7 (ref 7–25)
CALCIUM SPEC-SCNC: 9.5 MG/DL (ref 8.6–10.5)
CHLORIDE SERPL-SCNC: 107 MMOL/L (ref 98–107)
CO2 SERPL-SCNC: 23 MMOL/L (ref 22–29)
CREAT SERPL-MCNC: 0.89 MG/DL (ref 0.57–1)
DEPRECATED RDW RBC AUTO: 50.2 FL (ref 37–54)
EGFRCR SERPLBLD CKD-EPI 2021: 69 ML/MIN/1.73
EOSINOPHIL # BLD AUTO: 0.09 10*3/MM3 (ref 0–0.4)
EOSINOPHIL NFR BLD AUTO: 1.4 % (ref 0.3–6.2)
ERYTHROCYTE [DISTWIDTH] IN BLOOD BY AUTOMATED COUNT: 14.2 % (ref 12.3–15.4)
GLOBULIN UR ELPH-MCNC: 2.6 GM/DL
GLUCOSE SERPL-MCNC: 129 MG/DL (ref 65–99)
HCT VFR BLD AUTO: 37.4 % (ref 34–46.6)
HGB BLD-MCNC: 12.1 G/DL (ref 12–15.9)
IMM GRANULOCYTES # BLD AUTO: 0.03 10*3/MM3 (ref 0–0.05)
IMM GRANULOCYTES NFR BLD AUTO: 0.5 % (ref 0–0.5)
LYMPHOCYTES # BLD AUTO: 1.65 10*3/MM3 (ref 0.7–3.1)
LYMPHOCYTES NFR BLD AUTO: 25.9 % (ref 19.6–45.3)
MCH RBC QN AUTO: 31 PG (ref 26.6–33)
MCHC RBC AUTO-ENTMCNC: 32.4 G/DL (ref 31.5–35.7)
MCV RBC AUTO: 95.9 FL (ref 79–97)
MONOCYTES # BLD AUTO: 0.5 10*3/MM3 (ref 0.1–0.9)
MONOCYTES NFR BLD AUTO: 7.9 % (ref 5–12)
NEUTROPHILS NFR BLD AUTO: 4.06 10*3/MM3 (ref 1.7–7)
NEUTROPHILS NFR BLD AUTO: 63.8 % (ref 42.7–76)
PLATELET # BLD AUTO: 281 10*3/MM3 (ref 140–450)
PMV BLD AUTO: 9.3 FL (ref 6–12)
POTASSIUM SERPL-SCNC: 4.3 MMOL/L (ref 3.5–5.2)
PROT SERPL-MCNC: 6.9 G/DL (ref 6–8.5)
RBC # BLD AUTO: 3.9 10*6/MM3 (ref 3.77–5.28)
SODIUM SERPL-SCNC: 142 MMOL/L (ref 136–145)
WBC NRBC COR # BLD: 6.36 10*3/MM3 (ref 3.4–10.8)

## 2023-05-24 PROCEDURE — 85025 COMPLETE CBC W/AUTO DIFF WBC: CPT | Performed by: INTERNAL MEDICINE

## 2023-05-24 PROCEDURE — 96375 TX/PRO/DX INJ NEW DRUG ADDON: CPT

## 2023-05-24 PROCEDURE — 25010000002 DEXAMETHASONE SODIUM PHOSPHATE 100 MG/10ML SOLUTION: Performed by: INTERNAL MEDICINE

## 2023-05-24 PROCEDURE — 25010000002 TRASTUZUMAB-QYYP 420 MG RECONSTITUTED SOLUTION 1 EACH VIAL: Performed by: INTERNAL MEDICINE

## 2023-05-24 PROCEDURE — 96374 THER/PROPH/DIAG INJ IV PUSH: CPT

## 2023-05-24 PROCEDURE — 96413 CHEMO IV INFUSION 1 HR: CPT

## 2023-05-24 PROCEDURE — 80053 COMPREHEN METABOLIC PANEL: CPT | Performed by: INTERNAL MEDICINE

## 2023-05-24 PROCEDURE — 96417 CHEMO IV INFUS EACH ADDL SEQ: CPT

## 2023-05-24 PROCEDURE — 25010000002 HEPARIN LOCK FLUSH PER 10 UNITS: Performed by: INTERNAL MEDICINE

## 2023-05-24 PROCEDURE — 25010000002 DIPHENHYDRAMINE PER 50 MG: Performed by: INTERNAL MEDICINE

## 2023-05-24 PROCEDURE — 25010000002 PACLITAXEL PER 1 MG: Performed by: INTERNAL MEDICINE

## 2023-05-24 RX ORDER — HEPARIN SODIUM (PORCINE) LOCK FLUSH IV SOLN 100 UNIT/ML 100 UNIT/ML
500 SOLUTION INTRAVENOUS AS NEEDED
Status: DISCONTINUED | OUTPATIENT
Start: 2023-05-24 | End: 2023-05-25 | Stop reason: HOSPADM

## 2023-05-24 RX ORDER — SODIUM CHLORIDE 9 MG/ML
250 INJECTION, SOLUTION INTRAVENOUS ONCE
Status: COMPLETED | OUTPATIENT
Start: 2023-05-24 | End: 2023-05-24

## 2023-05-24 RX ORDER — FAMOTIDINE 10 MG/ML
20 INJECTION, SOLUTION INTRAVENOUS ONCE
Status: COMPLETED | OUTPATIENT
Start: 2023-05-24 | End: 2023-05-24

## 2023-05-24 RX ORDER — SODIUM CHLORIDE 0.9 % (FLUSH) 0.9 %
10 SYRINGE (ML) INJECTION AS NEEDED
OUTPATIENT
Start: 2023-05-24

## 2023-05-24 RX ORDER — HEPARIN SODIUM (PORCINE) LOCK FLUSH IV SOLN 100 UNIT/ML 100 UNIT/ML
500 SOLUTION INTRAVENOUS AS NEEDED
OUTPATIENT
Start: 2023-05-24

## 2023-05-24 RX ADMIN — DEXAMETHASONE SODIUM PHOSPHATE 12 MG: 10 INJECTION, SOLUTION INTRAMUSCULAR; INTRAVENOUS at 12:15

## 2023-05-24 RX ADMIN — SODIUM CHLORIDE 190 MG: 9 INJECTION, SOLUTION INTRAVENOUS at 11:35

## 2023-05-24 RX ADMIN — SODIUM CHLORIDE 160 MG: 9 INJECTION, SOLUTION INTRAVENOUS at 13:11

## 2023-05-24 RX ADMIN — HEPARIN 500 UNITS: 100 SYRINGE at 14:18

## 2023-05-24 RX ADMIN — FAMOTIDINE 20 MG: 10 INJECTION INTRAVENOUS at 12:14

## 2023-05-24 RX ADMIN — SODIUM CHLORIDE 250 ML: 9 INJECTION, SOLUTION INTRAVENOUS at 11:34

## 2023-05-24 RX ADMIN — DIPHENHYDRAMINE HYDROCHLORIDE 25 MG: 50 INJECTION INTRAMUSCULAR; INTRAVENOUS at 12:18

## 2023-05-24 NOTE — ACP (ADVANCE CARE PLANNING)
Date of Advanced Steps ACP conversation: 5/24/2023  Location of conversation:  [facility]cer Center   ACP Facilitator: Madalyn Brumfield MSW, CSW   Referral Source: REGINO Menendez  Time spent in conversation and documentation: 31-60 minutes.      Summary of MOST/POST/POLST Goals of Care and Specific Treatment Decisions:         • Preference for CPR: NO         • Treatment preference:  COMFORT MEASURES         • Other treatment preferences: If pt is having heart attack or stroke would like CPR.   Documents Completed:        • MOST/POST/POLST:NO        • Living Will Directive:YES    Decisional Capacity/who will participate in conversation:       • Individual has decisional capacity: YES         o If no, name healthcare surrogate/legal decision maker: Hollis Churchill III        o Relationship to individual: spouse       • Surrogate/decision maker understands role and will honor individual's decisions: YES    SUMMARY OF CONVERSATION:          • Assistance provided with decision regarding CPR/breathing support using a decision aid (if appropriate):YES   o    Individual expresses priority for medical care is to: YES   o    Additional decisions: n/a           • Goals were explored for medical care if severe, permanent brain injury:YES            • Each section of living will document was reviewed and discussed: YES           • Other: if having heart attack or stoke. If pt is brain dead/ would like comfort measures.     SUMMARY OF NEEDS/SERVICES:         • Needs more information about medical condition: NO          • Needs more information about complications that may occur:NO         • Wants to discuss with Church/:NO         • Other needs/services/followup: n/a    Educational materials were provided on:Advance Care Planning

## 2023-05-31 ENCOUNTER — HOSPITAL ENCOUNTER (OUTPATIENT)
Dept: ONCOLOGY | Facility: HOSPITAL | Age: 73
Discharge: HOME | End: 2023-05-31
Admitting: INTERNAL MEDICINE

## 2023-05-31 VITALS
TEMPERATURE: 98.1 F | BODY MASS INDEX: 36 KG/M2 | HEART RATE: 83 BPM | SYSTOLIC BLOOD PRESSURE: 149 MMHG | HEIGHT: 64 IN | RESPIRATION RATE: 18 BRPM | WEIGHT: 210.9 LBS | DIASTOLIC BLOOD PRESSURE: 85 MMHG

## 2023-05-31 DIAGNOSIS — Z45.2 ENCOUNTER FOR CARE RELATED TO PORT-A-CATH: ICD-10-CM

## 2023-05-31 DIAGNOSIS — Z17.0 MALIGNANT NEOPLASM OF RIGHT BREAST IN FEMALE, ESTROGEN RECEPTOR POSITIVE, UNSPECIFIED SITE OF BREAST: Primary | ICD-10-CM

## 2023-05-31 DIAGNOSIS — C50.911 MALIGNANT NEOPLASM OF RIGHT BREAST IN FEMALE, ESTROGEN RECEPTOR POSITIVE, UNSPECIFIED SITE OF BREAST: Primary | ICD-10-CM

## 2023-05-31 LAB
ALBUMIN SERPL-MCNC: 4.2 G/DL (ref 3.5–5.2)
ALBUMIN/GLOB SERPL: 1.8 G/DL
ALP SERPL-CCNC: 86 U/L (ref 39–117)
ALT SERPL W P-5'-P-CCNC: 7 U/L (ref 1–33)
ANION GAP SERPL CALCULATED.3IONS-SCNC: 12 MMOL/L (ref 5–15)
AST SERPL-CCNC: 13 U/L (ref 1–32)
BASOPHILS # BLD AUTO: 0.02 10*3/MM3 (ref 0–0.2)
BASOPHILS NFR BLD AUTO: 0.4 % (ref 0–1.5)
BILIRUB SERPL-MCNC: 0.4 MG/DL (ref 0–1.2)
BUN SERPL-MCNC: 19 MG/DL (ref 8–23)
BUN/CREAT SERPL: 23.5 (ref 7–25)
CALCIUM SPEC-SCNC: 9.3 MG/DL (ref 8.6–10.5)
CHLORIDE SERPL-SCNC: 108 MMOL/L (ref 98–107)
CO2 SERPL-SCNC: 22 MMOL/L (ref 22–29)
CREAT SERPL-MCNC: 0.81 MG/DL (ref 0.57–1)
DEPRECATED RDW RBC AUTO: 48.2 FL (ref 37–54)
EGFRCR SERPLBLD CKD-EPI 2021: 77.2 ML/MIN/1.73
EOSINOPHIL # BLD AUTO: 0.09 10*3/MM3 (ref 0–0.4)
EOSINOPHIL NFR BLD AUTO: 1.8 % (ref 0.3–6.2)
ERYTHROCYTE [DISTWIDTH] IN BLOOD BY AUTOMATED COUNT: 13.7 % (ref 12.3–15.4)
GLOBULIN UR ELPH-MCNC: 2.3 GM/DL
GLUCOSE SERPL-MCNC: 95 MG/DL (ref 65–99)
HCT VFR BLD AUTO: 36.2 % (ref 34–46.6)
HGB BLD-MCNC: 11.7 G/DL (ref 12–15.9)
IMM GRANULOCYTES # BLD AUTO: 0.04 10*3/MM3 (ref 0–0.05)
IMM GRANULOCYTES NFR BLD AUTO: 0.8 % (ref 0–0.5)
LYMPHOCYTES # BLD AUTO: 1.53 10*3/MM3 (ref 0.7–3.1)
LYMPHOCYTES NFR BLD AUTO: 31 % (ref 19.6–45.3)
MCH RBC QN AUTO: 30.6 PG (ref 26.6–33)
MCHC RBC AUTO-ENTMCNC: 32.3 G/DL (ref 31.5–35.7)
MCV RBC AUTO: 94.8 FL (ref 79–97)
MONOCYTES # BLD AUTO: 0.44 10*3/MM3 (ref 0.1–0.9)
MONOCYTES NFR BLD AUTO: 8.9 % (ref 5–12)
NEUTROPHILS NFR BLD AUTO: 2.82 10*3/MM3 (ref 1.7–7)
NEUTROPHILS NFR BLD AUTO: 57.1 % (ref 42.7–76)
PLATELET # BLD AUTO: 254 10*3/MM3 (ref 140–450)
PMV BLD AUTO: 9.3 FL (ref 6–12)
POTASSIUM SERPL-SCNC: 4 MMOL/L (ref 3.5–5.2)
PROT SERPL-MCNC: 6.5 G/DL (ref 6–8.5)
RBC # BLD AUTO: 3.82 10*6/MM3 (ref 3.77–5.28)
SODIUM SERPL-SCNC: 142 MMOL/L (ref 136–145)
WBC NRBC COR # BLD: 4.94 10*3/MM3 (ref 3.4–10.8)

## 2023-05-31 PROCEDURE — 25010000002 TRASTUZUMAB-QYYP 420 MG RECONSTITUTED SOLUTION 1 EACH VIAL: Performed by: INTERNAL MEDICINE

## 2023-05-31 PROCEDURE — 25010000002 PACLITAXEL PER 1 MG: Performed by: INTERNAL MEDICINE

## 2023-05-31 PROCEDURE — 96375 TX/PRO/DX INJ NEW DRUG ADDON: CPT

## 2023-05-31 PROCEDURE — 85025 COMPLETE CBC W/AUTO DIFF WBC: CPT | Performed by: INTERNAL MEDICINE

## 2023-05-31 PROCEDURE — 80053 COMPREHEN METABOLIC PANEL: CPT | Performed by: INTERNAL MEDICINE

## 2023-05-31 PROCEDURE — 25010000002 DIPHENHYDRAMINE PER 50 MG: Performed by: INTERNAL MEDICINE

## 2023-05-31 PROCEDURE — 96413 CHEMO IV INFUSION 1 HR: CPT

## 2023-05-31 PROCEDURE — 25010000002 DEXAMETHASONE SODIUM PHOSPHATE 100 MG/10ML SOLUTION: Performed by: INTERNAL MEDICINE

## 2023-05-31 PROCEDURE — 96417 CHEMO IV INFUS EACH ADDL SEQ: CPT

## 2023-05-31 PROCEDURE — 25010000002 HEPARIN LOCK FLUSH PER 10 UNITS: Performed by: INTERNAL MEDICINE

## 2023-05-31 RX ORDER — SODIUM CHLORIDE 9 MG/ML
250 INJECTION, SOLUTION INTRAVENOUS ONCE
Status: COMPLETED | OUTPATIENT
Start: 2023-05-31 | End: 2023-05-31

## 2023-05-31 RX ORDER — SODIUM CHLORIDE 0.9 % (FLUSH) 0.9 %
10 SYRINGE (ML) INJECTION AS NEEDED
OUTPATIENT
Start: 2023-05-31

## 2023-05-31 RX ORDER — FAMOTIDINE 10 MG/ML
20 INJECTION, SOLUTION INTRAVENOUS ONCE
Status: COMPLETED | OUTPATIENT
Start: 2023-05-31 | End: 2023-05-31

## 2023-05-31 RX ORDER — HEPARIN SODIUM (PORCINE) LOCK FLUSH IV SOLN 100 UNIT/ML 100 UNIT/ML
500 SOLUTION INTRAVENOUS AS NEEDED
OUTPATIENT
Start: 2023-05-31

## 2023-05-31 RX ORDER — HEPARIN SODIUM (PORCINE) LOCK FLUSH IV SOLN 100 UNIT/ML 100 UNIT/ML
500 SOLUTION INTRAVENOUS AS NEEDED
Status: DISCONTINUED | OUTPATIENT
Start: 2023-05-31 | End: 2023-06-01 | Stop reason: HOSPADM

## 2023-05-31 RX ADMIN — SODIUM CHLORIDE 160 MG: 9 INJECTION, SOLUTION INTRAVENOUS at 15:32

## 2023-05-31 RX ADMIN — SODIUM CHLORIDE 250 ML: 9 INJECTION, SOLUTION INTRAVENOUS at 13:48

## 2023-05-31 RX ADMIN — FAMOTIDINE 20 MG: 10 INJECTION INTRAVENOUS at 14:37

## 2023-05-31 RX ADMIN — SODIUM CHLORIDE 190 MG: 9 INJECTION, SOLUTION INTRAVENOUS at 14:01

## 2023-05-31 RX ADMIN — DEXAMETHASONE SODIUM PHOSPHATE 12 MG: 10 INJECTION, SOLUTION INTRAMUSCULAR; INTRAVENOUS at 14:37

## 2023-05-31 RX ADMIN — HEPARIN 500 UNITS: 100 SYRINGE at 16:36

## 2023-05-31 RX ADMIN — DIPHENHYDRAMINE HYDROCHLORIDE 25 MG: 50 INJECTION INTRAMUSCULAR; INTRAVENOUS at 15:06

## 2023-06-07 ENCOUNTER — OFFICE VISIT (OUTPATIENT)
Dept: ONCOLOGY | Facility: CLINIC | Age: 73
End: 2023-06-07
Payer: MEDICARE

## 2023-06-07 ENCOUNTER — HOSPITAL ENCOUNTER (OUTPATIENT)
Dept: ONCOLOGY | Facility: HOSPITAL | Age: 73
Discharge: HOME | End: 2023-06-07
Admitting: INTERNAL MEDICINE
Payer: MEDICARE

## 2023-06-07 VITALS — DIASTOLIC BLOOD PRESSURE: 78 MMHG | SYSTOLIC BLOOD PRESSURE: 152 MMHG | HEART RATE: 82 BPM | RESPIRATION RATE: 20 BRPM

## 2023-06-07 VITALS
HEIGHT: 64 IN | TEMPERATURE: 97.7 F | RESPIRATION RATE: 18 BRPM | OXYGEN SATURATION: 96 % | HEART RATE: 93 BPM | WEIGHT: 211 LBS | SYSTOLIC BLOOD PRESSURE: 172 MMHG | DIASTOLIC BLOOD PRESSURE: 85 MMHG | BODY MASS INDEX: 36.02 KG/M2

## 2023-06-07 DIAGNOSIS — Z17.0 MALIGNANT NEOPLASM OF RIGHT BREAST IN FEMALE, ESTROGEN RECEPTOR POSITIVE, UNSPECIFIED SITE OF BREAST: Primary | ICD-10-CM

## 2023-06-07 DIAGNOSIS — T45.1X5A CARDIOMYOPATHY DUE TO CHEMOTHERAPY: ICD-10-CM

## 2023-06-07 DIAGNOSIS — I42.7 CARDIOMYOPATHY DUE TO CHEMOTHERAPY: ICD-10-CM

## 2023-06-07 DIAGNOSIS — Z45.2 ENCOUNTER FOR CARE RELATED TO PORT-A-CATH: ICD-10-CM

## 2023-06-07 DIAGNOSIS — C50.911 MALIGNANT NEOPLASM OF RIGHT BREAST IN FEMALE, ESTROGEN RECEPTOR POSITIVE, UNSPECIFIED SITE OF BREAST: Primary | ICD-10-CM

## 2023-06-07 LAB
ALBUMIN SERPL-MCNC: 4.1 G/DL (ref 3.5–5.2)
ALBUMIN/GLOB SERPL: 1.6 G/DL
ALP SERPL-CCNC: 79 U/L (ref 39–117)
ALT SERPL W P-5'-P-CCNC: 7 U/L (ref 1–33)
ANION GAP SERPL CALCULATED.3IONS-SCNC: 8 MMOL/L (ref 5–15)
AST SERPL-CCNC: 15 U/L (ref 1–32)
BASOPHILS # BLD AUTO: 0.02 10*3/MM3 (ref 0–0.2)
BASOPHILS NFR BLD AUTO: 0.4 % (ref 0–1.5)
BILIRUB SERPL-MCNC: 0.3 MG/DL (ref 0–1.2)
BUN SERPL-MCNC: 16 MG/DL (ref 8–23)
BUN/CREAT SERPL: 21.6 (ref 7–25)
CALCIUM SPEC-SCNC: 9.1 MG/DL (ref 8.6–10.5)
CHLORIDE SERPL-SCNC: 107 MMOL/L (ref 98–107)
CO2 SERPL-SCNC: 25 MMOL/L (ref 22–29)
CREAT SERPL-MCNC: 0.74 MG/DL (ref 0.57–1)
DEPRECATED RDW RBC AUTO: 48.6 FL (ref 37–54)
EGFRCR SERPLBLD CKD-EPI 2021: 86.1 ML/MIN/1.73
EOSINOPHIL # BLD AUTO: 0.07 10*3/MM3 (ref 0–0.4)
EOSINOPHIL NFR BLD AUTO: 1.4 % (ref 0.3–6.2)
ERYTHROCYTE [DISTWIDTH] IN BLOOD BY AUTOMATED COUNT: 13.9 % (ref 12.3–15.4)
GLOBULIN UR ELPH-MCNC: 2.5 GM/DL
GLUCOSE SERPL-MCNC: 98 MG/DL (ref 65–99)
HCT VFR BLD AUTO: 37.4 % (ref 34–46.6)
HGB BLD-MCNC: 12.1 G/DL (ref 12–15.9)
IMM GRANULOCYTES # BLD AUTO: 0.04 10*3/MM3 (ref 0–0.05)
IMM GRANULOCYTES NFR BLD AUTO: 0.8 % (ref 0–0.5)
LYMPHOCYTES # BLD AUTO: 1.44 10*3/MM3 (ref 0.7–3.1)
LYMPHOCYTES NFR BLD AUTO: 28.3 % (ref 19.6–45.3)
MCH RBC QN AUTO: 30.9 PG (ref 26.6–33)
MCHC RBC AUTO-ENTMCNC: 32.4 G/DL (ref 31.5–35.7)
MCV RBC AUTO: 95.7 FL (ref 79–97)
MONOCYTES # BLD AUTO: 0.41 10*3/MM3 (ref 0.1–0.9)
MONOCYTES NFR BLD AUTO: 8.1 % (ref 5–12)
NEUTROPHILS NFR BLD AUTO: 3.1 10*3/MM3 (ref 1.7–7)
NEUTROPHILS NFR BLD AUTO: 61 % (ref 42.7–76)
PLATELET # BLD AUTO: 258 10*3/MM3 (ref 140–450)
PMV BLD AUTO: 9.1 FL (ref 6–12)
POTASSIUM SERPL-SCNC: 4.1 MMOL/L (ref 3.5–5.2)
PROT SERPL-MCNC: 6.6 G/DL (ref 6–8.5)
RBC # BLD AUTO: 3.91 10*6/MM3 (ref 3.77–5.28)
SODIUM SERPL-SCNC: 140 MMOL/L (ref 136–145)
WBC NRBC COR # BLD: 5.08 10*3/MM3 (ref 3.4–10.8)

## 2023-06-07 PROCEDURE — 85025 COMPLETE CBC W/AUTO DIFF WBC: CPT | Performed by: INTERNAL MEDICINE

## 2023-06-07 PROCEDURE — 25010000002 DEXAMETHASONE SODIUM PHOSPHATE 100 MG/10ML SOLUTION: Performed by: INTERNAL MEDICINE

## 2023-06-07 PROCEDURE — 96374 THER/PROPH/DIAG INJ IV PUSH: CPT

## 2023-06-07 PROCEDURE — 96375 TX/PRO/DX INJ NEW DRUG ADDON: CPT

## 2023-06-07 PROCEDURE — 96413 CHEMO IV INFUSION 1 HR: CPT

## 2023-06-07 PROCEDURE — 80053 COMPREHEN METABOLIC PANEL: CPT | Performed by: INTERNAL MEDICINE

## 2023-06-07 PROCEDURE — 96415 CHEMO IV INFUSION ADDL HR: CPT

## 2023-06-07 PROCEDURE — 25010000002 TRASTUZUMAB-QYYP 420 MG RECONSTITUTED SOLUTION 1 EACH VIAL: Performed by: INTERNAL MEDICINE

## 2023-06-07 PROCEDURE — 25010000002 HEPARIN LOCK FLUSH PER 10 UNITS: Performed by: INTERNAL MEDICINE

## 2023-06-07 PROCEDURE — 25010000002 PACLITAXEL PER 1 MG: Performed by: INTERNAL MEDICINE

## 2023-06-07 PROCEDURE — 96417 CHEMO IV INFUS EACH ADDL SEQ: CPT

## 2023-06-07 PROCEDURE — 25010000002 DIPHENHYDRAMINE PER 50 MG: Performed by: INTERNAL MEDICINE

## 2023-06-07 RX ORDER — SODIUM CHLORIDE 0.9 % (FLUSH) 0.9 %
10 SYRINGE (ML) INJECTION AS NEEDED
OUTPATIENT
Start: 2023-06-07

## 2023-06-07 RX ORDER — SODIUM CHLORIDE 9 MG/ML
250 INJECTION, SOLUTION INTRAVENOUS ONCE
Status: COMPLETED | OUTPATIENT
Start: 2023-06-07 | End: 2023-06-07

## 2023-06-07 RX ORDER — FAMOTIDINE 10 MG/ML
20 INJECTION, SOLUTION INTRAVENOUS ONCE
Status: COMPLETED | OUTPATIENT
Start: 2023-06-07 | End: 2023-06-07

## 2023-06-07 RX ORDER — HEPARIN SODIUM (PORCINE) LOCK FLUSH IV SOLN 100 UNIT/ML 100 UNIT/ML
500 SOLUTION INTRAVENOUS AS NEEDED
OUTPATIENT
Start: 2023-06-07

## 2023-06-07 RX ORDER — HEPARIN SODIUM (PORCINE) LOCK FLUSH IV SOLN 100 UNIT/ML 100 UNIT/ML
500 SOLUTION INTRAVENOUS AS NEEDED
Status: DISCONTINUED | OUTPATIENT
Start: 2023-06-07 | End: 2023-06-08 | Stop reason: HOSPADM

## 2023-06-07 RX ADMIN — DEXAMETHASONE SODIUM PHOSPHATE 12 MG: 10 INJECTION, SOLUTION INTRAMUSCULAR; INTRAVENOUS at 12:40

## 2023-06-07 RX ADMIN — SODIUM CHLORIDE 190 MG: 9 INJECTION, SOLUTION INTRAVENOUS at 12:05

## 2023-06-07 RX ADMIN — HEPARIN 500 UNITS: 100 SYRINGE at 14:40

## 2023-06-07 RX ADMIN — DIPHENHYDRAMINE HYDROCHLORIDE 25 MG: 50 INJECTION INTRAMUSCULAR; INTRAVENOUS at 12:50

## 2023-06-07 RX ADMIN — SODIUM CHLORIDE 160 MG: 9 INJECTION, SOLUTION INTRAVENOUS at 13:38

## 2023-06-07 RX ADMIN — FAMOTIDINE 20 MG: 10 INJECTION INTRAVENOUS at 12:42

## 2023-06-07 RX ADMIN — SODIUM CHLORIDE 250 ML: 9 INJECTION, SOLUTION INTRAVENOUS at 12:05

## 2023-06-07 NOTE — PROGRESS NOTES
"      PROBLEM LIST:  1. [id number] ER+ (50%) NV negative Her2 positive (3+) Invasive ductal carcinoma of the right breast  A) biopsy of a 1.2 cm mass in the lower outer quadrant on 3/14/23 showed a high grade IDC.  No concerning lymph nodes on imaging.  B) neoadjuvant chemotherapy with Taxol and Herceptin started on 4/12/2023  2. Hypertension  3. Depression  4. MOOKIE  5. Hyperlipidemia    Subjective     CHIEF COMPLAINT: breast cancer    HISTORY OF PRESENT ILLNESS:   Laura Churchill returns for follow-up.  She had a repeat mammogram done last week.  She says she has been feeling okay but has quite a bit of fatigue.  No significant neuropathy symptoms.  She has noticed some brain fog as well.      Objective      /85   Pulse 93   Temp 97.7 °F (36.5 °C)   Resp 18   Ht 162.6 cm (64\")   Wt 95.7 kg (211 lb)   LMP  (LMP Unknown)   SpO2 96%   BMI 36.22 kg/m²    Vitals:    06/07/23 1016   PainSc: 0-No pain               ECOG score: 1             General: well appearing female in no acute distress  Neuro: alert and oriented  HEENT: sclera anicteric, oropharynx clear  Lymphatics: no cervical, supraclavicular, or axillary adenopathy  Cardiovascular: regular rate and rhythm, no murmurs  Lungs: clear to auscultation bilaterally  Abdomen: soft, nontender, nondistended.  No palpable organomegaly  Extremeties: no lower extremity edema  Skin: no rashes, lesions, bruising, or petechiae  Psych: mood and affect appropriate            RECENT LABS:  Lab Results   Component Value Date    WBC 5.08 06/07/2023    HGB 12.1 06/07/2023    HCT 37.4 06/07/2023    MCV 95.7 06/07/2023     06/07/2023       Lab Results   Component Value Date    GLUCOSE 95 05/31/2023    BUN 19 05/31/2023    CREATININE 0.81 05/31/2023    EGFRIFNONA 66 09/28/2021    BCR 23.5 05/31/2023    K 4.0 05/31/2023    CO2 22.0 05/31/2023    CALCIUM 9.3 05/31/2023    ALBUMIN 4.2 05/31/2023    AST 13 05/31/2023    ALT 7 05/31/2023       Mammo Diagnostic Digital " Tomosynthesis Right With CAD, US Breast Right Limited  Narrative: RIGHT DIAGNOSTIC MAMMOGRAM WITH TOMOSYNTHESIS AND RIGHT BREAST  ULTRASOUND     HISTORY: 72-year-old patient who presents for evaluation of response to  neoadjuvant chemotherapy. She has undergone two cycles of Taxol and  Herceptin. The patient was diagnosed with poorly differentiated invasive  ductal carcinoma in the right 8:30-9:00 region following  ultrasound-guided core biopsy performed on 3/14/2023. This finding  measured approximately 1.0 cm on breast MRI imaging dated 4/10/2023.     TECHNIQUE: Right  breast low dose, full field digital CC and MLO views  were obtained with 2-D acquisitions and tomosynthesis. Right ML and  right CC focal compression views with tomosynthesis were also obtained.  Focused ultrasound imaging is performed of the right 8:30-9:00 region.     COMPARISON: 3/14/2023, 2/20/2023, 2/7/2022, 1/4/2022, 9/15/2020,  8/27/2019, 9/26/2017, and 7/12/2016     FINDINGS: There are scattered areas of fibroglandular density in the  right breast. The fibroglandular pattern is stable. The biopsy-proven  invasive ductal carcinoma in the 8:30-9:00 region measures approximately  0.7 cm on the current study. The finding has decreased in density  compared to the prior 3/14/2023 and 2/28/2023 mammograms. There is an  associated coil shaped postbiopsy marking clip. No other suspicious  mammographic findings are noted. Asymmetries in the right superior and  lateral breast effaced with focal compression imaging. There is a stable  intramammary lymph node in the right upper outer quadrant.     Focused ultrasound imaging of the right lateral breast demonstrates a  0.7 x 0.4 cm irregular, hypoechoic mass in the 8:30-9:00 position that  corresponds to the biopsy-proven ductal carcinoma. This finding measured  approximately 1.0 x 1.0 cm on the prior ultrasound study dated 3/14/2023  and 1.0 cm on breast MRI imaging dated 4/10/2023.     Impression:  Interval decrease in size of the biopsy-proven invasive  ductal carcinoma in the right 8:30-9:00 region following two cycles of  neoadjuvant chemotherapy, as described above.     RECOMMENDATION: Surgical/Oncology follow-up.     BI-RADS CATEGORY 6, KNOWN BIOPSY-PROVEN MALIGNANCY.     CAD was utilized.     The standard false-negative rate of mammography is between 10% and 25%.   Complex patterns or increased breast density will markedly elevate the  false-negative rate of mammography.        A results letter, in lay terminology, will be given to the patient at  the conclusion of the exam.     This report was finalized on 5/22/2023 12:09 PM by Dr. Katya De La Vega MD.                 ASSESSMENT AND PLAN:     Laura Churchill is a 72 y.o. female with a [id number] Er+ Her2+ invasive ductal carcinoma of the right breast.    She continues on neoadjuvant chemotherapy with Taxol and Herceptin.  She is due for cycle 3-day 15 today.  So far she is tolerating treatment relatively well.  Mammogram shows evidence of response.  We will continue with her treatment for another 4 doses.  We discussed that maintenance Herceptin will start the following week.  We have adjusted the schedule to account for a planned family vacation.    Following neoadjuvant chemotherapy she will be a candidate for lumpectomy followed by radiation, as well as endocrine therapy.  We will get her scheduled to meet with Dr. Saleh in the near future.  We will plan to continue postoperative HER2 targeted therapy with either Herceptin or Kadcyla based on her pathology results.    Anxiety related to breast cancer and treatment: She is doing well with Xanax which she is mainly taking at night to help her fall asleep.  I recommend that she try to use this only as needed and gradually decrease use going forward.    Follow-up in 3 weeks.                        Sharonda Hopkins MD  [facility] On[facility]    6/7/2023          CC:

## 2023-06-07 NOTE — ADDENDUM NOTE
Encounter addended by: Rosie Fragoso RN on: 6/7/2023 4:05 PM   Actions taken: Flowsheet data copied forward, Flowsheet accepted

## 2023-06-14 ENCOUNTER — APPOINTMENT (OUTPATIENT)
Dept: ONCOLOGY | Facility: HOSPITAL | Age: 73
End: 2023-06-14
Payer: MEDICARE

## 2023-06-14 ENCOUNTER — HOSPITAL ENCOUNTER (OUTPATIENT)
Dept: ONCOLOGY | Facility: HOSPITAL | Age: 73
Discharge: HOME | End: 2023-06-14
Admitting: INTERNAL MEDICINE
Payer: MEDICARE

## 2023-06-14 VITALS
BODY MASS INDEX: 35.85 KG/M2 | DIASTOLIC BLOOD PRESSURE: 84 MMHG | HEIGHT: 64 IN | SYSTOLIC BLOOD PRESSURE: 142 MMHG | WEIGHT: 210 LBS | RESPIRATION RATE: 16 BRPM | TEMPERATURE: 97 F | HEART RATE: 85 BPM

## 2023-06-14 DIAGNOSIS — Z45.2 ENCOUNTER FOR CARE RELATED TO PORT-A-CATH: ICD-10-CM

## 2023-06-14 DIAGNOSIS — Z17.0 MALIGNANT NEOPLASM OF RIGHT BREAST IN FEMALE, ESTROGEN RECEPTOR POSITIVE, UNSPECIFIED SITE OF BREAST: Primary | ICD-10-CM

## 2023-06-14 DIAGNOSIS — C50.911 MALIGNANT NEOPLASM OF RIGHT BREAST IN FEMALE, ESTROGEN RECEPTOR POSITIVE, UNSPECIFIED SITE OF BREAST: Primary | ICD-10-CM

## 2023-06-14 LAB
ALBUMIN SERPL-MCNC: 4.2 G/DL (ref 3.5–5.2)
ALBUMIN/GLOB SERPL: 1.8 G/DL
ALP SERPL-CCNC: 73 U/L (ref 39–117)
ALT SERPL W P-5'-P-CCNC: 9 U/L (ref 1–33)
ANION GAP SERPL CALCULATED.3IONS-SCNC: 11 MMOL/L (ref 5–15)
AST SERPL-CCNC: 15 U/L (ref 1–32)
BASOPHILS # BLD AUTO: 0.03 10*3/MM3 (ref 0–0.2)
BASOPHILS NFR BLD AUTO: 0.6 % (ref 0–1.5)
BILIRUB SERPL-MCNC: 0.3 MG/DL (ref 0–1.2)
BUN SERPL-MCNC: 18 MG/DL (ref 8–23)
BUN/CREAT SERPL: 22.5 (ref 7–25)
CALCIUM SPEC-SCNC: 9 MG/DL (ref 8.6–10.5)
CHLORIDE SERPL-SCNC: 107 MMOL/L (ref 98–107)
CO2 SERPL-SCNC: 23 MMOL/L (ref 22–29)
CREAT SERPL-MCNC: 0.8 MG/DL (ref 0.57–1)
DEPRECATED RDW RBC AUTO: 50.8 FL (ref 37–54)
EGFRCR SERPLBLD CKD-EPI 2021: 78.4 ML/MIN/1.73
EOSINOPHIL # BLD AUTO: 0.07 10*3/MM3 (ref 0–0.4)
EOSINOPHIL NFR BLD AUTO: 1.5 % (ref 0.3–6.2)
ERYTHROCYTE [DISTWIDTH] IN BLOOD BY AUTOMATED COUNT: 14.2 % (ref 12.3–15.4)
GLOBULIN UR ELPH-MCNC: 2.4 GM/DL
GLUCOSE SERPL-MCNC: 103 MG/DL (ref 65–99)
HCT VFR BLD AUTO: 36.4 % (ref 34–46.6)
HGB BLD-MCNC: 11.7 G/DL (ref 12–15.9)
IMM GRANULOCYTES # BLD AUTO: 0.03 10*3/MM3 (ref 0–0.05)
IMM GRANULOCYTES NFR BLD AUTO: 0.6 % (ref 0–0.5)
LYMPHOCYTES # BLD AUTO: 1.64 10*3/MM3 (ref 0.7–3.1)
LYMPHOCYTES NFR BLD AUTO: 34.2 % (ref 19.6–45.3)
MCH RBC QN AUTO: 31.2 PG (ref 26.6–33)
MCHC RBC AUTO-ENTMCNC: 32.1 G/DL (ref 31.5–35.7)
MCV RBC AUTO: 97.1 FL (ref 79–97)
MONOCYTES # BLD AUTO: 0.39 10*3/MM3 (ref 0.1–0.9)
MONOCYTES NFR BLD AUTO: 8.1 % (ref 5–12)
NEUTROPHILS NFR BLD AUTO: 2.64 10*3/MM3 (ref 1.7–7)
NEUTROPHILS NFR BLD AUTO: 55 % (ref 42.7–76)
PLATELET # BLD AUTO: 255 10*3/MM3 (ref 140–450)
PMV BLD AUTO: 9.4 FL (ref 6–12)
POTASSIUM SERPL-SCNC: 3.8 MMOL/L (ref 3.5–5.2)
PROT SERPL-MCNC: 6.6 G/DL (ref 6–8.5)
RBC # BLD AUTO: 3.75 10*6/MM3 (ref 3.77–5.28)
SODIUM SERPL-SCNC: 141 MMOL/L (ref 136–145)
WBC NRBC COR # BLD: 4.8 10*3/MM3 (ref 3.4–10.8)

## 2023-06-14 PROCEDURE — 96375 TX/PRO/DX INJ NEW DRUG ADDON: CPT

## 2023-06-14 PROCEDURE — 80053 COMPREHEN METABOLIC PANEL: CPT | Performed by: INTERNAL MEDICINE

## 2023-06-14 PROCEDURE — 96413 CHEMO IV INFUSION 1 HR: CPT

## 2023-06-14 PROCEDURE — 25010000002 TRASTUZUMAB-QYYP 420 MG RECONSTITUTED SOLUTION 1 EACH VIAL: Performed by: INTERNAL MEDICINE

## 2023-06-14 PROCEDURE — 25010000002 DEXAMETHASONE SODIUM PHOSPHATE 100 MG/10ML SOLUTION: Performed by: INTERNAL MEDICINE

## 2023-06-14 PROCEDURE — 25010000002 DIPHENHYDRAMINE PER 50 MG

## 2023-06-14 PROCEDURE — 96417 CHEMO IV INFUS EACH ADDL SEQ: CPT

## 2023-06-14 PROCEDURE — 25010000002 PACLITAXEL PER 1 MG: Performed by: INTERNAL MEDICINE

## 2023-06-14 PROCEDURE — 85025 COMPLETE CBC W/AUTO DIFF WBC: CPT | Performed by: INTERNAL MEDICINE

## 2023-06-14 PROCEDURE — 25010000002 HEPARIN LOCK FLUSH PER 10 UNITS: Performed by: INTERNAL MEDICINE

## 2023-06-14 RX ORDER — SODIUM CHLORIDE 0.9 % (FLUSH) 0.9 %
10 SYRINGE (ML) INJECTION AS NEEDED
OUTPATIENT
Start: 2023-06-14

## 2023-06-14 RX ORDER — HEPARIN SODIUM (PORCINE) LOCK FLUSH IV SOLN 100 UNIT/ML 100 UNIT/ML
500 SOLUTION INTRAVENOUS AS NEEDED
OUTPATIENT
Start: 2023-06-14

## 2023-06-14 RX ORDER — FAMOTIDINE 10 MG/ML
20 INJECTION, SOLUTION INTRAVENOUS ONCE
Status: COMPLETED | OUTPATIENT
Start: 2023-06-14 | End: 2023-06-14

## 2023-06-14 RX ORDER — SODIUM CHLORIDE 0.9 % (FLUSH) 0.9 %
10 SYRINGE (ML) INJECTION AS NEEDED
Status: DISCONTINUED | OUTPATIENT
Start: 2023-06-14 | End: 2023-06-15 | Stop reason: HOSPADM

## 2023-06-14 RX ORDER — SODIUM CHLORIDE 9 MG/ML
250 INJECTION, SOLUTION INTRAVENOUS ONCE
Status: COMPLETED | OUTPATIENT
Start: 2023-06-14 | End: 2023-06-14

## 2023-06-14 RX ORDER — HEPARIN SODIUM (PORCINE) LOCK FLUSH IV SOLN 100 UNIT/ML 100 UNIT/ML
500 SOLUTION INTRAVENOUS AS NEEDED
Status: DISCONTINUED | OUTPATIENT
Start: 2023-06-14 | End: 2023-06-15 | Stop reason: HOSPADM

## 2023-06-14 RX ADMIN — SODIUM CHLORIDE 160 MG: 9 INJECTION, SOLUTION INTRAVENOUS at 13:06

## 2023-06-14 RX ADMIN — DEXAMETHASONE SODIUM PHOSPHATE 12 MG: 10 INJECTION, SOLUTION INTRAMUSCULAR; INTRAVENOUS at 11:48

## 2023-06-14 RX ADMIN — SODIUM CHLORIDE 190 MG: 9 INJECTION, SOLUTION INTRAVENOUS at 12:26

## 2023-06-14 RX ADMIN — DIPHENHYDRAMINE HYDROCHLORIDE 25 MG: 50 INJECTION INTRAMUSCULAR; INTRAVENOUS at 12:07

## 2023-06-14 RX ADMIN — HEPARIN 500 UNITS: 100 SYRINGE at 14:12

## 2023-06-14 RX ADMIN — SODIUM CHLORIDE 250 ML: 9 INJECTION, SOLUTION INTRAVENOUS at 11:41

## 2023-06-14 RX ADMIN — FAMOTIDINE 20 MG: 10 INJECTION INTRAVENOUS at 11:50

## 2023-06-14 RX ADMIN — Medication 10 ML: at 14:12

## 2023-07-26 ENCOUNTER — PRE-ADMISSION TESTING (OUTPATIENT)
Dept: PREADMISSION TESTING | Facility: HOSPITAL | Age: 73
End: 2023-07-26
Payer: MEDICARE

## 2023-07-26 LAB
ALBUMIN SERPL-MCNC: 4.5 G/DL (ref 3.5–5.2)
ALBUMIN/GLOB SERPL: 2 G/DL
ALP SERPL-CCNC: 97 U/L (ref 39–117)
ALT SERPL W P-5'-P-CCNC: 8 U/L (ref 1–33)
ANION GAP SERPL CALCULATED.3IONS-SCNC: 9 MMOL/L (ref 5–15)
AST SERPL-CCNC: 15 U/L (ref 1–32)
BILIRUB SERPL-MCNC: 0.4 MG/DL (ref 0–1.2)
BUN SERPL-MCNC: 20 MG/DL (ref 8–23)
BUN/CREAT SERPL: 23.8 (ref 7–25)
CALCIUM SPEC-SCNC: 9.2 MG/DL (ref 8.6–10.5)
CHLORIDE SERPL-SCNC: 104 MMOL/L (ref 98–107)
CO2 SERPL-SCNC: 27 MMOL/L (ref 22–29)
CREAT SERPL-MCNC: 0.84 MG/DL (ref 0.57–1)
DEPRECATED RDW RBC AUTO: 47.8 FL (ref 37–54)
EGFRCR SERPLBLD CKD-EPI 2021: 73.9 ML/MIN/1.73
ERYTHROCYTE [DISTWIDTH] IN BLOOD BY AUTOMATED COUNT: 13.2 % (ref 12.3–15.4)
GLOBULIN UR ELPH-MCNC: 2.2 GM/DL
GLUCOSE SERPL-MCNC: 96 MG/DL (ref 65–99)
HCT VFR BLD AUTO: 40.2 % (ref 34–46.6)
HGB BLD-MCNC: 12.4 G/DL (ref 12–15.9)
MCH RBC QN AUTO: 30.4 PG (ref 26.6–33)
MCHC RBC AUTO-ENTMCNC: 30.8 G/DL (ref 31.5–35.7)
MCV RBC AUTO: 98.5 FL (ref 79–97)
PLATELET # BLD AUTO: 238 10*3/MM3 (ref 140–450)
PMV BLD AUTO: 9.2 FL (ref 6–12)
POTASSIUM SERPL-SCNC: 4.9 MMOL/L (ref 3.5–5.2)
PROT SERPL-MCNC: 6.7 G/DL (ref 6–8.5)
QT INTERVAL: 366 MS
QTC INTERVAL: 425 MS
RBC # BLD AUTO: 4.08 10*6/MM3 (ref 3.77–5.28)
SODIUM SERPL-SCNC: 140 MMOL/L (ref 136–145)
WBC NRBC COR # BLD: 6.48 10*3/MM3 (ref 3.4–10.8)

## 2023-07-26 PROCEDURE — 93010 ELECTROCARDIOGRAM REPORT: CPT | Performed by: INTERNAL MEDICINE

## 2023-07-26 PROCEDURE — 80053 COMPREHEN METABOLIC PANEL: CPT

## 2023-07-26 PROCEDURE — 85027 COMPLETE CBC AUTOMATED: CPT

## 2023-07-26 PROCEDURE — 36415 COLL VENOUS BLD VENIPUNCTURE: CPT

## 2023-07-26 PROCEDURE — 93005 ELECTROCARDIOGRAM TRACING: CPT

## 2023-07-27 ENCOUNTER — HOSPITAL ENCOUNTER (OUTPATIENT)
Dept: ONCOLOGY | Facility: HOSPITAL | Age: 73
Discharge: HOME | End: 2023-07-27
Payer: MEDICARE

## 2023-07-27 ENCOUNTER — OFFICE VISIT (OUTPATIENT)
Dept: ONCOLOGY | Facility: CLINIC | Age: 73
End: 2023-07-27
Payer: MEDICARE

## 2023-07-27 VITALS
DIASTOLIC BLOOD PRESSURE: 84 MMHG | SYSTOLIC BLOOD PRESSURE: 153 MMHG | BODY MASS INDEX: 36.35 KG/M2 | HEART RATE: 83 BPM | TEMPERATURE: 97.1 F | RESPIRATION RATE: 16 BRPM | OXYGEN SATURATION: 92 % | HEIGHT: 64 IN | WEIGHT: 212.9 LBS

## 2023-07-27 DIAGNOSIS — Z17.0 MALIGNANT NEOPLASM OF RIGHT BREAST IN FEMALE, ESTROGEN RECEPTOR POSITIVE, UNSPECIFIED SITE OF BREAST: Primary | ICD-10-CM

## 2023-07-27 DIAGNOSIS — Z45.2 ENCOUNTER FOR CARE RELATED TO PORT-A-CATH: ICD-10-CM

## 2023-07-27 DIAGNOSIS — C50.911 MALIGNANT NEOPLASM OF RIGHT BREAST IN FEMALE, ESTROGEN RECEPTOR POSITIVE, UNSPECIFIED SITE OF BREAST: Primary | ICD-10-CM

## 2023-07-27 PROCEDURE — 3077F SYST BP >= 140 MM HG: CPT | Performed by: INTERNAL MEDICINE

## 2023-07-27 PROCEDURE — 96413 CHEMO IV INFUSION 1 HR: CPT

## 2023-07-27 PROCEDURE — 1126F AMNT PAIN NOTED NONE PRSNT: CPT | Performed by: INTERNAL MEDICINE

## 2023-07-27 PROCEDURE — 25010000002 HEPARIN LOCK FLUSH PER 10 UNITS: Performed by: INTERNAL MEDICINE

## 2023-07-27 PROCEDURE — 3079F DIAST BP 80-89 MM HG: CPT | Performed by: INTERNAL MEDICINE

## 2023-07-27 PROCEDURE — 99214 OFFICE O/P EST MOD 30 MIN: CPT | Performed by: INTERNAL MEDICINE

## 2023-07-27 PROCEDURE — 25010000002 TRASTUZUMAB-QYYP 420 MG RECONSTITUTED SOLUTION 1 EACH VIAL: Performed by: NURSE PRACTITIONER

## 2023-07-27 RX ORDER — SODIUM CHLORIDE 9 MG/ML
250 INJECTION, SOLUTION INTRAVENOUS ONCE
OUTPATIENT
Start: 2023-08-16

## 2023-07-27 RX ORDER — SODIUM CHLORIDE 0.9 % (FLUSH) 0.9 %
10 SYRINGE (ML) INJECTION AS NEEDED
OUTPATIENT
Start: 2023-07-27

## 2023-07-27 RX ORDER — DICLOFENAC SODIUM 75 MG/1
TABLET, DELAYED RELEASE ORAL
COMMUNITY

## 2023-07-27 RX ORDER — BETAMETHASONE DIPROPIONATE 0.5 MG/G
CREAM TOPICAL
COMMUNITY

## 2023-07-27 RX ORDER — SODIUM CHLORIDE 9 MG/ML
250 INJECTION, SOLUTION INTRAVENOUS ONCE
Status: COMPLETED | OUTPATIENT
Start: 2023-07-27 | End: 2023-07-27

## 2023-07-27 RX ORDER — IPRATROPIUM BROMIDE 42 UG/1
SPRAY, METERED NASAL
COMMUNITY

## 2023-07-27 RX ORDER — SODIUM CHLORIDE 0.9 % (FLUSH) 0.9 %
10 SYRINGE (ML) INJECTION AS NEEDED
Status: DISCONTINUED | OUTPATIENT
Start: 2023-07-27 | End: 2023-07-28 | Stop reason: HOSPADM

## 2023-07-27 RX ORDER — OXYCODONE AND ACETAMINOPHEN 7.5; 325 MG/1; MG/1
TABLET ORAL
COMMUNITY

## 2023-07-27 RX ORDER — HEPARIN SODIUM (PORCINE) LOCK FLUSH IV SOLN 100 UNIT/ML 100 UNIT/ML
500 SOLUTION INTRAVENOUS AS NEEDED
Status: DISCONTINUED | OUTPATIENT
Start: 2023-07-27 | End: 2023-07-28 | Stop reason: HOSPADM

## 2023-07-27 RX ORDER — CARISOPRODOL 350 MG/1
TABLET ORAL
COMMUNITY

## 2023-07-27 RX ORDER — HEPARIN SODIUM (PORCINE) LOCK FLUSH IV SOLN 100 UNIT/ML 100 UNIT/ML
500 SOLUTION INTRAVENOUS AS NEEDED
OUTPATIENT
Start: 2023-07-27

## 2023-07-27 RX ORDER — CEPHALEXIN 500 MG/1
CAPSULE ORAL
COMMUNITY

## 2023-07-27 RX ADMIN — SODIUM CHLORIDE 250 ML: 9 INJECTION, SOLUTION INTRAVENOUS at 09:58

## 2023-07-27 RX ADMIN — HEPARIN 500 UNITS: 100 SYRINGE at 10:39

## 2023-07-27 RX ADMIN — SODIUM CHLORIDE 570 MG: 9 INJECTION, SOLUTION INTRAVENOUS at 10:02

## 2023-07-27 RX ADMIN — Medication 10 ML: at 10:39

## 2023-07-27 NOTE — PROGRESS NOTES
"      PROBLEM LIST:  1. [id number] ER+ (50%) ID negative Her2 positive (3+) Invasive ductal carcinoma of the right breast  A) biopsy of a 1.2 cm mass in the lower outer quadrant on 3/14/23 showed a high grade IDC.  No concerning lymph nodes on imaging.  B) neoadjuvant chemotherapy with Taxol and Herceptin started on 4/12/2023  2. Hypertension  3. Depression  4. MOOKIE  5. Hyperlipidemia    Subjective     CHIEF COMPLAINT: breast cancer    HISTORY OF PRESENT ILLNESS:   Laura Churchill returns for follow-up.  She says she is doing pretty well.  She still has a lot of fatigue.  Her hair has been falling out more.  Her surgery is scheduled for next week.        Objective      /84   Pulse 83   Temp 97.1 °F (36.2 °C) (Infrared)   Resp 16   Ht 162.6 cm (64.02\")   Wt 96.6 kg (212 lb 14.4 oz)   LMP  (LMP Unknown)   SpO2 92%   BMI 36.53 kg/m²    Vitals:    07/27/23 0854   PainSc: 0-No pain             General: well appearing female in no acute distress  Neuro: alert and oriented  HEENT: sclera anicteric, oropharynx clear  Cardiovascular: regular rate and rhythm, no murmurs  Lungs: clear to auscultation bilaterally  Abdomen: soft, nontender, nondistended.  No palpable organomegaly  Extremeties: no lower extremity edema  Skin: no rashes, lesions, bruising, or petechiae  Psych: mood and affect appropriate            RECENT LABS:  Lab Results   Component Value Date    WBC 6.48 07/26/2023    HGB 12.4 07/26/2023    HCT 40.2 07/26/2023    MCV 98.5 (H) 07/26/2023     07/26/2023       Lab Results   Component Value Date    GLUCOSE 96 07/26/2023    BUN 20 07/26/2023    CREATININE 0.84 07/26/2023    EGFRIFNONA 66 09/28/2021    BCR 23.8 07/26/2023    K 4.9 07/26/2023    CO2 27.0 07/26/2023    CALCIUM 9.2 07/26/2023    ALBUMIN 4.5 07/26/2023    AST 15 07/26/2023    ALT 8 07/26/2023       Adult Transthoracic Echo Limited W/ Cont if Necessary Per Protocol    Left ventricular systolic function is normal. Calculated left "   ventricular EF = 57%    Left ventricular wall thickness is consistent with mild concentric   hypertrophy.    Normal global longitudinal LV strain (GLS) = -17.1%    The left atrial cavity is mildly dilated.    Stable compared to previous study.              ASSESSMENT AND PLAN:     Laura Churchill is a 72 y.o. female with a [id number] Er+ Her2+ invasive ductal carcinoma of the right breast.    She has completed Taxol and Herceptin.  She will receive a dose of Herceptin today.  Surgery is scheduled for next week.  We will plan to continue postoperative HER2 targeted therapy with either Herceptin or Kadcyla based on her pathology results.    Recent echo was stable.  Plan to repeat in October.    Follow-up in 3 weeks.    Total time of patient care on day of service including time prior to, face to face with patient, and following visit spent in reviewing records, lab results,discussion with patient, and documentation/charting was > 31 minutes.                          Sharonda Hopknis MD  [facility] Hematology and Oncology    7/27/2023          CC:

## 2023-08-03 ENCOUNTER — TRANSCRIBE ORDERS (OUTPATIENT)
Dept: ADMINISTRATIVE | Facility: HOSPITAL | Age: 73
End: 2023-08-03
Payer: MEDICARE

## 2023-08-03 DIAGNOSIS — C50.511 MALIGNANT NEOPLASM OF LOWER-OUTER QUADRANT OF RIGHT FEMALE BREAST, UNSPECIFIED ESTROGEN RECEPTOR STATUS: Primary | ICD-10-CM

## 2023-08-04 ENCOUNTER — LAB REQUISITION (OUTPATIENT)
Dept: LAB | Facility: HOSPITAL | Age: 73
End: 2023-08-04
Payer: MEDICARE

## 2023-08-04 ENCOUNTER — HOSPITAL ENCOUNTER (OUTPATIENT)
Dept: MAMMOGRAPHY | Facility: HOSPITAL | Age: 73
Discharge: HOME | End: 2023-08-04
Payer: MEDICARE

## 2023-08-04 ENCOUNTER — HOSPITAL ENCOUNTER (OUTPATIENT)
Dept: NUCLEAR MEDICINE | Facility: HOSPITAL | Age: 73
Discharge: HOME | End: 2023-08-04
Payer: MEDICARE

## 2023-08-04 ENCOUNTER — HOSPITAL ENCOUNTER (OUTPATIENT)
Dept: ULTRASOUND IMAGING | Facility: HOSPITAL | Age: 73
Discharge: HOME | End: 2023-08-04
Payer: MEDICARE

## 2023-08-04 DIAGNOSIS — C50.511 MALIGNANT NEOPLASM OF LOWER-OUTER QUADRANT OF RIGHT FEMALE BREAST, UNSPECIFIED ESTROGEN RECEPTOR STATUS: ICD-10-CM

## 2023-08-04 DIAGNOSIS — C50.511 MALIGNANT NEOPLASM OF LOWER-OUTER QUADRANT OF RIGHT FEMALE BREAST: ICD-10-CM

## 2023-08-04 PROCEDURE — 76098 X-RAY EXAM SURGICAL SPECIMEN: CPT

## 2023-08-04 PROCEDURE — 38792 RA TRACER ID OF SENTINL NODE: CPT

## 2023-08-04 PROCEDURE — C1819 TISSUE LOCALIZATION-EXCISION: HCPCS

## 2023-08-04 PROCEDURE — 0 LIDOCAINE 1 % SOLUTION: Performed by: RADIOLOGY

## 2023-08-04 PROCEDURE — A9541 TC99M SULFUR COLLOID: HCPCS | Performed by: SURGERY

## 2023-08-04 PROCEDURE — 0 TECHNETIUM FILTERED SULFUR COLLOID: Performed by: SURGERY

## 2023-08-04 RX ORDER — LIDOCAINE HYDROCHLORIDE 10 MG/ML
5 INJECTION, SOLUTION INFILTRATION; PERINEURAL ONCE
Status: COMPLETED | OUTPATIENT
Start: 2023-08-04 | End: 2023-08-04

## 2023-08-04 RX ADMIN — TECHNETIUM TC 99M SULFUR COLLOID 1 DOSE: KIT at 11:50

## 2023-08-04 RX ADMIN — Medication 5 ML: at 08:25

## 2023-08-07 LAB — REF LAB TEST METHOD: NORMAL

## 2023-08-09 ENCOUNTER — OFFICE VISIT (OUTPATIENT)
Dept: RADIATION ONCOLOGY | Facility: HOSPITAL | Age: 73
End: 2023-08-09
Payer: MEDICARE

## 2023-08-09 ENCOUNTER — HOSPITAL ENCOUNTER (OUTPATIENT)
Dept: RADIATION ONCOLOGY | Facility: HOSPITAL | Age: 73
Setting detail: RADIATION/ONCOLOGY SERIES
Discharge: HOME | End: 2023-08-09
Payer: MEDICARE

## 2023-08-09 VITALS
DIASTOLIC BLOOD PRESSURE: 88 MMHG | HEIGHT: 64 IN | RESPIRATION RATE: 20 BRPM | HEART RATE: 87 BPM | SYSTOLIC BLOOD PRESSURE: 149 MMHG | BODY MASS INDEX: 36.58 KG/M2 | WEIGHT: 214.3 LBS

## 2023-08-09 DIAGNOSIS — Z17.0 MALIGNANT NEOPLASM OF LOWER-OUTER QUADRANT OF RIGHT BREAST OF FEMALE, ESTROGEN RECEPTOR POSITIVE: Primary | ICD-10-CM

## 2023-08-09 DIAGNOSIS — C50.511 MALIGNANT NEOPLASM OF LOWER-OUTER QUADRANT OF RIGHT BREAST OF FEMALE, ESTROGEN RECEPTOR POSITIVE: Primary | ICD-10-CM

## 2023-08-09 PROCEDURE — G0463 HOSPITAL OUTPT CLINIC VISIT: HCPCS | Performed by: RADIOLOGY

## 2023-08-09 NOTE — PROGRESS NOTES
CONSULTATION NOTE    NAME:      Laura Churchill  :                                                          1950  DATE OF CONSULTATION:                       23  REQUESTING PHYSICIAN:                   Delfina Saleh MD  REASON FOR CONSULTATION:           Evaluation and discussion of management options for pathologic yT0 N0 M0 stage 0 triple positive invasive ductal carcinoma of the right breast   Cancer Staging   Malignant neoplasm of right breast in female, estrogen receptor positive  Staging form: Breast, AJCC 8th Edition  - Clinical: Stage IA (cT1c, cN0, cM0, G2, ER+, MD-, HER2+) - Signed by Sharonda Hopkins MD on 3/22/2023           BRIEF HISTORY:  Laura Churchill  is a very pleasant 72 y.o. female found on imaging to have a 1.2 cm irregular mass at the 830 9:00 region of the right breast, lower outer quadrant.  Biopsy was positive for invasive ductal carcinoma, poorly differentiated grade 3.  Breast MRI revealed no evidence of malignancy in the left breast, no evidence of axillary or internal mammary lymphadenopathy and no multifocal or multitcentric disease in the right breast.  Ms. Churchill underwent neoadjuvant chemotherapy of Taxol and Herceptin per Dr. Sharonda Hopkins.  2023 she had right breast lumpectomy and was found to have no residual disease.  Ms. Churchill is here to discuss postoperative radiation.  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  They live in [place].      Age at menarche:  14   Age at menopause:  48  Hormone replacement therapy:  yes, x 1 year and stopped  Personal history of breast cancer:  no  Family history of breast cancer:  no  Radiation to chest before age of 30:  no  Age of first live birth:  P0    BMI:  Body mass index is 36.78 kg/mý.      Social History     Substance and Sexual Activity   Alcohol Use Yes    Alcohol/week: 1.0 standard drink    Types: 1 Glasses of wine per week    Comment: I seldom have a drink of any kind.        Allergies   Allergen Reactions    Codeine Hives    Gabapentin Hallucinations     Screaming nightmares    Sulfa Antibiotics Itching       Social History     Tobacco Use    Smoking status: Former     Packs/day: 0.25     Years: 0.50     Pack years: 0.13     Types: Cigarettes     Start date: 1977     Quit date: 1978     Years since quittin.6    Smokeless tobacco: Never   Vaping Use    Vaping Use: Never used   Substance Use Topics    Alcohol use: Yes     Alcohol/week: 1.0 standard drink     Types: 1 Glasses of wine per week     Comment: I seldom have a drink of any kind.    Drug use: No         Past Medical History:   Diagnosis Date    Allergic     Anesthesia     low BP with spinal surgery in , patient was hospitalized a few extra days due to low Bp    Anxiety     Atrophic vaginitis     Bilateral hip pain     Cancer     Cholelithiasis not sure    Depression     Hypertension     Low back pain     Malignant neoplasm of right breast in female, estrogen receptor positive 3/22/2023    Mixed hyperlipidemia     Muscle spasm     Obesity due to excess calories     MOOKIE (obstructive sleep apnea)     NO CPAP USE    Osteoarthritis     PONV (postoperative nausea and vomiting)     preprocedural meds help and are needed    Tendinitis of right shoulder     Vertigo        family history includes Alzheimer's disease in her mother; Arthritis in her father and sister; Bone cancer in her mother; Cancer in her father; Depression in her sister; Diabetes in her father; Heart failure in her father; Hyperlipidemia in her father and sister; Kidney disease in her father; Melanoma in her father; Other in her mother; Ovarian cancer (age of onset: 75) in her maternal aunt; Prostate cancer in her father.     Past Surgical History:   Procedure Laterality Date    BARIATRIC SURGERY      CHOLECYSTECTOMY      COLONOSCOPY      Had negative result for Cologard in     ENDOSCOPY      GASTRIC BANDING REMOVAL      GASTRIC SLEEVE  "LAPAROSCOPIC  2011    HIP PINNING Left     3 pins    INTERVENTIONAL RADIOLOGY PROCEDURE N/A 05/27/2020    Procedure: IR myelogram, lumbar;  Surgeon: Jason Rodriguez MD;  Location:  [facility];  Service: Interventional Radiology;  Laterality: N/A;    JOINT REPLACEMENT  Left shoulder replacement    2017    LAPAROSCOPIC GASTRIC BANDING      LAPAROTOMY OOPHERECTOMY Right     LUMBAR DISCECTOMY FUSION INSTRUMENTATION N/A 11/20/2018    Procedure: L3-4 LUMBAR LAMINECTOMY POSTERIOR LUMBAR INTERBODY FUSION PILF;  Surgeon: David Rider MD;  Location:  [facility] OR;  Service: Neurosurgery    LUMBAR DISCECTOMY FUSION INSTRUMENTATION N/A 07/21/2020    Procedure: L2-3 PLIF, exploration of L3-4 fusion, L2-4 fusion, L2 laminectomy;  Surgeon: David Rider MD;  Location:  [facility] OR;  Service: Neurosurgery;  Laterality: N/A;    VA ARTHROPLASTY GLENOHUMERAL JOINT TOTAL SHOULDER Left 07/10/2017    Procedure: LEFT TOTAL SHOULDER ARTHROPLASTY ;  Surgeon: Almas Miller MD;  Location:  [facility] OR;  Service: Orthopedics    SPINE SURGERY  L3 fused to L4,5    2018    TOTAL KNEE ARTHROPLASTY Left 01/16/2020    Procedure: TOTAL KNEE REPLACEMENT LEFT;  Surgeon: Houston Castano MD;  Location:  [facility] OR;  Service: Orthopedics    TOTAL KNEE ARTHROPLASTY Right 09/22/2022    Procedure: TOTAL KNEE ARTHROPLASTY RIGHT;  Surgeon: Houston Castano MD;  Location:  [facility] OR;  Service: Orthopedics;  Laterality: Right;    WISDOM TOOTH EXTRACTION          Review of Systems   Constitutional:  Positive for fatigue.   Gastrointestinal:  Positive for diarrhea (chronic, intermittent).   All other systems reviewed and are negative.        Objective   VITAL SIGNS:   Vitals:    08/09/23 1018   BP: 149/88   Pulse: 87   Resp: 20   Weight: 97.2 kg (214 lb 4.8 oz)   Height: 162.6 cm (64\")   PainSc: 0-No pain        KPS       90%    Physical Exam  Vitals reviewed.   Constitutional:       Appearance: Normal " appearance.   Cardiovascular:      Rate and Rhythm: Normal rate.   Pulmonary:      Effort: Pulmonary effort is normal.   Neurological:      Mental Status: She is alert.   The breast exam reveals the left breast is negative.  The right breast has a well healing surgical incision in the axilla and in the lower outer quadrant.  Both are associated with ecchymosis.         The following portions of the patient's history were reviewed and updated as appropriate: allergies, current medications, past family history, past medical history, past social history, past surgical history, and problem list.    Assessment      IMPRESSION:    Laura Churchill  is a 72 y.o. female found who underwent neoadjuvant chemotherapy of Taxol and Herceptin per Dr. Sharonda Hopkins.  8/4/2023 she had right breast lumpectomy and was found to have no residual disease.  Ms. Churchill is here to discuss postoperative radiation.        RECOMMENDATIONS: I recommend postoperative radiation to decrease the risk of local recurrence.  The pros and cons, risks and benefits of treatment were discussed and informed consent obtained.  She will return in 4 weeks for treatment planning.  We will deliver 40.05 Gray in 15 fractions or 45 Quick in 25 fractions and no tumor bed boost.  It is a pleasure to meet and Mrs. Churchill.               Lucina Oseguera MD    Total time of patient care on day of service including time prior to, face to face with patient, and following visit spent in reviewing records, lab results, imaging studies, discussion with patient, and documentation/charting was > 45 minutes.    Errors in dictation may reflect use of voice recognition software and not all errors in transcription may have been detected prior to signing.

## 2023-08-15 NOTE — PROGRESS NOTES
"      PROBLEM LIST:  1. [id number] ER+ (50%) ND negative Her2 positive (3+) Invasive ductal carcinoma of the right breast  A) biopsy of a 1.2 cm mass in the lower outer quadrant on 3/14/23 showed a high grade IDC.  No concerning lymph nodes on imaging.  B) neoadjuvant chemotherapy with Taxol and Herceptin started on 4/12/2023  C) right breast lumpectomy on 8/4/23 showed no residual cancer in the breast or 1 SLN.  2. Hypertension  3. Depression  4. MOOKIE  5. Hyperlipidemia    Subjective     CHIEF COMPLAINT: breast cancer    HISTORY OF PRESENT ILLNESS:   Laura Churchill returns for follow-up.  She had surgery 2 weeks ago, showing no residual disease in the breast or lymph nodes.  She is recovering well, a little sore in the axilla.        Objective      /78   Pulse 92   Temp 97.6 øF (36.4 øC)   Resp 18   Ht 162.6 cm (64\")   Wt 97.5 kg (215 lb)   LMP  (LMP Unknown)   SpO2 96%   BMI 36.90 kg/mý    Vitals:    08/16/23 0943   PainSc: 0-No pain               General: well appearing female in no acute distress  Neuro: alert and oriented  HEENT: sclera anicteric, oropharynx clear  Extremeties: no lower extremity edema  Skin: no rashes, lesions, bruising, or petechiae  Psych: mood and affect appropriate            RECENT LABS:  Lab Results   Component Value Date    WBC 6.63 08/16/2023    HGB 12.4 08/16/2023    HCT 38.5 08/16/2023    MCV 97.5 (H) 08/16/2023     08/16/2023       Lab Results   Component Value Date    GLUCOSE 96 07/26/2023    BUN 20 07/26/2023    CREATININE 0.84 07/26/2023    EGFRIFNONA 66 09/28/2021    BCR 23.8 07/26/2023    K 4.9 07/26/2023    CO2 27.0 07/26/2023    CALCIUM 9.2 07/26/2023    ALBUMIN 4.5 07/26/2023    AST 15 07/26/2023    ALT 8 07/26/2023       NM Sage Node Injection Only  This procedure was auto-finalized with no dictation required.              ASSESSMENT AND PLAN:     Laura Churchill is a 72 y.o. female with a [id number] Er+ Her2+ invasive ductal carcinoma of the right " breast.    Her pathology shows a complete pathologic response.  We will plan to continue maintenance treatment with herceptin for a total of 1 year.    She is a candidate for adjuvant radiotherapy and has an appointment to meet with Dr. Oseguera in a few weeks.  We will plan to start anastrozole following radiation treatment.    Recent echo was stable.  Plan to repeat in October.    Follow-up in 6 weeks.                              Sharonda Hopkins MD  [facility] Hematology and Oncology    8/16/2023          CC:

## 2023-08-16 ENCOUNTER — OFFICE VISIT (OUTPATIENT)
Dept: ONCOLOGY | Facility: CLINIC | Age: 73
End: 2023-08-16
Payer: MEDICARE

## 2023-08-16 ENCOUNTER — HOSPITAL ENCOUNTER (OUTPATIENT)
Dept: ONCOLOGY | Facility: HOSPITAL | Age: 73
Discharge: HOME | End: 2023-08-16
Payer: MEDICARE

## 2023-08-16 VITALS
RESPIRATION RATE: 18 BRPM | SYSTOLIC BLOOD PRESSURE: 145 MMHG | HEIGHT: 64 IN | BODY MASS INDEX: 36.7 KG/M2 | TEMPERATURE: 97.6 F | WEIGHT: 215 LBS | HEART RATE: 92 BPM | OXYGEN SATURATION: 96 % | DIASTOLIC BLOOD PRESSURE: 78 MMHG

## 2023-08-16 DIAGNOSIS — Z17.0 MALIGNANT NEOPLASM OF RIGHT BREAST IN FEMALE, ESTROGEN RECEPTOR POSITIVE, UNSPECIFIED SITE OF BREAST: Primary | ICD-10-CM

## 2023-08-16 DIAGNOSIS — C50.911 MALIGNANT NEOPLASM OF RIGHT BREAST IN FEMALE, ESTROGEN RECEPTOR POSITIVE, UNSPECIFIED SITE OF BREAST: Primary | ICD-10-CM

## 2023-08-16 DIAGNOSIS — Z45.2 ENCOUNTER FOR CARE RELATED TO PORT-A-CATH: ICD-10-CM

## 2023-08-16 LAB
ALBUMIN SERPL-MCNC: 4.1 G/DL (ref 3.5–5.2)
ALBUMIN/GLOB SERPL: 1.4 G/DL
ALP SERPL-CCNC: 106 U/L (ref 39–117)
ALT SERPL W P-5'-P-CCNC: 6 U/L (ref 1–33)
ANION GAP SERPL CALCULATED.3IONS-SCNC: 12 MMOL/L (ref 5–15)
AST SERPL-CCNC: 17 U/L (ref 1–32)
BASOPHILS # BLD AUTO: 0.03 10*3/MM3 (ref 0–0.2)
BASOPHILS NFR BLD AUTO: 0.5 % (ref 0–1.5)
BILIRUB SERPL-MCNC: 0.3 MG/DL (ref 0–1.2)
BUN SERPL-MCNC: 16 MG/DL (ref 8–23)
BUN/CREAT SERPL: 19.5 (ref 7–25)
CALCIUM SPEC-SCNC: 9.1 MG/DL (ref 8.6–10.5)
CHLORIDE SERPL-SCNC: 106 MMOL/L (ref 98–107)
CO2 SERPL-SCNC: 23 MMOL/L (ref 22–29)
CREAT SERPL-MCNC: 0.82 MG/DL (ref 0.57–1)
DEPRECATED RDW RBC AUTO: 44.3 FL (ref 37–54)
EGFRCR SERPLBLD CKD-EPI 2021: 76.1 ML/MIN/1.73
EOSINOPHIL # BLD AUTO: 0.13 10*3/MM3 (ref 0–0.4)
EOSINOPHIL NFR BLD AUTO: 2 % (ref 0.3–6.2)
ERYTHROCYTE [DISTWIDTH] IN BLOOD BY AUTOMATED COUNT: 12.2 % (ref 12.3–15.4)
GLOBULIN UR ELPH-MCNC: 3 GM/DL
GLUCOSE SERPL-MCNC: 123 MG/DL (ref 65–99)
HCT VFR BLD AUTO: 38.5 % (ref 34–46.6)
HGB BLD-MCNC: 12.4 G/DL (ref 12–15.9)
IMM GRANULOCYTES # BLD AUTO: 0.01 10*3/MM3 (ref 0–0.05)
IMM GRANULOCYTES NFR BLD AUTO: 0.2 % (ref 0–0.5)
LYMPHOCYTES # BLD AUTO: 1.09 10*3/MM3 (ref 0.7–3.1)
LYMPHOCYTES NFR BLD AUTO: 16.4 % (ref 19.6–45.3)
MCH RBC QN AUTO: 31.4 PG (ref 26.6–33)
MCHC RBC AUTO-ENTMCNC: 32.2 G/DL (ref 31.5–35.7)
MCV RBC AUTO: 97.5 FL (ref 79–97)
MONOCYTES # BLD AUTO: 0.47 10*3/MM3 (ref 0.1–0.9)
MONOCYTES NFR BLD AUTO: 7.1 % (ref 5–12)
NEUTROPHILS NFR BLD AUTO: 4.9 10*3/MM3 (ref 1.7–7)
NEUTROPHILS NFR BLD AUTO: 73.8 % (ref 42.7–76)
PLATELET # BLD AUTO: 230 10*3/MM3 (ref 140–450)
PMV BLD AUTO: 9.3 FL (ref 6–12)
POTASSIUM SERPL-SCNC: 4.5 MMOL/L (ref 3.5–5.2)
PROT SERPL-MCNC: 7.1 G/DL (ref 6–8.5)
RBC # BLD AUTO: 3.95 10*6/MM3 (ref 3.77–5.28)
SODIUM SERPL-SCNC: 141 MMOL/L (ref 136–145)
WBC NRBC COR # BLD: 6.63 10*3/MM3 (ref 3.4–10.8)

## 2023-08-16 PROCEDURE — 1126F AMNT PAIN NOTED NONE PRSNT: CPT | Performed by: INTERNAL MEDICINE

## 2023-08-16 PROCEDURE — 85025 COMPLETE CBC W/AUTO DIFF WBC: CPT | Performed by: INTERNAL MEDICINE

## 2023-08-16 PROCEDURE — 25010000002 TRASTUZUMAB-QYYP 420 MG RECONSTITUTED SOLUTION 1 EACH VIAL: Performed by: INTERNAL MEDICINE

## 2023-08-16 PROCEDURE — 99214 OFFICE O/P EST MOD 30 MIN: CPT | Performed by: INTERNAL MEDICINE

## 2023-08-16 PROCEDURE — 80053 COMPREHEN METABOLIC PANEL: CPT | Performed by: INTERNAL MEDICINE

## 2023-08-16 PROCEDURE — 25010000002 HEPARIN LOCK FLUSH PER 10 UNITS: Performed by: INTERNAL MEDICINE

## 2023-08-16 PROCEDURE — 3077F SYST BP >= 140 MM HG: CPT | Performed by: INTERNAL MEDICINE

## 2023-08-16 PROCEDURE — 3078F DIAST BP <80 MM HG: CPT | Performed by: INTERNAL MEDICINE

## 2023-08-16 PROCEDURE — 96413 CHEMO IV INFUSION 1 HR: CPT

## 2023-08-16 RX ORDER — HEPARIN SODIUM (PORCINE) LOCK FLUSH IV SOLN 100 UNIT/ML 100 UNIT/ML
500 SOLUTION INTRAVENOUS AS NEEDED
OUTPATIENT
Start: 2023-08-16

## 2023-08-16 RX ORDER — SODIUM CHLORIDE 0.9 % (FLUSH) 0.9 %
10 SYRINGE (ML) INJECTION AS NEEDED
OUTPATIENT
Start: 2023-08-16

## 2023-08-16 RX ORDER — SODIUM CHLORIDE 9 MG/ML
250 INJECTION, SOLUTION INTRAVENOUS ONCE
OUTPATIENT
Start: 2023-09-27

## 2023-08-16 RX ORDER — HEPARIN SODIUM (PORCINE) LOCK FLUSH IV SOLN 100 UNIT/ML 100 UNIT/ML
500 SOLUTION INTRAVENOUS AS NEEDED
Status: DISCONTINUED | OUTPATIENT
Start: 2023-08-16 | End: 2023-08-17 | Stop reason: HOSPADM

## 2023-08-16 RX ORDER — SODIUM CHLORIDE 9 MG/ML
250 INJECTION, SOLUTION INTRAVENOUS ONCE
OUTPATIENT
Start: 2023-09-06

## 2023-08-16 RX ORDER — SODIUM CHLORIDE 9 MG/ML
250 INJECTION, SOLUTION INTRAVENOUS ONCE
Status: COMPLETED | OUTPATIENT
Start: 2023-08-16 | End: 2023-08-16

## 2023-08-16 RX ADMIN — HEPARIN 500 UNITS: 100 SYRINGE at 11:38

## 2023-08-16 RX ADMIN — SODIUM CHLORIDE 250 ML: 9 INJECTION, SOLUTION INTRAVENOUS at 11:00

## 2023-08-16 RX ADMIN — SODIUM CHLORIDE 570 MG: 9 INJECTION, SOLUTION INTRAVENOUS at 11:02

## 2023-08-21 NOTE — TELEPHONE ENCOUNTER
Pt returned call on this date to schedule ACP.  SW, pt and her  will meet next Friday 4/28 at noon to complete documentation.    21-Aug-2023 16:32

## 2023-09-06 ENCOUNTER — HOSPITAL ENCOUNTER (OUTPATIENT)
Dept: ONCOLOGY | Facility: HOSPITAL | Age: 73
Discharge: HOME | End: 2023-09-06
Admitting: INTERNAL MEDICINE
Payer: MEDICARE

## 2023-09-06 VITALS
RESPIRATION RATE: 18 BRPM | HEART RATE: 94 BPM | SYSTOLIC BLOOD PRESSURE: 167 MMHG | WEIGHT: 214 LBS | HEIGHT: 64 IN | DIASTOLIC BLOOD PRESSURE: 75 MMHG | TEMPERATURE: 96.1 F | BODY MASS INDEX: 36.54 KG/M2

## 2023-09-06 DIAGNOSIS — Z17.0 MALIGNANT NEOPLASM OF RIGHT BREAST IN FEMALE, ESTROGEN RECEPTOR POSITIVE, UNSPECIFIED SITE OF BREAST: ICD-10-CM

## 2023-09-06 DIAGNOSIS — C50.911 MALIGNANT NEOPLASM OF RIGHT BREAST IN FEMALE, ESTROGEN RECEPTOR POSITIVE, UNSPECIFIED SITE OF BREAST: ICD-10-CM

## 2023-09-06 DIAGNOSIS — Z45.2 ENCOUNTER FOR CARE RELATED TO PORT-A-CATH: Primary | ICD-10-CM

## 2023-09-06 LAB
ALBUMIN SERPL-MCNC: 4.1 G/DL (ref 3.5–5.2)
ALBUMIN/GLOB SERPL: 1.5 G/DL
ALP SERPL-CCNC: 100 U/L (ref 39–117)
ALT SERPL W P-5'-P-CCNC: 7 U/L (ref 1–33)
ANION GAP SERPL CALCULATED.3IONS-SCNC: 11 MMOL/L (ref 5–15)
AST SERPL-CCNC: 17 U/L (ref 1–32)
BASOPHILS # BLD AUTO: 0.03 10*3/MM3 (ref 0–0.2)
BASOPHILS NFR BLD AUTO: 0.4 % (ref 0–1.5)
BILIRUB SERPL-MCNC: 0.4 MG/DL (ref 0–1.2)
BUN SERPL-MCNC: 18 MG/DL (ref 8–23)
BUN/CREAT SERPL: 20.2 (ref 7–25)
CALCIUM SPEC-SCNC: 8.9 MG/DL (ref 8.6–10.5)
CHLORIDE SERPL-SCNC: 105 MMOL/L (ref 98–107)
CO2 SERPL-SCNC: 26 MMOL/L (ref 22–29)
CREAT SERPL-MCNC: 0.89 MG/DL (ref 0.57–1)
DEPRECATED RDW RBC AUTO: 42.2 FL (ref 37–54)
EGFRCR SERPLBLD CKD-EPI 2021: 69 ML/MIN/1.73
EOSINOPHIL # BLD AUTO: 0.15 10*3/MM3 (ref 0–0.4)
EOSINOPHIL NFR BLD AUTO: 1.9 % (ref 0.3–6.2)
ERYTHROCYTE [DISTWIDTH] IN BLOOD BY AUTOMATED COUNT: 11.7 % (ref 12.3–15.4)
GLOBULIN UR ELPH-MCNC: 2.8 GM/DL
GLUCOSE SERPL-MCNC: 96 MG/DL (ref 65–99)
HCT VFR BLD AUTO: 39.3 % (ref 34–46.6)
HGB BLD-MCNC: 12.6 G/DL (ref 12–15.9)
IMM GRANULOCYTES # BLD AUTO: 0.04 10*3/MM3 (ref 0–0.05)
IMM GRANULOCYTES NFR BLD AUTO: 0.5 % (ref 0–0.5)
LYMPHOCYTES # BLD AUTO: 1.5 10*3/MM3 (ref 0.7–3.1)
LYMPHOCYTES NFR BLD AUTO: 19 % (ref 19.6–45.3)
MCH RBC QN AUTO: 30.7 PG (ref 26.6–33)
MCHC RBC AUTO-ENTMCNC: 32.1 G/DL (ref 31.5–35.7)
MCV RBC AUTO: 95.9 FL (ref 79–97)
MONOCYTES # BLD AUTO: 0.51 10*3/MM3 (ref 0.1–0.9)
MONOCYTES NFR BLD AUTO: 6.5 % (ref 5–12)
NEUTROPHILS NFR BLD AUTO: 5.67 10*3/MM3 (ref 1.7–7)
NEUTROPHILS NFR BLD AUTO: 71.7 % (ref 42.7–76)
PLATELET # BLD AUTO: 236 10*3/MM3 (ref 140–450)
PMV BLD AUTO: 9.3 FL (ref 6–12)
POTASSIUM SERPL-SCNC: 4.5 MMOL/L (ref 3.5–5.2)
PROT SERPL-MCNC: 6.9 G/DL (ref 6–8.5)
RBC # BLD AUTO: 4.1 10*6/MM3 (ref 3.77–5.28)
SODIUM SERPL-SCNC: 142 MMOL/L (ref 136–145)
WBC NRBC COR # BLD: 7.9 10*3/MM3 (ref 3.4–10.8)

## 2023-09-06 PROCEDURE — 25010000002 HEPARIN LOCK FLUSH PER 10 UNITS: Performed by: INTERNAL MEDICINE

## 2023-09-06 PROCEDURE — 85025 COMPLETE CBC W/AUTO DIFF WBC: CPT | Performed by: INTERNAL MEDICINE

## 2023-09-06 PROCEDURE — 96365 THER/PROPH/DIAG IV INF INIT: CPT

## 2023-09-06 PROCEDURE — 96413 CHEMO IV INFUSION 1 HR: CPT

## 2023-09-06 PROCEDURE — 80053 COMPREHEN METABOLIC PANEL: CPT | Performed by: INTERNAL MEDICINE

## 2023-09-06 PROCEDURE — 25010000002 TRASTUZUMAB-QYYP 420 MG RECONSTITUTED SOLUTION 1 EACH VIAL: Performed by: INTERNAL MEDICINE

## 2023-09-06 RX ORDER — HEPARIN SODIUM (PORCINE) LOCK FLUSH IV SOLN 100 UNIT/ML 100 UNIT/ML
500 SOLUTION INTRAVENOUS AS NEEDED
OUTPATIENT
Start: 2023-09-06

## 2023-09-06 RX ORDER — SODIUM CHLORIDE 0.9 % (FLUSH) 0.9 %
10 SYRINGE (ML) INJECTION AS NEEDED
Status: DISCONTINUED | OUTPATIENT
Start: 2023-09-06 | End: 2023-09-07 | Stop reason: HOSPADM

## 2023-09-06 RX ORDER — SODIUM CHLORIDE 0.9 % (FLUSH) 0.9 %
10 SYRINGE (ML) INJECTION AS NEEDED
OUTPATIENT
Start: 2023-09-06

## 2023-09-06 RX ORDER — HEPARIN SODIUM (PORCINE) LOCK FLUSH IV SOLN 100 UNIT/ML 100 UNIT/ML
500 SOLUTION INTRAVENOUS AS NEEDED
Status: DISCONTINUED | OUTPATIENT
Start: 2023-09-06 | End: 2023-09-07 | Stop reason: HOSPADM

## 2023-09-06 RX ADMIN — HEPARIN 500 UNITS: 100 SYRINGE at 10:20

## 2023-09-06 RX ADMIN — Medication 10 ML: at 10:20

## 2023-09-06 RX ADMIN — SODIUM CHLORIDE 570 MG: 9 INJECTION, SOLUTION INTRAVENOUS at 09:46

## 2023-09-06 NOTE — PROGRESS NOTES
"RE-EVALUATION    PATIENT:                                                      Laura Churchill  :                                                          1950  DATE:                          2023   DIAGNOSIS:     Pathologic yT0 N0 M0 stage 0 triple positive invasive ductal carcinoma of the right breast   Cancer Staging   Malignant neoplasm of right breast in female, estrogen receptor positive  Staging form: Breast, AJCC 8th Edition  - Clinical: Stage IA (cT1c, cN0, cM0, G2, ER+, IN-, HER2+) - Signed by Sharonda Hopkins MD on 3/22/2023          BRIEF HISTORY:   Laura Churchill  is a very pleasant 72 y.o. female found on imaging to have a 1.2 cm irregular mass at the 830 9:00 region of the right breast, lower outer quadrant.  Biopsy was positive for invasive ductal carcinoma, poorly differentiated grade 3.  Breast MRI revealed no evidence of malignancy in the left breast, no evidence of axillary or internal mammary lymphadenopathy and no multifocal or multitcentric disease in the right breast.  Ms. Churchill underwent neoadjuvant chemotherapy of Taxol and Herceptin per Dr. Sharnoda Hopkins.  2023 she had right breast lumpectomy and was found to have no residual disease.   Ms. Churchill is here to proceed with postoperative radiation.  She is accompanied by her     Allergies   Allergen Reactions    Codeine Hives    Gabapentin Hallucinations     Screaming nightmares    Sulfa Antibiotics Itching       Review of Systems   Constitutional:  Positive for appetite change (Was decreased and now improving) and fatigue.   All other systems reviewed and are negative.        Objective   VITAL SIGNS:   Vitals:    23 0841   BP: 110/62   Pulse: 81   Resp: 20   Temp: 97.8 °F (36.6 °C)   TempSrc: Temporal   SpO2: 96%   Weight: 96.8 kg (213 lb 6.4 oz)   Height: 162.6 cm (64\")   PainSc: 0-No pain        Karnofsky score: 80   {KPS:  **    Physical Exam  Vitals reviewed.   Constitutional:       Appearance: Normal " Findings discussed with Patients Family appearance. She is obese.   Cardiovascular:      Rate and Rhythm: Normal rate.   Pulmonary:      Effort: Pulmonary effort is normal.   Neurological:      Mental Status: She is alert.   Breast exam reveals well-healed surgical incisions in the upper outer quadrant of the right breast and in the lower outer quadrant.         The following portions of the patient's history were reviewed and updated as appropriate: allergies, current medications, past family history, past medical history, past social history, past surgical history, and problem list.    There are no diagnoses linked to this encounter.  IMPRESSION:  Laura Churchill  is a 72 y.o. female found who underwent neoadjuvant chemotherapy of Taxol and Herceptin per Dr. Sharonda Hopkins.  8/4/2023 she had right breast lumpectomy and was found to have no residual disease.     RECOMMENDATIONS:  I recommend postoperative radiation to decrease the risk of local recurrence. The pros and cons, risks and benefits of treatment were discussed and informed consent obtained. She will return in 4 weeks for treatment planning. We will deliver 40.05 Gray in 15 fractions or 45 Quick in 25 fractions and no tumor bed boost.          Lucina Oseguera MD    Total time of patient care on day of service including time prior to, face to face with patient, and following visit spent in reviewing records, lab results, imaging studies, discussion with patient, and documentation/charting was > 30 minutes.

## 2023-09-07 ENCOUNTER — HOSPITAL ENCOUNTER (OUTPATIENT)
Dept: RADIATION ONCOLOGY | Facility: HOSPITAL | Age: 73
Setting detail: RADIATION/ONCOLOGY SERIES
Discharge: HOME | End: 2023-09-07
Payer: MEDICARE

## 2023-09-07 ENCOUNTER — OFFICE VISIT (OUTPATIENT)
Dept: RADIATION ONCOLOGY | Facility: HOSPITAL | Age: 73
End: 2023-09-07
Payer: MEDICARE

## 2023-09-07 ENCOUNTER — HOSPITAL ENCOUNTER (OUTPATIENT)
Dept: RADIATION ONCOLOGY | Facility: HOSPITAL | Age: 73
Discharge: HOME | End: 2023-09-07
Payer: MEDICARE

## 2023-09-07 VITALS
HEIGHT: 64 IN | DIASTOLIC BLOOD PRESSURE: 62 MMHG | HEART RATE: 81 BPM | SYSTOLIC BLOOD PRESSURE: 110 MMHG | BODY MASS INDEX: 36.43 KG/M2 | RESPIRATION RATE: 20 BRPM | OXYGEN SATURATION: 96 % | TEMPERATURE: 97.8 F | WEIGHT: 213.4 LBS

## 2023-09-07 DIAGNOSIS — C50.511 MALIGNANT NEOPLASM OF LOWER-OUTER QUADRANT OF RIGHT BREAST OF FEMALE, ESTROGEN RECEPTOR POSITIVE: Primary | ICD-10-CM

## 2023-09-07 DIAGNOSIS — Z17.0 MALIGNANT NEOPLASM OF LOWER-OUTER QUADRANT OF RIGHT BREAST OF FEMALE, ESTROGEN RECEPTOR POSITIVE: Primary | ICD-10-CM

## 2023-09-07 PROCEDURE — 77290 THER RAD SIMULAJ FIELD CPLX: CPT | Performed by: RADIOLOGY

## 2023-09-07 PROCEDURE — G0463 HOSPITAL OUTPT CLINIC VISIT: HCPCS

## 2023-09-07 PROCEDURE — 77333 RADIATION TREATMENT AID(S): CPT | Performed by: RADIOLOGY

## 2023-09-11 PROCEDURE — 77300 RADIATION THERAPY DOSE PLAN: CPT | Performed by: RADIOLOGY

## 2023-09-11 PROCEDURE — 77295 3-D RADIOTHERAPY PLAN: CPT | Performed by: RADIOLOGY

## 2023-09-11 PROCEDURE — 77334 RADIATION TREATMENT AID(S): CPT | Performed by: RADIOLOGY

## 2023-09-12 ENCOUNTER — TELEPHONE (OUTPATIENT)
Dept: ONCOLOGY | Facility: CLINIC | Age: 73
End: 2023-09-12
Payer: MEDICARE

## 2023-09-12 NOTE — TELEPHONE ENCOUNTER
Caller: Laura Churchill    Relationship: Self    Best call back number: [phone]     What is the best time to reach you: ANYTIME - OKAY TO LEAVE A VM IF NO ANSWER.     Who are you requesting to speak with (clinical staff, provider,  specific staff member): CLINICAL    What was the call regarding: PT WOULD LIKE TO GET THE COVID BOOSTER AND FLU SHOT, BUT WOULD LIKE TO KNOW IF IT IS OKAY TO GET THOSE? JUST WANTS TO MAKE SURE THAT IT WILL NOT INTERFERE WITH ANY TREATMENTS OR ANYTHING.     Is it okay if the provider responds through MyChart: YES

## 2023-09-12 NOTE — TELEPHONE ENCOUNTER
Return call to Laura.  Advised Laura she is fine to take the flu and Covid booster with her current treatment as per Gabi.  Laura states understood

## 2023-09-12 NOTE — TELEPHONE ENCOUNTER
Left voicemail for patient letting her know we did not have the time she requested and told her to let radiation know and they can adjust their appointment.

## 2023-09-12 NOTE — TELEPHONE ENCOUNTER
Caller: Laura Churchill    Relationship to patient: Self    Best call back number: [phone]    Chief complaint: PATIENT HAS RADIATION AT 9AM ON 9/27/2023    Type of visit: F/U 2 & INFUSION     Requested date: AROUND 9:30 ON 9/27/2023 CALL TO R/S     If rescheduling, when is the original appointment: 9/27/2023

## 2023-09-13 ENCOUNTER — TELEPHONE (OUTPATIENT)
Dept: ONCOLOGY | Facility: CLINIC | Age: 73
End: 2023-09-13

## 2023-09-13 ENCOUNTER — HOSPITAL ENCOUNTER (OUTPATIENT)
Dept: RADIATION ONCOLOGY | Facility: HOSPITAL | Age: 73
Discharge: HOME | End: 2023-09-13
Payer: MEDICARE

## 2023-09-13 PROCEDURE — 77280 THER RAD SIMULAJ FIELD SMPL: CPT | Performed by: RADIOLOGY

## 2023-09-13 NOTE — TELEPHONE ENCOUNTER
Caller: Laura Churchill    Relationship to patient: Self    Best call back number: [phone]     Chief complaint: PATIENT TO ADJUST APPT FOR 9/27/23 RADIATION    Type of visit: FU2    Requested date: 9/27/23 AT AN EARLIER TIME

## 2023-09-14 ENCOUNTER — HOSPITAL ENCOUNTER (OUTPATIENT)
Dept: RADIATION ONCOLOGY | Facility: HOSPITAL | Age: 73
Discharge: HOME | End: 2023-09-14
Payer: MEDICARE

## 2023-09-14 PROCEDURE — 77412 RADIATION TX DELIVERY LVL 3: CPT | Performed by: RADIOLOGY

## 2023-09-14 PROCEDURE — 77336 RADIATION PHYSICS CONSULT: CPT | Performed by: RADIOLOGY

## 2023-09-15 ENCOUNTER — HOSPITAL ENCOUNTER (OUTPATIENT)
Dept: RADIATION ONCOLOGY | Facility: HOSPITAL | Age: 73
Discharge: HOME | End: 2023-09-15
Payer: MEDICARE

## 2023-09-15 PROCEDURE — 77412 RADIATION TX DELIVERY LVL 3: CPT | Performed by: RADIOLOGY

## 2023-09-18 ENCOUNTER — HOSPITAL ENCOUNTER (OUTPATIENT)
Dept: RADIATION ONCOLOGY | Facility: HOSPITAL | Age: 73
Discharge: HOME | End: 2023-09-18
Payer: MEDICARE

## 2023-09-18 PROCEDURE — 77412 RADIATION TX DELIVERY LVL 3: CPT | Performed by: RADIOLOGY

## 2023-09-19 ENCOUNTER — DOCUMENTATION (OUTPATIENT)
Dept: NUTRITION | Facility: HOSPITAL | Age: 73
End: 2023-09-19
Payer: MEDICARE

## 2023-09-19 ENCOUNTER — HOSPITAL ENCOUNTER (OUTPATIENT)
Dept: RADIATION ONCOLOGY | Facility: HOSPITAL | Age: 73
Discharge: HOME | End: 2023-09-19
Payer: MEDICARE

## 2023-09-19 VITALS — WEIGHT: 216.4 LBS | BODY MASS INDEX: 37.14 KG/M2

## 2023-09-19 PROCEDURE — 77412 RADIATION TX DELIVERY LVL 3: CPT | Performed by: RADIOLOGY

## 2023-09-19 NOTE — PROGRESS NOTES
ONC Nutrition    Diagnosis:  [id number] ER+ (50%) OH negative Her2 positive (3+) Invasive ductal carcinoma of the right breast     Surgery:  8/4/2023 she had right breast lumpectomy and was found to have no residual disease        Chemotherapy: Neoadjuvant PACLitaxel / Trastuzumab - weekly x 12 then Maintenance Trastuzumab - every 21 days - 8/18 cycles completed     Radiation:  Postoperative radiation to decrease the risk of local recurrence / 40.05 Quick in 15 fractions or 45 Quick in 25 fractions and no tumor bed boost      Weight - 216.4#  Weight Change - stable weight between 203-214# for the past 2 years     Discussed possible nutritionally related side effect of fatigue with radiation / patient states that she experienced increased fatigue with chemo and it has improved. Provided tips for combating emphasizing higher protein intake, hydration and physical activity.    Discussed nutritional considerations for survivorship with breast cancer diagnosis focusing on adoption of healthier food selection with a plant based approach, weight management, as well as an increase in physical activity. Patient states that following completion of treatment, her goal is to focus on weight loss.  Encouraged focus on Mediterranean style diet; she plans to incorporate a behavioral modification intervention also.

## 2023-09-20 ENCOUNTER — HOSPITAL ENCOUNTER (OUTPATIENT)
Dept: RADIATION ONCOLOGY | Facility: HOSPITAL | Age: 73
Discharge: HOME | End: 2023-09-20
Payer: MEDICARE

## 2023-09-20 PROCEDURE — 77412 RADIATION TX DELIVERY LVL 3: CPT | Performed by: RADIOLOGY

## 2023-09-20 PROCEDURE — 77417 THER RADIOLOGY PORT IMAGE(S): CPT | Performed by: RADIOLOGY

## 2023-09-21 ENCOUNTER — HOSPITAL ENCOUNTER (OUTPATIENT)
Dept: RADIATION ONCOLOGY | Facility: HOSPITAL | Age: 73
Discharge: HOME | End: 2023-09-21
Payer: MEDICARE

## 2023-09-21 PROCEDURE — 77412 RADIATION TX DELIVERY LVL 3: CPT | Performed by: RADIOLOGY

## 2023-09-22 ENCOUNTER — HOSPITAL ENCOUNTER (OUTPATIENT)
Dept: RADIATION ONCOLOGY | Facility: HOSPITAL | Age: 73
Discharge: HOME | End: 2023-09-22
Payer: MEDICARE

## 2023-09-22 PROCEDURE — 77412 RADIATION TX DELIVERY LVL 3: CPT | Performed by: RADIOLOGY

## 2023-09-25 ENCOUNTER — HOSPITAL ENCOUNTER (OUTPATIENT)
Dept: RADIATION ONCOLOGY | Facility: HOSPITAL | Age: 73
Discharge: HOME | End: 2023-09-25
Payer: MEDICARE

## 2023-09-25 PROCEDURE — 77412 RADIATION TX DELIVERY LVL 3: CPT | Performed by: RADIOLOGY

## 2023-09-26 ENCOUNTER — HOSPITAL ENCOUNTER (OUTPATIENT)
Dept: RADIATION ONCOLOGY | Facility: HOSPITAL | Age: 73
Discharge: HOME | End: 2023-09-26
Payer: MEDICARE

## 2023-09-26 VITALS — BODY MASS INDEX: 37.13 KG/M2 | WEIGHT: 216.3 LBS

## 2023-09-26 PROCEDURE — 77412 RADIATION TX DELIVERY LVL 3: CPT | Performed by: RADIOLOGY

## 2023-09-27 ENCOUNTER — TELEPHONE (OUTPATIENT)
Dept: FAMILY MEDICINE CLINIC | Facility: CLINIC | Age: 73
End: 2023-09-27

## 2023-09-27 ENCOUNTER — OFFICE VISIT (OUTPATIENT)
Dept: ONCOLOGY | Facility: CLINIC | Age: 73
End: 2023-09-27
Payer: MEDICARE

## 2023-09-27 ENCOUNTER — HOSPITAL ENCOUNTER (OUTPATIENT)
Dept: RADIATION ONCOLOGY | Facility: HOSPITAL | Age: 73
Discharge: HOME | End: 2023-09-27
Payer: MEDICARE

## 2023-09-27 ENCOUNTER — APPOINTMENT (OUTPATIENT)
Dept: ONCOLOGY | Facility: HOSPITAL | Age: 73
End: 2023-09-27
Payer: MEDICARE

## 2023-09-27 ENCOUNTER — HOSPITAL ENCOUNTER (OUTPATIENT)
Dept: ONCOLOGY | Facility: HOSPITAL | Age: 73
Discharge: HOME | End: 2023-09-27
Admitting: INTERNAL MEDICINE
Payer: MEDICARE

## 2023-09-27 VITALS
TEMPERATURE: 97.4 F | OXYGEN SATURATION: 99 % | HEART RATE: 84 BPM | SYSTOLIC BLOOD PRESSURE: 175 MMHG | WEIGHT: 216 LBS | RESPIRATION RATE: 20 BRPM | DIASTOLIC BLOOD PRESSURE: 89 MMHG | HEIGHT: 64 IN | BODY MASS INDEX: 36.88 KG/M2

## 2023-09-27 DIAGNOSIS — Z79.811 AROMATASE INHIBITOR USE: ICD-10-CM

## 2023-09-27 DIAGNOSIS — Z51.11 MAINTENANCE CHEMOTHERAPY: Primary | ICD-10-CM

## 2023-09-27 DIAGNOSIS — R92.8 ABNORMAL MAMMOGRAM: ICD-10-CM

## 2023-09-27 DIAGNOSIS — Z17.0 MALIGNANT NEOPLASM OF RIGHT BREAST IN FEMALE, ESTROGEN RECEPTOR POSITIVE, UNSPECIFIED SITE OF BREAST: Primary | ICD-10-CM

## 2023-09-27 DIAGNOSIS — Z17.0 MALIGNANT NEOPLASM OF RIGHT BREAST IN FEMALE, ESTROGEN RECEPTOR POSITIVE, UNSPECIFIED SITE OF BREAST: ICD-10-CM

## 2023-09-27 DIAGNOSIS — Z45.2 ENCOUNTER FOR CARE RELATED TO PORT-A-CATH: ICD-10-CM

## 2023-09-27 DIAGNOSIS — C50.911 MALIGNANT NEOPLASM OF RIGHT BREAST IN FEMALE, ESTROGEN RECEPTOR POSITIVE, UNSPECIFIED SITE OF BREAST: Primary | ICD-10-CM

## 2023-09-27 DIAGNOSIS — C50.911 MALIGNANT NEOPLASM OF RIGHT BREAST IN FEMALE, ESTROGEN RECEPTOR POSITIVE, UNSPECIFIED SITE OF BREAST: ICD-10-CM

## 2023-09-27 LAB
BASOPHILS # BLD AUTO: 0.02 10*3/MM3 (ref 0–0.2)
BASOPHILS NFR BLD AUTO: 0.4 % (ref 0–1.5)
DEPRECATED RDW RBC AUTO: 41.8 FL (ref 37–54)
EOSINOPHIL # BLD AUTO: 0.11 10*3/MM3 (ref 0–0.4)
EOSINOPHIL NFR BLD AUTO: 2.3 % (ref 0.3–6.2)
ERYTHROCYTE [DISTWIDTH] IN BLOOD BY AUTOMATED COUNT: 11.8 % (ref 12.3–15.4)
HCT VFR BLD AUTO: 39.5 % (ref 34–46.6)
HGB BLD-MCNC: 12.6 G/DL (ref 12–15.9)
IMM GRANULOCYTES # BLD AUTO: 0.02 10*3/MM3 (ref 0–0.05)
IMM GRANULOCYTES NFR BLD AUTO: 0.4 % (ref 0–0.5)
LYMPHOCYTES # BLD AUTO: 0.98 10*3/MM3 (ref 0.7–3.1)
LYMPHOCYTES NFR BLD AUTO: 20.1 % (ref 19.6–45.3)
MCH RBC QN AUTO: 30.4 PG (ref 26.6–33)
MCHC RBC AUTO-ENTMCNC: 31.9 G/DL (ref 31.5–35.7)
MCV RBC AUTO: 95.4 FL (ref 79–97)
MONOCYTES # BLD AUTO: 0.43 10*3/MM3 (ref 0.1–0.9)
MONOCYTES NFR BLD AUTO: 8.8 % (ref 5–12)
NEUTROPHILS NFR BLD AUTO: 3.32 10*3/MM3 (ref 1.7–7)
NEUTROPHILS NFR BLD AUTO: 68 % (ref 42.7–76)
PLATELET # BLD AUTO: 205 10*3/MM3 (ref 140–450)
PMV BLD AUTO: 9.2 FL (ref 6–12)
RBC # BLD AUTO: 4.14 10*6/MM3 (ref 3.77–5.28)
WBC NRBC COR # BLD: 4.88 10*3/MM3 (ref 3.4–10.8)

## 2023-09-27 PROCEDURE — 25010000002 TRASTUZUMAB-QYYP 420 MG RECONSTITUTED SOLUTION 1 EACH VIAL: Performed by: INTERNAL MEDICINE

## 2023-09-27 PROCEDURE — 85025 COMPLETE CBC W/AUTO DIFF WBC: CPT | Performed by: INTERNAL MEDICINE

## 2023-09-27 PROCEDURE — 96413 CHEMO IV INFUSION 1 HR: CPT

## 2023-09-27 PROCEDURE — 77412 RADIATION TX DELIVERY LVL 3: CPT | Performed by: RADIOLOGY

## 2023-09-27 PROCEDURE — 25010000002 HEPARIN LOCK FLUSH PER 10 UNITS: Performed by: INTERNAL MEDICINE

## 2023-09-27 PROCEDURE — 77417 THER RADIOLOGY PORT IMAGE(S): CPT | Performed by: RADIOLOGY

## 2023-09-27 RX ORDER — ANASTROZOLE 1 MG/1
1 TABLET ORAL DAILY
Qty: 90 TABLET | Refills: 3 | Status: SHIPPED | OUTPATIENT
Start: 2023-09-27

## 2023-09-27 RX ORDER — SODIUM CHLORIDE 9 MG/ML
250 INJECTION, SOLUTION INTRAVENOUS ONCE
OUTPATIENT
Start: 2023-10-18

## 2023-09-27 RX ORDER — HEPARIN SODIUM (PORCINE) LOCK FLUSH IV SOLN 100 UNIT/ML 100 UNIT/ML
500 SOLUTION INTRAVENOUS AS NEEDED
OUTPATIENT
Start: 2023-09-27

## 2023-09-27 RX ORDER — SODIUM CHLORIDE 0.9 % (FLUSH) 0.9 %
10 SYRINGE (ML) INJECTION AS NEEDED
OUTPATIENT
Start: 2023-09-27

## 2023-09-27 RX ORDER — SODIUM CHLORIDE 9 MG/ML
250 INJECTION, SOLUTION INTRAVENOUS ONCE
OUTPATIENT
Start: 2023-11-08

## 2023-09-27 RX ORDER — SODIUM CHLORIDE 9 MG/ML
250 INJECTION, SOLUTION INTRAVENOUS ONCE
Status: COMPLETED | OUTPATIENT
Start: 2023-09-27 | End: 2023-09-27

## 2023-09-27 RX ORDER — HEPARIN SODIUM (PORCINE) LOCK FLUSH IV SOLN 100 UNIT/ML 100 UNIT/ML
500 SOLUTION INTRAVENOUS AS NEEDED
Status: DISCONTINUED | OUTPATIENT
Start: 2023-09-27 | End: 2023-09-28 | Stop reason: HOSPADM

## 2023-09-27 RX ADMIN — HEPARIN 500 UNITS: 100 SYRINGE at 10:54

## 2023-09-27 RX ADMIN — SODIUM CHLORIDE 250 ML: 9 INJECTION, SOLUTION INTRAVENOUS at 10:00

## 2023-09-27 RX ADMIN — SODIUM CHLORIDE 570 MG: 9 INJECTION, SOLUTION INTRAVENOUS at 10:15

## 2023-09-27 NOTE — TELEPHONE ENCOUNTER
Caller: Laura Churchill    Relationship to patient: Self    Best call back number: [phone]     Type of visit: SUBSEQUENT MEDICARE WELLNESS VISIT    Requested date: ANY DAY EXCEPT 11/29/23     If rescheduling, when is the original appointment: 11/29/23     Additional notes:PATIENT FOUND OUT THAT SHE HAS CHEMOTHERAPY ON 11/29/23 AND NEEDED TO RESCHEDULE.    NO APPOINTMENTS WERE AVAILABLE TO RESCHEDULE WITH DR GARCIA UNTIL FEBRUARY OF 2024     PATIENT'S LAST SUBSEQUENT MEDICARE WELLNESS VISIT WAS 11/10/22    PLEASE ADVISE PATIENT IF WE CAN WORK HER IN ON A DATE AROUND 11/29/23

## 2023-09-27 NOTE — PROGRESS NOTES
"      PROBLEM LIST:  1. [id number] ER+ (50%) MS negative Her2 positive (3+) Invasive ductal carcinoma of the right breast  A) biopsy of a 1.2 cm mass in the lower outer quadrant on 3/14/23 showed a high grade IDC.  No concerning lymph nodes on imaging.  B) neoadjuvant chemotherapy with Taxol and Herceptin started on 4/12/2023  C) right breast lumpectomy on 8/4/23 showed no residual cancer in the breast or 1 SLN.  D) will complete adjuvant radiation 10/4/2023.  2. Hypertension  3. Depression  4. MOOKIE  5. Hyperlipidemia    Subjective     CHIEF COMPLAINT: breast cancer    HISTORY OF PRESENT ILLNESS:   Laura Churchill returns for follow-up.  Today is day 10 of adjuvant radiation.  She is tolerating radiation relatively well.  She continues on maintenance Herceptin.    She has noticed a little swelling in the right breast with radiation.  No other medical concerns today.        Objective      /89   Pulse 84   Temp 97.4 °F (36.3 °C) (Infrared)   Resp 20   Ht 162.6 cm (64.02\")   Wt 98 kg (216 lb)   LMP  (LMP Unknown)   SpO2 99%   BMI 37.06 kg/m²    Vitals:    09/27/23 0853   PainSc: 0-No pain               General: well appearing female in no acute distress  Neuro: alert and oriented  HEENT: sclera anicteric, oropharynx clear  Extremeties: no lower extremity edema  Skin: no rashes, lesions, bruising, or petechiae  Psych: mood and affect appropriate            RECENT LABS:  Lab Results   Component Value Date    WBC 7.90 09/06/2023    HGB 12.6 09/06/2023    HCT 39.3 09/06/2023    MCV 95.9 09/06/2023     09/06/2023       Lab Results   Component Value Date    GLUCOSE 96 09/06/2023    BUN 18 09/06/2023    CREATININE 0.89 09/06/2023    EGFRIFNONA 66 09/28/2021    BCR 20.2 09/06/2023    K 4.5 09/06/2023    CO2 26.0 09/06/2023    CALCIUM 8.9 09/06/2023    ALBUMIN 4.1 09/06/2023    AST 17 09/06/2023    ALT 7 09/06/2023       Mammo Breast Placement Device Initial Without Biopsy, Mammo Post Device Placement " "Right, Mammo Breast Specimen  Narrative: RIGHT BREAST TOMOSYNTHESIS/STEREOTACTIC GUIDED NEEDLE LOCALIZATION:  AFFIRM     CLINICAL HISTORY: 72-year-old patient with biopsy-proven invasive ductal  carcinoma in the right 8:30-9:00 region. The patient underwent  neoadjuvant chemotherapy and the mass is no longer visualized on  ultrasound imaging. The marking clip will be localized with mammographic  imaging.     TECHNIQUE:  After obtaining informed consent and performing a \"time-out\"  procedure, the right breast was breast was positioned in the  alphanumeric grid compression paddle from a inferior approach.  A 2D/3D  caudocranial view was obtained.  The lesion undergoing localization was  identified.  The breast was prepped in the usual sterile fashion and  anesthestized with 5 cc of 1% Lidocaine without epinephrine.  Next, a  9.0 cm Bard needle was placed into the breast from an inferior approach  to the appropriate depth and tomosynthesis images were obtained.  After  confirming accurate positioning of the needle, a wire was placed through  the needle and the needle was removed with the wire remaining in place.     Routine right 2D/3D CC and ML digital mammographic images were obtained  with a BB placed on the inferior skin edge. The clip is located at the  distal one third of the thick part of the wire, 3.7 cm from the inferior  skin edge. Mammographic images were sent along with the patient to the  operating room. No complications occurred during this procedure.     A specimen radiograph with tomosynthesis imaging was obtained. The clip  is visualized within the tissue.  Results of the specimen radiograph  were discussed with  Dr. Saleh intraoperatively.     Impression: Successful tomosynthesis guided needle localization of site  of biopsy-proven invasive ductal carcinoma in the right breast.   Surgical excision was performed by Dr. Saleh. The post biopsy  marking clip is visualized within the " specimen.     PATHOLOGY: Benign breast parenchyma with mild fibrocystic change.  Negative for atypia, DCIS, and invasive carcinoma. An extended surgical  margin also revealed benign breast parenchyma with mild fibrocystic  change. No biopsy clip or prior biopsy site change identified. [place]  lymph node sampling was negative for carcinoma.     RECOMMENDATION: Baseline postsurgical right diagnostic mammogram with  tomosynthesis in 6 months. The patient will be due for bilateral  mammographic imaging at that time.  ________________________  PHYSICIAN ORDER:   Diagnostic 6 month mammographic and/or US followup.  DIAGNOSIS:  Probably benign findings     This report was finalized on 8/17/2023 5:45 PM by Dr. Katya De La Vega MD.                 ASSESSMENT AND PLAN:     Laura Churchill is a 72 y.o. female with a [id number] Er+ Her2+ invasive ductal carcinoma of the right breast.    Her pathology shows a complete pathologic response.  We will plan to continue maintenance treatment with herceptin for a total of 1 year.    She continues on adjuvant radiation therapy.  Her last dose of radiation will be given October 4, 2023.  We will plan to start anastrozole following radiation.  We reviewed the potential side effects of anastrozole including hot flashes, vaginal dryness, joint pain, decrease in bone density, and mood or sleep disturbance.  I have instructed her to wait approximately 1 week after radiation before starting anastrozole.  Prescription sent to pharmacy today.    Bone density scan ordered for October 2023.  She has a history of osteopenia.    Repeat echo was stable.  Order placed for repeat echo due October 2023.    Order placed for right diagnostic mammogram due April 2024.    Follow-up in 6 weeks.                        I spent 41 minutes caring for Laura on this date of service. This time includes time spent by me in the following activities: preparing for the visit, obtaining and/or reviewing a separately  obtained history, performing a medically appropriate examination and/or evaluation, counseling and educating the patient/family/caregiver, ordering medications, tests, or procedures, and documenting information in the medical record        Joanna Campos APRN  [facility]    9/27/2023          CC:

## 2023-09-28 ENCOUNTER — HOSPITAL ENCOUNTER (OUTPATIENT)
Dept: RADIATION ONCOLOGY | Facility: HOSPITAL | Age: 73
Discharge: HOME | End: 2023-09-28
Payer: MEDICARE

## 2023-09-28 ENCOUNTER — TELEPHONE (OUTPATIENT)
Dept: FAMILY MEDICINE CLINIC | Facility: CLINIC | Age: 73
End: 2023-09-28

## 2023-09-28 PROCEDURE — 77412 RADIATION TX DELIVERY LVL 3: CPT | Performed by: RADIOLOGY

## 2023-09-29 ENCOUNTER — HOSPITAL ENCOUNTER (OUTPATIENT)
Dept: RADIATION ONCOLOGY | Facility: HOSPITAL | Age: 73
Discharge: HOME | End: 2023-09-29
Payer: MEDICARE

## 2023-09-29 PROCEDURE — 77412 RADIATION TX DELIVERY LVL 3: CPT | Performed by: RADIOLOGY

## 2023-10-02 ENCOUNTER — HOSPITAL ENCOUNTER (OUTPATIENT)
Dept: RADIATION ONCOLOGY | Facility: HOSPITAL | Age: 73
Setting detail: RADIATION/ONCOLOGY SERIES
Discharge: HOME | End: 2023-10-02
Payer: MEDICARE

## 2023-10-02 PROCEDURE — 77412 RADIATION TX DELIVERY LVL 3: CPT | Performed by: RADIOLOGY

## 2023-10-03 ENCOUNTER — HOSPITAL ENCOUNTER (OUTPATIENT)
Dept: RADIATION ONCOLOGY | Facility: HOSPITAL | Age: 73
Discharge: HOME | End: 2023-10-03
Payer: MEDICARE

## 2023-10-03 VITALS — BODY MASS INDEX: 37.04 KG/M2 | WEIGHT: 215.9 LBS

## 2023-10-03 DIAGNOSIS — R92.8 ABNORMAL MAMMOGRAM: Primary | ICD-10-CM

## 2023-10-03 PROCEDURE — 77412 RADIATION TX DELIVERY LVL 3: CPT | Performed by: RADIOLOGY

## 2023-10-03 NOTE — TELEPHONE ENCOUNTER
PATIENT IS CALLING BACK BECAUSE SHE IS STILL NEEDING THIS SCHEDULED. SHE HAD GOTTEN A NOTICE ABOUT A NO SHOW ON 09.28.23. SHE WAS NOT INFORMED ABOUT THIS APPOINTMENT. IT ALSO IS BEFORE THE DATE THAT SHE IS ABLE TO GET HER ANNUAL WELLNESS. THIS NEEDS TO BE AROUND LATE NOVEMBER. PLEASE SEE WHERE SHE CAN BE FIT AND CALL HER. IF SHE DOES NOT ANSWER PLEASE LEAVE A VOICEMAIL.     PHONE: [phone]

## 2023-10-04 ENCOUNTER — HOSPITAL ENCOUNTER (OUTPATIENT)
Dept: RADIATION ONCOLOGY | Facility: HOSPITAL | Age: 73
Discharge: HOME | End: 2023-10-04
Payer: MEDICARE

## 2023-10-04 PROCEDURE — 77412 RADIATION TX DELIVERY LVL 3: CPT | Performed by: RADIOLOGY

## 2023-10-05 NOTE — RADIATION COMPLETION NOTES
COMPLETION NOTE    PATIENT:   Laura Churchill  :    1950  COMPLETION DATE:   10/4/2023  DIAGNOSIS:     Pathologic yT0 N0 M0 stage 0 triple positive invasive ductal carcinoma of the right breast   Cancer Staging   Malignant neoplasm of right breast in female, estrogen receptor positive  Staging form: Breast, AJCC 8th Edition  - Clinical: Stage IA (cT1c, cN0, cM0, G2, ER+, MD-, HER2+) - Signed by Sharonda Hopkins MD on 3/22/2023           Subjective      BRIEF HISTORY:   Laura Churchill  is a very pleasant 72 y.o. female found on imaging to have a 1.2 cm irregular mass at the 830 9:00 region of the right breast, lower outer quadrant.  Biopsy was positive for invasive ductal carcinoma, poorly differentiated grade 3.  Breast MRI revealed no evidence of malignancy in the left breast, no evidence of axillary or internal mammary lymphadenopathy and no multifocal or multitcentric disease in the right breast.  Ms. Churchill underwent neoadjuvant chemotherapy of Taxol and Herceptin per Dr. Sharonda Hopkins.  2023 she had right breast lumpectomy and was found to have no residual disease.   She received radiotherapy in our department as follows:    TREATMENT COURSE:    - 10/4/2023 the right breast received 40.05 Gray in 15 fractions with 10 MV photons      TOLERANCE:   Ms. Churchill had no difficulty tolerating treatment.  She felt like she had mild edema of the skin that it was not objectively evident.  She had very faint erythema of the skin.  The skin remained healthy throughout treatment.  She had no lymphedema.      DISPOSITION:  At the completion of therapy an appointment was made for her to return on 2023 at 9:30 AM.  She knows to call if she has any problems sooner.        Lucina Oseguera MD    Dictated using dragon dictation

## 2023-10-11 ENCOUNTER — TELEPHONE (OUTPATIENT)
Dept: FAMILY MEDICINE CLINIC | Facility: CLINIC | Age: 73
End: 2023-10-11

## 2023-10-11 NOTE — TELEPHONE ENCOUNTER
Caller: Laura Churchill    Relationship to patient: Self    Best call back number:[phone]         Type of visit: WELLNESS        Additional notes:SCHEDULED WITH STEVE CRUMP A SUBSEQUENT WELLNESS . PCP HAD NO AVAILABILITY REST OF THE YEAR

## 2023-10-12 ENCOUNTER — HOSPITAL ENCOUNTER (OUTPATIENT)
Dept: CARDIOLOGY | Facility: HOSPITAL | Age: 73
Discharge: HOME | End: 2023-10-12
Admitting: NURSE PRACTITIONER
Payer: MEDICARE

## 2023-10-12 VITALS — BODY MASS INDEX: 36.85 KG/M2 | WEIGHT: 215.83 LBS | HEIGHT: 64 IN

## 2023-10-12 DIAGNOSIS — Z51.11 MAINTENANCE CHEMOTHERAPY: ICD-10-CM

## 2023-10-12 LAB
BH CV ECHO LEFT VENTRICLE GLOBAL LONGITUDINAL STRAIN: -17.6 %
BH CV ECHO MEAS - EDV(CUBED): 100.5 ML
BH CV ECHO MEAS - EDV(MOD-SP2): 55 ML
BH CV ECHO MEAS - EDV(MOD-SP4): 62 ML
BH CV ECHO MEAS - EF(MOD-BP): 56 %
BH CV ECHO MEAS - EF(MOD-SP2): 56.4 %
BH CV ECHO MEAS - EF(MOD-SP4): 54.8 %
BH CV ECHO MEAS - ESV(CUBED): 39.7 ML
BH CV ECHO MEAS - ESV(MOD-SP2): 24 ML
BH CV ECHO MEAS - ESV(MOD-SP4): 28 ML
BH CV ECHO MEAS - FS: 26.7 %
BH CV ECHO MEAS - LV DIASTOLIC VOL/BSA (35-75): 30.7 CM2
BH CV ECHO MEAS - LV SYSTOLIC VOL/BSA (12-30): 13.9 CM2
BH CV ECHO MEAS - LVIDD: 4.7 CM
BH CV ECHO MEAS - LVIDS: 3.4 CM
BH CV ECHO MEAS - SI(MOD-SP2): 15.4 ML/M2
BH CV ECHO MEAS - SI(MOD-SP4): 16.9 ML/M2
BH CV ECHO MEAS - SV(MOD-SP2): 31 ML
BH CV ECHO MEAS - SV(MOD-SP4): 34 ML
BH CV VAS BP LEFT ARM: NORMAL MMHG

## 2023-10-12 PROCEDURE — 93356 MYOCRD STRAIN IMG SPCKL TRCK: CPT

## 2023-10-12 PROCEDURE — 93308 TTE F-UP OR LMTD: CPT

## 2023-10-18 ENCOUNTER — HOSPITAL ENCOUNTER (OUTPATIENT)
Dept: ONCOLOGY | Facility: HOSPITAL | Age: 73
Discharge: HOME | End: 2023-10-18
Admitting: NURSE PRACTITIONER
Payer: MEDICARE

## 2023-10-18 VITALS
DIASTOLIC BLOOD PRESSURE: 88 MMHG | TEMPERATURE: 97.2 F | WEIGHT: 214 LBS | HEIGHT: 64 IN | HEART RATE: 99 BPM | SYSTOLIC BLOOD PRESSURE: 131 MMHG | RESPIRATION RATE: 16 BRPM | BODY MASS INDEX: 36.54 KG/M2

## 2023-10-18 DIAGNOSIS — Z45.2 ENCOUNTER FOR CARE RELATED TO PORT-A-CATH: ICD-10-CM

## 2023-10-18 DIAGNOSIS — Z17.0 MALIGNANT NEOPLASM OF RIGHT BREAST IN FEMALE, ESTROGEN RECEPTOR POSITIVE, UNSPECIFIED SITE OF BREAST: Primary | ICD-10-CM

## 2023-10-18 DIAGNOSIS — C50.911 MALIGNANT NEOPLASM OF RIGHT BREAST IN FEMALE, ESTROGEN RECEPTOR POSITIVE, UNSPECIFIED SITE OF BREAST: Primary | ICD-10-CM

## 2023-10-18 LAB
ALBUMIN SERPL-MCNC: 4.2 G/DL (ref 3.5–5.2)
ALBUMIN/GLOB SERPL: 1.8 G/DL
ALP SERPL-CCNC: 93 U/L (ref 39–117)
ALT SERPL W P-5'-P-CCNC: 9 U/L (ref 1–33)
ANION GAP SERPL CALCULATED.3IONS-SCNC: 9 MMOL/L (ref 5–15)
AST SERPL-CCNC: 16 U/L (ref 1–32)
BASOPHILS # BLD AUTO: 0.02 10*3/MM3 (ref 0–0.2)
BASOPHILS NFR BLD AUTO: 0.4 % (ref 0–1.5)
BILIRUB SERPL-MCNC: 0.3 MG/DL (ref 0–1.2)
BUN SERPL-MCNC: 14 MG/DL (ref 8–23)
BUN/CREAT SERPL: 17.7 (ref 7–25)
CALCIUM SPEC-SCNC: 9.2 MG/DL (ref 8.6–10.5)
CHLORIDE SERPL-SCNC: 105 MMOL/L (ref 98–107)
CO2 SERPL-SCNC: 26 MMOL/L (ref 22–29)
CREAT SERPL-MCNC: 0.79 MG/DL (ref 0.57–1)
DEPRECATED RDW RBC AUTO: 42.6 FL (ref 37–54)
EGFRCR SERPLBLD CKD-EPI 2021: 79.1 ML/MIN/1.73
EOSINOPHIL # BLD AUTO: 0.08 10*3/MM3 (ref 0–0.4)
EOSINOPHIL NFR BLD AUTO: 1.8 % (ref 0.3–6.2)
ERYTHROCYTE [DISTWIDTH] IN BLOOD BY AUTOMATED COUNT: 12.1 % (ref 12.3–15.4)
GLOBULIN UR ELPH-MCNC: 2.4 GM/DL
GLUCOSE SERPL-MCNC: 97 MG/DL (ref 65–99)
HCT VFR BLD AUTO: 38.7 % (ref 34–46.6)
HGB BLD-MCNC: 12.6 G/DL (ref 12–15.9)
IMM GRANULOCYTES # BLD AUTO: 0.01 10*3/MM3 (ref 0–0.05)
IMM GRANULOCYTES NFR BLD AUTO: 0.2 % (ref 0–0.5)
LYMPHOCYTES # BLD AUTO: 0.76 10*3/MM3 (ref 0.7–3.1)
LYMPHOCYTES NFR BLD AUTO: 16.7 % (ref 19.6–45.3)
MCH RBC QN AUTO: 30.8 PG (ref 26.6–33)
MCHC RBC AUTO-ENTMCNC: 32.6 G/DL (ref 31.5–35.7)
MCV RBC AUTO: 94.6 FL (ref 79–97)
MONOCYTES # BLD AUTO: 0.41 10*3/MM3 (ref 0.1–0.9)
MONOCYTES NFR BLD AUTO: 9 % (ref 5–12)
NEUTROPHILS NFR BLD AUTO: 3.28 10*3/MM3 (ref 1.7–7)
NEUTROPHILS NFR BLD AUTO: 71.9 % (ref 42.7–76)
PLATELET # BLD AUTO: 187 10*3/MM3 (ref 140–450)
PMV BLD AUTO: 9.3 FL (ref 6–12)
POTASSIUM SERPL-SCNC: 3.9 MMOL/L (ref 3.5–5.2)
PROT SERPL-MCNC: 6.6 G/DL (ref 6–8.5)
RBC # BLD AUTO: 4.09 10*6/MM3 (ref 3.77–5.28)
SODIUM SERPL-SCNC: 140 MMOL/L (ref 136–145)
WBC NRBC COR # BLD: 4.56 10*3/MM3 (ref 3.4–10.8)

## 2023-10-18 PROCEDURE — 25010000002 HEPARIN LOCK FLUSH PER 10 UNITS: Performed by: INTERNAL MEDICINE

## 2023-10-18 PROCEDURE — 25010000002 TRASTUZUMAB-QYYP 420 MG RECONSTITUTED SOLUTION 1 EACH VIAL: Performed by: NURSE PRACTITIONER

## 2023-10-18 PROCEDURE — 25810000003 SODIUM CHLORIDE 0.9 % SOLUTION 250 ML FLEX CONT: Performed by: NURSE PRACTITIONER

## 2023-10-18 PROCEDURE — 85025 COMPLETE CBC W/AUTO DIFF WBC: CPT | Performed by: NURSE PRACTITIONER

## 2023-10-18 PROCEDURE — 25810000003 SODIUM CHLORIDE 0.9 % SOLUTION: Performed by: NURSE PRACTITIONER

## 2023-10-18 PROCEDURE — 80053 COMPREHEN METABOLIC PANEL: CPT | Performed by: NURSE PRACTITIONER

## 2023-10-18 PROCEDURE — 96413 CHEMO IV INFUSION 1 HR: CPT

## 2023-10-18 RX ORDER — SODIUM CHLORIDE 9 MG/ML
250 INJECTION, SOLUTION INTRAVENOUS ONCE
Status: COMPLETED | OUTPATIENT
Start: 2023-10-18 | End: 2023-10-18

## 2023-10-18 RX ORDER — SODIUM CHLORIDE 0.9 % (FLUSH) 0.9 %
10 SYRINGE (ML) INJECTION AS NEEDED
OUTPATIENT
Start: 2023-10-18

## 2023-10-18 RX ORDER — HEPARIN SODIUM (PORCINE) LOCK FLUSH IV SOLN 100 UNIT/ML 100 UNIT/ML
500 SOLUTION INTRAVENOUS AS NEEDED
Status: DISCONTINUED | OUTPATIENT
Start: 2023-10-18 | End: 2023-10-19 | Stop reason: HOSPADM

## 2023-10-18 RX ORDER — HEPARIN SODIUM (PORCINE) LOCK FLUSH IV SOLN 100 UNIT/ML 100 UNIT/ML
500 SOLUTION INTRAVENOUS AS NEEDED
OUTPATIENT
Start: 2023-10-18

## 2023-10-18 RX ADMIN — SODIUM CHLORIDE 250 ML: 9 INJECTION, SOLUTION INTRAVENOUS at 10:38

## 2023-10-18 RX ADMIN — SODIUM CHLORIDE 570 MG: 9 INJECTION, SOLUTION INTRAVENOUS at 10:38

## 2023-10-18 RX ADMIN — HEPARIN 500 UNITS: 100 SYRINGE at 11:17

## 2023-10-23 DIAGNOSIS — I10 ESSENTIAL HYPERTENSION: ICD-10-CM

## 2023-10-23 RX ORDER — LOSARTAN POTASSIUM 100 MG/1
100 TABLET ORAL DAILY
Qty: 90 TABLET | Refills: 3 | Status: SHIPPED | OUTPATIENT
Start: 2023-10-23

## 2023-10-30 ENCOUNTER — HOSPITAL ENCOUNTER (OUTPATIENT)
Dept: BONE DENSITY | Facility: HOSPITAL | Age: 73
Discharge: HOME | End: 2023-10-30
Admitting: NURSE PRACTITIONER
Payer: MEDICARE

## 2023-10-30 DIAGNOSIS — Z79.811 AROMATASE INHIBITOR USE: ICD-10-CM

## 2023-10-30 PROCEDURE — 77080 DXA BONE DENSITY AXIAL: CPT

## 2023-11-08 ENCOUNTER — HOSPITAL ENCOUNTER (OUTPATIENT)
Dept: ONCOLOGY | Facility: HOSPITAL | Age: 73
Discharge: HOME | End: 2023-11-08
Admitting: INTERNAL MEDICINE
Payer: MEDICARE

## 2023-11-08 ENCOUNTER — OFFICE VISIT (OUTPATIENT)
Dept: ONCOLOGY | Facility: CLINIC | Age: 73
End: 2023-11-08
Payer: MEDICARE

## 2023-11-08 VITALS
SYSTOLIC BLOOD PRESSURE: 166 MMHG | TEMPERATURE: 97.3 F | HEART RATE: 94 BPM | RESPIRATION RATE: 18 BRPM | WEIGHT: 216 LBS | BODY MASS INDEX: 36.88 KG/M2 | OXYGEN SATURATION: 95 % | HEIGHT: 64 IN | DIASTOLIC BLOOD PRESSURE: 87 MMHG

## 2023-11-08 DIAGNOSIS — Z17.0 MALIGNANT NEOPLASM OF RIGHT BREAST IN FEMALE, ESTROGEN RECEPTOR POSITIVE, UNSPECIFIED SITE OF BREAST: Primary | ICD-10-CM

## 2023-11-08 DIAGNOSIS — Z45.2 ENCOUNTER FOR CARE RELATED TO PORT-A-CATH: Primary | ICD-10-CM

## 2023-11-08 DIAGNOSIS — C50.911 MALIGNANT NEOPLASM OF RIGHT BREAST IN FEMALE, ESTROGEN RECEPTOR POSITIVE, UNSPECIFIED SITE OF BREAST: Primary | ICD-10-CM

## 2023-11-08 DIAGNOSIS — C50.911 MALIGNANT NEOPLASM OF RIGHT BREAST IN FEMALE, ESTROGEN RECEPTOR POSITIVE, UNSPECIFIED SITE OF BREAST: ICD-10-CM

## 2023-11-08 DIAGNOSIS — Z17.0 MALIGNANT NEOPLASM OF RIGHT BREAST IN FEMALE, ESTROGEN RECEPTOR POSITIVE, UNSPECIFIED SITE OF BREAST: ICD-10-CM

## 2023-11-08 LAB
ALBUMIN SERPL-MCNC: 4.3 G/DL (ref 3.5–5.2)
ALBUMIN/GLOB SERPL: 1.9 G/DL
ALP SERPL-CCNC: 89 U/L (ref 39–117)
ALT SERPL W P-5'-P-CCNC: 10 U/L (ref 1–33)
ANION GAP SERPL CALCULATED.3IONS-SCNC: 8 MMOL/L (ref 5–15)
AST SERPL-CCNC: 17 U/L (ref 1–32)
BASOPHILS # BLD AUTO: 0.02 10*3/MM3 (ref 0–0.2)
BASOPHILS NFR BLD AUTO: 0.4 % (ref 0–1.5)
BILIRUB SERPL-MCNC: 0.3 MG/DL (ref 0–1.2)
BUN SERPL-MCNC: 16 MG/DL (ref 8–23)
BUN/CREAT SERPL: 19 (ref 7–25)
CALCIUM SPEC-SCNC: 9 MG/DL (ref 8.6–10.5)
CHLORIDE SERPL-SCNC: 107 MMOL/L (ref 98–107)
CO2 SERPL-SCNC: 28 MMOL/L (ref 22–29)
CREAT SERPL-MCNC: 0.84 MG/DL (ref 0.57–1)
DEPRECATED RDW RBC AUTO: 44.1 FL (ref 37–54)
EGFRCR SERPLBLD CKD-EPI 2021: 73.5 ML/MIN/1.73
EOSINOPHIL # BLD AUTO: 0.08 10*3/MM3 (ref 0–0.4)
EOSINOPHIL NFR BLD AUTO: 1.7 % (ref 0.3–6.2)
ERYTHROCYTE [DISTWIDTH] IN BLOOD BY AUTOMATED COUNT: 12.5 % (ref 12.3–15.4)
GLOBULIN UR ELPH-MCNC: 2.3 GM/DL
GLUCOSE SERPL-MCNC: 106 MG/DL (ref 65–99)
HCT VFR BLD AUTO: 38.2 % (ref 34–46.6)
HGB BLD-MCNC: 12.5 G/DL (ref 12–15.9)
IMM GRANULOCYTES # BLD AUTO: 0 10*3/MM3 (ref 0–0.05)
IMM GRANULOCYTES NFR BLD AUTO: 0 % (ref 0–0.5)
LYMPHOCYTES # BLD AUTO: 0.95 10*3/MM3 (ref 0.7–3.1)
LYMPHOCYTES NFR BLD AUTO: 19.9 % (ref 19.6–45.3)
MCH RBC QN AUTO: 30.9 PG (ref 26.6–33)
MCHC RBC AUTO-ENTMCNC: 32.7 G/DL (ref 31.5–35.7)
MCV RBC AUTO: 94.3 FL (ref 79–97)
MONOCYTES # BLD AUTO: 0.43 10*3/MM3 (ref 0.1–0.9)
MONOCYTES NFR BLD AUTO: 9 % (ref 5–12)
NEUTROPHILS NFR BLD AUTO: 3.29 10*3/MM3 (ref 1.7–7)
NEUTROPHILS NFR BLD AUTO: 69 % (ref 42.7–76)
PLATELET # BLD AUTO: 198 10*3/MM3 (ref 140–450)
PMV BLD AUTO: 8.9 FL (ref 6–12)
POTASSIUM SERPL-SCNC: 4 MMOL/L (ref 3.5–5.2)
PROT SERPL-MCNC: 6.6 G/DL (ref 6–8.5)
RBC # BLD AUTO: 4.05 10*6/MM3 (ref 3.77–5.28)
SODIUM SERPL-SCNC: 143 MMOL/L (ref 136–145)
WBC NRBC COR # BLD: 4.77 10*3/MM3 (ref 3.4–10.8)

## 2023-11-08 PROCEDURE — 25010000002 HEPARIN LOCK FLUSH PER 10 UNITS: Performed by: INTERNAL MEDICINE

## 2023-11-08 PROCEDURE — 80053 COMPREHEN METABOLIC PANEL: CPT | Performed by: NURSE PRACTITIONER

## 2023-11-08 PROCEDURE — 25010000002 TRASTUZUMAB-QYYP 420 MG RECONSTITUTED SOLUTION 1 EACH VIAL: Performed by: NURSE PRACTITIONER

## 2023-11-08 PROCEDURE — 3077F SYST BP >= 140 MM HG: CPT | Performed by: INTERNAL MEDICINE

## 2023-11-08 PROCEDURE — 25810000003 SODIUM CHLORIDE 0.9 % SOLUTION: Performed by: NURSE PRACTITIONER

## 2023-11-08 PROCEDURE — 3079F DIAST BP 80-89 MM HG: CPT | Performed by: INTERNAL MEDICINE

## 2023-11-08 PROCEDURE — 25810000003 SODIUM CHLORIDE 0.9 % SOLUTION 250 ML FLEX CONT: Performed by: NURSE PRACTITIONER

## 2023-11-08 PROCEDURE — 99214 OFFICE O/P EST MOD 30 MIN: CPT | Performed by: INTERNAL MEDICINE

## 2023-11-08 PROCEDURE — 85025 COMPLETE CBC W/AUTO DIFF WBC: CPT | Performed by: NURSE PRACTITIONER

## 2023-11-08 PROCEDURE — 1126F AMNT PAIN NOTED NONE PRSNT: CPT | Performed by: INTERNAL MEDICINE

## 2023-11-08 PROCEDURE — 96413 CHEMO IV INFUSION 1 HR: CPT

## 2023-11-08 RX ORDER — HEPARIN SODIUM (PORCINE) LOCK FLUSH IV SOLN 100 UNIT/ML 100 UNIT/ML
500 SOLUTION INTRAVENOUS AS NEEDED
Status: DISCONTINUED | OUTPATIENT
Start: 2023-11-08 | End: 2023-11-09 | Stop reason: HOSPADM

## 2023-11-08 RX ORDER — SODIUM CHLORIDE 9 MG/ML
250 INJECTION, SOLUTION INTRAVENOUS ONCE
OUTPATIENT
Start: 2023-11-29

## 2023-11-08 RX ORDER — SODIUM CHLORIDE 0.9 % (FLUSH) 0.9 %
10 SYRINGE (ML) INJECTION AS NEEDED
OUTPATIENT
Start: 2023-11-08

## 2023-11-08 RX ORDER — HEPARIN SODIUM (PORCINE) LOCK FLUSH IV SOLN 100 UNIT/ML 100 UNIT/ML
500 SOLUTION INTRAVENOUS AS NEEDED
OUTPATIENT
Start: 2023-11-08

## 2023-11-08 RX ORDER — SODIUM CHLORIDE 0.9 % (FLUSH) 0.9 %
10 SYRINGE (ML) INJECTION AS NEEDED
Status: DISCONTINUED | OUTPATIENT
Start: 2023-11-08 | End: 2023-11-09 | Stop reason: HOSPADM

## 2023-11-08 RX ORDER — SODIUM CHLORIDE 9 MG/ML
250 INJECTION, SOLUTION INTRAVENOUS ONCE
Status: COMPLETED | OUTPATIENT
Start: 2023-11-08 | End: 2023-11-08

## 2023-11-08 RX ORDER — SODIUM CHLORIDE 9 MG/ML
250 INJECTION, SOLUTION INTRAVENOUS ONCE
OUTPATIENT
Start: 2023-12-20

## 2023-11-08 RX ADMIN — SODIUM CHLORIDE 250 ML: 9 INJECTION, SOLUTION INTRAVENOUS at 11:30

## 2023-11-08 RX ADMIN — SODIUM CHLORIDE 570 MG: 9 INJECTION, SOLUTION INTRAVENOUS at 11:32

## 2023-11-08 RX ADMIN — HEPARIN 500 UNITS: 100 SYRINGE at 12:08

## 2023-11-08 NOTE — PROGRESS NOTES
"      PROBLEM LIST:  1. [id number] ER+ (50%) MO negative Her2 positive (3+) Invasive ductal carcinoma of the right breast  A) biopsy of a 1.2 cm mass in the lower outer quadrant on 3/14/23 showed a high grade IDC.  No concerning lymph nodes on imaging.  B) neoadjuvant chemotherapy with Taxol and Herceptin started on 4/12/2023  C) right breast lumpectomy on 8/4/23 showed no residual cancer in the breast or 1 SLN.  D) completed adjuvant radiation 10/4/2023.  Anastrozole started October 2023.  2. Hypertension  3. Depression  4. MOOKIE  5. Hyperlipidemia    Subjective     CHIEF COMPLAINT: breast cancer    HISTORY OF PRESENT ILLNESS:   Laura Churchill returns for follow-up.  She says she is doing okay.  She started the anastrozole about 3 weeks ago.  She has been having some stomach pain as well as intermittent diarrhea.  This is unusual for her because she normally just has a bowel movement about every 5 days.  She has taken Imodium intermittently.        Objective      /87   Pulse 94   Temp 97.3 °F (36.3 °C)   Resp 18   Ht 162.6 cm (64\")   Wt 98 kg (216 lb)   LMP  (LMP Unknown)   SpO2 95%   BMI 37.08 kg/m²    Vitals:    11/08/23 0937   PainSc: 0-No pain               General: well appearing female in no acute distress  Neuro: alert and oriented  HEENT: sclera anicteric, oropharynx clear  Cardiovascular: Regular rate and rhythm no murmurs  Lungs: Clear to auscultation bilaterally  Extremeties: no lower extremity edema  Skin: no rashes, lesions, bruising, or petechiae  Psych: mood and affect appropriate            RECENT LABS:  Lab Results   Component Value Date    WBC 4.56 10/18/2023    HGB 12.6 10/18/2023    HCT 38.7 10/18/2023    MCV 94.6 10/18/2023     10/18/2023       Lab Results   Component Value Date    GLUCOSE 97 10/18/2023    BUN 14 10/18/2023    CREATININE 0.79 10/18/2023    EGFRIFNONA 66 09/28/2021    BCR 17.7 10/18/2023    K 3.9 10/18/2023    CO2 26.0 10/18/2023    CALCIUM 9.2 10/18/2023    " "ALBUMIN 4.2 10/18/2023    AST 16 10/18/2023    ALT 9 10/18/2023       DEXA Bone Density Axial  Narrative: DUAL-ENERGY X-RAY ABSORPTIOMETRY (DXA)    INDICATION: Osteopenia, height loss, prior fracture, cancer    COMPARISON: Previous bone mineral density exam performed on 10/8/2021    PROCEDURE: A DXA scan was performed using a Hologic densitometer.    The right hip was evaluated as well as the right forearm.    The T-score compares the patient's bone mineral density with the peak bone mass of young normal patients.  According to criteria established by the World Health Organization, patients with T-scores  between 1.0 and 2.5 standard deviations BELOW the mean   are osteopenic (low bone mass).  Patients with T-scores EQUAL TO OR GREATER than 2.5 standard deviations below the mean are osteoporotic.    The Z-score compares the patient bone mineral density with age and sex matched peers.  According to the International Society for Clinical Densitometry's 2007 consensus conference:  In women prior to menopause and men less than age 50, Z-scores, not   T-scores are preferred.  A Z-score of -2.0 or lower is defined as \"below the expected range for age\" and a Z-score above -2.0 is \"within the expected range for age.\"  The WHO diagnostic criteria may be applied in women in the menopausal transition.    Osteoporosis cannot be diagnosed in men under age 50 on the basis of BMD alone.    TECHNICAL QUALITY:  The study is of good technical quality.    RESULTS:    Total Hip:  The BMD measured at the right total proximal femur is 0.824 g/cm2.  The T-score is -1.0.  The Z-score is 0.7.    Femoral neck: The BMD measured at the right femoral neck is 0.626 g/cm2. The T score is -2.0. The Z score is 0.0.    1/3 Radius:  The BMD measured at the right one-third radius is 0.588 g/cm2.  The T-score is -1.8.  The Z-score is 0.6.  Impression: Osteopenia of the right femoral neck, and one third right forearm    The ten year fracture risk " assessment is calculated at 17% for major systemic osteoporotic fracture and 3.4% for a hip fracture. Less than 3% risk in the [place] for hip fracture and less than 20% risk of any systemic osteoporotic fracture is   considered less than the threshold for where pharmacological therapy is recommended by the National Osteoporosis Foundation.    All the treatment decisions require clinical judgment and consideration of individual patient factors, including patient preferences, co-morbidities, previous drug use, risk factors not captured in the FRAX model (frailty, falls, vitamin D deficiency,   increased bone turnover, interval significant decline in bone density) and possible under or over estimation of fracture risk by FRAX. Approaches to reduce osteoporosis related fracture risk include optimizing calcium and vitamin D status, appropriate   weight bearing exercises and fall-prevention measurements.  The National Osteoporosis Foundation recommends (http://www.nof.org/hcp/practice/practice-and-clinical-guidelines/clinicians-guide) that FDA-approved medical therapies be considered in   postmenopausal women and men aged equal or greater than 50 years with :  a) hip or vertebral (clinical or morphometric) fracture; b) T-score of -2.5 or less at the spine or hip; c) Ten-year fracture probability by FRAX of greater than 3% for hip fracture   of greater than 20% for major osteoporotic fracture.      Secondary causes of bone loss should be evaluated if clinically indicated since the etiology of low BMD cannot be determined by BMD measurement alone.    FOLLOWUP: Consider repeating the study in 2-3 years to reassess the patient's status or sooner if there is some new clinical indication.    INTERVAL CHANGE:   There was a decrease in bone mineral density of the total right hip by 1.1%, and one third right forearm by 1.4% when compared to previous study performed on 10/8/2021.       At this facility, the least significant  change in the BMD at the left hip with 95% confidence is 0.601992 gm/cm2 at the hip and 0.169746 g/cm2 at the lumbar spine.    Electronically Signed: Cam Gamez MD    10/31/2023 5:26 AM EDT    Workstation ID: [id number]              ASSESSMENT AND PLAN:     Laura Churchill is a 73 y.o. female with a [id number] Er+ Her2+ invasive ductal carcinoma of the right breast.    Her pathology showed a complete pathologic response.  We will plan to continue maintenance treatment with herceptin for a total of 1 year.    She has started anastrozole.  She has been having some diarrhea and upset stomach, which is not typical with anastrozole but possibly related.  We discussed that if it is related to the anastrozole this should get better over the next few weeks.  She can continue to use Imodium as needed.    Osteopenia: Stable on recent DEXA scan.  We will check this again in 2 years.  We discussed vitamin D supplementation and regular exercise.    Repeat echo was stable.  She will be due for repeat in January 2024.    She is scheduled for diagnostic mammogram due April 2024.    Follow-up in 6 weeks.                              Sharonda Hopkins MD  [facility]    11/8/2023          CC:

## 2023-11-13 ENCOUNTER — LAB (OUTPATIENT)
Dept: LAB | Facility: HOSPITAL | Age: 73
End: 2023-11-13
Payer: MEDICARE

## 2023-11-13 ENCOUNTER — OFFICE VISIT (OUTPATIENT)
Dept: FAMILY MEDICINE CLINIC | Facility: CLINIC | Age: 73
End: 2023-11-13
Payer: MEDICARE

## 2023-11-13 VITALS
DIASTOLIC BLOOD PRESSURE: 74 MMHG | TEMPERATURE: 98.6 F | HEIGHT: 64 IN | WEIGHT: 215.5 LBS | BODY MASS INDEX: 36.79 KG/M2 | SYSTOLIC BLOOD PRESSURE: 126 MMHG | OXYGEN SATURATION: 96 % | HEART RATE: 90 BPM

## 2023-11-13 DIAGNOSIS — E78.41 ELEVATED LIPOPROTEIN(A): ICD-10-CM

## 2023-11-13 DIAGNOSIS — F41.9 ANXIETY: ICD-10-CM

## 2023-11-13 DIAGNOSIS — Z00.00 MEDICARE ANNUAL WELLNESS VISIT, SUBSEQUENT: Primary | ICD-10-CM

## 2023-11-13 LAB
CHOLEST SERPL-MCNC: 166 MG/DL (ref 0–200)
HDLC SERPL-MCNC: 54 MG/DL (ref 40–60)
LDLC SERPL CALC-MCNC: 93 MG/DL (ref 0–100)
LDLC/HDLC SERPL: 1.67 {RATIO}
TRIGL SERPL-MCNC: 108 MG/DL (ref 0–150)
TSH SERPL DL<=0.05 MIU/L-ACNC: 0.89 UIU/ML (ref 0.27–4.2)
VLDLC SERPL-MCNC: 19 MG/DL (ref 5–40)

## 2023-11-13 PROCEDURE — 80061 LIPID PANEL: CPT

## 2023-11-13 PROCEDURE — 3074F SYST BP LT 130 MM HG: CPT | Performed by: PHYSICIAN ASSISTANT

## 2023-11-13 PROCEDURE — 1160F RVW MEDS BY RX/DR IN RCRD: CPT | Performed by: PHYSICIAN ASSISTANT

## 2023-11-13 PROCEDURE — 36415 COLL VENOUS BLD VENIPUNCTURE: CPT

## 2023-11-13 PROCEDURE — 3078F DIAST BP <80 MM HG: CPT | Performed by: PHYSICIAN ASSISTANT

## 2023-11-13 PROCEDURE — G0439 PPPS, SUBSEQ VISIT: HCPCS | Performed by: PHYSICIAN ASSISTANT

## 2023-11-13 PROCEDURE — 84443 ASSAY THYROID STIM HORMONE: CPT

## 2023-11-13 PROCEDURE — 1159F MED LIST DOCD IN RCRD: CPT | Performed by: PHYSICIAN ASSISTANT

## 2023-11-13 PROCEDURE — 1170F FXNL STATUS ASSESSED: CPT | Performed by: PHYSICIAN ASSISTANT

## 2023-11-13 RX ORDER — ATORVASTATIN CALCIUM 10 MG/1
10 TABLET, FILM COATED ORAL DAILY
Qty: 90 TABLET | Refills: 3 | Status: SHIPPED | OUTPATIENT
Start: 2023-11-13

## 2023-11-13 RX ORDER — VENLAFAXINE HYDROCHLORIDE 150 MG/1
150 CAPSULE, EXTENDED RELEASE ORAL DAILY
Qty: 90 CAPSULE | Refills: 3 | Status: SHIPPED | OUTPATIENT
Start: 2023-11-13

## 2023-11-13 RX ORDER — ATORVASTATIN CALCIUM 10 MG/1
10 TABLET, FILM COATED ORAL DAILY
Qty: 90 TABLET | Refills: 3 | Status: SHIPPED | OUTPATIENT
Start: 2023-11-13 | End: 2023-11-13 | Stop reason: SDUPTHER

## 2023-11-13 NOTE — PROGRESS NOTES
The ABCs of the Annual Wellness Visit  Subsequent Medicare Wellness Visit    Subjective      Laura Churchill is a 73 y.o. female who presents for a Subsequent Medicare Wellness Visit. She has been doing well, no new concerns.    The following portions of the patient's history were reviewed and   updated as appropriate: allergies, current medications, past family history, past medical history, past social history, past surgical history, and problem list.    Compared to one year ago, the patient feels her physical   health is the same.    Compared to one year ago, the patient feels her mental   health is the same.    Recent Hospitalizations:  She was not admitted to the hospital during the last year.       Current Medical Providers:  Patient Care Team:  Julius Rivera DO as PCP - General (Family Medicine)  Carol Gruber APRN as Nurse Practitioner (Obstetrics and Gynecology)  Lucina Oseguera MD as Consulting Physician (Radiation Oncology)  Delfina Saleh MD as Referring Physician (General Surgery)  Sharonda Hopkins MD as Consulting Physician (Hematology and Oncology)  David Rider MD as Consulting Physician (Neurosurgery)  Houston Castano MD as Consulting Physician (Orthopedic Surgery)  Almas Miller MD as Consulting Physician (Orthopedic Surgery)    Outpatient Medications Prior to Visit   Medication Sig Dispense Refill    anastrozole (ARIMIDEX) 1 MG tablet Take 1 tablet by mouth Daily. 90 tablet 3    betamethasone dipropionate 0.05 % cream       fexofenadine (Allegra Allergy) 60 MG tablet Take 1 tablet by mouth Daily. 90 tablet 3    lidocaine-prilocaine (EMLA) 2.5-2.5 % cream Apply 1 application topically to the appropriate area as directed As Needed (prior to port access). 30 g 3    losartan (Cozaar) 100 MG tablet Take 1 tablet by mouth Daily. 90 tablet 3    vitamin B-12 (CYANOCOBALAMIN) 1000 MCG tablet Take 1 tablet by mouth Daily.      VITAMIN D PO Take 5,000 Units by  mouth Daily.      atorvastatin (LIPITOR) 10 MG tablet Take 1 tablet by mouth Daily. 90 tablet 3    venlafaxine XR (EFFEXOR-XR) 150 MG 24 hr capsule Take 1 capsule by mouth Daily. 90 capsule 3     No facility-administered medications prior to visit.       No opioid medication identified on active medication list. I have reviewed chart for other potential  high risk medication/s and harmful drug interactions in the elderly.        Aspirin is not on active medication list.  Aspirin use is not indicated based on review of current medical condition/s. Risk of harm outweighs potential benefits.  .    Patient Active Problem List   Diagnosis    Essential hypertension    Hyperlipidemia    Obstructive sleep apnea syndrome    Osteoarthritis of knee    Osteoporosis    Lumbar spondylosis without myelopathy/Lumbar facet arthropathy    Displacement of lumbar intervertebral disc    Stenosis of lateral recess of lumbar spine    Physical deconditioning    Morbid obesity due to excess calories    Anxiety and depression    Sacroiliac joint dysfunction of right side    Greater trochanteric bursitis of right hip    Iliotibial band syndrome of right side    Spinal stenosis    Osteoarthritis    Obesity due to excess calories    Menopause    Mixed hyperlipidemia    Lumbosacral disc disease    Low back pain    Joint pain    Extremity pain    Chronic pain disorder    Back problem    Vaginal atrophy    Primary localized osteoarthrosis of shoulder region    Status post total left shoulder arthroplasty    Anxiety    Sciatica    Acquired spondylolisthesis    Spinal stenosis, lumbar region, with neurogenic claudication    L3-5 PLIF 7/2020    Primary osteoarthritis of left knee    Status post total left knee replacement    Leukocytosis, likely reactive    Acute blood loss anemia, mild, asymptomatic    Nonintractable episodic headache    Right thigh pain    Pseudoarthrosis of lumbar spine    Primary localized osteoarthritis of right knee    Status  "post right knee replacement    Obesity    Postoperative pain    Prediabetes    Malignant neoplasm of right breast in female, estrogen receptor positive    Encounter for care related to Port-a-Cath     Advance Care Planning   Advance Care Planning     Advance Directive is on file.  ACP discussion was held with the patient during this visit. Patient has an advance directive in EMR which is still valid.      Objective    Vitals:    11/13/23 1314   BP: 126/74   Pulse: 90   Temp: 98.6 °F (37 °C)   TempSrc: Infrared   SpO2: 96%   Weight: 97.8 kg (215 lb 8 oz)   Height: 162.6 cm (64.02\")   PainSc: 0-No pain     Estimated body mass index is 36.97 kg/m² as calculated from the following:    Height as of this encounter: 162.6 cm (64.02\").    Weight as of this encounter: 97.8 kg (215 lb 8 oz).       Physical Exam  Vitals and nursing note reviewed.   Constitutional:       General: She is not in acute distress.     Appearance: Normal appearance. She is well-developed.   HENT:      Head: Normocephalic and atraumatic.      Right Ear: Tympanic membrane and ear canal normal. There is no impacted cerumen.      Left Ear: Tympanic membrane and ear canal normal. There is no impacted cerumen.      Nose: Nose normal. No congestion or rhinorrhea.      Mouth/Throat:      Mouth: Mucous membranes are moist.      Pharynx: Oropharynx is clear. No oropharyngeal exudate or posterior oropharyngeal erythema.   Eyes:      General: No scleral icterus.        Right eye: No discharge.         Left eye: No discharge.      Extraocular Movements: Extraocular movements intact.      Conjunctiva/sclera: Conjunctivae normal.      Pupils: Pupils are equal, round, and reactive to light.   Neck:      Thyroid: No thyromegaly.      Vascular: No carotid bruit.   Cardiovascular:      Rate and Rhythm: Normal rate and regular rhythm.      Heart sounds: Normal heart sounds. No murmur heard.  Pulmonary:      Breath sounds: Normal breath sounds. No wheezing, rhonchi or " rales.   Abdominal:      General: Bowel sounds are normal. There is no distension.      Palpations: Abdomen is soft. There is no mass.      Tenderness: There is no abdominal tenderness.   Musculoskeletal:         General: No swelling. Normal range of motion.      Cervical back: Normal range of motion and neck supple.      Right lower leg: No edema.      Left lower leg: No edema.   Lymphadenopathy:      Cervical: No cervical adenopathy.   Skin:     General: Skin is warm.      Coloration: Skin is not jaundiced or pale.      Findings: No bruising or rash.   Neurological:      General: No focal deficit present.      Mental Status: She is alert.      Cranial Nerves: No cranial nerve deficit.      Motor: No weakness.      Gait: Gait normal.   Psychiatric:         Mood and Affect: Mood normal.         Behavior: Behavior normal.         Judgment: Judgment normal.            Does the patient have evidence of cognitive impairment?   No    Lab Results   Component Value Date    TRIG 108 2023    HDL 54 2023    LDL 93 2023    VLDL 19 2023          HEALTH RISK ASSESSMENT    Smoking Status:  Social History     Tobacco Use   Smoking Status Former    Packs/day: 0.25    Years: 0.50    Additional pack years: 0.00    Total pack years: 0.13    Types: Cigarettes    Start date: 1977    Quit date: 1978    Years since quittin.8   Smokeless Tobacco Never     Alcohol Consumption:  Social History     Substance and Sexual Activity   Alcohol Use Yes    Alcohol/week: 1.0 standard drink of alcohol    Types: 1 Glasses of wine per week    Comment: I seldom have a drink of any kind.     Fall Risk Screen:    ROMIE Fall Risk Assessment was completed, and patient is at LOW risk for falls.Assessment completed on:2023    Depression Screenin/13/2023     1:22 PM   PHQ-2/PHQ-9 Depression Screening   Little Interest or Pleasure in Doing Things 0-->not at all   Feeling Down, Depressed or Hopeless 0-->not at  all   PHQ-9: Brief Depression Severity Measure Score 0       Health Habits and Functional and Cognitive Screenin/13/2023     1:19 PM   Functional & Cognitive Status   Do you have difficulty preparing food and eating? No   Do you have difficulty bathing yourself, getting dressed or grooming yourself? No   Do you have difficulty using the toilet? No   Do you have difficulty moving around from place to place? No   Do you have trouble with steps or getting out of a bed or a chair? No   Current Diet Well Balanced Diet   Dental Exam Up to date   Eye Exam Up to date   Exercise (times per week) 0 times per week   Current Exercises Include No Regular Exercise   Do you need help using the phone?  No   Are you deaf or do you have serious difficulty hearing?  Yes   Do you need help to go to places out of walking distance? No   Do you need help shopping? No   Do you need help preparing meals?  No   Do you need help with housework?  No   Do you need help with laundry? No   Do you need help taking your medications? No   Do you need help managing money? No   Do you ever drive or ride in a car without wearing a seat belt? No   Have you felt unusual stress, anger or loneliness in the last month? No   Who do you live with? Spouse   If you need help, do you have trouble finding someone available to you? No   Have you been bothered in the last four weeks by sexual problems? No   Do you have difficulty concentrating, remembering or making decisions? Yes       Age-appropriate Screening Schedule:  Refer to the list below for future screening recommendations based on patient's age, sex and/or medical conditions. Orders for these recommended tests are listed in the plan section. The patient has been provided with a written plan.    Health Maintenance   Topic Date Due    ANNUAL WELLNESS VISIT  11/10/2023    COVID-19 Vaccine ( season) 2024    BMI FOLLOWUP  2024    MAMMOGRAM  2024    COLORECTAL CANCER  SCREENING  09/24/2024    LIPID PANEL  11/13/2024    DXA SCAN  10/30/2025    HEPATITIS C SCREENING  Completed    INFLUENZA VACCINE  Completed    Pneumococcal Vaccine 65+  Completed    ZOSTER VACCINE  Completed    PAP SMEAR  Discontinued    TDAP/TD VACCINES  Discontinued                  CMS Preventative Services Quick Reference  Risk Factors Identified During Encounter:    None Identified    The above risks/problems have been discussed with the patient.  Pertinent information has been shared with the patient in the After Visit Summary.    Diagnoses and all orders for this visit:    1. Medicare annual wellness visit, subsequent (Primary)    2. Elevated lipoprotein(a)  -     Discontinue: atorvastatin (LIPITOR) 10 MG tablet; Take 1 tablet by mouth Daily.  Dispense: 90 tablet; Refill: 3  -     atorvastatin (LIPITOR) 10 MG tablet; Take 1 tablet by mouth Daily.  Dispense: 90 tablet; Refill: 3  -     TSH; Future  -     Lipid panel; Future    3. Anxiety  -     venlafaxine XR (EFFEXOR-XR) 150 MG 24 hr capsule; Take 1 capsule by mouth Daily.  Dispense: 90 capsule; Refill: 3        Follow Up:   Next Medicare Wellness visit to be scheduled in 1 year.      An After Visit Summary and PPPS were made available to the patient.

## 2023-11-14 ENCOUNTER — OFFICE VISIT (OUTPATIENT)
Dept: RADIATION ONCOLOGY | Facility: HOSPITAL | Age: 73
End: 2023-11-14
Payer: MEDICARE

## 2023-11-14 ENCOUNTER — HOSPITAL ENCOUNTER (OUTPATIENT)
Dept: RADIATION ONCOLOGY | Facility: HOSPITAL | Age: 73
Setting detail: RADIATION/ONCOLOGY SERIES
Discharge: HOME | End: 2023-11-14
Payer: MEDICARE

## 2023-11-14 VITALS
SYSTOLIC BLOOD PRESSURE: 138 MMHG | OXYGEN SATURATION: 93 % | HEART RATE: 84 BPM | RESPIRATION RATE: 16 BRPM | DIASTOLIC BLOOD PRESSURE: 72 MMHG | TEMPERATURE: 96.7 F | BODY MASS INDEX: 37.35 KG/M2 | WEIGHT: 217.7 LBS

## 2023-11-14 DIAGNOSIS — Z17.0 MALIGNANT NEOPLASM OF RIGHT BREAST IN FEMALE, ESTROGEN RECEPTOR POSITIVE, UNSPECIFIED SITE OF BREAST: Primary | ICD-10-CM

## 2023-11-14 DIAGNOSIS — C50.911 MALIGNANT NEOPLASM OF RIGHT BREAST IN FEMALE, ESTROGEN RECEPTOR POSITIVE, UNSPECIFIED SITE OF BREAST: Primary | ICD-10-CM

## 2023-11-14 NOTE — PROGRESS NOTES
FOLLOW UP NOTE    PATIENT:                                                      Laura Churchill  MEDICAL RECORD #:                        [id number]  :                                                          1950  COMPLETION DATE:   10/4/2023  DIAGNOSIS:     Malignant neoplasm of right breast in female, estrogen receptor positive  -Initial Clinical: Stage IA (cT1c, cN0, cM0, G2, ER+, NE-, HER2+)  -Pathologic: ypT0, ypN0, cM0      BRIEF HISTORY:   Laura Churchill is a 73 y.o. female returning for initial follow-up visit of her right-sided breast cancer.  She was found on imaging to have a 1.2 cm irregular mass at the 9:00 region of the right breast, lower outer quadrant.  Biopsy was positive for invasive ductal carcinoma, poorly differentiated grade 3.  Tumor was ER positive, NE negative, HER2/meg positive.  Breast MRI revealed no evidence of malignancy in the left breast, no evidence of axillary or internal mammary lymphadenopathy and no multifocal or multicentric disease in the right breast.  The patient underwent a course of neoadjuvant chemotherapy of Taxol and Herceptin per Dr. Hopkins.  She was then taken for right breast lumpectomy 2023 with Dr. MELTON.  Pathology revealed complete pathologic response with no evidence of atypical ductal hyperplasia, DCIS, or residual invasive carcinoma.  Surgical margins were negative.  Right sentinel lymph node was negative.  She then underwent a course of adjuvant radiation therapy to the right breast consisting of 40.05 Quick in 15 fractions, completing 10/4/2023.  She tolerated treatment well.  She reports exacerbation of fatigue with treatment, which is slowly subsiding.  She did develop brisk erythema and a small area of pruritic miliaria rubra within the upper inner quadrant of the right breast.  Skin was managed with application of topical Aquaphor and triple antibiotic cream with reported resolution within a few days.  She reports some sensitivity at the  right nipple.  She did not develop lymphedema.  She denies dyspnea or chest pain.  At this point, she continues maintenance Herceptin and has begun adjuvant anastrozole.  She reports some diarrhea and GI upset which she associated with anastrozole.  She reports that she has begun taking medication with food in the morning which has helped with her symptoms and stools are now more soft/formed.  She does note some unwanted recent weight gain but is motivated to begin weight loss program such as [employer].  She denies additional acute concerns today and overall feels well.          MEDICATIONS: Medication reconciliation for the patient was reviewed and confirmed in the electronic medical record.    Review of Systems   Constitutional:  Positive for fatigue and unexpected weight change (gain).   Gastrointestinal:  Positive for diarrhea (improved).   All other systems reviewed and are negative.          KPS 90%      Physical Exam  Vitals and nursing note reviewed.   Constitutional:       General: She is not in acute distress.     Appearance: She is well-developed.   HENT:      Head: Normocephalic and atraumatic.   Eyes:      Conjunctiva/sclera: Conjunctivae normal.      Pupils: Pupils are equal, round, and reactive to light.   Cardiovascular:      Rate and Rhythm: Normal rate and regular rhythm.   Pulmonary:      Effort: Pulmonary effort is normal.      Breath sounds: Normal breath sounds.   Chest:      Comments: The breast exam reveals well-healed surgical incisions in the upper outer quadrant of the right breast and in the lower outer quadrant.  Skin of the right breast appears healthy, intact, and nonerythematous.  No suspicious masses or nodules palpated on the right.  No evidence of lymphedema or axillary adenopathy on the right.  The left breast was not examined.  Abdominal:      General: There is no distension.   Musculoskeletal:         General: Normal range of motion.      Cervical back: Normal range of  motion and neck supple.   Lymphadenopathy:      Cervical: No cervical adenopathy.      Upper Body:      Right upper body: No supraclavicular or axillary adenopathy.      Left upper body: No supraclavicular adenopathy.   Skin:     General: Skin is warm and dry.   Neurological:      Mental Status: She is alert and oriented to person, place, and time.   Psychiatric:         Behavior: Behavior normal.         Thought Content: Thought content normal.         Judgment: Judgment normal.         VITAL SIGNS:   Vitals:    11/14/23 0940   BP: 138/72   Pulse: 84   Resp: 16   Temp: 96.7 °F (35.9 °C)   TempSrc: Temporal   SpO2: 93%   Weight: 98.7 kg (217 lb 11.2 oz)   PainSc: 0-No pain           The following portions of the patient's history were reviewed and updated as appropriate: allergies, current medications, past family history, past medical history, past social history, past surgical history and problem list.         Diagnoses and all orders for this visit:    1. Malignant neoplasm of right breast in female, estrogen receptor positive, unspecified site of breast (Primary)         IMPRESSION:  Laura Churchill is a 73 y.o. female with recent diagnosis of a clinical stage IA ER+/HER2+ IDC of the right breast.  Following a course of neoadjuvant chemotherapy of Taxol and Herceptin, she underwent right breast lumpectomy and was found to have a complete pathologic response with no residual disease, [id number].  She is now 1 month status post adjuvant radiotherapy on the right as part of her breast conserving treatment.  She tolerated treatment well.  She developed the anticipated grade 1 fatigue and grade 1 radiation dermatitis which have appropriately subsided.  Clinical exam of the right breast today is benign.  We discussed the role of local breast/scar massage, as well as stretching and range of motion exercises of the right upper extremity to minimize the risk of treatment related fibrosis or lymphedema.  The patient is  continuing maintenance Herceptin which is planned for a total of 1 year with Dr. Hopkins.  The patient is also on adjuvant endocrine therapy with an aromatase inhibitor which is planned for a minimum duration of 5 years.  She is scheduled for repeat diagnostic mammogram of the bilateral breasts 6 months out from radiation, on 4/22/2024.  We discussed follow-up intervals, including biannual diagnostic mammograms to alternate between the right and bilateral breasts, biannual clinical breast exams, and monthly self breast exams.  She was also reminded to maintain a healthy lifestyle with a well balanced diet, calcium and vitamin D for osteoporosis prevention, and exercise as well as continue with recommended health and cancer screening guidelines.    RECOMMENDATIONS:  Laura Churchill will continue routine oncologic management/maintenance under the direction of Dr. Hopkins, with follow-up in the breast cancer survivorship clinic on 12/20/2023.  Additionally, she continues to see Dr. MELTON in the surgical oncology breast clinic.  Given the close follow-up with her other physicians, we will be happy to remain available as needed.    Return if symptoms worsen or fail to improve, for Office Visit.    REGINO Paul spent a total of 40 minutes on today's visit, with more than 20 minutes in direct face to face communication, and the remainder of the time spent in reviewing the relevant history, records, available imaging, and for documentation.

## 2023-11-27 ENCOUNTER — OFFICE VISIT (OUTPATIENT)
Dept: FAMILY MEDICINE CLINIC | Facility: CLINIC | Age: 73
End: 2023-11-27
Payer: MEDICARE

## 2023-11-27 VITALS
TEMPERATURE: 98.6 F | WEIGHT: 216.3 LBS | OXYGEN SATURATION: 98 % | HEART RATE: 86 BPM | SYSTOLIC BLOOD PRESSURE: 124 MMHG | DIASTOLIC BLOOD PRESSURE: 88 MMHG | BODY MASS INDEX: 36.93 KG/M2 | HEIGHT: 64 IN

## 2023-11-27 DIAGNOSIS — R05.1 ACUTE COUGH: Primary | ICD-10-CM

## 2023-11-27 PROCEDURE — 1159F MED LIST DOCD IN RCRD: CPT | Performed by: FAMILY MEDICINE

## 2023-11-27 PROCEDURE — 99213 OFFICE O/P EST LOW 20 MIN: CPT | Performed by: FAMILY MEDICINE

## 2023-11-27 PROCEDURE — 3079F DIAST BP 80-89 MM HG: CPT | Performed by: FAMILY MEDICINE

## 2023-11-27 PROCEDURE — 1160F RVW MEDS BY RX/DR IN RCRD: CPT | Performed by: FAMILY MEDICINE

## 2023-11-27 PROCEDURE — 3074F SYST BP LT 130 MM HG: CPT | Performed by: FAMILY MEDICINE

## 2023-11-27 RX ORDER — AMOXICILLIN AND CLAVULANATE POTASSIUM 875; 125 MG/1; MG/1
1 TABLET, FILM COATED ORAL 2 TIMES DAILY
Qty: 20 TABLET | Refills: 0 | Status: SHIPPED | OUTPATIENT
Start: 2023-11-27

## 2023-11-27 NOTE — PROGRESS NOTES
Follow Up Office Visit      Patient Name: Laura Churchill  : 1950   MRN: [id number]     Chief Complaint:    Chief Complaint   Patient presents with    Cough     Started with a tinkle on Wednesday, pt has headache  And now its gotten much worse, pt has tried several over the counter medications and nothing is working.  Pt states that she is concerned that it might be her low immune system due to recent chemo       History of Present Illness: Laura Churchill is a 73 y.o. female who is here today to follow up with history of breast cancer that has been surgically cured and she is currently on chemo to prevent it from coming back.  Patient reports that her sickness started about a week ago.  Is worsened overall.  She been using over-the-counter medications.  No sick contacts and no fever.  No body aches.    Physical exam: Lungs show rhonchi bilaterally.  No rales.  Patient's bilateral ear exam shows bulging TM but no erythema.  Lymphadenopathy in the submandibular region but none in the cervical region.      Subjective        I have reviewed and the following portions of the patient's history were updated as appropriate: past family history, past medical history, past social history, past surgical history and problem list.    Medications:     Current Outpatient Medications:     anastrozole (ARIMIDEX) 1 MG tablet, Take 1 tablet by mouth Daily., Disp: 90 tablet, Rfl: 3    atorvastatin (LIPITOR) 10 MG tablet, Take 1 tablet by mouth Daily., Disp: 90 tablet, Rfl: 3    betamethasone dipropionate 0.05 % cream, , Disp: , Rfl:     fexofenadine (Allegra Allergy) 60 MG tablet, Take 1 tablet by mouth Daily., Disp: 90 tablet, Rfl: 3    lidocaine-prilocaine (EMLA) 2.5-2.5 % cream, Apply 1 application topically to the appropriate area as directed As Needed (prior to port access)., Disp: 30 g, Rfl: 3    losartan (Cozaar) 100 MG tablet, Take 1 tablet by mouth Daily., Disp: 90 tablet, Rfl: 3    venlafaxine XR  "(EFFEXOR-XR) 150 MG 24 hr capsule, Take 1 capsule by mouth Daily., Disp: 90 capsule, Rfl: 3    vitamin B-12 (CYANOCOBALAMIN) 1000 MCG tablet, Take 1 tablet by mouth Daily., Disp: , Rfl:     VITAMIN D PO, Take 5,000 Units by mouth Daily., Disp: , Rfl:     amoxicillin-clavulanate (AUGMENTIN) 875-125 MG per tablet, Take 1 tablet by mouth 2 (Two) Times a Day., Disp: 20 tablet, Rfl: 0    Allergies:   Allergies   Allergen Reactions    Codeine Hives    Gabapentin Hallucinations     Screaming nightmares    Sulfa Antibiotics Itching       Objective     Physical Exam: Please see above  Vital Signs:   Vitals:    11/27/23 0949   BP: 124/88   Pulse: 86   Temp: 98.6 °F (37 °C)   TempSrc: Infrared   SpO2: 98%   Weight: 98.1 kg (216 lb 4.8 oz)   Height: 162.6 cm (64.02\")   PainSc:   8     Body mass index is 37.11 kg/m².          Assessment / Plan      Assessment/Plan:   Diagnoses and all orders for this visit:    1. Acute cough (Primary)  -     amoxicillin-clavulanate (AUGMENTIN) 875-125 MG per tablet; Take 1 tablet by mouth 2 (Two) Times a Day.  Dispense: 20 tablet; Refill: 0    We will treat patient's URI symptoms as secondary infection given the worsening aspect of them and history of needing chemotherapy.  This looks like bronchitis overall.  Should improve with 10 days worth of Augmentin.  Symptoms may remain for up to 1 month after she has improved.  Recommend over-the-counter medications and vapes vapor rub.    Follow Up:   Return for Medicare Wellness.    Julius Rivera DO  [facility]   "

## 2023-11-28 ENCOUNTER — TELEPHONE (OUTPATIENT)
Dept: ONCOLOGY | Facility: CLINIC | Age: 73
End: 2023-11-28
Payer: MEDICARE

## 2023-11-28 NOTE — TELEPHONE ENCOUNTER
Patient went to her pcp for testing and evaluation yesterday and determined she has bronchitis.  She was prescribed augmentin and started it yesterday.  She denies any fevers and just has congestion and sore throat.  She is on maintenance herceptin so counts are adequate for infection fighting.  She will plan to come in tomorrow as scheduled.

## 2023-11-28 NOTE — TELEPHONE ENCOUNTER
Caller: Laura Churchill    Relationship: Self    Best call back number: [phone]    What is the best time to reach you: ANYTIME    Who are you requesting to speak with (clinical staff, provider,  specific staff member): CLINICAL     Do you know the name of the person who called:     What was the call regarding: PATIENT HAS BRONCHITIS AND A REALLY BAD COUGH CAN SHE HAVE HER INFUSION DONE ON 11/29/2023?    CALL TO ADVISE     Is it okay if the provider responds through MyChart:

## 2023-11-29 ENCOUNTER — HOSPITAL ENCOUNTER (OUTPATIENT)
Dept: ONCOLOGY | Facility: HOSPITAL | Age: 73
Discharge: HOME | End: 2023-11-29
Payer: MEDICARE

## 2023-11-29 VITALS
HEIGHT: 64 IN | BODY MASS INDEX: 36.02 KG/M2 | SYSTOLIC BLOOD PRESSURE: 152 MMHG | WEIGHT: 211 LBS | TEMPERATURE: 97.6 F | HEART RATE: 94 BPM | DIASTOLIC BLOOD PRESSURE: 71 MMHG | RESPIRATION RATE: 20 BRPM

## 2023-11-29 DIAGNOSIS — Z17.0 MALIGNANT NEOPLASM OF RIGHT BREAST IN FEMALE, ESTROGEN RECEPTOR POSITIVE, UNSPECIFIED SITE OF BREAST: Primary | ICD-10-CM

## 2023-11-29 DIAGNOSIS — Z45.2 ENCOUNTER FOR CARE RELATED TO PORT-A-CATH: ICD-10-CM

## 2023-11-29 DIAGNOSIS — C50.911 MALIGNANT NEOPLASM OF RIGHT BREAST IN FEMALE, ESTROGEN RECEPTOR POSITIVE, UNSPECIFIED SITE OF BREAST: Primary | ICD-10-CM

## 2023-11-29 LAB
BASOPHILS # BLD AUTO: 0.03 10*3/MM3 (ref 0–0.2)
BASOPHILS NFR BLD AUTO: 0.4 % (ref 0–1.5)
DEPRECATED RDW RBC AUTO: 44.4 FL (ref 37–54)
EOSINOPHIL # BLD AUTO: 0.11 10*3/MM3 (ref 0–0.4)
EOSINOPHIL NFR BLD AUTO: 1.4 % (ref 0.3–6.2)
ERYTHROCYTE [DISTWIDTH] IN BLOOD BY AUTOMATED COUNT: 12.7 % (ref 12.3–15.4)
HCT VFR BLD AUTO: 36.2 % (ref 34–46.6)
HGB BLD-MCNC: 11.8 G/DL (ref 12–15.9)
IMM GRANULOCYTES # BLD AUTO: 0.05 10*3/MM3 (ref 0–0.05)
IMM GRANULOCYTES NFR BLD AUTO: 0.6 % (ref 0–0.5)
LYMPHOCYTES # BLD AUTO: 1.03 10*3/MM3 (ref 0.7–3.1)
LYMPHOCYTES NFR BLD AUTO: 13 % (ref 19.6–45.3)
MCH RBC QN AUTO: 30.6 PG (ref 26.6–33)
MCHC RBC AUTO-ENTMCNC: 32.6 G/DL (ref 31.5–35.7)
MCV RBC AUTO: 94 FL (ref 79–97)
MONOCYTES # BLD AUTO: 0.57 10*3/MM3 (ref 0.1–0.9)
MONOCYTES NFR BLD AUTO: 7.2 % (ref 5–12)
NEUTROPHILS NFR BLD AUTO: 6.14 10*3/MM3 (ref 1.7–7)
NEUTROPHILS NFR BLD AUTO: 77.4 % (ref 42.7–76)
PLATELET # BLD AUTO: 296 10*3/MM3 (ref 140–450)
PMV BLD AUTO: 8.4 FL (ref 6–12)
RBC # BLD AUTO: 3.85 10*6/MM3 (ref 3.77–5.28)
WBC NRBC COR # BLD AUTO: 7.93 10*3/MM3 (ref 3.4–10.8)

## 2023-11-29 PROCEDURE — 25810000003 SODIUM CHLORIDE 0.9 % SOLUTION 250 ML FLEX CONT: Performed by: INTERNAL MEDICINE

## 2023-11-29 PROCEDURE — 25010000002 TRASTUZUMAB-QYYP 420 MG RECONSTITUTED SOLUTION 1 EACH VIAL: Performed by: INTERNAL MEDICINE

## 2023-11-29 PROCEDURE — 96413 CHEMO IV INFUSION 1 HR: CPT

## 2023-11-29 PROCEDURE — 85025 COMPLETE CBC W/AUTO DIFF WBC: CPT | Performed by: INTERNAL MEDICINE

## 2023-11-29 PROCEDURE — 25010000002 HEPARIN LOCK FLUSH PER 10 UNITS: Performed by: INTERNAL MEDICINE

## 2023-11-29 RX ORDER — HEPARIN SODIUM (PORCINE) LOCK FLUSH IV SOLN 100 UNIT/ML 100 UNIT/ML
500 SOLUTION INTRAVENOUS AS NEEDED
OUTPATIENT
Start: 2023-11-29

## 2023-11-29 RX ORDER — SODIUM CHLORIDE 9 MG/ML
250 INJECTION, SOLUTION INTRAVENOUS ONCE
Status: DISCONTINUED | OUTPATIENT
Start: 2023-11-29 | End: 2023-11-30 | Stop reason: HOSPADM

## 2023-11-29 RX ORDER — SODIUM CHLORIDE 0.9 % (FLUSH) 0.9 %
10 SYRINGE (ML) INJECTION AS NEEDED
OUTPATIENT
Start: 2023-11-29

## 2023-11-29 RX ORDER — HEPARIN SODIUM (PORCINE) LOCK FLUSH IV SOLN 100 UNIT/ML 100 UNIT/ML
500 SOLUTION INTRAVENOUS AS NEEDED
Status: DISCONTINUED | OUTPATIENT
Start: 2023-11-29 | End: 2023-11-30 | Stop reason: HOSPADM

## 2023-11-29 RX ADMIN — HEPARIN 500 UNITS: 100 SYRINGE at 12:32

## 2023-11-29 RX ADMIN — SODIUM CHLORIDE 570 MG: 9 INJECTION, SOLUTION INTRAVENOUS at 11:55

## 2023-12-06 ENCOUNTER — OFFICE VISIT (OUTPATIENT)
Dept: OBSTETRICS AND GYNECOLOGY | Facility: CLINIC | Age: 73
End: 2023-12-06
Payer: MEDICARE

## 2023-12-06 VITALS
BODY MASS INDEX: 35.68 KG/M2 | DIASTOLIC BLOOD PRESSURE: 70 MMHG | HEIGHT: 64 IN | WEIGHT: 209 LBS | SYSTOLIC BLOOD PRESSURE: 130 MMHG

## 2023-12-06 DIAGNOSIS — Z01.411 ENCOUNTER FOR GYNECOLOGICAL EXAMINATION WITH ABNORMAL FINDING: Primary | ICD-10-CM

## 2023-12-06 DIAGNOSIS — N76.0 VULVOVAGINITIS: ICD-10-CM

## 2023-12-06 NOTE — PROGRESS NOTES
Chief Complaint  Laura Churchill is a 73 y.o.  female presenting for Gynecologic Exam and Vaginitis (S/P treatment with Augmentin.  Patient c/o vaginal/vulvar itching.)    History of Present Illness  Laura is a very pleasant 73-year-old woman, G1, P0, here for annual GYN exam.  See surgical history listed below.  She had recent treatment with Augmentin, complains of vulvar itching.  Hypertension is well-controlled  Hyperlipidemia well-controlled, in part with a statin.    Cologuard was normal in   Last DEXA scan was osteopenia.  We discussed FRAX scores.  She is intentional about vitamin D and walking.    The following portions of the patient's his para 0tory were reviewed and updated as appropriate: allergies, current medications, past family history, past medical history, past social history, past surgical history, and problem list.    Allergies   Allergen Reactions    Codeine Hives    Gabapentin Hallucinations     Screaming nightmares    Sulfa Antibiotics Itching         Current Outpatient Medications:     amoxicillin-clavulanate (AUGMENTIN) 875-125 MG per tablet, Take 1 tablet by mouth 2 (Two) Times a Day., Disp: 20 tablet, Rfl: 0    anastrozole (ARIMIDEX) 1 MG tablet, Take 1 tablet by mouth Daily., Disp: 90 tablet, Rfl: 3    atorvastatin (LIPITOR) 10 MG tablet, Take 1 tablet by mouth Daily., Disp: 90 tablet, Rfl: 3    betamethasone dipropionate 0.05 % cream, , Disp: , Rfl:     fexofenadine (Allegra Allergy) 60 MG tablet, Take 1 tablet by mouth Daily., Disp: 90 tablet, Rfl: 3    lidocaine-prilocaine (EMLA) 2.5-2.5 % cream, Apply 1 application topically to the appropriate area as directed As Needed (prior to port access)., Disp: 30 g, Rfl: 3    losartan (Cozaar) 100 MG tablet, Take 1 tablet by mouth Daily., Disp: 90 tablet, Rfl: 3    terconazole (TERAZOL 7) 0.4 % vaginal cream, Insert 1 applicator into the vagina Every Night., Disp: 45 g, Rfl: 1    venlafaxine XR (EFFEXOR-XR) 150 MG 24 hr capsule,  Take 1 capsule by mouth Daily., Disp: 90 capsule, Rfl: 3    vitamin B-12 (CYANOCOBALAMIN) 1000 MCG tablet, Take 1 tablet by mouth Daily., Disp: , Rfl:     VITAMIN D PO, Take 5,000 Units by mouth Daily., Disp: , Rfl:     Past Medical History:   Diagnosis Date    Allergic     Anesthesia     low BP with spinal surgery in 2020, patient was hospitalized a few extra days due to low Bp    Anxiety     Atrophic vaginitis     Bilateral hip pain     Breast cancer 03/2023    right breast    Cancer     Cholelithiasis not sure    Depression     History of radiation therapy 10/04/2023    right breast    HL (hearing loss) just noticing difficulties    Hypertension     Low back pain     Malignant neoplasm of right breast in female, estrogen receptor positive 03/22/2023    Mixed hyperlipidemia     Muscle spasm     Obesity due to excess calories     MOOKIE (obstructive sleep apnea)     NO CPAP USE    Osteoarthritis     PONV (postoperative nausea and vomiting)     preprocedural meds help and are needed    Tendinitis of right shoulder     Vertigo         Past Surgical History:   Procedure Laterality Date    BARIATRIC SURGERY  2015    BREAST LUMPECTOMY WITH AXILLARY NODE DISSECTION Right     CHOLECYSTECTOMY      COLONOSCOPY  2013    Had negative result for Cologard in 2022    ENDOSCOPY      GASTRIC BANDING REMOVAL      GASTRIC SLEEVE LAPAROSCOPIC  2011    HIP PINNING Left     3 pins    INTERVENTIONAL RADIOLOGY PROCEDURE N/A 05/27/2020    Procedure: IR myelogram, lumbar;  Surgeon: Jason Rodriguez MD;  Location: [facility] CATH INVASIVE LOCATION;  Service: Interventional Radiology;  Laterality: N/A;    JOINT REPLACEMENT  Left shoulder replacement    2017    LAPAROSCOPIC GASTRIC BANDING      LAPAROTOMY OOPHERECTOMY Right     LUMBAR DISCECTOMY FUSION INSTRUMENTATION N/A 11/20/2018    Procedure: L3-4 LUMBAR LAMINECTOMY POSTERIOR LUMBAR INTERBODY FUSION PILF;  Surgeon: David Rider MD;  Location: [facility] OR;  Service: Neurosurgery     "LUMBAR DISCECTOMY FUSION INSTRUMENTATION N/A 07/21/2020    Procedure: L2-3 PLIF, exploration of L3-4 fusion, L2-4 fusion, L2 laminectomy;  Surgeon: David Rider MD;  Location:  [facility] OR;  Service: Neurosurgery;  Laterality: N/A;    VT ARTHROPLASTY GLENOHUMERAL JOINT TOTAL SHOULDER Left 07/10/2017    Procedure: LEFT TOTAL SHOULDER ARTHROPLASTY ;  Surgeon: Almas Miller MD;  Location:  [facility] OR;  Service: Orthopedics    SPINE SURGERY  L3 fused to L4,5    2018    TOTAL KNEE ARTHROPLASTY Left 01/16/2020    Procedure: TOTAL KNEE REPLACEMENT LEFT;  Surgeon: Houston Castano MD;  Location:  [facility] OR;  Service: Orthopedics    TOTAL KNEE ARTHROPLASTY Right 09/22/2022    Procedure: TOTAL KNEE ARTHROPLASTY RIGHT;  Surgeon: Houston Castano MD;  Location:  [facility] OR;  Service: Orthopedics;  Laterality: Right;    WISDOM TOOTH EXTRACTION         Objective  /70   Ht 162.6 cm (64\")   Wt 94.8 kg (209 lb)   LMP  (LMP Unknown)   Breastfeeding No   BMI 35.87 kg/m²     Physical Exam  Vitals and nursing note reviewed. Exam conducted with a chaperone present.   Constitutional:       General: She is not in acute distress.     Appearance: Normal appearance. She is not ill-appearing.   HENT:      Head: Normocephalic.   Neck:      Thyroid: No thyroid mass or thyromegaly.   Cardiovascular:      Rate and Rhythm: Normal rate and regular rhythm.      Heart sounds: Normal heart sounds. No murmur heard.  Pulmonary:      Effort: Pulmonary effort is normal. No respiratory distress.      Breath sounds: Normal breath sounds.   Chest:   Breasts:     Right: No inverted nipple, mass or nipple discharge.      Left: No inverted nipple, mass or nipple discharge.   Abdominal:      Palpations: Abdomen is soft. There is no mass.      Tenderness: There is no abdominal tenderness.   Genitourinary:     General: Normal vulva.      Labia:         Right: No rash, tenderness or lesion.         Left: No rash, tenderness or " lesion.       Vagina: Vaginal discharge and erythema present.      Cervix: No discharge, lesion or erythema.      Uterus: Not enlarged and not tender.       Adnexa:         Right: No mass or tenderness.          Left: No mass or tenderness.        Comments: Anus appears wnl.  No rectal exam performed.  Lymphadenopathy:      Upper Body:      Right upper body: No supraclavicular or axillary adenopathy.      Left upper body: No supraclavicular or axillary adenopathy.   Skin:     General: Skin is warm and dry.   Neurological:      Mental Status: She is alert and oriented to person, place, and time.   Psychiatric:         Mood and Affect: Mood normal.         Behavior: Behavior normal.         Assessment/Plan   Diagnoses and all orders for this visit:    1. Encounter for gynecological examination with abnormal finding (Primary)    2. Vulvovaginitis    Other orders  -     terconazole (TERAZOL 7) 0.4 % vaginal cream; Insert 1 applicator into the vagina Every Night.  Dispense: 45 g; Refill: 1    Counseled about vitamin EE and coconut oil suppositories made by the pharmacist at [facility].      Procedures    40 to 64: Counseling/Anticipatory Guidance Discussed: nutrition, physical activity, screenings, and self-breast exam    Return in about 1 year (around 12/6/2024) for Annual physical.    Carol Gruber, APRN  12/06/2023

## 2023-12-20 ENCOUNTER — HOSPITAL ENCOUNTER (OUTPATIENT)
Dept: ONCOLOGY | Facility: HOSPITAL | Age: 73
Discharge: HOME | End: 2023-12-20
Admitting: INTERNAL MEDICINE
Payer: MEDICARE

## 2023-12-20 ENCOUNTER — OFFICE VISIT (OUTPATIENT)
Dept: ONCOLOGY | Facility: CLINIC | Age: 73
End: 2023-12-20
Payer: MEDICARE

## 2023-12-20 VITALS
BODY MASS INDEX: 35.51 KG/M2 | HEIGHT: 64 IN | HEART RATE: 96 BPM | SYSTOLIC BLOOD PRESSURE: 134 MMHG | WEIGHT: 208 LBS | TEMPERATURE: 97.1 F | RESPIRATION RATE: 16 BRPM | DIASTOLIC BLOOD PRESSURE: 87 MMHG | OXYGEN SATURATION: 97 %

## 2023-12-20 DIAGNOSIS — Z17.0 MALIGNANT NEOPLASM OF RIGHT BREAST IN FEMALE, ESTROGEN RECEPTOR POSITIVE, UNSPECIFIED SITE OF BREAST: Primary | ICD-10-CM

## 2023-12-20 DIAGNOSIS — Z51.11 MAINTENANCE CHEMOTHERAPY: ICD-10-CM

## 2023-12-20 DIAGNOSIS — C50.911 MALIGNANT NEOPLASM OF RIGHT BREAST IN FEMALE, ESTROGEN RECEPTOR POSITIVE, UNSPECIFIED SITE OF BREAST: Primary | ICD-10-CM

## 2023-12-20 DIAGNOSIS — Z45.2 ENCOUNTER FOR CARE RELATED TO PORT-A-CATH: ICD-10-CM

## 2023-12-20 LAB
ALBUMIN SERPL-MCNC: 4.3 G/DL (ref 3.5–5.2)
ALBUMIN/GLOB SERPL: 1.4 G/DL
ALP SERPL-CCNC: 105 U/L (ref 39–117)
ALT SERPL W P-5'-P-CCNC: 9 U/L (ref 1–33)
ANION GAP SERPL CALCULATED.3IONS-SCNC: 9 MMOL/L (ref 5–15)
AST SERPL-CCNC: 19 U/L (ref 1–32)
BASOPHILS # BLD AUTO: 0.02 10*3/MM3 (ref 0–0.2)
BASOPHILS NFR BLD AUTO: 0.3 % (ref 0–1.5)
BILIRUB SERPL-MCNC: 0.5 MG/DL (ref 0–1.2)
BUN SERPL-MCNC: 16 MG/DL (ref 8–23)
BUN/CREAT SERPL: 17.8 (ref 7–25)
CALCIUM SPEC-SCNC: 9.6 MG/DL (ref 8.6–10.5)
CHLORIDE SERPL-SCNC: 105 MMOL/L (ref 98–107)
CO2 SERPL-SCNC: 25 MMOL/L (ref 22–29)
CREAT SERPL-MCNC: 0.9 MG/DL (ref 0.57–1)
DEPRECATED RDW RBC AUTO: 46.8 FL (ref 37–54)
EGFRCR SERPLBLD CKD-EPI 2021: 67.6 ML/MIN/1.73
EOSINOPHIL # BLD AUTO: 0.14 10*3/MM3 (ref 0–0.4)
EOSINOPHIL NFR BLD AUTO: 2.4 % (ref 0.3–6.2)
ERYTHROCYTE [DISTWIDTH] IN BLOOD BY AUTOMATED COUNT: 13.1 % (ref 12.3–15.4)
GLOBULIN UR ELPH-MCNC: 3.1 GM/DL
GLUCOSE SERPL-MCNC: 108 MG/DL (ref 65–99)
HCT VFR BLD AUTO: 39.9 % (ref 34–46.6)
HGB BLD-MCNC: 12.8 G/DL (ref 12–15.9)
IMM GRANULOCYTES # BLD AUTO: 0.02 10*3/MM3 (ref 0–0.05)
IMM GRANULOCYTES NFR BLD AUTO: 0.3 % (ref 0–0.5)
LYMPHOCYTES # BLD AUTO: 1.1 10*3/MM3 (ref 0.7–3.1)
LYMPHOCYTES NFR BLD AUTO: 18.8 % (ref 19.6–45.3)
MCH RBC QN AUTO: 30.5 PG (ref 26.6–33)
MCHC RBC AUTO-ENTMCNC: 32.1 G/DL (ref 31.5–35.7)
MCV RBC AUTO: 95.2 FL (ref 79–97)
MONOCYTES # BLD AUTO: 0.57 10*3/MM3 (ref 0.1–0.9)
MONOCYTES NFR BLD AUTO: 9.8 % (ref 5–12)
NEUTROPHILS NFR BLD AUTO: 3.99 10*3/MM3 (ref 1.7–7)
NEUTROPHILS NFR BLD AUTO: 68.4 % (ref 42.7–76)
PLATELET # BLD AUTO: 200 10*3/MM3 (ref 140–450)
PMV BLD AUTO: 8.9 FL (ref 6–12)
POTASSIUM SERPL-SCNC: 4.3 MMOL/L (ref 3.5–5.2)
PROT SERPL-MCNC: 7.4 G/DL (ref 6–8.5)
RBC # BLD AUTO: 4.19 10*6/MM3 (ref 3.77–5.28)
SODIUM SERPL-SCNC: 139 MMOL/L (ref 136–145)
WBC NRBC COR # BLD AUTO: 5.84 10*3/MM3 (ref 3.4–10.8)

## 2023-12-20 PROCEDURE — 25810000003 SODIUM CHLORIDE 0.9 % SOLUTION 250 ML FLEX CONT: Performed by: INTERNAL MEDICINE

## 2023-12-20 PROCEDURE — 25010000002 HEPARIN LOCK FLUSH PER 10 UNITS: Performed by: INTERNAL MEDICINE

## 2023-12-20 PROCEDURE — 25010000002 TRASTUZUMAB-QYYP 420 MG RECONSTITUTED SOLUTION 1 EACH VIAL: Performed by: INTERNAL MEDICINE

## 2023-12-20 PROCEDURE — 80053 COMPREHEN METABOLIC PANEL: CPT | Performed by: INTERNAL MEDICINE

## 2023-12-20 PROCEDURE — 96413 CHEMO IV INFUSION 1 HR: CPT

## 2023-12-20 PROCEDURE — 25810000003 SODIUM CHLORIDE 0.9 % SOLUTION: Performed by: INTERNAL MEDICINE

## 2023-12-20 PROCEDURE — 85025 COMPLETE CBC W/AUTO DIFF WBC: CPT | Performed by: INTERNAL MEDICINE

## 2023-12-20 RX ORDER — SODIUM CHLORIDE 9 MG/ML
250 INJECTION, SOLUTION INTRAVENOUS ONCE
OUTPATIENT
Start: 2024-01-31

## 2023-12-20 RX ORDER — SODIUM CHLORIDE 0.9 % (FLUSH) 0.9 %
10 SYRINGE (ML) INJECTION AS NEEDED
OUTPATIENT
Start: 2023-12-20

## 2023-12-20 RX ORDER — HEPARIN SODIUM (PORCINE) LOCK FLUSH IV SOLN 100 UNIT/ML 100 UNIT/ML
500 SOLUTION INTRAVENOUS AS NEEDED
Status: DISCONTINUED | OUTPATIENT
Start: 2023-12-20 | End: 2023-12-21 | Stop reason: HOSPADM

## 2023-12-20 RX ORDER — SODIUM CHLORIDE 9 MG/ML
250 INJECTION, SOLUTION INTRAVENOUS ONCE
OUTPATIENT
Start: 2024-01-10

## 2023-12-20 RX ORDER — SODIUM CHLORIDE 9 MG/ML
250 INJECTION, SOLUTION INTRAVENOUS ONCE
Status: COMPLETED | OUTPATIENT
Start: 2023-12-20 | End: 2023-12-20

## 2023-12-20 RX ORDER — HEPARIN SODIUM (PORCINE) LOCK FLUSH IV SOLN 100 UNIT/ML 100 UNIT/ML
500 SOLUTION INTRAVENOUS AS NEEDED
OUTPATIENT
Start: 2023-12-20

## 2023-12-20 RX ADMIN — HEPARIN 500 UNITS: 100 SYRINGE at 12:51

## 2023-12-20 RX ADMIN — SODIUM CHLORIDE 570 MG: 9 INJECTION, SOLUTION INTRAVENOUS at 12:10

## 2023-12-20 RX ADMIN — SODIUM CHLORIDE 250 ML: 9 INJECTION, SOLUTION INTRAVENOUS at 12:07

## 2023-12-20 NOTE — PROGRESS NOTES
"      PROBLEM LIST:  1. [id number] ER+ (50%) [place] negative Her2 positive (3+) Invasive ductal carcinoma of the right breast  A) biopsy of a 1.2 cm mass in the lower outer quadrant on 3/14/23 showed a high grade IDC.  No concerning lymph nodes on imaging.  B) neoadjuvant chemotherapy with Taxol and Herceptin started on 4/12/2023  C) right breast lumpectomy on 8/4/23 showed no residual cancer in the breast or 1 SLN.  D) completed adjuvant radiation 10/4/2023.  Anastrozole started October 2023.  2. Hypertension  3. Depression  4. MOOKIE  5. Hyperlipidemia    Subjective     CHIEF COMPLAINT: breast cancer    HISTORY OF PRESENT ILLNESS:   Laura Churchill returns for follow-up.  She continues on maintenance Herceptin and anastrozole.  She is now tolerating the anastrozole well.  The diarrhea has resolved.  Her bowel movements are back to normal.  She denies any new medical concerns today.        Objective      /87 Comment: LUE  Pulse 96   Temp 97.1 °F (36.2 °C) (Infrared)   Resp 16   Ht 162.6 cm (64\")   Wt 94.3 kg (208 lb)   LMP  (LMP Unknown)   SpO2 97% Comment: RA  BMI 35.70 kg/m²    Vitals:    12/20/23 1051   PainSc: 0-No pain                 General: well appearing female in no acute distress  Neuro: alert and oriented  HEENT: sclera anicteric, oropharynx clear  Cardiovascular: Regular rate and rhythm no murmurs  Lungs: Clear to auscultation bilaterally  Extremeties: no lower extremity edema  Skin: no rashes, lesions, bruising, or petechiae  Psych: mood and affect appropriate            RECENT LABS:  Lab Results   Component Value Date    WBC 7.93 11/29/2023    HGB 11.8 (L) 11/29/2023    HCT 36.2 11/29/2023    MCV 94.0 11/29/2023     11/29/2023       Lab Results   Component Value Date    GLUCOSE 106 (H) 11/08/2023    BUN 16 11/08/2023    CREATININE 0.84 11/08/2023    EGFRIFNONA 66 09/28/2021    BCR 19.0 11/08/2023    K 4.0 11/08/2023    CO2 28.0 11/08/2023    CALCIUM 9.0 11/08/2023    ALBUMIN 4.3 " "11/08/2023    AST 17 11/08/2023    ALT 10 11/08/2023       DEXA Bone Density Axial  Narrative: DUAL-ENERGY X-RAY ABSORPTIOMETRY (DXA)    INDICATION: Osteopenia, height loss, prior fracture, cancer    COMPARISON: Previous bone mineral density exam performed on 10/8/2021    PROCEDURE: A DXA scan was performed using a Hologic densitometer.    The right hip was evaluated as well as the right forearm.    The T-score compares the patient's bone mineral density with the peak bone mass of young normal patients.  According to criteria established by the World Health Organization, patients with T-scores  between 1.0 and 2.5 standard deviations BELOW the mean   are osteopenic (low bone mass).  Patients with T-scores EQUAL TO OR GREATER than 2.5 standard deviations below the mean are osteoporotic.    The Z-score compares the patient bone mineral density with age and sex matched peers.  According to the International Society for Clinical Densitometry's 2007 consensus conference:  In women prior to menopause and men less than age 50, Z-scores, not   T-scores are preferred.  A Z-score of -2.0 or lower is defined as \"below the expected range for age\" and a Z-score above -2.0 is \"within the expected range for age.\"  The WHO diagnostic criteria may be applied in women in the menopausal transition.    Osteoporosis cannot be diagnosed in men under age 50 on the basis of BMD alone.    TECHNICAL QUALITY:  The study is of good technical quality.    RESULTS:    Total Hip:  The BMD measured at the right total proximal femur is 0.824 g/cm2.  The T-score is -1.0.  The Z-score is 0.7.    Femoral neck: The BMD measured at the right femoral neck is 0.626 g/cm2. The T score is -2.0. The Z score is 0.0.    1/3 Radius:  The BMD measured at the right one-third radius is 0.588 g/cm2.  The T-score is -1.8.  The Z-score is 0.6.  Impression: Osteopenia of the right femoral neck, and one third right forearm    The ten year fracture risk assessment is " calculated at 17% for major systemic osteoporotic fracture and 3.4% for a hip fracture. Less than 3% risk in the [place] for hip fracture and less than 20% risk of any systemic osteoporotic fracture is   considered less than the threshold for where pharmacological therapy is recommended by the National Osteoporosis Foundation.    All the treatment decisions require clinical judgment and consideration of individual patient factors, including patient preferences, co-morbidities, previous drug use, risk factors not captured in the FRAX model (frailty, falls, vitamin D deficiency,   increased bone turnover, interval significant decline in bone density) and possible under or over estimation of fracture risk by FRAX. Approaches to reduce osteoporosis related fracture risk include optimizing calcium and vitamin D status, appropriate   weight bearing exercises and fall-prevention measurements.  The National Osteoporosis Foundation recommends (http://www.nof.org/hcp/practice/practice-and-clinical-guidelines/clinicians-guide) that FDA-approved medical therapies be considered in   postmenopausal women and men aged equal or greater than 50 years with :  a) hip or vertebral (clinical or morphometric) fracture; b) T-score of -2.5 or less at the spine or hip; c) Ten-year fracture probability by FRAX of greater than 3% for hip fracture   of greater than 20% for major osteoporotic fracture.      Secondary causes of bone loss should be evaluated if clinically indicated since the etiology of low BMD cannot be determined by BMD measurement alone.    FOLLOWUP: Consider repeating the study in 2-3 years to reassess the patient's status or sooner if there is some new clinical indication.    INTERVAL CHANGE:   There was a decrease in bone mineral density of the total right hip by 1.1%, and one third right forearm by 1.4% when compared to previous study performed on 10/8/2021.       At this facility, the least significant change in the  BMD at the left hip with 95% confidence is 0.192959 gm/cm2 at the hip and 0.296976 g/cm2 at the lumbar spine.    Electronically Signed: Cam Gamez MD    10/31/2023 5:26 AM EDT    Workstation ID: [id number]              ASSESSMENT AND PLAN:     Laura Churchill is a 73 y.o. female with a [id number] Er+ Her2+ invasive ductal carcinoma of the right breast.    Her pathology showed a complete pathologic response.  We will plan to continue maintenance treatment with herceptin for a total of 1 year.    She continues on anastrozole.  She is tolerating it well.  We will continue anastrozole for minimum of 5 years.    Osteopenia: Stable on recent DEXA scan.  We will check this again in 2 years.  We discussed vitamin D supplementation and regular exercise.    Repeat echo was stable.  She will be due for repeat in January 2024.  Order placed today.    She is scheduled for diagnostic mammogram due April 2024.    Follow-up in 6 weeks.                              Joanna Campos APRN  [facility] Hematology and Oncology    12/20/2023          CC:

## 2023-12-21 ENCOUNTER — TELEPHONE (OUTPATIENT)
Dept: OBSTETRICS AND GYNECOLOGY | Facility: CLINIC | Age: 73
End: 2023-12-21
Payer: MEDICARE

## 2023-12-21 NOTE — TELEPHONE ENCOUNTER
Caller: Laura Churchill    Relationship: Self    Best call back number: [phone] (     Which medication are you concerned about: terconazole (TERAZOL 7) 0.4 % vaginal cream [[id number]] (Order [id number])     Who prescribed you this medication: ALISTAIR GARCIA     When did you start taking this medication: PT STATES SHE ATTEMPTED TO  [facility] HOWEVER OUT OF POCKET WAS PRETTY EXPENSIVE     What are your concerns: PLEASE ADVISE, PT WOULD LIKE PRESCRIPTION SENT THROUGH [facility] - [address] [phone] [facility] [phone] FX     How long have you had these concerns: NA    OK TO CALLBACK ANYTIME, OK TO LEAVE A

## 2024-01-10 ENCOUNTER — HOSPITAL ENCOUNTER (OUTPATIENT)
Dept: ONCOLOGY | Facility: HOSPITAL | Age: 74
Discharge: HOME | End: 2024-01-10
Admitting: NURSE PRACTITIONER
Payer: MEDICARE

## 2024-01-10 VITALS
SYSTOLIC BLOOD PRESSURE: 180 MMHG | WEIGHT: 212 LBS | RESPIRATION RATE: 16 BRPM | BODY MASS INDEX: 36.19 KG/M2 | HEART RATE: 82 BPM | DIASTOLIC BLOOD PRESSURE: 93 MMHG | HEIGHT: 64 IN | TEMPERATURE: 97.9 F

## 2024-01-10 DIAGNOSIS — Z45.2 ENCOUNTER FOR CARE RELATED TO PORT-A-CATH: ICD-10-CM

## 2024-01-10 DIAGNOSIS — C50.911 MALIGNANT NEOPLASM OF RIGHT BREAST IN FEMALE, ESTROGEN RECEPTOR POSITIVE, UNSPECIFIED SITE OF BREAST: Primary | ICD-10-CM

## 2024-01-10 DIAGNOSIS — Z17.0 MALIGNANT NEOPLASM OF RIGHT BREAST IN FEMALE, ESTROGEN RECEPTOR POSITIVE, UNSPECIFIED SITE OF BREAST: Primary | ICD-10-CM

## 2024-01-10 LAB
ALBUMIN SERPL-MCNC: 4.3 G/DL (ref 3.5–5.2)
ALBUMIN/GLOB SERPL: 1.7 G/DL
ALP SERPL-CCNC: 95 U/L (ref 39–117)
ALT SERPL W P-5'-P-CCNC: 8 U/L (ref 1–33)
ANION GAP SERPL CALCULATED.3IONS-SCNC: 10 MMOL/L (ref 5–15)
AST SERPL-CCNC: 16 U/L (ref 1–32)
BASOPHILS # BLD AUTO: 0.02 10*3/MM3 (ref 0–0.2)
BASOPHILS NFR BLD AUTO: 0.4 % (ref 0–1.5)
BILIRUB SERPL-MCNC: 0.3 MG/DL (ref 0–1.2)
BUN SERPL-MCNC: 18 MG/DL (ref 8–23)
BUN/CREAT SERPL: 19.6 (ref 7–25)
CALCIUM SPEC-SCNC: 9.3 MG/DL (ref 8.6–10.5)
CHLORIDE SERPL-SCNC: 103 MMOL/L (ref 98–107)
CO2 SERPL-SCNC: 25 MMOL/L (ref 22–29)
CREAT SERPL-MCNC: 0.92 MG/DL (ref 0.57–1)
DEPRECATED RDW RBC AUTO: 45.9 FL (ref 37–54)
EGFRCR SERPLBLD CKD-EPI 2021: 65.9 ML/MIN/1.73
EOSINOPHIL # BLD AUTO: 0.12 10*3/MM3 (ref 0–0.4)
EOSINOPHIL NFR BLD AUTO: 2.3 % (ref 0.3–6.2)
ERYTHROCYTE [DISTWIDTH] IN BLOOD BY AUTOMATED COUNT: 12.9 % (ref 12.3–15.4)
GLOBULIN UR ELPH-MCNC: 2.5 GM/DL
GLUCOSE SERPL-MCNC: 99 MG/DL (ref 65–99)
HCT VFR BLD AUTO: 36.3 % (ref 34–46.6)
HGB BLD-MCNC: 12 G/DL (ref 12–15.9)
IMM GRANULOCYTES # BLD AUTO: 0.01 10*3/MM3 (ref 0–0.05)
IMM GRANULOCYTES NFR BLD AUTO: 0.2 % (ref 0–0.5)
LYMPHOCYTES # BLD AUTO: 1.05 10*3/MM3 (ref 0.7–3.1)
LYMPHOCYTES NFR BLD AUTO: 19.9 % (ref 19.6–45.3)
MCH RBC QN AUTO: 31.4 PG (ref 26.6–33)
MCHC RBC AUTO-ENTMCNC: 33.1 G/DL (ref 31.5–35.7)
MCV RBC AUTO: 95 FL (ref 79–97)
MONOCYTES # BLD AUTO: 0.43 10*3/MM3 (ref 0.1–0.9)
MONOCYTES NFR BLD AUTO: 8.2 % (ref 5–12)
NEUTROPHILS NFR BLD AUTO: 3.64 10*3/MM3 (ref 1.7–7)
NEUTROPHILS NFR BLD AUTO: 69 % (ref 42.7–76)
PLATELET # BLD AUTO: 201 10*3/MM3 (ref 140–450)
PMV BLD AUTO: 9 FL (ref 6–12)
POTASSIUM SERPL-SCNC: 4.1 MMOL/L (ref 3.5–5.2)
PROT SERPL-MCNC: 6.8 G/DL (ref 6–8.5)
RBC # BLD AUTO: 3.82 10*6/MM3 (ref 3.77–5.28)
SODIUM SERPL-SCNC: 138 MMOL/L (ref 136–145)
WBC NRBC COR # BLD AUTO: 5.27 10*3/MM3 (ref 3.4–10.8)

## 2024-01-10 PROCEDURE — 80053 COMPREHEN METABOLIC PANEL: CPT | Performed by: NURSE PRACTITIONER

## 2024-01-10 PROCEDURE — 96413 CHEMO IV INFUSION 1 HR: CPT

## 2024-01-10 PROCEDURE — 25010000002 HEPARIN LOCK FLUSH PER 10 UNITS: Performed by: INTERNAL MEDICINE

## 2024-01-10 PROCEDURE — 25010000002 TRASTUZUMAB-QYYP 420 MG RECONSTITUTED SOLUTION 1 EACH VIAL: Performed by: NURSE PRACTITIONER

## 2024-01-10 PROCEDURE — 85025 COMPLETE CBC W/AUTO DIFF WBC: CPT | Performed by: NURSE PRACTITIONER

## 2024-01-10 PROCEDURE — 25810000003 SODIUM CHLORIDE 0.9 % SOLUTION 250 ML FLEX CONT: Performed by: NURSE PRACTITIONER

## 2024-01-10 PROCEDURE — 25810000003 SODIUM CHLORIDE 0.9 % SOLUTION: Performed by: NURSE PRACTITIONER

## 2024-01-10 PROCEDURE — 36591 DRAW BLOOD OFF VENOUS DEVICE: CPT

## 2024-01-10 RX ORDER — SODIUM CHLORIDE 9 MG/ML
250 INJECTION, SOLUTION INTRAVENOUS ONCE
Status: COMPLETED | OUTPATIENT
Start: 2024-01-10 | End: 2024-01-10

## 2024-01-10 RX ORDER — HEPARIN SODIUM (PORCINE) LOCK FLUSH IV SOLN 100 UNIT/ML 100 UNIT/ML
500 SOLUTION INTRAVENOUS AS NEEDED
Status: DISCONTINUED | OUTPATIENT
Start: 2024-01-10 | End: 2024-01-11 | Stop reason: HOSPADM

## 2024-01-10 RX ORDER — HEPARIN SODIUM (PORCINE) LOCK FLUSH IV SOLN 100 UNIT/ML 100 UNIT/ML
500 SOLUTION INTRAVENOUS AS NEEDED
OUTPATIENT
Start: 2024-01-10

## 2024-01-10 RX ORDER — SODIUM CHLORIDE 0.9 % (FLUSH) 0.9 %
10 SYRINGE (ML) INJECTION AS NEEDED
OUTPATIENT
Start: 2024-01-10

## 2024-01-10 RX ADMIN — SODIUM CHLORIDE 250 ML: 9 INJECTION, SOLUTION INTRAVENOUS at 12:25

## 2024-01-10 RX ADMIN — HEPARIN 500 UNITS: 100 SYRINGE at 13:12

## 2024-01-10 RX ADMIN — SODIUM CHLORIDE 570 MG: 9 INJECTION, SOLUTION INTRAVENOUS at 12:25

## 2024-01-31 ENCOUNTER — OFFICE VISIT (OUTPATIENT)
Dept: ONCOLOGY | Facility: CLINIC | Age: 74
End: 2024-01-31
Payer: MEDICARE

## 2024-01-31 ENCOUNTER — HOSPITAL ENCOUNTER (OUTPATIENT)
Dept: ONCOLOGY | Facility: HOSPITAL | Age: 74
Discharge: HOME | End: 2024-01-31
Payer: MEDICARE

## 2024-01-31 VITALS
HEIGHT: 64 IN | RESPIRATION RATE: 18 BRPM | OXYGEN SATURATION: 95 % | HEART RATE: 94 BPM | WEIGHT: 215 LBS | BODY MASS INDEX: 36.7 KG/M2 | DIASTOLIC BLOOD PRESSURE: 84 MMHG | SYSTOLIC BLOOD PRESSURE: 176 MMHG | TEMPERATURE: 97.4 F

## 2024-01-31 DIAGNOSIS — Z17.0 MALIGNANT NEOPLASM OF RIGHT BREAST IN FEMALE, ESTROGEN RECEPTOR POSITIVE, UNSPECIFIED SITE OF BREAST: Primary | ICD-10-CM

## 2024-01-31 DIAGNOSIS — C50.911 MALIGNANT NEOPLASM OF RIGHT BREAST IN FEMALE, ESTROGEN RECEPTOR POSITIVE, UNSPECIFIED SITE OF BREAST: Primary | ICD-10-CM

## 2024-01-31 DIAGNOSIS — F09 COGNITIVE DYSFUNCTION, ACQUIRED: ICD-10-CM

## 2024-01-31 DIAGNOSIS — Z45.2 ENCOUNTER FOR CARE RELATED TO PORT-A-CATH: ICD-10-CM

## 2024-01-31 LAB
ALBUMIN SERPL-MCNC: 4.5 G/DL (ref 3.5–5.2)
ALBUMIN/GLOB SERPL: 1.7 G/DL
ALP SERPL-CCNC: 91 U/L (ref 39–117)
ALT SERPL W P-5'-P-CCNC: 17 U/L (ref 1–33)
ANION GAP SERPL CALCULATED.3IONS-SCNC: 11 MMOL/L (ref 5–15)
AST SERPL-CCNC: 25 U/L (ref 1–32)
BASOPHILS # BLD AUTO: 0.02 10*3/MM3 (ref 0–0.2)
BASOPHILS NFR BLD AUTO: 0.4 % (ref 0–1.5)
BILIRUB SERPL-MCNC: 0.4 MG/DL (ref 0–1.2)
BUN SERPL-MCNC: 18 MG/DL (ref 8–23)
BUN/CREAT SERPL: 21.7 (ref 7–25)
CALCIUM SPEC-SCNC: 9.1 MG/DL (ref 8.6–10.5)
CHLORIDE SERPL-SCNC: 105 MMOL/L (ref 98–107)
CO2 SERPL-SCNC: 25 MMOL/L (ref 22–29)
CREAT SERPL-MCNC: 0.83 MG/DL (ref 0.57–1)
DEPRECATED RDW RBC AUTO: 46.5 FL (ref 37–54)
EGFRCR SERPLBLD CKD-EPI 2021: 74.5 ML/MIN/1.73
EOSINOPHIL # BLD AUTO: 0.14 10*3/MM3 (ref 0–0.4)
EOSINOPHIL NFR BLD AUTO: 2.7 % (ref 0.3–6.2)
ERYTHROCYTE [DISTWIDTH] IN BLOOD BY AUTOMATED COUNT: 13 % (ref 12.3–15.4)
GLOBULIN UR ELPH-MCNC: 2.6 GM/DL
GLUCOSE SERPL-MCNC: 95 MG/DL (ref 65–99)
HCT VFR BLD AUTO: 38.5 % (ref 34–46.6)
HGB BLD-MCNC: 12.5 G/DL (ref 12–15.9)
IMM GRANULOCYTES # BLD AUTO: 0.01 10*3/MM3 (ref 0–0.05)
IMM GRANULOCYTES NFR BLD AUTO: 0.2 % (ref 0–0.5)
LYMPHOCYTES # BLD AUTO: 1.29 10*3/MM3 (ref 0.7–3.1)
LYMPHOCYTES NFR BLD AUTO: 24.8 % (ref 19.6–45.3)
MCH RBC QN AUTO: 31 PG (ref 26.6–33)
MCHC RBC AUTO-ENTMCNC: 32.5 G/DL (ref 31.5–35.7)
MCV RBC AUTO: 95.5 FL (ref 79–97)
MONOCYTES # BLD AUTO: 0.51 10*3/MM3 (ref 0.1–0.9)
MONOCYTES NFR BLD AUTO: 9.8 % (ref 5–12)
NEUTROPHILS NFR BLD AUTO: 3.23 10*3/MM3 (ref 1.7–7)
NEUTROPHILS NFR BLD AUTO: 62.1 % (ref 42.7–76)
PLATELET # BLD AUTO: 212 10*3/MM3 (ref 140–450)
PMV BLD AUTO: 8.8 FL (ref 6–12)
POTASSIUM SERPL-SCNC: 4.3 MMOL/L (ref 3.5–5.2)
PROT SERPL-MCNC: 7.1 G/DL (ref 6–8.5)
RBC # BLD AUTO: 4.03 10*6/MM3 (ref 3.77–5.28)
SODIUM SERPL-SCNC: 141 MMOL/L (ref 136–145)
VIT B12 BLD-MCNC: 717 PG/ML (ref 211–946)
WBC NRBC COR # BLD AUTO: 5.2 10*3/MM3 (ref 3.4–10.8)

## 2024-01-31 PROCEDURE — 25810000003 SODIUM CHLORIDE 0.9 % SOLUTION 250 ML FLEX CONT: Performed by: NURSE PRACTITIONER

## 2024-01-31 PROCEDURE — 80053 COMPREHEN METABOLIC PANEL: CPT | Performed by: INTERNAL MEDICINE

## 2024-01-31 PROCEDURE — 96413 CHEMO IV INFUSION 1 HR: CPT

## 2024-01-31 PROCEDURE — 3077F SYST BP >= 140 MM HG: CPT | Performed by: INTERNAL MEDICINE

## 2024-01-31 PROCEDURE — 1126F AMNT PAIN NOTED NONE PRSNT: CPT | Performed by: INTERNAL MEDICINE

## 2024-01-31 PROCEDURE — 25010000002 TRASTUZUMAB-QYYP 420 MG RECONSTITUTED SOLUTION 1 EACH VIAL: Performed by: NURSE PRACTITIONER

## 2024-01-31 PROCEDURE — 3079F DIAST BP 80-89 MM HG: CPT | Performed by: INTERNAL MEDICINE

## 2024-01-31 PROCEDURE — 82607 VITAMIN B-12: CPT | Performed by: INTERNAL MEDICINE

## 2024-01-31 PROCEDURE — 85025 COMPLETE CBC W/AUTO DIFF WBC: CPT | Performed by: NURSE PRACTITIONER

## 2024-01-31 PROCEDURE — 25010000002 HEPARIN LOCK FLUSH PER 10 UNITS: Performed by: INTERNAL MEDICINE

## 2024-01-31 PROCEDURE — 25810000003 SODIUM CHLORIDE 0.9 % SOLUTION: Performed by: NURSE PRACTITIONER

## 2024-01-31 PROCEDURE — 99214 OFFICE O/P EST MOD 30 MIN: CPT | Performed by: INTERNAL MEDICINE

## 2024-01-31 RX ORDER — SODIUM CHLORIDE 9 MG/ML
250 INJECTION, SOLUTION INTRAVENOUS ONCE
OUTPATIENT
Start: 2024-02-21

## 2024-01-31 RX ORDER — SODIUM CHLORIDE 0.9 % (FLUSH) 0.9 %
10 SYRINGE (ML) INJECTION AS NEEDED
OUTPATIENT
Start: 2024-01-31

## 2024-01-31 RX ORDER — HEPARIN SODIUM (PORCINE) LOCK FLUSH IV SOLN 100 UNIT/ML 100 UNIT/ML
500 SOLUTION INTRAVENOUS AS NEEDED
Status: DISCONTINUED | OUTPATIENT
Start: 2024-01-31 | End: 2024-02-01 | Stop reason: HOSPADM

## 2024-01-31 RX ORDER — SODIUM CHLORIDE 9 MG/ML
250 INJECTION, SOLUTION INTRAVENOUS ONCE
OUTPATIENT
Start: 2024-03-20

## 2024-01-31 RX ORDER — HEPARIN SODIUM (PORCINE) LOCK FLUSH IV SOLN 100 UNIT/ML 100 UNIT/ML
500 SOLUTION INTRAVENOUS AS NEEDED
OUTPATIENT
Start: 2024-01-31

## 2024-01-31 RX ORDER — SODIUM CHLORIDE 9 MG/ML
250 INJECTION, SOLUTION INTRAVENOUS ONCE
Status: COMPLETED | OUTPATIENT
Start: 2024-01-31 | End: 2024-01-31

## 2024-01-31 RX ADMIN — HEPARIN 500 UNITS: 100 SYRINGE at 12:17

## 2024-01-31 RX ADMIN — SODIUM CHLORIDE 250 ML: 9 INJECTION, SOLUTION INTRAVENOUS at 11:32

## 2024-01-31 RX ADMIN — SODIUM CHLORIDE 570 MG: 9 INJECTION, SOLUTION INTRAVENOUS at 11:36

## 2024-01-31 NOTE — PROGRESS NOTES
"      PROBLEM LIST:  1. [id number] ER+ (50%) [place] negative Her2 positive (3+) Invasive ductal carcinoma of the right breast  A) biopsy of a 1.2 cm mass in the lower outer quadrant on 3/14/23 showed a high grade IDC.  No concerning lymph nodes on imaging.  B) neoadjuvant chemotherapy with Taxol and Herceptin started on 4/12/2023  C) right breast lumpectomy on 8/4/23 showed no residual cancer in the breast or 1 SLN.  D) completed adjuvant radiation 10/4/2023.  Anastrozole started October 2023.  2. Hypertension  3. Depression  4. MOOKIE  5. Hyperlipidemia    Subjective     CHIEF COMPLAINT: breast cancer    HISTORY OF PRESENT ILLNESS:   Laura Churchill returns for follow-up.  She continues on maintenance Herceptin and anastrozole.      She says she is feeling okay overall.  She has noticed some cognitive changes since her treatment and this is distressing to her.  She does have a family history of dementia in her mother.  Mostly this manifests as difficulty remembering words.        Objective      /84   Pulse 94   Temp 97.4 °F (36.3 °C)   Resp 18   Ht 162.6 cm (64\")   Wt 97.5 kg (215 lb)   LMP  (LMP Unknown)   SpO2 95%   BMI 36.90 kg/m²    Vitals:    01/31/24 1006   PainSc: 0-No pain                 General: well appearing female in no acute distress  Neuro: alert and oriented  HEENT: sclera anicteric, oropharynx clear  Cardiovascular: Regular rate and rhythm no murmurs  Lungs: Clear to auscultation bilaterally  Extremeties: no lower extremity edema  Skin: no rashes, lesions, bruising, or petechiae  Psych: mood and affect appropriate            RECENT LABS:  Lab Results   Component Value Date    WBC 5.27 01/10/2024    HGB 12.0 01/10/2024    HCT 36.3 01/10/2024    MCV 95.0 01/10/2024     01/10/2024       Lab Results   Component Value Date    GLUCOSE 99 01/10/2024    BUN 18 01/10/2024    CREATININE 0.92 01/10/2024    EGFRIFNONA 66 09/28/2021    BCR 19.6 01/10/2024    K 4.1 01/10/2024    CO2 25.0 " "01/10/2024    CALCIUM 9.3 01/10/2024    ALBUMIN 4.3 01/10/2024    AST 16 01/10/2024    ALT 8 01/10/2024       DEXA Bone Density Axial  Narrative: DUAL-ENERGY X-RAY ABSORPTIOMETRY (DXA)    INDICATION: Osteopenia, height loss, prior fracture, cancer    COMPARISON: Previous bone mineral density exam performed on 10/8/2021    PROCEDURE: A DXA scan was performed using a Hologic densitometer.    The right hip was evaluated as well as the right forearm.    The T-score compares the patient's bone mineral density with the peak bone mass of young normal patients.  According to criteria established by the World Health Organization, patients with T-scores  between 1.0 and 2.5 standard deviations BELOW the mean   are osteopenic (low bone mass).  Patients with T-scores EQUAL TO OR GREATER than 2.5 standard deviations below the mean are osteoporotic.    The Z-score compares the patient bone mineral density with age and sex matched peers.  According to the International Society for Clinical Densitometry's 2007 consensus conference:  In women prior to menopause and men less than age 50, Z-scores, not   T-scores are preferred.  A Z-score of -2.0 or lower is defined as \"below the expected range for age\" and a Z-score above -2.0 is \"within the expected range for age.\"  The WHO diagnostic criteria may be applied in women in the menopausal transition.    Osteoporosis cannot be diagnosed in men under age 50 on the basis of BMD alone.    TECHNICAL QUALITY:  The study is of good technical quality.    RESULTS:    Total Hip:  The BMD measured at the right total proximal femur is 0.824 g/cm2.  The T-score is -1.0.  The Z-score is 0.7.    Femoral neck: The BMD measured at the right femoral neck is 0.626 g/cm2. The T score is -2.0. The Z score is 0.0.    1/3 Radius:  The BMD measured at the right one-third radius is 0.588 g/cm2.  The T-score is -1.8.  The Z-score is 0.6.  Impression: Osteopenia of the right femoral neck, and one third right " forearm    The ten year fracture risk assessment is calculated at 17% for major systemic osteoporotic fracture and 3.4% for a hip fracture. Less than 3% risk in the [place] for hip fracture and less than 20% risk of any systemic osteoporotic fracture is   considered less than the threshold for where pharmacological therapy is recommended by the National Osteoporosis Foundation.    All the treatment decisions require clinical judgment and consideration of individual patient factors, including patient preferences, co-morbidities, previous drug use, risk factors not captured in the FRAX model (frailty, falls, vitamin D deficiency,   increased bone turnover, interval significant decline in bone density) and possible under or over estimation of fracture risk by FRAX. Approaches to reduce osteoporosis related fracture risk include optimizing calcium and vitamin D status, appropriate   weight bearing exercises and fall-prevention measurements.  The National Osteoporosis Foundation recommends (http://www.nof.org/hcp/practice/practice-and-clinical-guidelines/clinicians-guide) that FDA-approved medical therapies be considered in   postmenopausal women and men aged equal or greater than 50 years with :  a) hip or vertebral (clinical or morphometric) fracture; b) T-score of -2.5 or less at the spine or hip; c) Ten-year fracture probability by FRAX of greater than 3% for hip fracture   of greater than 20% for major osteoporotic fracture.      Secondary causes of bone loss should be evaluated if clinically indicated since the etiology of low BMD cannot be determined by BMD measurement alone.    FOLLOWUP: Consider repeating the study in 2-3 years to reassess the patient's status or sooner if there is some new clinical indication.    INTERVAL CHANGE:   There was a decrease in bone mineral density of the total right hip by 1.1%, and one third right forearm by 1.4% when compared to previous study performed on 10/8/2021.        At this facility, the least significant change in the BMD at the left hip with 95% confidence is 0.590392 gm/cm2 at the hip and 0.319922 g/cm2 at the lumbar spine.    Electronically Signed: Cam Gamez MD    10/31/2023 5:26 AM EDT    Workstation ID: [id number]              ASSESSMENT AND PLAN:     Laura Churchill is a 73 y.o. female with a dM1K2V9 Er+ Her2+ invasive ductal carcinoma of the right breast.    Her pathology showed a complete pathologic response.  We will plan to continue maintenance treatment with herceptin for a total of 1 year.    She continues on anastrozole.  She is tolerating it well.  We will continue anastrozole for minimum of 5 years.    Osteopenia: Stable on r DEXA scan October 2023..  We will check this again in 2 years.      Repeat echo was stable.  He is scheduled for repeat echo in February.    She is scheduled for diagnostic mammogram due April 2024.    Cognitive changes: I will refer her to speech therapy.  The symptoms started after she began her cancer treatment and have not improved.    Follow-up 6 weeks.                              Sharonda Hopkins MD  [facility] Hematology and Oncology    1/31/2024          CC:

## 2024-02-06 ENCOUNTER — HOSPITAL ENCOUNTER (OUTPATIENT)
Dept: CARDIOLOGY | Facility: HOSPITAL | Age: 74
Discharge: HOME | End: 2024-02-06
Admitting: NURSE PRACTITIONER
Payer: MEDICARE

## 2024-02-06 VITALS — BODY MASS INDEX: 36.7 KG/M2 | HEIGHT: 64 IN | WEIGHT: 214.95 LBS

## 2024-02-06 DIAGNOSIS — Z51.11 MAINTENANCE CHEMOTHERAPY: ICD-10-CM

## 2024-02-06 LAB
BH CV ECHO LEFT VENTRICLE GLOBAL LONGITUDINAL STRAIN: -12.6 %
BH CV ECHO MEAS - EDV(CUBED): 64 ML
BH CV ECHO MEAS - EDV(MOD-SP2): 85.8 ML
BH CV ECHO MEAS - EDV(MOD-SP4): 107 ML
BH CV ECHO MEAS - EF(MOD-BP): 47.8 %
BH CV ECHO MEAS - EF(MOD-SP2): 48.3 %
BH CV ECHO MEAS - EF(MOD-SP4): 46.8 %
BH CV ECHO MEAS - ESV(CUBED): 27 ML
BH CV ECHO MEAS - ESV(MOD-SP2): 44.4 ML
BH CV ECHO MEAS - ESV(MOD-SP4): 56.9 ML
BH CV ECHO MEAS - FS: 25 %
BH CV ECHO MEAS - IVS/LVPW: 1.1 CM
BH CV ECHO MEAS - IVSD: 1.1 CM
BH CV ECHO MEAS - LV DIASTOLIC VOL/BSA (35-75): 53 CM2
BH CV ECHO MEAS - LV MASS(C)D: 136.2 GRAMS
BH CV ECHO MEAS - LV SYSTOLIC VOL/BSA (12-30): 28.2 CM2
BH CV ECHO MEAS - LVIDD: 4 CM
BH CV ECHO MEAS - LVIDS: 3 CM
BH CV ECHO MEAS - LVPWD: 1 CM
BH CV ECHO MEAS - SI(MOD-SP2): 20.5 ML/M2
BH CV ECHO MEAS - SI(MOD-SP4): 24.8 ML/M2
BH CV ECHO MEAS - SV(MOD-SP2): 41.4 ML
BH CV ECHO MEAS - SV(MOD-SP4): 50.1 ML
BH CV VAS BP RIGHT ARM: NORMAL MMHG

## 2024-02-06 PROCEDURE — 93308 TTE F-UP OR LMTD: CPT

## 2024-02-06 PROCEDURE — 93356 MYOCRD STRAIN IMG SPCKL TRCK: CPT | Performed by: INTERNAL MEDICINE

## 2024-02-06 PROCEDURE — 93356 MYOCRD STRAIN IMG SPCKL TRCK: CPT

## 2024-02-06 PROCEDURE — 93308 TTE F-UP OR LMTD: CPT | Performed by: INTERNAL MEDICINE

## 2024-02-07 ENCOUNTER — TELEPHONE (OUTPATIENT)
Dept: ONCOLOGY | Facility: CLINIC | Age: 74
End: 2024-02-07
Payer: MEDICARE

## 2024-02-07 DIAGNOSIS — Z51.11 MAINTENANCE CHEMOTHERAPY: Primary | ICD-10-CM

## 2024-02-07 NOTE — TELEPHONE ENCOUNTER
Reviewed results of echocardiogram with Dr. Sharonda Hopkins.  Due to decrease in the EF we will stop her Herceptin at this point.  She did have a complete pathologic response to neoadjuvant chemotherapy.  We will plan on repeat echocardiogram in 1 month.  If she has not close to baseline EF at that time we will refer her to cardiology oncology.    Called patient to review above information.  She is in agreement with current plan.    Order placed for echocardiogram in 1 month.

## 2024-02-16 ENCOUNTER — TELEPHONE (OUTPATIENT)
Dept: OBSTETRICS AND GYNECOLOGY | Facility: CLINIC | Age: 74
End: 2024-02-16
Payer: MEDICARE

## 2024-02-16 NOTE — TELEPHONE ENCOUNTER
Provider: NORMA VALE    Caller: FLORIN WAITE    Relationship to Patient: SELF    Pharmacy: [facility]    Phone Number: [phone]    Reason for Call: PT IS ADV THAT NORMA HAD ASKED HER IN HER VISIT IN DEC ABOUT ANY BLADDER LEAKAGE AND STATES THAT NOW SHE IS STARTING TO HAVE SOME LEAKAGE. STARTED AFTER THAT VISIT. WANTS TO KNOW WHAT HER OPTIONS WOULD BE, WITHOUT SURGERY.     When was the patient last seen: 12-06-23

## 2024-02-16 NOTE — TELEPHONE ENCOUNTER
"I have contacted the patient to discuss urinary incontinence.  She states that she gets up \"at least\" 2 x every night to urinate.  States that this morning when she got up she was unable to make it to the bathroom/urine was \"running down her legs\".  Also states that she recently had an episode while washing dishes.  On her feet, ? Needed to urinate (but felt like it wasn't urgent), started to urinate without warning.  States that she doesn't leak when coughing, straining, or laughing.    She is willing to come in for an exam, or to see a urologist to determine if she needs medication or surgery (would be willing to have something like TVT if needed).      Fariba, please advise.  Patient was advised that Fariba is out of the office today and she will not be called back until Monday.  " no

## 2024-02-19 ENCOUNTER — HOSPITAL ENCOUNTER (OUTPATIENT)
Dept: SPEECH THERAPY | Facility: HOSPITAL | Age: 74
Setting detail: THERAPIES SERIES
Discharge: HOME | End: 2024-02-19
Payer: MEDICARE

## 2024-02-19 DIAGNOSIS — F09 COGNITIVE DYSFUNCTION: Primary | ICD-10-CM

## 2024-02-19 PROCEDURE — 92523 SPEECH SOUND LANG COMPREHEN: CPT

## 2024-02-19 NOTE — TELEPHONE ENCOUNTER
I called Larua and she answered but was unable to talk at this time (her sister had fallen and they were trying to help her).  I have told Laura that I will call back this afternoon.

## 2024-02-19 NOTE — THERAPY EVALUATION
Outpatient Speech Language Pathology   Adult Speech Language Cognitive Initial Evaluation   [facility]     Patient Name: Laura Churchill  : 1950  MRN: [id number]  Today's Date: 2024        Visit Date: 2024   Patient Active Problem List   Diagnosis    Essential hypertension    Hyperlipidemia    Obstructive sleep apnea syndrome    Osteoarthritis of knee    Osteoporosis    Lumbar spondylosis without myelopathy/Lumbar facet arthropathy    Displacement of lumbar intervertebral disc    Stenosis of lateral recess of lumbar spine    Physical deconditioning    Morbid obesity due to excess calories    Anxiety and depression    Sacroiliac joint dysfunction of right side    Greater trochanteric bursitis of right hip    Iliotibial band syndrome of right side    Spinal stenosis    Osteoarthritis    Obesity due to excess calories    Menopause    Mixed hyperlipidemia    Lumbosacral disc disease    Low back pain    Joint pain    Extremity pain    Chronic pain disorder    Back problem    Vaginal atrophy    Primary localized osteoarthrosis of shoulder region    Status post total left shoulder arthroplasty    Anxiety    Sciatica    Acquired spondylolisthesis    Spinal stenosis, lumbar region, with neurogenic claudication    L3-5 PLIF 2020    Primary osteoarthritis of left knee    Status post total left knee replacement    Leukocytosis, likely reactive    Acute blood loss anemia, mild, asymptomatic    Nonintractable episodic headache    Right thigh pain    Pseudoarthrosis of lumbar spine    Primary localized osteoarthritis of right knee    Status post right knee replacement    Obesity    Postoperative pain    Prediabetes    Malignant neoplasm of right breast in female, estrogen receptor positive    Encounter for care related to Port-a-Cath        Past Medical History:   Diagnosis Date    Allergic     Anesthesia     low BP with spinal surgery in , patient was hospitalized a few extra days due to low Bp     Anxiety     Atrophic vaginitis     Bilateral hip pain     Breast cancer 03/2023    right breast    Cancer     Cholelithiasis not sure    Depression     History of radiation therapy 10/04/2023    right breast    HL (hearing loss) just noticing difficulties    Hypertension     Low back pain     Malignant neoplasm of right breast in female, estrogen receptor positive 03/22/2023    Mixed hyperlipidemia     Muscle spasm     Obesity due to excess calories     MOOKIE (obstructive sleep apnea)     NO CPAP USE    Osteoarthritis     PONV (postoperative nausea and vomiting)     preprocedural meds help and are needed    Tendinitis of right shoulder     Vertigo         Past Surgical History:   Procedure Laterality Date    BARIATRIC SURGERY  2015    BREAST LUMPECTOMY WITH AXILLARY NODE DISSECTION Right     CHOLECYSTECTOMY      COLONOSCOPY  2013    Had negative result for Cologard in 2022    ENDOSCOPY      GASTRIC BANDING REMOVAL      GASTRIC SLEEVE LAPAROSCOPIC  2011    HIP PINNING Left     3 pins    INTERVENTIONAL RADIOLOGY PROCEDURE N/A 05/27/2020    Procedure: IR myelogram, lumbar;  Surgeon: Jason Rodriguez MD;  Location:  [facility];  Service: Interventional Radiology;  Laterality: N/A;    JOINT REPLACEMENT  Left shoulder replacement    2017    LAPAROSCOPIC GASTRIC BANDING      LAPAROTOMY OOPHERECTOMY Right     LUMBAR DISCECTOMY FUSION INSTRUMENTATION N/A 11/20/2018    Procedure: L3-4 LUMBAR LAMINECTOMY POSTERIOR LUMBAR INTERBODY FUSION PILF;  Surgeon: David Rider MD;  Location:  [facility];  Service: Neurosurgery    LUMBAR DISCECTOMY FUSION INSTRUMENTATION N/A 07/21/2020    Procedure: L2-3 PLIF, exploration of L3-4 fusion, L2-4 fusion, L2 laminectomy;  Surgeon: David Rider MD;  Location:  [facility];  Service: Neurosurgery;  Laterality: N/A;    LA ARTHROPLASTY GLENOHUMERAL JOINT TOTAL SHOULDER Left 07/10/2017    Procedure: LEFT TOTAL SHOULDER ARTHROPLASTY ;  Surgeon: Almas Reed  MD Paul;  Location:  [facility];  Service: Orthopedics    SPINE SURGERY  L3 fused to L4,5    2018    TOTAL KNEE ARTHROPLASTY Left 01/16/2020    Procedure: TOTAL KNEE REPLACEMENT LEFT;  Surgeon: Houston Castano MD;  Location:  [facility];  Service: Orthopedics    TOTAL KNEE ARTHROPLASTY Right 09/22/2022    Procedure: TOTAL KNEE ARTHROPLASTY RIGHT;  Surgeon: Houston Castano MD;  Location:  [facility];  Service: Orthopedics;  Laterality: Right;    WISDOM TOOTH EXTRACTION           Visit Dx:    ICD-10-CM ICD-9-CM   1. Cognitive dysfunction  F09 294.9            OP SLP Assessment/Plan - 02/19/24 0800          SLP Assessment    Functional Problems Speech Language- Adult/Cognition  -    Impact on Function: Adult Speech Language/Cognition Trouble learning or remembering new information;Difficulty sequencing thoughts to express complex messages  -HG    Clinical Impression: Speech Language-Adult/Congnition Mild:;Cognitive Communication Impairment  -HG    Functional Problems Comment Pt reports brain fog and difficulty with word finding at times.  -HG    Clinical Impression Comments RBANS Total score of 107 places pt in the Average range.  -HG    Please refer to paper survey for additional self-reported information Yes  -HG    Please refer to items scanned into chart for additional diagnostic informaiton and handouts as provided by clinician Yes  -HG    SLP Diagnosis Mild Cog Comm impairment  -HG    Prognosis Excellent (comment)  -HG    Patient/caregiver participated in establishment of treatment plan and goals Yes  -HG    Patient would benefit from skilled therapy intervention Yes  -HG       SLP Plan    Frequency 1x/week  -HG    Duration 12 weeks  -HG    Planned CPT's? SLP SPEECH & LANGUAGE EVAL: 84638;SLP INDIVIDUAL SPEECH THERAPY: 27487  -HG    Expected Duration of Therapy Session (SLP Eval) 60  -HG    Plan Comments Initiate Cog Comm tx.  -HG              User Key  (r) = Recorded By, (t) = Taken By, (c) =  Cosigned By      Initials Name Provider Type     Rosalina Oneal MS CCC-SLP Speech and Language Pathologist                     SLP SLC Evaluation - 02/19/24 0800          Communication Assessment/Intervention    Document Type evaluation  -HG    Subjective Information no complaints  -HG    Patient Observations alert;cooperative;agree to therapy  -HG    Patient/Family/Caregiver Comments/Observations No family present. Pt reports difficulty with word finding and brain fog.  -HG    Patient Effort excellent  -HG    Symptoms Noted During/After Treatment none  -HG       General Information    Patient Profile Reviewed yes  -HG    Pertinent History Of Current Problem Pt is a 73 year old female with with a [id number] Er+ Her2+ invasive ductal carcinoma of the right breast.     Her pathology showed a complete pathologic response.  We will plan to continue maintenance treatment with herceptin for a total of 1 year.     She continues on anastrozole.  She is tolerating it well.  We will continue anastrozole for minimum of 5 years. At recent oncology follow up, pt notes cognitive decline.  -HG    Precautions/Limitations, Vision WFL with corrective lenses  -HG    Precautions/Limitations, Hearing hearing impairment, bilaterally;WFL;for purposes of eval;other (see comments)   not aided but pt feels she needs them. -HG    Patient Level of Education Master's  -HG    Prior Level of Function-Communication WFL  -HG    Plans/Goals Discussed with patient  -HG    Barriers to Rehab none identified  -HG    Patient's Goals for Discharge functional cognition  -    Standardized Assessment Used RBANS  -HG       Auditory Comprehension Assessment/Intervention    Auditory Comprehension (Communication) WFL  -HG       Reading Comprehension Assessment/Intervention    Reading Comprehension (Communication) WFL  -    Reading Comprehension, Comment Pt able to read insurance information and reports no difficulty at home.  -HG       Verbal Expression  Assessment/Intervention    Verbal Expression WFL  -HG       Graphic Expression Assessment/Intervention    Graphic Expression WFL  -HG       Oral Musculature and Cranial Nerve Assessment    Oral Motor General Assessment WFL  -HG       Motor Speech Assessment/Intervention    Motor Speech Function WFL  -HG       Cursory Voice Assessment/Intervention    Quality and Resonance (Voice) WFL  -HG       Cognitive Assessment Intervention- SLP    Cognitive Function (Cognition) WFL  -HG    Orientation Status (Cognition) WFL  -HG    Memory (Cognitive) short-term;mild impairment;delayed;WFL;unrelated  -HG    Attention (Cognitive) sustained;alternating;WFL  -HG    Thought Organization (Cognitive) concrete divergent;mild impairment  -HG    Cognition, Comment reasoning and problem solving to be further assessed.  -HG       RBANS- Repeatable Battery for the Assessment of Neuropsychological Status    Immediate Memory Index Score 97  -HG    Immediate Memory Percentile 42 %  -HG    Immediate Memory Qualitative Description average  -HG    Visuospatial Index Score 109  -HG    Visuospatial Percentile 73 %  -HG    Visuospatial Qualitative Description average  -HG    Language Index Score 90  -HG    Language Percentile 25 %  -HG    Language Qualitative Description average  -HG    Attention Index Score 122  -HG    Attention Percentile 93 %  -HG    Attention Qualitative Description superior  -HG    Delayed Memory Index Score 110  -HG    Delayed Memory Percentile 75 %  -HG    Delayed Memory Qualitative Description high average  -HG    Total Index Score 528  -HG    Total Percentile 68 %  -HG    Total Qualitative Description average  -HG              User Key  (r) = Recorded By, (t) = Taken By, (c) = Cosigned By      Initials Name Provider Type    Rosalina Payton MS CCC-SLP Speech and Language Pathologist                                    OP SLP Education       Row Name 02/19/24 0800       Education    Barriers to Learning No barriers  identified  -    Education Provided Described results of evaluation;Patient expressed understanding of evaluation;Patient participated in establishing goals and treatment plan;Patient demonstrated recommended strategies;Patient requires further education on strategies, risks  -    Assessed Learning needs;Learning preferences;Learning motivation;Learning readiness  -    Learning Motivation Strong  -    Learning Method Explanation;Demonstration;Teach back;Written materials  -    Teaching Response Verbalized understanding;Demonstrated understanding;Reinforcement needed  -    Education Comments Reviewed RBANS results. Provided handout for Word Retrieval strategies after explaining all strategies with demonstration.  -              User Key  (r) = Recorded By, (t) = Taken By, (c) = Cosigned By      Initials Name Effective Dates    HG KimmyRosalina peña FINESSE, MS CCC-SLP 06/16/21 -                    SLP OP Goals       Row Name 02/19/24 0800          Goal Type Needed    Goal Type Needed Memory;Attention/Orientation;Other Adult Goals;Verbal Expression  -        Subjective Comments    Subjective Comments Pt alert, cooperative, concerned by her brain fog.  -        Verbal Expression Goals    Patient will be able to use verbal expressive language skills to communicate effectively in all situations with unfamiliar listener 100%:;with cues  -HG     Status: Patient will be able to use verbal expressive language skills to communicate effectively in all situations with unfamiliar listener New  -HG     Patient will improve verbal expressive language skills by providing multiple definitions/meanings when given a word 90%:;with cues  -HG     Status: Patient will improve verbal expressive language skills by providing multiple definitions/meanings when given a word New  -HG     Patient will improve verbal expressive language skills by describing single/multiple similarities and differences between two target items 90%:;with  cues  -HG     Status: Patient will improve verbal expressive language skills by describing single/multiple similarities and differences between two target items New  -HG     Patient will improve verbal expressive language skills by completing divergent naming tasks 90%:;with cues  -HG     Status: Patient will improve verbal expressive language skills by completing divergent naming tasks New  -HG     Patient will improve verbal expressive language skills by describing attributes, function, action and/or uses of an object/item 90%:;with cues  -HG     Status: Patient will improve verbal expressive language skills by describing attributes, function, action and/or uses of an object/item New  -HG     Patient will improve verbal expressive language skills by providing simple/complex target word when given its definition, meaning, attributes, function, or use 90%:;with cues  -HG     Status: Patient will improve verbal expressive language skills by providing simple/complex target word when given its definition, meaning, attributes, function, or use New  -HG        Memory Goals    Patient will be able to remember information needed to participate in avocational activities 100% accuracy with strategies in place.  -HG     Status: Patient will be able to remember information needed to participate in avocational activities New  -HG     Patient will demonstrate improved ability to recall information by immediately recalling a series of words 90%:;unrelated;with no delay;with cues  -HG     Status: Patient will demonstrate improved ability to recall information by immediately recalling a series of words New  -HG     Patient’s memory skills will be enhanced as reported by patient by utilizing internal memory strategies to recall up to 3 pieces of information after a 5- minute delay 90%:;with cues  -HG     Status: Patient’s memory skills will be enhanced as reported by patient by utilizing internal memory strategies to recall up to 3  pieces of information after a 5- minute delay New  -HG        Attention/Orientation Goals    Patient will be able to participate in the community safely Independently;With Cues  -HG     Status: Patient will be able to participate in the community safely New  -HG     Patient will improve attention skills by sustaining focus in order to actively hold and manipulate information provided (e.g., sequencing auditorily presented number series in ascending or descending order) 90%:;with cues  -HG     Status: Patient will improve attention skills by sustaining focus in order to actively hold and manipulate information provided (e.g., sequencing auditorily presented number series in ascending or descending order) New  -HG     Patient will improve attention skills by alternating or shifting focus between two different tasks in order to complete both tasks 90%:;with cues  -HG     Status: Patient will improve attention skills by alternating or shifting focus between two different tasks in order to complete both tasks New  -HG     Patient will improve attention skills by sustaining focus to high-level cognitive tasks in order to complete task 90%:;with cues  -HG     Status: Patient will improve attention skills by sustaining focus to high-level cognitive tasks in order to complete task New  -HG        Other Goals    Other Adult Goal- 1 Pt will complete reasoning and problem solving assessment with RECs to follow as indicated.  -HG     Status: Other Adult Goal- 1 New  -HG     Other Adult Goal- 2 Pt will improve RBANS Total score to at least a 110 placing pt in the High Average range.  -HG     Status: Other Adult Goal- 2 New  -HG        SLP Time Calculation    SLP Goal Re-Cert Due Date 05/19/24  -HG               User Key  (r) = Recorded By, (t) = Taken By, (c) = Cosigned By      Initials Name Provider Type    Rosalina Payton MS CCC-SLP Speech and Language Pathologist                         Time Calculation:   SLP Start  Time: 0800  Untimed Charges  38174-[id number] Eval Speech and Production w/ Language Minutes: 75  Total Minutes  Untimed Charges Total Minutes: 75   Total Minutes: 75                 Rosalina Oneal, MS CCC-SLP  2/19/2024

## 2024-02-19 NOTE — TELEPHONE ENCOUNTER
Attempted to reach Laura this afternoon as promised.  No answer.  I left a voicemail message letting Laura know I would try again tomorrow.

## 2024-02-26 ENCOUNTER — HOSPITAL ENCOUNTER (OUTPATIENT)
Dept: SPEECH THERAPY | Facility: HOSPITAL | Age: 74
Setting detail: THERAPIES SERIES
Discharge: HOME | End: 2024-02-26
Payer: MEDICARE

## 2024-02-26 DIAGNOSIS — F09 COGNITIVE DYSFUNCTION: Primary | ICD-10-CM

## 2024-02-26 PROCEDURE — 92507 TX SP LANG VOICE COMM INDIV: CPT

## 2024-02-26 NOTE — THERAPY TREATMENT NOTE
Outpatient Speech Language Pathology   Adult Speech Language Cognitive Treatment Note  ALAN Baez     Patient Name: Laura Churchill  : 1950  MRN: [id number]  Today's Date: 2024         Visit Date: 2024   Patient Active Problem List   Diagnosis    Essential hypertension    Hyperlipidemia    Obstructive sleep apnea syndrome    Osteoarthritis of knee    Osteoporosis    Lumbar spondylosis without myelopathy/Lumbar facet arthropathy    Displacement of lumbar intervertebral disc    Stenosis of lateral recess of lumbar spine    Physical deconditioning    Morbid obesity due to excess calories    Anxiety and depression    Sacroiliac joint dysfunction of right side    Greater trochanteric bursitis of right hip    Iliotibial band syndrome of right side    Spinal stenosis    Osteoarthritis    Obesity due to excess calories    Menopause    Mixed hyperlipidemia    Lumbosacral disc disease    Low back pain    Joint pain    Extremity pain    Chronic pain disorder    Back problem    Vaginal atrophy    Primary localized osteoarthrosis of shoulder region    Status post total left shoulder arthroplasty    Anxiety    Sciatica    Acquired spondylolisthesis    Spinal stenosis, lumbar region, with neurogenic claudication    L3-5 PLIF 2020    Primary osteoarthritis of left knee    Status post total left knee replacement    Leukocytosis, likely reactive    Acute blood loss anemia, mild, asymptomatic    Nonintractable episodic headache    Right thigh pain    Pseudoarthrosis of lumbar spine    Primary localized osteoarthritis of right knee    Status post right knee replacement    Obesity    Postoperative pain    Prediabetes    Malignant neoplasm of right breast in female, estrogen receptor positive    Encounter for care related to Port-a-Cath          Visit Dx:    ICD-10-CM ICD-9-CM   1. Cognitive dysfunction  F09 294.9        OP SLP Assessment/Plan - 24 0800          SLP Plan    Plan Comments Cont with Cog tx.   -HG              User Key  (r) = Recorded By, (t) = Taken By, (c) = Cosigned By      Initials Name Provider Type    Rosalina Payton MS CCC-SLP Speech and Language Pathologist                                       SLP OP Goals       Row Name 02/26/24 0800          Goal Type Needed    Goal Type Needed Memory;Attention/Orientation;Other Adult Goals;Verbal Expression  -HG        Subjective Comments    Subjective Comments Pt alert, cooperative, returned seeking brain games for phone.  -HG        Verbal Expression Goals    Patient will be able to use verbal expressive language skills to communicate effectively in all situations with unfamiliar listener 100%:;with cues  -HG     Status: Patient will be able to use verbal expressive language skills to communicate effectively in all situations with unfamiliar listener New  -HG     Patient will improve verbal expressive language skills by providing multiple definitions/meanings when given a word 90%:;with cues  -HG     Status: Patient will improve verbal expressive language skills by providing multiple definitions/meanings when given a word New  -HG     Patient will improve verbal expressive language skills by describing single/multiple similarities and differences between two target items 90%:;with cues  -HG     Status: Patient will improve verbal expressive language skills by describing single/multiple similarities and differences between two target items New  -HG     Patient will improve verbal expressive language skills by completing divergent naming tasks 90%:;with cues  -HG     Status: Patient will improve verbal expressive language skills by completing divergent naming tasks New  -HG     Patient will improve verbal expressive language skills by describing attributes, function, action and/or uses of an object/item 90%:;with cues  -HG     Status: Patient will improve verbal expressive language skills by describing attributes, function, action and/or uses of an  object/item New  -HG     Patient will improve verbal expressive language skills by providing simple/complex target word when given its definition, meaning, attributes, function, or use 90%:;with cues  -HG     Status: Patient will improve verbal expressive language skills by providing simple/complex target word when given its definition, meaning, attributes, function, or use New  -HG        Memory Goals    Patient will be able to remember information needed to participate in avocational activities 100% accuracy with strategies in place.  -HG     Status: Patient will be able to remember information needed to participate in avocational activities New  -HG     Patient will demonstrate improved ability to recall information by immediately recalling a series of words 90%:;unrelated;with no delay;with cues  -HG     Status: Patient will demonstrate improved ability to recall information by immediately recalling a series of words Progressing as expected  -HG     Comments: Patient will demonstrate improved ability to recall information by immediately recalling a series of words 2/26/24: Immediate recall of 10 paired un-related words and pt was 90% accurate.  -HG     Patient’s memory skills will be enhanced as reported by patient by utilizing internal memory strategies to recall up to 3 pieces of information after a 5- minute delay 90%:;with cues  -HG     Status: Patient’s memory skills will be enhanced as reported by patient by utilizing internal memory strategies to recall up to 3 pieces of information after a 5- minute delay Progressing as expected  -HG     Comments: Patient’s memory skills will be enhanced as reported by patient by utilizing internal memory strategies to recall up to 3 pieces of information after a 5- minute delay 2/26/24: Delayed recall of 6 related words and pt was 6/6. Delayed recall of 10 paired words and pt was 100% accurate.  -HG        Attention/Orientation Goals    Patient will be able to  participate in the community safely Independently;With Cues  -HG     Status: Patient will be able to participate in the community safely Progressing as expected  -HG     Comments: Patient will be able to participate in the community safely 2/26/24: Reviewed [employer] and other brain games to target attention and speed.  -HG     Patient will improve attention skills by sustaining focus in order to actively hold and manipulate information provided (e.g., sequencing auditorily presented number series in ascending or descending order) 90%:;with cues  -HG     Status: Patient will improve attention skills by sustaining focus in order to actively hold and manipulate information provided (e.g., sequencing auditorily presented number series in ascending or descending order) Progressing as expected  -HG     Comments: Patient will improve attention skills by sustaining focus in order to actively hold and manipulate information provided (e.g., sequencing auditorily presented number series in ascending or descending order) 2/26/24: One Pile Card game and pt was 90% accurate.  -HG     Patient will improve attention skills by alternating or shifting focus between two different tasks in order to complete both tasks 90%:;with cues  -HG     Status: Patient will improve attention skills by alternating or shifting focus between two different tasks in order to complete both tasks New  -HG     Patient will improve attention skills by sustaining focus to high-level cognitive tasks in order to complete task 90%:;with cues  -HG     Status: Patient will improve attention skills by sustaining focus to high-level cognitive tasks in order to complete task Progressing as expected  -HG     Comments: Patient will improve attention skills by sustaining focus to high-level cognitive tasks in order to complete task 2/26/24: A game on Lumosity for divided attention and pt was 85% accurate.  -HG        Other Goals    Other Adult Goal- 1 Pt will complete  reasoning and problem solving assessment with RECs to follow as indicated.  -HG     Status: Other Adult Goal- 1 New  -HG     Other Adult Goal- 2 Pt will improve RBANS Total score to at least a 110 placing pt in the High Average range.  -HG     Status: Other Adult Goal- 2 New  -HG        SLP Time Calculation    SLP Goal Re-Cert Due Date 05/19/24  -HG               User Key  (r) = Recorded By, (t) = Taken By, (c) = Cosigned By      Initials Name Provider Type    Rosalina Payton MS CCC-SLP Speech and Language Pathologist                   OP SLP Education       Row Name 02/26/24 0800       Education    Education Comments Hmwk for 7 related words, brain games and the card game of One Pile.  -HG              User Key  (r) = Recorded By, (t) = Taken By, (c) = Cosigned By      Initials Name Effective Dates    Rosalina Payton MS CCC-SLP 06/16/21 -                          Time Calculation:   SLP Start Time: 0800  Untimed Charges  01455-XQ Treatment/ST Modification Prosth Aug Alter : 60  Total Minutes  Untimed Charges Total Minutes: 60   Total Minutes: 60                 Rosalina Oneal MS CCC-SLP  2/26/2024

## 2024-02-29 ENCOUNTER — OFFICE VISIT (OUTPATIENT)
Dept: OBSTETRICS AND GYNECOLOGY | Facility: CLINIC | Age: 74
End: 2024-02-29
Payer: MEDICARE

## 2024-02-29 VITALS
WEIGHT: 206.4 LBS | DIASTOLIC BLOOD PRESSURE: 86 MMHG | BODY MASS INDEX: 35.24 KG/M2 | HEIGHT: 64 IN | SYSTOLIC BLOOD PRESSURE: 124 MMHG

## 2024-02-29 DIAGNOSIS — R32 URINARY INCONTINENCE, UNSPECIFIED TYPE: Primary | ICD-10-CM

## 2024-02-29 DIAGNOSIS — R35.1 NOCTURIA: ICD-10-CM

## 2024-02-29 LAB — HOLD SPECIMEN: NORMAL

## 2024-02-29 PROCEDURE — 3074F SYST BP LT 130 MM HG: CPT | Performed by: NURSE PRACTITIONER

## 2024-02-29 PROCEDURE — 81003 URINALYSIS AUTO W/O SCOPE: CPT | Performed by: NURSE PRACTITIONER

## 2024-02-29 PROCEDURE — 3079F DIAST BP 80-89 MM HG: CPT | Performed by: NURSE PRACTITIONER

## 2024-02-29 PROCEDURE — 99459 PELVIC EXAMINATION: CPT | Performed by: NURSE PRACTITIONER

## 2024-02-29 PROCEDURE — 1159F MED LIST DOCD IN RCRD: CPT | Performed by: NURSE PRACTITIONER

## 2024-02-29 PROCEDURE — 99213 OFFICE O/P EST LOW 20 MIN: CPT | Performed by: NURSE PRACTITIONER

## 2024-02-29 PROCEDURE — 1160F RVW MEDS BY RX/DR IN RCRD: CPT | Performed by: NURSE PRACTITIONER

## 2024-02-29 NOTE — PROGRESS NOTES
"Chief Complaint  Laura Churchill is a 73 y.o.  female presenting for Urinary Incontinence (Nocturia.  Clean catch urine collected.  )    History of Present Illness  Laura is a very pleasant 72yo woman, , here for a problem visit.  She c/o urinary urge incontinence and nocturia; no pain or burning with voiding.  She denies any vaginitis symptoms such as itching or burning.  Her \"norm\" is 2 half to 1-3 times up at night for urination, and greater than 12 times in a day.  But recently with much more urinary leaking and urge incontinence for about the past 2 months.  She has not noticed any visible blood in the urine.  No fever, chills, flank pain.    It is noted that Laura has a past medical history of right breast cancer in 2023;  She had a right lumpectomy and radiation in 2023.    The following portions of the patient's history were reviewed and updated as appropriate: allergies, current medications, past family history, past medical history, past social history, past surgical history, and problem list.    Allergies   Allergen Reactions    Codeine Hives    Gabapentin Hallucinations     Screaming nightmares    Sulfa Antibiotics Itching         Current Outpatient Medications:     anastrozole (ARIMIDEX) 1 MG tablet, Take 1 tablet by mouth Daily., Disp: 90 tablet, Rfl: 3    atorvastatin (LIPITOR) 10 MG tablet, Take 1 tablet by mouth Daily., Disp: 90 tablet, Rfl: 3    lidocaine-prilocaine (EMLA) 2.5-2.5 % cream, Apply 1 application topically to the appropriate area as directed As Needed (prior to port access)., Disp: 30 g, Rfl: 3    losartan (Cozaar) 100 MG tablet, Take 1 tablet by mouth Daily., Disp: 90 tablet, Rfl: 3    venlafaxine XR (EFFEXOR-XR) 150 MG 24 hr capsule, Take 1 capsule by mouth Daily., Disp: 90 capsule, Rfl: 3    vitamin B-12 (CYANOCOBALAMIN) 1000 MCG tablet, Take 1 tablet by mouth Daily., Disp: , Rfl:     VITAMIN D PO, Take 5,000 Units by mouth Daily., Disp: , Rfl:     Past " Medical History:   Diagnosis Date    Allergic     Anesthesia     low BP with spinal surgery in 2020, patient was hospitalized a few extra days due to low Bp    Anxiety     Atrophic vaginitis     Bilateral hip pain     Breast cancer 03/2023    right breast    Cancer     Cholelithiasis not sure    Depression     History of radiation therapy 10/04/2023    right breast    HL (hearing loss) just noticing difficulties    Hypertension     Low back pain     Malignant neoplasm of right breast in female, estrogen receptor positive 03/22/2023    Mixed hyperlipidemia     Muscle spasm     Obesity due to excess calories     MOOKIE (obstructive sleep apnea)     NO CPAP USE    Osteoarthritis     PONV (postoperative nausea and vomiting)     preprocedural meds help and are needed    Tendinitis of right shoulder     Vertigo         Past Surgical History:   Procedure Laterality Date    BARIATRIC SURGERY  2015    BREAST LUMPECTOMY WITH AXILLARY NODE DISSECTION Right     CHOLECYSTECTOMY      COLONOSCOPY  2013    Had negative result for Cologard in 2022    ENDOSCOPY      GASTRIC BANDING REMOVAL      GASTRIC SLEEVE LAPAROSCOPIC  2011    HIP PINNING Left     3 pins    INTERVENTIONAL RADIOLOGY PROCEDURE N/A 05/27/2020    Procedure: IR myelogram, lumbar;  Surgeon: Jason Rodriguez MD;  Location:  [facility];  Service: Interventional Radiology;  Laterality: N/A;    JOINT REPLACEMENT  Left shoulder replacement    2017    LAPAROSCOPIC GASTRIC BANDING      LAPAROTOMY OOPHERECTOMY Right     LUMBAR DISCECTOMY FUSION INSTRUMENTATION N/A 11/20/2018    Procedure: L3-4 LUMBAR LAMINECTOMY POSTERIOR LUMBAR INTERBODY FUSION PILF;  Surgeon: David Rider MD;  Location:  [facility] OR;  Service: Neurosurgery    LUMBAR DISCECTOMY FUSION INSTRUMENTATION N/A 07/21/2020    Procedure: L2-3 PLIF, exploration of L3-4 fusion, L2-4 fusion, L2 laminectomy;  Surgeon: David Rider MD;  Location:  [facility] OR;  Service: Neurosurgery;   "Laterality: N/A;    NV ARTHROPLASTY GLENOHUMERAL JOINT TOTAL SHOULDER Left 07/10/2017    Procedure: LEFT TOTAL SHOULDER ARTHROPLASTY ;  Surgeon: Almas Miller MD;  Location:  [facility];  Service: Orthopedics    SPINE SURGERY  L3 fused to L4,5    2018    TOTAL KNEE ARTHROPLASTY Left 01/16/2020    Procedure: TOTAL KNEE REPLACEMENT LEFT;  Surgeon: Houston Castano MD;  Location:  [facility];  Service: Orthopedics    TOTAL KNEE ARTHROPLASTY Right 09/22/2022    Procedure: TOTAL KNEE ARTHROPLASTY RIGHT;  Surgeon: Houston Castano MD;  Location:  [facility];  Service: Orthopedics;  Laterality: Right;    WISDOM TOOTH EXTRACTION         Objective  /86   Ht 162.6 cm (64\")   Wt 93.6 kg (206 lb 6.4 oz)   LMP  (LMP Unknown)   Breastfeeding No   BMI 35.43 kg/m²     Physical Exam  Exam conducted with a chaperone present.   Constitutional:       Appearance: Normal appearance.   Abdominal:      General: Bowel sounds are normal.      Palpations: Abdomen is soft. There is no mass.      Tenderness: There is no abdominal tenderness.   Genitourinary:     General: Normal vulva.      Labia:         Right: No rash, tenderness or lesion.         Left: No rash, tenderness or lesion.       Vagina: Normal. No vaginal discharge or erythema.      Cervix: No cervical motion tenderness, discharge, lesion or erythema.      Uterus: Normal. Not enlarged and not tender.       Adnexa: Right adnexa normal and left adnexa normal.        Right: No mass or tenderness.          Left: No mass or tenderness.        Rectum: Normal.      Comments: Only mild vaginal atrophy; good support of vaginal walls.  Anus appears wnl.  (No rectal exam performed.)        Assessment/Plan   Diagnoses and all orders for this visit:    1. Urinary incontinence, unspecified type (Primary)  -     Urinalysis With Culture If Indicated - Urine, Clean Catch  -     [place] Urine Culture Tube - Urine, Clean Catch    2. " Nocturia        Procedures            Return if symptoms worsen or fail to improve.    Carol Gruber, APRN  02/29/2024

## 2024-03-01 LAB
BILIRUB UR QL STRIP: NEGATIVE
CLARITY UR: CLEAR
COLOR UR: YELLOW
GLUCOSE UR STRIP-MCNC: NEGATIVE MG/DL
HGB UR QL STRIP.AUTO: NEGATIVE
KETONES UR QL STRIP: NEGATIVE
LEUKOCYTE ESTERASE UR QL STRIP.AUTO: NEGATIVE
NITRITE UR QL STRIP: NEGATIVE
PH UR STRIP.AUTO: 6 [PH] (ref 5–8)
PROT UR QL STRIP: NEGATIVE
SP GR UR STRIP: 1.02 (ref 1–1.03)
UROBILINOGEN UR QL STRIP: NORMAL

## 2024-03-04 ENCOUNTER — TELEPHONE (OUTPATIENT)
Dept: OBSTETRICS AND GYNECOLOGY | Facility: CLINIC | Age: 74
End: 2024-03-04

## 2024-03-04 NOTE — TELEPHONE ENCOUNTER
TT PATIENT ASKED IF SHE WAS OK MAYBE WITH ME SCHEDULING AT ANOTHER UROLOGY OFFICE SHE ADVISED THIS IS SOMETHING TO CONSIDER BUT WAITING RIGHT NOW AND IF DECIDES TO AT A LATER DATE SHED GIVE THEY OFFICE A CALL

## 2024-03-04 NOTE — TELEPHONE ENCOUNTER
Caller: Laura Churchill    Relationship: Self    Best call back number: [phone]    What is the best time to reach you:     ANY    Who are you requesting to speak with (clinical staff, provider,  specific staff member):     ANY    What was the call regarding:     PT HAS DECIDED TO NOT GO WITH UROLOGY FOR THE THE TIME BEING    THANK YOU

## 2024-03-05 ENCOUNTER — HOSPITAL ENCOUNTER (OUTPATIENT)
Dept: SPEECH THERAPY | Facility: HOSPITAL | Age: 74
Setting detail: THERAPIES SERIES
Discharge: HOME | End: 2024-03-05
Payer: MEDICARE

## 2024-03-05 DIAGNOSIS — F09 COGNITIVE DYSFUNCTION: Primary | ICD-10-CM

## 2024-03-05 PROCEDURE — 92507 TX SP LANG VOICE COMM INDIV: CPT

## 2024-03-05 NOTE — THERAPY TREATMENT NOTE
Outpatient Speech Language Pathology   Adult Speech Language Cognitive Treatment Note  ALAN Baez     Patient Name: Laura Churchill  : 1950  MRN: [id number]  Today's Date: 3/5/2024         Visit Date: 2024   Patient Active Problem List   Diagnosis    Essential hypertension    Hyperlipidemia    Obstructive sleep apnea syndrome    Osteoarthritis of knee    Osteoporosis    Lumbar spondylosis without myelopathy/Lumbar facet arthropathy    Displacement of lumbar intervertebral disc    Stenosis of lateral recess of lumbar spine    Physical deconditioning    Morbid obesity due to excess calories    Anxiety and depression    Sacroiliac joint dysfunction of right side    Greater trochanteric bursitis of right hip    Iliotibial band syndrome of right side    Spinal stenosis    Osteoarthritis    Obesity due to excess calories    Menopause    Mixed hyperlipidemia    Lumbosacral disc disease    Low back pain    Joint pain    Extremity pain    Chronic pain disorder    Back problem    Vaginal atrophy    Primary localized osteoarthrosis of shoulder region    Status post total left shoulder arthroplasty    Anxiety    Sciatica    Acquired spondylolisthesis    Spinal stenosis, lumbar region, with neurogenic claudication    L3-5 PLIF 2020    Primary osteoarthritis of left knee    Status post total left knee replacement    Leukocytosis, likely reactive    Acute blood loss anemia, mild, asymptomatic    Nonintractable episodic headache    Right thigh pain    Pseudoarthrosis of lumbar spine    Primary localized osteoarthritis of right knee    Status post right knee replacement    Obesity    Postoperative pain    Prediabetes    Malignant neoplasm of right breast in female, estrogen receptor positive    Encounter for care related to Port-a-Cath          Visit Dx:    ICD-10-CM ICD-9-CM   1. Cognitive dysfunction  F09 294.9        OP SLP Assessment/Plan - 24 0814          SLP Plan    Plan Comments Cont with Cog tx.   -HG              User Key  (r) = Recorded By, (t) = Taken By, (c) = Cosigned By      Initials Name Provider Type    HG Rosalina Oneal MS CCC-SLP Speech and Language Pathologist                                       SLP OP Goals       Row Name 03/05/24 0814          Goal Type Needed    Goal Type Needed Memory;Attention/Orientation;Other Adult Goals;Verbal Expression  -HG        Subjective Comments    Subjective Comments Pt alert, cooperative, returned feeling improvement.  -HG        Verbal Expression Goals    Patient will be able to use verbal expressive language skills to communicate effectively in all situations with unfamiliar listener 100%:;with cues  -HG     Status: Patient will be able to use verbal expressive language skills to communicate effectively in all situations with unfamiliar listener New  -HG     Patient will improve verbal expressive language skills by providing multiple definitions/meanings when given a word 90%:;with cues  -HG     Status: Patient will improve verbal expressive language skills by providing multiple definitions/meanings when given a word New  -HG     Patient will improve verbal expressive language skills by describing single/multiple similarities and differences between two target items 90%:;with cues  -HG     Status: Patient will improve verbal expressive language skills by describing single/multiple similarities and differences between two target items New  -HG     Patient will improve verbal expressive language skills by completing divergent naming tasks 90%:;with cues  -HG     Status: Patient will improve verbal expressive language skills by completing divergent naming tasks Progressing as expected  -HG     Comments: Patient will improve verbal expressive language skills by completing divergent naming tasks 3/5/24: Completing divergent naming for a category matrix and pt was 100% accurate.  -HG     Patient will improve verbal expressive language skills by describing  attributes, function, action and/or uses of an object/item 90%:;with cues  -HG     Status: Patient will improve verbal expressive language skills by describing attributes, function, action and/or uses of an object/item Progressing as expected  -HG     Comments: Patient will improve verbal expressive language skills by describing attributes, function, action and/or uses of an object/item 3/5/24: The game of Heads Up and pt was 90% accurate.  -HG     Patient will improve verbal expressive language skills by providing simple/complex target word when given its definition, meaning, attributes, function, or use 90%:;with cues  -HG     Status: Patient will improve verbal expressive language skills by providing simple/complex target word when given its definition, meaning, attributes, function, or use New  -HG        Memory Goals    Patient will be able to remember information needed to participate in avocational activities 100% accuracy with strategies in place.  -HG     Status: Patient will be able to remember information needed to participate in avocational activities New  -HG     Patient will demonstrate improved ability to recall information by immediately recalling a series of words 90%:;unrelated;with no delay;with cues  -HG     Status: Patient will demonstrate improved ability to recall information by immediately recalling a series of words Progressing as expected  -HG     Comments: Patient will demonstrate improved ability to recall information by immediately recalling a series of words 3/5/24: Immediate recall of 5 names and faces and pt was 5/5. 2/26/24: Immediate recall of 10 paired un-related words and pt was 90% accurate.  -HG     Patient’s memory skills will be enhanced as reported by patient by utilizing internal memory strategies to recall up to 3 pieces of information after a 5- minute delay 90%:;with cues  -HG     Status: Patient’s memory skills will be enhanced as reported by patient by utilizing  internal memory strategies to recall up to 3 pieces of information after a 5- minute delay Progressing as expected  -HG     Comments: Patient’s memory skills will be enhanced as reported by patient by utilizing internal memory strategies to recall up to 3 pieces of information after a 5- minute delay 3/5/24: Delayed recall of 7 related words and pt was 100% accurate. Delayed recall of 8 related words and pt was 6/8. 2/26/24: Delayed recall of 6 related words and pt was 6/6. Delayed recall of 10 paired words and pt was 100% accurate.  -HG        Attention/Orientation Goals    Patient will be able to participate in the community safely Independently;With Cues  -HG     Status: Patient will be able to participate in the community safely Progressing as expected  -HG     Comments: Patient will be able to participate in the community safely 2/26/24: Reviewed [employer] and other brain games to target attention and speed.  -HG     Patient will improve attention skills by sustaining focus in order to actively hold and manipulate information provided (e.g., sequencing auditorily presented number series in ascending or descending order) 90%:;with cues  -HG     Status: Patient will improve attention skills by sustaining focus in order to actively hold and manipulate information provided (e.g., sequencing auditorily presented number series in ascending or descending order) Progressing as expected  -HG     Comments: Patient will improve attention skills by sustaining focus in order to actively hold and manipulate information provided (e.g., sequencing auditorily presented number series in ascending or descending order) 2/26/24: One Pile Card game and pt was 90% accurate.  -HG     Patient will improve attention skills by alternating or shifting focus between two different tasks in order to complete both tasks 90%:;with cues  -HG     Status: Patient will improve attention skills by alternating or shifting focus between two different  tasks in order to complete both tasks New  -HG     Patient will improve attention skills by sustaining focus to high-level cognitive tasks in order to complete task 90%:;with cues  -HG     Status: Patient will improve attention skills by sustaining focus to high-level cognitive tasks in order to complete task Progressing as expected  -HG     Comments: Patient will improve attention skills by sustaining focus to high-level cognitive tasks in order to complete task 2/26/24: A game on Lumosity for divided attention and pt was 85% accurate.  -HG        Other Goals    Other Adult Goal- 1 Pt will complete reasoning and problem solving assessment with RECs to follow as indicated.  -HG     Status: Other Adult Goal- 1 New  -HG     Other Adult Goal- 2 Pt will improve RBANS Total score to at least a 110 placing pt in the High Average range.  -HG     Status: Other Adult Goal- 2 New  -HG        SLP Time Calculation    SLP Goal Re-Cert Due Date 05/19/24  -HG               User Key  (r) = Recorded By, (t) = Taken By, (c) = Cosigned By      Initials Name Provider Type    Rosalina Payton MS CCC-SLP Speech and Language Pathologist                   OP SLP Education       Row Name 03/05/24 0814       Education    Education Comments Hmwk for 8 related words and names and faces association.  -HG              User Key  (r) = Recorded By, (t) = Taken By, (c) = Cosigned By      Initials Name Effective Dates    Rosalina Payton MS CCC-SLP 06/16/21 -                          Time Calculation:   SLP Start Time: 0814  Untimed Charges  00024-ZI Treatment/ST Modification Prosth Aug Alter : 46  Total Minutes  Untimed Charges Total Minutes: 46   Total Minutes: 46                 Rosalina Oneal MS CCC-SLP  3/5/2024

## 2024-03-12 ENCOUNTER — HOSPITAL ENCOUNTER (OUTPATIENT)
Dept: SPEECH THERAPY | Facility: HOSPITAL | Age: 74
Setting detail: THERAPIES SERIES
Discharge: HOME | End: 2024-03-12
Payer: MEDICARE

## 2024-03-12 DIAGNOSIS — F09 COGNITIVE DYSFUNCTION: Primary | ICD-10-CM

## 2024-03-12 PROCEDURE — 92507 TX SP LANG VOICE COMM INDIV: CPT

## 2024-03-12 NOTE — THERAPY TREATMENT NOTE
Outpatient Speech Language Pathology   Adult Speech Language Cognitive Treatment Note  ALAN Baez     Patient Name: Laura Churchill  : 1950  MRN: [id number]  Today's Date: 3/12/2024         Visit Date: 2024   Patient Active Problem List   Diagnosis    Essential hypertension    Hyperlipidemia    Obstructive sleep apnea syndrome    Osteoarthritis of knee    Osteoporosis    Lumbar spondylosis without myelopathy/Lumbar facet arthropathy    Displacement of lumbar intervertebral disc    Stenosis of lateral recess of lumbar spine    Physical deconditioning    Morbid obesity due to excess calories    Anxiety and depression    Sacroiliac joint dysfunction of right side    Greater trochanteric bursitis of right hip    Iliotibial band syndrome of right side    Spinal stenosis    Osteoarthritis    Obesity due to excess calories    Menopause    Mixed hyperlipidemia    Lumbosacral disc disease    Low back pain    Joint pain    Extremity pain    Chronic pain disorder    Back problem    Vaginal atrophy    Primary localized osteoarthrosis of shoulder region    Status post total left shoulder arthroplasty    Anxiety    Sciatica    Acquired spondylolisthesis    Spinal stenosis, lumbar region, with neurogenic claudication    L3-5 PLIF 2020    Primary osteoarthritis of left knee    Status post total left knee replacement    Leukocytosis, likely reactive    Acute blood loss anemia, mild, asymptomatic    Nonintractable episodic headache    Right thigh pain    Pseudoarthrosis of lumbar spine    Primary localized osteoarthritis of right knee    Status post right knee replacement    Obesity    Postoperative pain    Prediabetes    Malignant neoplasm of right breast in female, estrogen receptor positive    Encounter for care related to Port-a-Cath          Visit Dx:    ICD-10-CM ICD-9-CM   1. Cognitive dysfunction  F09 294.9        OP SLP Assessment/Plan - 24 1405          SLP Plan    Plan Comments Cont with Cog tx.   -HG              User Key  (r) = Recorded By, (t) = Taken By, (c) = Cosigned By      Initials Name Provider Type    HG Rosalina Oneal MS CCC-SLP Speech and Language Pathologist                                       SLP OP Goals       Row Name 03/12/24 6848          Goal Type Needed    Goal Type Needed Memory;Attention/Orientation;Other Adult Goals  -HG        Subjective Comments    Subjective Comments Pt alert, cooperative, returned knowing homework.  -HG        Verbal Expression Goals    Patient will be able to use verbal expressive language skills to communicate effectively in all situations with unfamiliar listener 100%:;with cues  -HG     Status: Patient will be able to use verbal expressive language skills to communicate effectively in all situations with unfamiliar listener New  -HG     Patient will improve verbal expressive language skills by providing multiple definitions/meanings when given a word 90%:;with cues  -HG     Status: Patient will improve verbal expressive language skills by providing multiple definitions/meanings when given a word New  -HG     Patient will improve verbal expressive language skills by describing single/multiple similarities and differences between two target items 90%:;with cues  -HG     Status: Patient will improve verbal expressive language skills by describing single/multiple similarities and differences between two target items New  -HG     Patient will improve verbal expressive language skills by completing divergent naming tasks 90%:;with cues  -HG     Status: Patient will improve verbal expressive language skills by completing divergent naming tasks Progressing as expected  -HG     Comments: Patient will improve verbal expressive language skills by completing divergent naming tasks 3/12/24: Naming items for the ABC game and pt was 100% accurate. 3/5/24: Completing divergent naming for a category matrix and pt was 100% accurate.  -HG     Patient will improve verbal  expressive language skills by describing attributes, function, action and/or uses of an object/item 90%:;with cues  -HG     Status: Patient will improve verbal expressive language skills by describing attributes, function, action and/or uses of an object/item Progressing as expected  -HG     Comments: Patient will improve verbal expressive language skills by describing attributes, function, action and/or uses of an object/item 3/5/24: The game of Heads Up and pt was 90% accurate.  -HG     Patient will improve verbal expressive language skills by providing simple/complex target word when given its definition, meaning, attributes, function, or use 90%:;with cues  -HG     Status: Patient will improve verbal expressive language skills by providing simple/complex target word when given its definition, meaning, attributes, function, or use New  -        Memory Goals    Patient will be able to remember information needed to participate in avocational activities 100% accuracy with strategies in place.  -HG     Status: Patient will be able to remember information needed to participate in avocational activities New  -HG     Patient will demonstrate improved ability to recall information by immediately recalling a series of words 90%:;unrelated;with no delay;with cues  -HG     Status: Patient will demonstrate improved ability to recall information by immediately recalling a series of words Progressing as expected  -HG     Comments: Patient will demonstrate improved ability to recall information by immediately recalling a series of words 3/12/24: Immediate recall for ABC game and pt was 90% accurate for delayed recall of 26 unrelated items. 3/5/24: Immediate recall of 5 names and faces and pt was 5/5. 2/26/24: Immediate recall of 10 paired un-related words and pt was 90% accurate.  -HG     Patient’s memory skills will be enhanced as reported by patient by utilizing internal memory strategies to recall up to 3 pieces of  information after a 5- minute delay 90%:;with cues  -HG     Status: Patient’s memory skills will be enhanced as reported by patient by utilizing internal memory strategies to recall up to 3 pieces of information after a 5- minute delay Progressing as expected  -HG     Comments: Patient’s memory skills will be enhanced as reported by patient by utilizing internal memory strategies to recall up to 3 pieces of information after a 5- minute delay 3/12/24: Delayed recall of 8 related words and pt was 8/8. Delayed recall of 4 un-related words and pt was 4/4 x 2. 3/5/24: Delayed recall of 7 related words and pt was 100% accurate. Delayed recall of 8 related words and pt was 6/8. 2/26/24: Delayed recall of 6 related words and pt was 6/6. Delayed recall of 10 paired words and pt was 100% accurate.  -HG        Attention/Orientation Goals    Patient will be able to participate in the community safely Independently;With Cues  -HG     Status: Patient will be able to participate in the community safely Progressing as expected  -HG     Comments: Patient will be able to participate in the community safely 3/12/24: Pt reports playing [employer] on her phone and finds it challenging yet enjoyable. 2/26/24: Reviewed [employer] and other brain games to target attention and speed.  -HG     Patient will improve attention skills by sustaining focus in order to actively hold and manipulate information provided (e.g., sequencing auditorily presented number series in ascending or descending order) 90%:;with cues  -HG     Status: Patient will improve attention skills by sustaining focus in order to actively hold and manipulate information provided (e.g., sequencing auditorily presented number series in ascending or descending order) Progressing as expected  -HG     Comments: Patient will improve attention skills by sustaining focus in order to actively hold and manipulate information provided (e.g., sequencing auditorily presented number series in  ascending or descending order) 2/26/24: One Pile Card game and pt was 90% accurate.  -HG     Patient will improve attention skills by alternating or shifting focus between two different tasks in order to complete both tasks 90%:;with cues  -HG     Status: Patient will improve attention skills by alternating or shifting focus between two different tasks in order to complete both tasks New  -HG     Patient will improve attention skills by sustaining focus to high-level cognitive tasks in order to complete task 90%:;with cues  -HG     Status: Patient will improve attention skills by sustaining focus to high-level cognitive tasks in order to complete task Progressing as expected  -HG     Comments: Patient will improve attention skills by sustaining focus to high-level cognitive tasks in order to complete task 2/26/24: A game on Lumosity for divided attention and pt was 85% accurate.  -HG        Other Goals    Other Adult Goal- 1 Pt will complete reasoning and problem solving assessment with RECs to follow as indicated.  -HG     Status: Other Adult Goal- 1 New  -HG     Other Adult Goal- 2 Pt will improve RBANS Total score to at least a 110 placing pt in the High Average range.  -HG     Status: Other Adult Goal- 2 New  -HG        SLP Time Calculation    SLP Goal Re-Cert Due Date 05/19/24  -HG               User Key  (r) = Recorded By, (t) = Taken By, (c) = Cosigned By      Initials Name Provider Type    Rosalina Payton MS CCC-SLP Speech and Language Pathologist                   OP SLP Education       Row Name 03/12/24 1405       Education    Education Comments Hmwk for 5 un-related words.  -HG              User Key  (r) = Recorded By, (t) = Taken By, (c) = Cosigned By      Initials Name Effective Dates    Rosalina Payton MS CCC-SLP 06/16/21 -                          Time Calculation:   SLP Start Time: 1405  Untimed Charges  95366-GU Treatment/ST Modification Prosth Aug Alter : 55  Total Minutes  Untimed  Charges Total Minutes: 55   Total Minutes: 55                 Rosalina Oneal, MS CCC-SLP  3/12/2024

## 2024-03-15 ENCOUNTER — TELEPHONE (OUTPATIENT)
Dept: OBSTETRICS AND GYNECOLOGY | Facility: CLINIC | Age: 74
End: 2024-03-15

## 2024-03-15 NOTE — TELEPHONE ENCOUNTER
Caller: Laura Churchill    Relationship: Self    Best call back number: [phone]     What is the best time to reach you: 03/18/24 CALL BEFORE 1:30PM. IT IS OKAY TO LVM.    Who are you requesting to speak with (clinical staff, provider,  specific staff member): CRYSTAL    Do you know the name of the person who called: CRYSTAL    What was the call regarding: VOICEMAIL FROM 03/11/24 ASKED PATIENT TO RETURN CALL AND ASK FOR CRYSTAL.     Is it okay if the provider responds through MyChart: PLEASE CALL

## 2024-03-18 ENCOUNTER — HOSPITAL ENCOUNTER (OUTPATIENT)
Dept: CARDIOLOGY | Facility: HOSPITAL | Age: 74
Discharge: HOME | End: 2024-03-18
Admitting: NURSE PRACTITIONER
Payer: MEDICARE

## 2024-03-18 DIAGNOSIS — Z51.11 MAINTENANCE CHEMOTHERAPY: ICD-10-CM

## 2024-03-18 PROCEDURE — 93308 TTE F-UP OR LMTD: CPT

## 2024-03-18 PROCEDURE — 93356 MYOCRD STRAIN IMG SPCKL TRCK: CPT

## 2024-03-19 LAB
BH CV ECHO LEFT VENTRICLE GLOBAL LONGITUDINAL STRAIN: -16 %
BH CV ECHO MEAS - EDV(MOD-SP2): 94.5 ML
BH CV ECHO MEAS - EDV(MOD-SP4): 119 ML
BH CV ECHO MEAS - EF(MOD-BP): 55 %
BH CV ECHO MEAS - EF(MOD-SP2): 57.1 %
BH CV ECHO MEAS - EF(MOD-SP4): 55.4 %
BH CV ECHO MEAS - ESV(MOD-SP2): 40.5 ML
BH CV ECHO MEAS - ESV(MOD-SP4): 53.1 ML
BH CV ECHO MEAS - SV(MOD-SP2): 54 ML
BH CV ECHO MEAS - SV(MOD-SP4): 65.9 ML
BH CV VAS BP LEFT ARM: NORMAL MMHG

## 2024-03-20 ENCOUNTER — OFFICE VISIT (OUTPATIENT)
Dept: ONCOLOGY | Facility: CLINIC | Age: 74
End: 2024-03-20
Payer: MEDICARE

## 2024-03-20 ENCOUNTER — LAB (OUTPATIENT)
Dept: LAB | Facility: HOSPITAL | Age: 74
End: 2024-03-20
Payer: MEDICARE

## 2024-03-20 VITALS
RESPIRATION RATE: 18 BRPM | OXYGEN SATURATION: 94 % | TEMPERATURE: 97.6 F | BODY MASS INDEX: 34.66 KG/M2 | DIASTOLIC BLOOD PRESSURE: 72 MMHG | HEART RATE: 84 BPM | SYSTOLIC BLOOD PRESSURE: 143 MMHG | WEIGHT: 203 LBS | HEIGHT: 64 IN

## 2024-03-20 DIAGNOSIS — Z17.0 MALIGNANT NEOPLASM OF RIGHT BREAST IN FEMALE, ESTROGEN RECEPTOR POSITIVE, UNSPECIFIED SITE OF BREAST: Primary | ICD-10-CM

## 2024-03-20 DIAGNOSIS — C50.911 MALIGNANT NEOPLASM OF RIGHT BREAST IN FEMALE, ESTROGEN RECEPTOR POSITIVE, UNSPECIFIED SITE OF BREAST: Primary | ICD-10-CM

## 2024-03-20 LAB
ALBUMIN SERPL-MCNC: 4.6 G/DL (ref 3.5–5.2)
ALBUMIN/GLOB SERPL: 1.8 G/DL
ALP SERPL-CCNC: 99 U/L (ref 39–117)
ALT SERPL W P-5'-P-CCNC: 13 U/L (ref 1–33)
ANION GAP SERPL CALCULATED.3IONS-SCNC: 8 MMOL/L (ref 5–15)
AST SERPL-CCNC: 29 U/L (ref 1–32)
BASOPHILS # BLD AUTO: 0.03 10*3/MM3 (ref 0–0.2)
BASOPHILS NFR BLD AUTO: 0.6 % (ref 0–1.5)
BILIRUB SERPL-MCNC: 0.4 MG/DL (ref 0–1.2)
BUN SERPL-MCNC: 20 MG/DL (ref 8–23)
BUN/CREAT SERPL: 24.1 (ref 7–25)
CALCIUM SPEC-SCNC: 9.5 MG/DL (ref 8.6–10.5)
CHLORIDE SERPL-SCNC: 100 MMOL/L (ref 98–107)
CO2 SERPL-SCNC: 27 MMOL/L (ref 22–29)
CREAT SERPL-MCNC: 0.83 MG/DL (ref 0.57–1)
DEPRECATED RDW RBC AUTO: 44.3 FL (ref 37–54)
EGFRCR SERPLBLD CKD-EPI 2021: 74.5 ML/MIN/1.73
EOSINOPHIL # BLD AUTO: 0.09 10*3/MM3 (ref 0–0.4)
EOSINOPHIL NFR BLD AUTO: 1.7 % (ref 0.3–6.2)
ERYTHROCYTE [DISTWIDTH] IN BLOOD BY AUTOMATED COUNT: 12.5 % (ref 12.3–15.4)
GLOBULIN UR ELPH-MCNC: 2.6 GM/DL
GLUCOSE SERPL-MCNC: 99 MG/DL (ref 65–99)
HCT VFR BLD AUTO: 41.2 % (ref 34–46.6)
HGB BLD-MCNC: 13.4 G/DL (ref 12–15.9)
IMM GRANULOCYTES # BLD AUTO: 0.01 10*3/MM3 (ref 0–0.05)
IMM GRANULOCYTES NFR BLD AUTO: 0.2 % (ref 0–0.5)
LYMPHOCYTES # BLD AUTO: 1.32 10*3/MM3 (ref 0.7–3.1)
LYMPHOCYTES NFR BLD AUTO: 24.8 % (ref 19.6–45.3)
MCH RBC QN AUTO: 30.7 PG (ref 26.6–33)
MCHC RBC AUTO-ENTMCNC: 32.5 G/DL (ref 31.5–35.7)
MCV RBC AUTO: 94.5 FL (ref 79–97)
MONOCYTES # BLD AUTO: 0.51 10*3/MM3 (ref 0.1–0.9)
MONOCYTES NFR BLD AUTO: 9.6 % (ref 5–12)
NEUTROPHILS NFR BLD AUTO: 3.36 10*3/MM3 (ref 1.7–7)
NEUTROPHILS NFR BLD AUTO: 63.1 % (ref 42.7–76)
PLATELET # BLD AUTO: 224 10*3/MM3 (ref 140–450)
PMV BLD AUTO: 8.8 FL (ref 6–12)
POTASSIUM SERPL-SCNC: 4.1 MMOL/L (ref 3.5–5.2)
PROT SERPL-MCNC: 7.2 G/DL (ref 6–8.5)
RBC # BLD AUTO: 4.36 10*6/MM3 (ref 3.77–5.28)
SODIUM SERPL-SCNC: 135 MMOL/L (ref 136–145)
WBC NRBC COR # BLD AUTO: 5.32 10*3/MM3 (ref 3.4–10.8)

## 2024-03-20 PROCEDURE — 80053 COMPREHEN METABOLIC PANEL: CPT | Performed by: NURSE PRACTITIONER

## 2024-03-20 PROCEDURE — 3077F SYST BP >= 140 MM HG: CPT | Performed by: NURSE PRACTITIONER

## 2024-03-20 PROCEDURE — 36415 COLL VENOUS BLD VENIPUNCTURE: CPT | Performed by: NURSE PRACTITIONER

## 2024-03-20 PROCEDURE — 1160F RVW MEDS BY RX/DR IN RCRD: CPT | Performed by: NURSE PRACTITIONER

## 2024-03-20 PROCEDURE — 1159F MED LIST DOCD IN RCRD: CPT | Performed by: NURSE PRACTITIONER

## 2024-03-20 PROCEDURE — 1126F AMNT PAIN NOTED NONE PRSNT: CPT | Performed by: NURSE PRACTITIONER

## 2024-03-20 PROCEDURE — 85025 COMPLETE CBC W/AUTO DIFF WBC: CPT | Performed by: NURSE PRACTITIONER

## 2024-03-20 PROCEDURE — 3078F DIAST BP <80 MM HG: CPT | Performed by: NURSE PRACTITIONER

## 2024-03-20 PROCEDURE — 99214 OFFICE O/P EST MOD 30 MIN: CPT | Performed by: NURSE PRACTITIONER

## 2024-03-20 NOTE — PROGRESS NOTES
"      PROBLEM LIST:  1. [id number] ER+ (50%) NE negative Her2 positive (3+) Invasive ductal carcinoma of the right breast  A) biopsy of a 1.2 cm mass in the lower outer quadrant on 3/14/23 showed a high grade IDC.  No concerning lymph nodes on imaging.  B) neoadjuvant chemotherapy with Taxol and Herceptin started on 4/12/2023.  Last dose of maintenance Herceptin given 1/31/2024.  Stopped maintenance Herceptin early due to decrease in EF.  C) right breast lumpectomy on 8/4/23 showed no residual cancer in the breast or 1 SLN.  D) completed adjuvant radiation 10/4/2023.  Anastrozole started October 2023.  2. Hypertension  3. Depression  4. MOOKIE  5. Hyperlipidemia    Subjective     CHIEF COMPLAINT: breast cancer    HISTORY OF PRESENT ILLNESS:   Laura Churchill returns for follow-up.  She continues on anastrozole.      She had her last dose of maintenance Herceptin 1/31/2024.  Her echocardiogram from 2/6/2024 showed a decrease in her EF.  After consultation with Dr. Sharonda Hopkins we stopped the Herceptin early.  She only had 3 doses of maintenance Herceptin left.  She did have a complete pathologic response to neoadjuvant chemotherapy.    Overall she is feeling well.  She has noticed occasional headaches that are relieved with Tylenol or ibuprofen.  She denies any other concerns today.    She is looking forward to going to the [employer].  She and her  are leaving today for [place].          Objective      /72   Pulse 84   Temp 97.6 °F (36.4 °C) (Temporal)   Resp 18   Ht 162.6 cm (64.02\")   Wt 92.1 kg (203 lb)   LMP  (LMP Unknown)   SpO2 94%   BMI 34.83 kg/m²    Vitals:    03/20/24 0941   PainSc: 0-No pain                   General: well appearing female in no acute distress  Neuro: alert and oriented  HEENT: sclera anicteric, oropharynx clear  Cardiovascular: Regular rate and rhythm no murmurs  Lungs: Clear to auscultation bilaterally  Extremeties: no lower extremity edema  Skin: no rashes, " lesions, bruising, or petechiae  Psych: mood and affect appropriate            RECENT LABS:  Lab Results   Component Value Date    WBC 5.20 01/31/2024    HGB 12.5 01/31/2024    HCT 38.5 01/31/2024    MCV 95.5 01/31/2024     01/31/2024       Lab Results   Component Value Date    GLUCOSE 95 01/31/2024    BUN 18 01/31/2024    CREATININE 0.83 01/31/2024    EGFRIFNONA 66 09/28/2021    BCR 21.7 01/31/2024    K 4.3 01/31/2024    CO2 25.0 01/31/2024    CALCIUM 9.1 01/31/2024    ALBUMIN 4.5 01/31/2024    AST 25 01/31/2024    ALT 17 01/31/2024       Adult Transthoracic Echo Limited W/ Cont if Necessary Per Protocol    This was a limited echocardiogram to reassess left ventricular   function.    Left ventricular systolic function is normal. Calculated left   ventricular EF = 55%.  Left ventricular ejection fraction appears to be 51   - 55%.    Global longitudinal LV strain (GLS) = -16.0%.              ASSESSMENT AND PLAN:     Laura Churchill is a 73 y.o. female with a [id number] Er+ Her2+ invasive ductal carcinoma of the right breast.    Her pathology showed a complete pathologic response.  She had her last dose of maintenance Herceptin 1/31/2024.  Her echocardiogram from 2/6/2024 showed a decrease in her EF.  After consultation with Dr. Sharonda Hopkins we stopped the Herceptin early.  She only had 3 doses of maintenance Herceptin left.      She continues on anastrozole.  She is tolerating it well.  We will continue anastrozole for minimum of 5 years.    Osteopenia: Stable on DEXA scan October 2023..  We will check this again October 2025.    Echo from February 6 showed a decreased EF.  Repeat echocardiogram 3/19/2024 showed improved EF back to 55%.    She is scheduled for diagnostic mammogram due April 2024.    Cognitive changes: Continue treatment with speech therapy.    Order for port removal placed today.    Follow-up in 2 months with labs.        I spent 36 minutes caring for Laura on this date of service. This time  includes time spent by me in the following activities: preparing for the visit, reviewing tests, obtaining and/or reviewing a separately obtained history, performing a medically appropriate examination and/or evaluation, counseling and educating the patient/family/caregiver, ordering medications, tests, or procedures, and documenting information in the medical record                        Joanna Campos APRN  [facility]    3/20/2024          CC:

## 2024-03-28 ENCOUNTER — HOSPITAL ENCOUNTER (OUTPATIENT)
Dept: SPEECH THERAPY | Facility: HOSPITAL | Age: 74
Setting detail: THERAPIES SERIES
Discharge: HOME | End: 2024-03-28
Payer: MEDICARE

## 2024-03-28 DIAGNOSIS — F09 COGNITIVE DYSFUNCTION: Primary | ICD-10-CM

## 2024-03-28 PROCEDURE — 92507 TX SP LANG VOICE COMM INDIV: CPT

## 2024-03-28 NOTE — THERAPY TREATMENT NOTE
Outpatient Speech Language Pathology   Adult Speech Language Cognitive Treatment Note  ALAN Baez     Patient Name: Laura Churchill  : 1950  MRN: [id number]  Today's Date: 3/28/2024         Visit Date: 2024   Patient Active Problem List   Diagnosis    Essential hypertension    Hyperlipidemia    Obstructive sleep apnea syndrome    Osteoarthritis of knee    Osteoporosis    Lumbar spondylosis without myelopathy/Lumbar facet arthropathy    Displacement of lumbar intervertebral disc    Stenosis of lateral recess of lumbar spine    Physical deconditioning    Morbid obesity due to excess calories    Anxiety and depression    Sacroiliac joint dysfunction of right side    Greater trochanteric bursitis of right hip    Iliotibial band syndrome of right side    Spinal stenosis    Osteoarthritis    Obesity due to excess calories    Menopause    Mixed hyperlipidemia    Lumbosacral disc disease    Low back pain    Joint pain    Extremity pain    Chronic pain disorder    Back problem    Vaginal atrophy    Primary localized osteoarthrosis of shoulder region    Status post total left shoulder arthroplasty    Anxiety    Sciatica    Acquired spondylolisthesis    Spinal stenosis, lumbar region, with neurogenic claudication    L3-5 PLIF 2020    Primary osteoarthritis of left knee    Status post total left knee replacement    Leukocytosis, likely reactive    Acute blood loss anemia, mild, asymptomatic    Nonintractable episodic headache    Right thigh pain    Pseudoarthrosis of lumbar spine    Primary localized osteoarthritis of right knee    Status post right knee replacement    Obesity    Postoperative pain    Prediabetes    Malignant neoplasm of right breast in female, estrogen receptor positive    Encounter for care related to Port-a-Cath          Visit Dx:    ICD-10-CM ICD-9-CM   1. Cognitive dysfunction  F09 294.9        OP SLP Assessment/Plan - 24 0800          SLP Assessment    Functional Problems  Speech Language- Adult/Cognition  -HG    Clinical Impression: Speech Language-Adult/Congnition Minimal:;Mild:;Cognitive Communication Impairment  -HG    Prognosis Excellent (comment)  -HG       SLP Plan    Plan Comments Cont with Cog tx.  -HG              User Key  (r) = Recorded By, (t) = Taken By, (c) = Cosigned By      Initials Name Provider Type    HG Rosalina Oneal MS CCC-SLP Speech and Language Pathologist                                       SLP OP Goals       Row Name 03/28/24 0800          Goal Type Needed    Goal Type Needed Memory;Attention/Orientation;Other Adult Goals  -HG        Subjective Comments    Subjective Comments Pt alert, cooperative,  -HG        Verbal Expression Goals    Patient will be able to use verbal expressive language skills to communicate effectively in all situations with unfamiliar listener 100%:;with cues  -HG     Status: Patient will be able to use verbal expressive language skills to communicate effectively in all situations with unfamiliar listener New  -HG     Patient will improve verbal expressive language skills by providing multiple definitions/meanings when given a word 90%:;with cues  -HG     Status: Patient will improve verbal expressive language skills by providing multiple definitions/meanings when given a word New  -HG     Patient will improve verbal expressive language skills by describing single/multiple similarities and differences between two target items 90%:;with cues  -HG     Status: Patient will improve verbal expressive language skills by describing single/multiple similarities and differences between two target items New  -HG     Patient will improve verbal expressive language skills by completing divergent naming tasks 90%:;with cues  -HG     Status: Patient will improve verbal expressive language skills by completing divergent naming tasks Progressing as expected  -HG     Comments: Patient will improve verbal expressive language skills by completing  divergent naming tasks 3/12/24: Naming items for the ABC game and pt was 100% accurate. 3/5/24: Completing divergent naming for a category matrix and pt was 100% accurate.  -HG     Patient will improve verbal expressive language skills by describing attributes, function, action and/or uses of an object/item 90%:;with cues  -HG     Status: Patient will improve verbal expressive language skills by describing attributes, function, action and/or uses of an object/item Progressing as expected  -HG     Comments: Patient will improve verbal expressive language skills by describing attributes, function, action and/or uses of an object/item 3/5/24: The game of Heads Up and pt was 90% accurate.  -HG     Patient will improve verbal expressive language skills by providing simple/complex target word when given its definition, meaning, attributes, function, or use 90%:;with cues  -HG     Status: Patient will improve verbal expressive language skills by providing simple/complex target word when given its definition, meaning, attributes, function, or use Progressing as expected  -HG     Comments: Patient will improve verbal expressive language skills by providing simple/complex target word when given its definition, meaning, attributes, function, or use 3/28/24: The game of Scattergories: pt was 80% accurate.  -HG        Memory Goals    Patient will be able to remember information needed to participate in avocational activities 100% accuracy with strategies in place.  -HG     Status: Patient will be able to remember information needed to participate in avocational activities New  -HG     Patient will demonstrate improved ability to recall information by immediately recalling a series of words 90%:;unrelated;with no delay;with cues  -HG     Status: Patient will demonstrate improved ability to recall information by immediately recalling a series of words Progressing as expected  -HG     Comments: Patient will demonstrate improved  ability to recall information by immediately recalling a series of words 3/12/24: Immediate recall for ABC game and pt was 90% accurate for delayed recall of 26 unrelated items. 3/5/24: Immediate recall of 5 names and faces and pt was 5/5. 2/26/24: Immediate recall of 10 paired un-related words and pt was 90% accurate.  -HG     Patient’s memory skills will be enhanced as reported by patient by utilizing internal memory strategies to recall up to 3 pieces of information after a 5- minute delay 90%:;with cues  -HG     Status: Patient’s memory skills will be enhanced as reported by patient by utilizing internal memory strategies to recall up to 3 pieces of information after a 5- minute delay Progressing as expected  -HG     Comments: Patient’s memory skills will be enhanced as reported by patient by utilizing internal memory strategies to recall up to 3 pieces of information after a 5- minute delay 3/28/24: Delayed recall of 5 un-related words and pt was 5/5 x3.  3/12/24: Delayed recall of 8 related words and pt was 8/8. Delayed recall of 4 un-related words and pt was 4/4 x 2. 3/5/24: Delayed recall of 7 related words and pt was 100% accurate. Delayed recall of 8 related words and pt was 6/8. 2/26/24: Delayed recall of 6 related words and pt was 6/6. Delayed recall of 10 paired words and pt was 100% accurate.  -HG        Attention/Orientation Goals    Patient will be able to participate in the community safely Independently;With Cues  -HG     Status: Patient will be able to participate in the community safely Progressing as expected  -HG     Comments: Patient will be able to participate in the community safely 3/12/24: Pt reports playing CompBlue on her phone and finds it challenging yet enjoyable. 2/26/24: Reviewed LIA and other brain games to target attention and speed.  -HG     Patient will improve attention skills by sustaining focus in order to actively hold and manipulate information provided (e.g., sequencing  auditorily presented number series in ascending or descending order) 90%:;with cues  -HG     Status: Patient will improve attention skills by sustaining focus in order to actively hold and manipulate information provided (e.g., sequencing auditorily presented number series in ascending or descending order) Progressing as expected  -HG     Comments: Patient will improve attention skills by sustaining focus in order to actively hold and manipulate information provided (e.g., sequencing auditorily presented number series in ascending or descending order) 2/26/24: One Pile Card game and pt was 90% accurate.  -HG     Patient will improve attention skills by alternating or shifting focus between two different tasks in order to complete both tasks 90%:;with cues  -HG     Status: Patient will improve attention skills by alternating or shifting focus between two different tasks in order to complete both tasks New  -HG     Patient will improve attention skills by sustaining focus to high-level cognitive tasks in order to complete task 90%:;with cues  -HG     Status: Patient will improve attention skills by sustaining focus to high-level cognitive tasks in order to complete task Progressing as expected  -HG     Comments: Patient will improve attention skills by sustaining focus to high-level cognitive tasks in order to complete task 2/26/24: A game on Lumosity for divided attention and pt was 85% accurate.  -HG        Other Goals    Other Adult Goal- 1 Pt will complete reasoning and problem solving assessment with RECs to follow as indicated.  -HG     Status: Other Adult Goal- 1 Progressing as expected  -HG     Comments: Other Adult Goal- 1 3/28/24: Pt discussed a murder mystery event when on vacay and pt enjoyed it and found it challenging.  -HG     Other Adult Goal- 2 Pt will improve RBANS Total score to at least a 110 placing pt in the High Average range.  -HG     Status: Other Adult Goal- 2 New  -HG        SLP Time  Calculation    SLP Goal Re-Cert Due Date 05/19/24  -               User Key  (r) = Recorded By, (t) = Taken By, (c) = Cosigned By      Initials Name Provider Type    Rosalina Payton MS CCC-SLP Speech and Language Pathologist                   OP SLP Education       Row Name 03/28/24 0800       Education    Education Comments Hmwk for 6 un-related words.  -              User Key  (r) = Recorded By, (t) = Taken By, (c) = Cosigned By      Initials Name Effective Dates    Rosalina Payton MS CCC-SLP 06/16/21 -                          Time Calculation:   SLP Start Time: 0800  Untimed Charges  24132-WB Treatment/ST Modification Prosth Aug Alter : 60  Total Minutes  Untimed Charges Total Minutes: 60   Total Minutes: 60                 Rosalina Oneal MS CCC-SLP  3/28/2024

## 2024-03-28 NOTE — THERAPY PROGRESS REPORT/RE-CERT
Outpatient Speech Language Pathology   Adult Speech Language Cognitive Progress Note   [facility]     Patient Name: Laura Churchill  : 1950  MRN: [id number]  Today's Date: 3/28/2024         Visit Date: 2024   Patient Active Problem List   Diagnosis    Essential hypertension    Hyperlipidemia    Obstructive sleep apnea syndrome    Osteoarthritis of knee    Osteoporosis    Lumbar spondylosis without myelopathy/Lumbar facet arthropathy    Displacement of lumbar intervertebral disc    Stenosis of lateral recess of lumbar spine    Physical deconditioning    Morbid obesity due to excess calories    Anxiety and depression    Sacroiliac joint dysfunction of right side    Greater trochanteric bursitis of right hip    Iliotibial band syndrome of right side    Spinal stenosis    Osteoarthritis    Obesity due to excess calories    Menopause    Mixed hyperlipidemia    Lumbosacral disc disease    Low back pain    Joint pain    Extremity pain    Chronic pain disorder    Back problem    Vaginal atrophy    Primary localized osteoarthrosis of shoulder region    Status post total left shoulder arthroplasty    Anxiety    Sciatica    Acquired spondylolisthesis    Spinal stenosis, lumbar region, with neurogenic claudication    L3-5 PLIF 2020    Primary osteoarthritis of left knee    Status post total left knee replacement    Leukocytosis, likely reactive    Acute blood loss anemia, mild, asymptomatic    Nonintractable episodic headache    Right thigh pain    Pseudoarthrosis of lumbar spine    Primary localized osteoarthritis of right knee    Status post right knee replacement    Obesity    Postoperative pain    Prediabetes    Malignant neoplasm of right breast in female, estrogen receptor positive    Encounter for care related to Port-a-Cath          Visit Dx:    ICD-10-CM ICD-9-CM   1. Cognitive dysfunction  F09 294.9        OP SLP Assessment/Plan - 24 0800          SLP Assessment    Functional Problems Speech  Language- Adult/Cognition  -HG    Clinical Impression: Speech Language-Adult/Congnition Minimal:;Mild:;Cognitive Communication Impairment  -HG    Prognosis Excellent (comment)  -HG       SLP Plan    Plan Comments Cont with Cog tx.  -HG              User Key  (r) = Recorded By, (t) = Taken By, (c) = Cosigned By      Initials Name Provider Type    HG Rosalina Oneal MS CCC-SLP Speech and Language Pathologist                                       SLP OP Goals       Row Name 03/28/24 0800          Goal Type Needed    Goal Type Needed Memory;Attention/Orientation;Other Adult Goals  -HG        Subjective Comments    Subjective Comments Pt alert, cooperative,  -HG        Verbal Expression Goals    Patient will be able to use verbal expressive language skills to communicate effectively in all situations with unfamiliar listener 100%:;with cues  -HG     Status: Patient will be able to use verbal expressive language skills to communicate effectively in all situations with unfamiliar listener New  -HG     Patient will improve verbal expressive language skills by providing multiple definitions/meanings when given a word 90%:;with cues  -HG     Status: Patient will improve verbal expressive language skills by providing multiple definitions/meanings when given a word New  -HG     Patient will improve verbal expressive language skills by describing single/multiple similarities and differences between two target items 90%:;with cues  -HG     Status: Patient will improve verbal expressive language skills by describing single/multiple similarities and differences between two target items New  -HG     Patient will improve verbal expressive language skills by completing divergent naming tasks 90%:;with cues  -HG     Status: Patient will improve verbal expressive language skills by completing divergent naming tasks Progressing as expected  -HG     Comments: Patient will improve verbal expressive language skills by completing  divergent naming tasks 3/12/24: Naming items for the ABC game and pt was 100% accurate. 3/5/24: Completing divergent naming for a category matrix and pt was 100% accurate.  -HG     Patient will improve verbal expressive language skills by describing attributes, function, action and/or uses of an object/item 90%:;with cues  -HG     Status: Patient will improve verbal expressive language skills by describing attributes, function, action and/or uses of an object/item Progressing as expected  -HG     Comments: Patient will improve verbal expressive language skills by describing attributes, function, action and/or uses of an object/item 3/5/24: The game of Heads Up and pt was 90% accurate.  -HG     Patient will improve verbal expressive language skills by providing simple/complex target word when given its definition, meaning, attributes, function, or use 90%:;with cues  -HG     Status: Patient will improve verbal expressive language skills by providing simple/complex target word when given its definition, meaning, attributes, function, or use Progressing as expected  -HG     Comments: Patient will improve verbal expressive language skills by providing simple/complex target word when given its definition, meaning, attributes, function, or use 3/28/24: The game of Scattergories: pt was 80% accurate.  -HG        Memory Goals    Patient will be able to remember information needed to participate in avocational activities 100% accuracy with strategies in place.  -HG     Status: Patient will be able to remember information needed to participate in avocational activities New  -HG     Patient will demonstrate improved ability to recall information by immediately recalling a series of words 90%:;unrelated;with no delay;with cues  -HG     Status: Patient will demonstrate improved ability to recall information by immediately recalling a series of words Progressing as expected  -HG     Comments: Patient will demonstrate improved  ability to recall information by immediately recalling a series of words 3/12/24: Immediate recall for ABC game and pt was 90% accurate for delayed recall of 26 unrelated items. 3/5/24: Immediate recall of 5 names and faces and pt was 5/5. 2/26/24: Immediate recall of 10 paired un-related words and pt was 90% accurate.  -HG     Patient’s memory skills will be enhanced as reported by patient by utilizing internal memory strategies to recall up to 3 pieces of information after a 5- minute delay 90%:;with cues  -HG     Status: Patient’s memory skills will be enhanced as reported by patient by utilizing internal memory strategies to recall up to 3 pieces of information after a 5- minute delay Progressing as expected  -HG     Comments: Patient’s memory skills will be enhanced as reported by patient by utilizing internal memory strategies to recall up to 3 pieces of information after a 5- minute delay 3/28/24: Delayed recall of 5 un-related words and pt was 5/5 x3.  3/12/24: Delayed recall of 8 related words and pt was 8/8. Delayed recall of 4 un-related words and pt was 4/4 x 2. 3/5/24: Delayed recall of 7 related words and pt was 100% accurate. Delayed recall of 8 related words and pt was 6/8. 2/26/24: Delayed recall of 6 related words and pt was 6/6. Delayed recall of 10 paired words and pt was 100% accurate.  -HG        Attention/Orientation Goals    Patient will be able to participate in the community safely Independently;With Cues  -HG     Status: Patient will be able to participate in the community safely Progressing as expected  -HG     Comments: Patient will be able to participate in the community safely 3/12/24: Pt reports playing [employer] on her phone and finds it challenging yet enjoyable. 2/26/24: Reviewed [employer] and other brain games to target attention and speed.  -HG     Patient will improve attention skills by sustaining focus in order to actively hold and manipulate information provided (e.g., sequencing  auditorily presented number series in ascending or descending order) 90%:;with cues  -HG     Status: Patient will improve attention skills by sustaining focus in order to actively hold and manipulate information provided (e.g., sequencing auditorily presented number series in ascending or descending order) Progressing as expected  -HG     Comments: Patient will improve attention skills by sustaining focus in order to actively hold and manipulate information provided (e.g., sequencing auditorily presented number series in ascending or descending order) 2/26/24: One Pile Card game and pt was 90% accurate.  -HG     Patient will improve attention skills by alternating or shifting focus between two different tasks in order to complete both tasks 90%:;with cues  -HG     Status: Patient will improve attention skills by alternating or shifting focus between two different tasks in order to complete both tasks New  -HG     Patient will improve attention skills by sustaining focus to high-level cognitive tasks in order to complete task 90%:;with cues  -HG     Status: Patient will improve attention skills by sustaining focus to high-level cognitive tasks in order to complete task Progressing as expected  -HG     Comments: Patient will improve attention skills by sustaining focus to high-level cognitive tasks in order to complete task 2/26/24: A game on Lumosity for divided attention and pt was 85% accurate.  -HG        Other Goals    Other Adult Goal- 1 Pt will complete reasoning and problem solving assessment with RECs to follow as indicated.  -HG     Status: Other Adult Goal- 1 Progressing as expected  -HG     Comments: Other Adult Goal- 1 3/28/24: Pt discussed a murder mystery event when on vacay and pt enjoyed it and found it challenging.  -HG     Other Adult Goal- 2 Pt will improve RBANS Total score to at least a 110 placing pt in the High Average range.  -HG     Status: Other Adult Goal- 2 New  -HG        SLP Time  Calculation    SLP Goal Re-Cert Due Date 05/19/24  -               User Key  (r) = Recorded By, (t) = Taken By, (c) = Cosigned By      Initials Name Provider Type    Rosalina Payton MS CCC-SLP Speech and Language Pathologist                   OP SLP Education       Row Name 03/28/24 0800       Education    Education Comments Hmwk for 6 un-related words.  -              User Key  (r) = Recorded By, (t) = Taken By, (c) = Cosigned By      Initials Name Effective Dates    Rosalina Payton MS CCC-SLP 06/16/21 -                          Time Calculation:   SLP Start Time: 0800  Untimed Charges  81765-CH Treatment/ST Modification Prosth Aug Alter : 60  Total Minutes  Untimed Charges Total Minutes: 60   Total Minutes: 60                 Rosalina Oneal MS CCC-SLP  3/28/2024

## 2024-03-29 ENCOUNTER — TELEPHONE (OUTPATIENT)
Dept: INFUSION THERAPY | Facility: HOSPITAL | Age: 74
End: 2024-03-29

## 2024-03-29 NOTE — TELEPHONE ENCOUNTER
Patient called about appointment 4/4 to confirm. Reviewed meds and instructions given for NPO after MN, , Main registration etc..

## 2024-04-02 ENCOUNTER — HOSPITAL ENCOUNTER (OUTPATIENT)
Dept: SPEECH THERAPY | Facility: HOSPITAL | Age: 74
Setting detail: THERAPIES SERIES
Discharge: HOME | End: 2024-04-02
Payer: MEDICARE

## 2024-04-02 DIAGNOSIS — F09 COGNITIVE DYSFUNCTION: Primary | ICD-10-CM

## 2024-04-02 PROCEDURE — 92507 TX SP LANG VOICE COMM INDIV: CPT

## 2024-04-02 NOTE — THERAPY TREATMENT NOTE
Outpatient Speech Language Pathology   Adult Speech Language Cognitive Treatment Note  [facility]     Patient Name: Laura Churchill  : 1950  MRN: [id number]  Today's Date: 2024         Visit Date: 2024   Patient Active Problem List   Diagnosis    Essential hypertension    Hyperlipidemia    Obstructive sleep apnea syndrome    Osteoarthritis of knee    Osteoporosis    Lumbar spondylosis without myelopathy/Lumbar facet arthropathy    Displacement of lumbar intervertebral disc    Stenosis of lateral recess of lumbar spine    Physical deconditioning    Morbid obesity due to excess calories    Anxiety and depression    Sacroiliac joint dysfunction of right side    Greater trochanteric bursitis of right hip    Iliotibial band syndrome of right side    Spinal stenosis    Osteoarthritis    Obesity due to excess calories    Menopause    Mixed hyperlipidemia    Lumbosacral disc disease    Low back pain    Joint pain    Extremity pain    Chronic pain disorder    Back problem    Vaginal atrophy    Primary localized osteoarthrosis of shoulder region    Status post total left shoulder arthroplasty    Anxiety    Sciatica    Acquired spondylolisthesis    Spinal stenosis, lumbar region, with neurogenic claudication    L3-5 PLIF 2020    Primary osteoarthritis of left knee    Status post total left knee replacement    Leukocytosis, likely reactive    Acute blood loss anemia, mild, asymptomatic    Nonintractable episodic headache    Right thigh pain    Pseudoarthrosis of lumbar spine    Primary localized osteoarthritis of right knee    Status post right knee replacement    Obesity    Postoperative pain    Prediabetes    Malignant neoplasm of right breast in female, estrogen receptor positive    Encounter for care related to Port-a-Cath          Visit Dx:    ICD-10-CM ICD-9-CM   1. Cognitive dysfunction  F09 294.9        OP SLP Assessment/Plan - 24 0756          SLP Plan    Plan Comments Cont with Cog tx  for two more sessions.  -HG              User Key  (r) = Recorded By, (t) = Taken By, (c) = Cosigned By      Initials Name Provider Type    Rosalina Payton MS CCC-SLP Speech and Language Pathologist                                       SLP OP Goals       Row Name 04/02/24 0756          Goal Type Needed    Goal Type Needed Memory;Attention/Orientation;Other Adult Goals  -HG        Subjective Comments    Subjective Comments Pt alert, cooperative, returned with homework. Pt  feels that her brain fog has cleared and she doesn't question herself anymore.  -HG        Verbal Expression Goals    Patient will be able to use verbal expressive language skills to communicate effectively in all situations with unfamiliar listener 100%:;with cues  -HG     Status: Patient will be able to use verbal expressive language skills to communicate effectively in all situations with unfamiliar listener New  -HG     Patient will improve verbal expressive language skills by providing multiple definitions/meanings when given a word 90%:;with cues  -HG     Status: Patient will improve verbal expressive language skills by providing multiple definitions/meanings when given a word Achieved  -HG     Comments: Patient will improve verbal expressive language skills by providing multiple definitions/meanings when given a word 4/2/24: multiple definitions and pt was 100% accurate.  -HG     Patient will improve verbal expressive language skills by describing single/multiple similarities and differences between two target items 90%:;with cues  -HG     Status: Patient will improve verbal expressive language skills by describing single/multiple similarities and differences between two target items Achieved  -HG     Comments: Patient will improve verbal expressive language skills by describing single/multiple similarities and differences between two target items 4/2/24: similarities and differences and pt was 100% accurate.  -HG     Patient will  improve verbal expressive language skills by completing divergent naming tasks 90%:;with cues  -HG     Status: Patient will improve verbal expressive language skills by completing divergent naming tasks Progressing as expected  -HG     Comments: Patient will improve verbal expressive language skills by completing divergent naming tasks 3/12/24: Naming items for the ABC game and pt was 100% accurate. 3/5/24: Completing divergent naming for a category matrix and pt was 100% accurate.  -HG     Patient will improve verbal expressive language skills by describing attributes, function, action and/or uses of an object/item 90%:;with cues  -HG     Status: Patient will improve verbal expressive language skills by describing attributes, function, action and/or uses of an object/item Progressing as expected  -HG     Comments: Patient will improve verbal expressive language skills by describing attributes, function, action and/or uses of an object/item 3/5/24: The game of Heads Up and pt was 90% accurate.  -HG     Patient will improve verbal expressive language skills by providing simple/complex target word when given its definition, meaning, attributes, function, or use 90%:;with cues  -HG     Status: Patient will improve verbal expressive language skills by providing simple/complex target word when given its definition, meaning, attributes, function, or use Progressing as expected  -HG     Comments: Patient will improve verbal expressive language skills by providing simple/complex target word when given its definition, meaning, attributes, function, or use 3/28/24: The game of Scattergories: pt was 80% accurate.  -HG        Memory Goals    Patient will be able to remember information needed to participate in avocational activities 100% accuracy with strategies in place.  -HG     Status: Patient will be able to remember information needed to participate in avocational activities New  -HG     Patient will demonstrate improved  ability to recall information by immediately recalling a series of words 90%:;unrelated;with no delay;with cues  -HG     Status: Patient will demonstrate improved ability to recall information by immediately recalling a series of words Progressing as expected  -HG     Comments: Patient will demonstrate improved ability to recall information by immediately recalling a series of words 3/12/24: Immediate recall for ABC game and pt was 90% accurate for delayed recall of 26 unrelated items. 3/5/24: Immediate recall of 5 names and faces and pt was 5/5. 2/26/24: Immediate recall of 10 paired un-related words and pt was 90% accurate.  -HG     Patient’s memory skills will be enhanced as reported by patient by utilizing internal memory strategies to recall up to 3 pieces of information after a 5- minute delay 90%:;with cues  -HG     Status: Patient’s memory skills will be enhanced as reported by patient by utilizing internal memory strategies to recall up to 3 pieces of information after a 5- minute delay Progressing as expected  -HG     Comments: Patient’s memory skills will be enhanced as reported by patient by utilizing internal memory strategies to recall up to 3 pieces of information after a 5- minute delay 4/2/24: Delayed recalld 6 un-related words and pt was 6/6 with use of linking. 3/28/24: Delayed recall of 5 un-related words and pt was 5/5 x3.  3/12/24: Delayed recall of 8 related words and pt was 8/8. Delayed recall of 4 un-related words and pt was 4/4 x 2. 3/5/24: Delayed recall of 7 related words and pt was 100% accurate. Delayed recall of 8 related words and pt was 6/8. 2/26/24: Delayed recall of 6 related words and pt was 6/6. Delayed recall of 10 paired words and pt was 100% accurate.  -HG        Attention/Orientation Goals    Patient will be able to participate in the community safely Independently;With Cues  -HG     Status: Patient will be able to participate in the community safely Progressing as expected   -HG     Comments: Patient will be able to participate in the community safely 3/12/24: Pt reports playing [employer] on her phone and finds it challenging yet enjoyable. 2/26/24: Reviewed [employer] and other brain games to target attention and speed.  -HG     Patient will improve attention skills by sustaining focus in order to actively hold and manipulate information provided (e.g., sequencing auditorily presented number series in ascending or descending order) 90%:;with cues  -HG     Status: Patient will improve attention skills by sustaining focus in order to actively hold and manipulate information provided (e.g., sequencing auditorily presented number series in ascending or descending order) Progressing as expected  -HG     Comments: Patient will improve attention skills by sustaining focus in order to actively hold and manipulate information provided (e.g., sequencing auditorily presented number series in ascending or descending order) 2/26/24: One Pile Card game and pt was 90% accurate.  -HG     Patient will improve attention skills by alternating or shifting focus between two different tasks in order to complete both tasks 90%:;with cues  -HG     Status: Patient will improve attention skills by alternating or shifting focus between two different tasks in order to complete both tasks Progressing as expected  -HG     Comments: Patient will improve attention skills by alternating or shifting focus between two different tasks in order to complete both tasks 4/2/24: Alternating attention task and pt was 90% accurate for the game of card sorting based on suit and the letter N.  -HG     Patient will improve attention skills by sustaining focus to high-level cognitive tasks in order to complete task 90%:;with cues  -HG     Status: Patient will improve attention skills by sustaining focus to high-level cognitive tasks in order to complete task Progressing as expected  -HG     Comments: Patient will improve attention  skills by sustaining focus to high-level cognitive tasks in order to complete task 2/26/24: A game on Lumosity for divided attention and pt was 85% accurate.  -HG        Other Goals    Other Adult Goal- 1 Pt will complete reasoning and problem solving assessment with RECs to follow as indicated.  -HG     Status: Other Adult Goal- 1 Progressing as expected  -HG     Comments: Other Adult Goal- 1 4/2/24: Pt is enjoying sudoku and has worked up to the middle level of difficulty. Pt completed deduction puzzle with 90% accuracy.  3/28/24: Pt discussed a murder mystery event when on vacay and pt enjoyed it and found it challenging.  -HG     Other Adult Goal- 2 Pt will improve RBANS Total score to at least a 110 placing pt in the High Average range.  -HG     Status: Other Adult Goal- 2 New  -HG        SLP Time Calculation    SLP Goal Re-Cert Due Date 05/19/24  -HG               User Key  (r) = Recorded By, (t) = Taken By, (c) = Cosigned By      Initials Name Provider Type    Rosalina Payton MS CCC-SLP Speech and Language Pathologist                   OP SLP Education       Row Name 04/02/24 0756       Education    Education Comments Hmwk for 7 un-related words.  -HG              User Key  (r) = Recorded By, (t) = Taken By, (c) = Cosigned By      Initials Name Effective Dates    Rosalina Payton MS CCC-SLP 06/16/21 -                          Time Calculation:   SLP Start Time: 1756  Untimed Charges  24277-ZQ Treatment/ST Modification Prosth Aug Alter : 60  Total Minutes  Untimed Charges Total Minutes: 60   Total Minutes: 60                 Rosalina Oneal MS CCC-SLP  4/2/2024

## 2024-04-05 ENCOUNTER — TELEPHONE (OUTPATIENT)
Dept: INFUSION THERAPY | Facility: HOSPITAL | Age: 74
End: 2024-04-05
Payer: MEDICARE

## 2024-04-05 NOTE — TELEPHONE ENCOUNTER
Pt contacted as pre-procedure phone call prior to planned port removal for 4/9/24. Reviewed with patient arrival time, location, nothing to eat or drink by mouth, okay to take blood pressure medications morning of procedure with a small sip of water,   needed, reviewed procedure instructions and allowed time for questions, and reviewed home medications, allergies, and medical history.

## 2024-04-08 ENCOUNTER — PREP FOR SURGERY (OUTPATIENT)
Dept: OTHER | Facility: HOSPITAL | Age: 74
End: 2024-04-08
Payer: MEDICARE

## 2024-04-08 RX ORDER — SODIUM CHLORIDE 9 MG/ML
40 INJECTION, SOLUTION INTRAVENOUS AS NEEDED
Status: CANCELLED | OUTPATIENT
Start: 2024-04-08

## 2024-04-08 RX ORDER — SODIUM CHLORIDE 0.9 % (FLUSH) 0.9 %
10 SYRINGE (ML) INJECTION AS NEEDED
Status: CANCELLED | OUTPATIENT
Start: 2024-04-08

## 2024-04-08 RX ORDER — SODIUM CHLORIDE 0.9 % (FLUSH) 0.9 %
3 SYRINGE (ML) INJECTION EVERY 12 HOURS SCHEDULED
Status: CANCELLED | OUTPATIENT
Start: 2024-04-08

## 2024-04-09 ENCOUNTER — HOSPITAL ENCOUNTER (OUTPATIENT)
Dept: INTERVENTIONAL RADIOLOGY/VASCULAR | Facility: HOSPITAL | Age: 74
Discharge: HOME | End: 2024-04-09
Admitting: NURSE PRACTITIONER
Payer: MEDICARE

## 2024-04-09 VITALS
RESPIRATION RATE: 18 BRPM | HEIGHT: 65 IN | WEIGHT: 199 LBS | SYSTOLIC BLOOD PRESSURE: 169 MMHG | DIASTOLIC BLOOD PRESSURE: 81 MMHG | OXYGEN SATURATION: 93 % | BODY MASS INDEX: 33.15 KG/M2 | TEMPERATURE: 97.1 F | HEART RATE: 89 BPM

## 2024-04-09 DIAGNOSIS — Z17.0 MALIGNANT NEOPLASM OF RIGHT BREAST IN FEMALE, ESTROGEN RECEPTOR POSITIVE, UNSPECIFIED SITE OF BREAST: ICD-10-CM

## 2024-04-09 DIAGNOSIS — C50.911 MALIGNANT NEOPLASM OF RIGHT BREAST IN FEMALE, ESTROGEN RECEPTOR POSITIVE, UNSPECIFIED SITE OF BREAST: ICD-10-CM

## 2024-04-09 LAB
BASOPHILS # BLD AUTO: 0.03 10*3/MM3 (ref 0–0.2)
BASOPHILS NFR BLD AUTO: 0.6 % (ref 0–1.5)
DEPRECATED RDW RBC AUTO: 43 FL (ref 37–54)
EOSINOPHIL # BLD AUTO: 0.1 10*3/MM3 (ref 0–0.4)
EOSINOPHIL NFR BLD AUTO: 1.9 % (ref 0.3–6.2)
ERYTHROCYTE [DISTWIDTH] IN BLOOD BY AUTOMATED COUNT: 12.3 % (ref 12.3–15.4)
HCT VFR BLD AUTO: 42.1 % (ref 34–46.6)
HGB BLD-MCNC: 13.4 G/DL (ref 12–15.9)
IMM GRANULOCYTES # BLD AUTO: 0.01 10*3/MM3 (ref 0–0.05)
IMM GRANULOCYTES NFR BLD AUTO: 0.2 % (ref 0–0.5)
INR PPP: 1.04 (ref 0.89–1.12)
LYMPHOCYTES # BLD AUTO: 1.19 10*3/MM3 (ref 0.7–3.1)
LYMPHOCYTES NFR BLD AUTO: 23 % (ref 19.6–45.3)
MCH RBC QN AUTO: 30.2 PG (ref 26.6–33)
MCHC RBC AUTO-ENTMCNC: 31.8 G/DL (ref 31.5–35.7)
MCV RBC AUTO: 94.8 FL (ref 79–97)
MONOCYTES # BLD AUTO: 0.47 10*3/MM3 (ref 0.1–0.9)
MONOCYTES NFR BLD AUTO: 9.1 % (ref 5–12)
NEUTROPHILS NFR BLD AUTO: 3.37 10*3/MM3 (ref 1.7–7)
NEUTROPHILS NFR BLD AUTO: 65.2 % (ref 42.7–76)
NRBC BLD AUTO-RTO: 0 /100 WBC (ref 0–0.2)
PLATELET # BLD AUTO: 235 10*3/MM3 (ref 140–450)
PMV BLD AUTO: 9.4 FL (ref 6–12)
PROTHROMBIN TIME: 13.7 SECONDS (ref 12.2–14.5)
RBC # BLD AUTO: 4.44 10*6/MM3 (ref 3.77–5.28)
WBC NRBC COR # BLD AUTO: 5.17 10*3/MM3 (ref 3.4–10.8)

## 2024-04-09 PROCEDURE — 99152 MOD SED SAME PHYS/QHP 5/>YRS: CPT

## 2024-04-09 PROCEDURE — 85610 PROTHROMBIN TIME: CPT | Performed by: NURSE PRACTITIONER

## 2024-04-09 PROCEDURE — 25010000002 FENTANYL CITRATE (PF) 50 MCG/ML SOLUTION: Performed by: NURSE PRACTITIONER

## 2024-04-09 PROCEDURE — 99153 MOD SED SAME PHYS/QHP EA: CPT

## 2024-04-09 PROCEDURE — 25010000002 MIDAZOLAM PER 1 MG: Performed by: NURSE PRACTITIONER

## 2024-04-09 PROCEDURE — 25010000002 CEFAZOLIN PER 500 MG

## 2024-04-09 PROCEDURE — 85025 COMPLETE CBC W/AUTO DIFF WBC: CPT | Performed by: NURSE PRACTITIONER

## 2024-04-09 PROCEDURE — 25010000002 LIDOCAINE 1 % SOLUTION: Performed by: RADIOLOGY

## 2024-04-09 RX ORDER — FENTANYL CITRATE 50 UG/ML
INJECTION, SOLUTION INTRAMUSCULAR; INTRAVENOUS AS NEEDED
Status: COMPLETED | OUTPATIENT
Start: 2024-04-09 | End: 2024-04-09

## 2024-04-09 RX ORDER — CEFAZOLIN SODIUM 1 G/3ML
INJECTION, POWDER, FOR SOLUTION INTRAMUSCULAR; INTRAVENOUS
Status: COMPLETED
Start: 2024-04-09 | End: 2024-04-09

## 2024-04-09 RX ORDER — MIDAZOLAM HYDROCHLORIDE 1 MG/ML
INJECTION INTRAMUSCULAR; INTRAVENOUS
Status: DISPENSED
Start: 2024-04-09 | End: 2024-04-09

## 2024-04-09 RX ORDER — MIDAZOLAM HYDROCHLORIDE 1 MG/ML
INJECTION INTRAMUSCULAR; INTRAVENOUS AS NEEDED
Status: COMPLETED | OUTPATIENT
Start: 2024-04-09 | End: 2024-04-09

## 2024-04-09 RX ORDER — LIDOCAINE HYDROCHLORIDE 10 MG/ML
10 INJECTION, SOLUTION INFILTRATION; PERINEURAL ONCE
Status: COMPLETED | OUTPATIENT
Start: 2024-04-09 | End: 2024-04-09

## 2024-04-09 RX ORDER — SODIUM CHLORIDE 0.9 % (FLUSH) 0.9 %
10 SYRINGE (ML) INJECTION AS NEEDED
Status: DISCONTINUED | OUTPATIENT
Start: 2024-04-09 | End: 2024-04-10 | Stop reason: HOSPADM

## 2024-04-09 RX ORDER — FENTANYL CITRATE 50 UG/ML
INJECTION, SOLUTION INTRAMUSCULAR; INTRAVENOUS
Status: DISPENSED
Start: 2024-04-09 | End: 2024-04-09

## 2024-04-09 RX ORDER — SODIUM CHLORIDE 9 MG/ML
40 INJECTION, SOLUTION INTRAVENOUS AS NEEDED
Status: DISCONTINUED | OUTPATIENT
Start: 2024-04-09 | End: 2024-04-10 | Stop reason: HOSPADM

## 2024-04-09 RX ORDER — SODIUM CHLORIDE 0.9 % (FLUSH) 0.9 %
3 SYRINGE (ML) INJECTION EVERY 12 HOURS SCHEDULED
Status: DISCONTINUED | OUTPATIENT
Start: 2024-04-09 | End: 2024-04-10 | Stop reason: HOSPADM

## 2024-04-09 RX ADMIN — MIDAZOLAM HYDROCHLORIDE 2 MG: 1 INJECTION, SOLUTION INTRAMUSCULAR; INTRAVENOUS at 12:21

## 2024-04-09 RX ADMIN — FENTANYL CITRATE 50 MCG: 50 INJECTION, SOLUTION INTRAMUSCULAR; INTRAVENOUS at 12:21

## 2024-04-09 RX ADMIN — LIDOCAINE HYDROCHLORIDE 10 ML: 10 INJECTION, SOLUTION INFILTRATION; PERINEURAL at 12:27

## 2024-04-09 RX ADMIN — CEFAZOLIN SODIUM 1000 MG: 1 INJECTION, POWDER, FOR SOLUTION INTRAMUSCULAR; INTRAVENOUS at 12:20

## 2024-04-09 NOTE — NURSING NOTE
Image guided port removal performed by Dr. Karlos MD and REGINO Muñoz in interventional radiology. 2mg Versed and 50 mcg Fentanyl administered to patient with a total sedation time of 31 minutes. Patient tolerated procedure well. Report given to BOB Salmon.

## 2024-04-09 NOTE — DISCHARGE INSTRUCTIONS
Avoid any strenuous activities, pulling, tugging, straining or lifting anything over 10 pounds for the next 24 HOURS.    You may shower tomorrow; but you will need to avoid tub baths or any situation where your are submersed in water for the next 5 days to avoid the risk of infection.     DO NOT DRIVE ON THE DAY OF YOUR PROCEDURE.    If you have any problems or concern please contact your physician's office.

## 2024-04-09 NOTE — PRE-PROCEDURE NOTE
[facility]   Vascular Interventional Radiology  History & Physicial        Patient Name:Laura Churchill    : 1950    MRN: [id number]    Primary Care Physician: Julius Rivera DO    Referring Physician: REGINO Calvo     Date of admission: 2024    Subjective     Reason for Consult: Port removal, treatment complete.    History of Present Illness     Laura Churchill is a 73 y.o. female referred to IR as noted above.      Active Hospital Problems:  There are no active hospital problems to display for this patient.      Personal History     Past Medical History:   Diagnosis Date    Allergic     Anesthesia     low BP with spinal surgery in , patient was hospitalized a few extra days due to low Bp    Anxiety     Atrophic vaginitis     Bilateral hip pain     Breast cancer 2023    right breast    Cancer     breast CA    Cholelithiasis not sure    Depression     History of radiation therapy 10/04/2023    right breast    HL (hearing loss) just noticing difficulties    Hypertension     Low back pain     Malignant neoplasm of right breast in female, estrogen receptor positive 2023    Mixed hyperlipidemia     Muscle spasm     Obesity due to excess calories     MOOKIE (obstructive sleep apnea)     NO CPAP USE    Osteoarthritis     PONV (postoperative nausea and vomiting)     preprocedural meds help and are needed    Tendinitis of right shoulder     Vertigo        Past Surgical History:   Procedure Laterality Date    BARIATRIC SURGERY      BREAST LUMPECTOMY WITH AXILLARY NODE DISSECTION Right     CHOLECYSTECTOMY      COLONOSCOPY      Had negative result for Cologard in     ENDOSCOPY      GASTRIC BANDING REMOVAL      GASTRIC SLEEVE LAPAROSCOPIC      HIP PINNING Left     3 pins    INTERVENTIONAL RADIOLOGY PROCEDURE N/A 2020    Procedure: IR myelogram, lumbar;  Surgeon: Jason Rodriguez MD;  Location: [facility] INVASIVE LOCATION;  Service: Interventional Radiology;   Laterality: N/A;    JOINT REPLACEMENT  Left shoulder replacement    2017    LAPAROSCOPIC GASTRIC BANDING      LAPAROTOMY OOPHERECTOMY Right     LUMBAR DISCECTOMY FUSION INSTRUMENTATION N/A 11/20/2018    Procedure: L3-4 LUMBAR LAMINECTOMY POSTERIOR LUMBAR INTERBODY FUSION PILF;  Surgeon: David Rider MD;  Location:  [facility] OR;  Service: Neurosurgery    LUMBAR DISCECTOMY FUSION INSTRUMENTATION N/A 07/21/2020    Procedure: L2-3 PLIF, exploration of L3-4 fusion, L2-4 fusion, L2 laminectomy;  Surgeon: David Rider MD;  Location:  [facility] OR;  Service: Neurosurgery;  Laterality: N/A;    MI ARTHROPLASTY GLENOHUMERAL JOINT TOTAL SHOULDER Left 07/10/2017    Procedure: LEFT TOTAL SHOULDER ARTHROPLASTY ;  Surgeon: Almas Miller MD;  Location: [facility] OR;  Service: Orthopedics    SPINE SURGERY  L3 fused to L4,5    2018    TOTAL KNEE ARTHROPLASTY Left 01/16/2020    Procedure: TOTAL KNEE REPLACEMENT LEFT;  Surgeon: Houston Castano MD;  Location: [facility] OR;  Service: Orthopedics    TOTAL KNEE ARTHROPLASTY Right 09/22/2022    Procedure: TOTAL KNEE ARTHROPLASTY RIGHT;  Surgeon: Houston Castano MD;  Location: [facility] OR;  Service: Orthopedics;  Laterality: Right;    WISDOM TOOTH EXTRACTION         Family History: Her family history includes Alzheimer's disease in her mother; Arthritis in her father and sister; Bone cancer in her mother; Cancer in her father; Depression in her sister; Diabetes in her father; Heart failure in her father; Hyperlipidemia in her father and sister; Kidney disease in her father; Melanoma in her father; Other in her mother; Ovarian cancer (age of onset: 75) in her maternal aunt; Prostate cancer in her father.     Social History: She  reports that she quit smoking about 46 years ago. Her smoking use included cigarettes. She started smoking about 46 years ago. She has a 0.1 pack-year smoking history. She has never used smokeless tobacco. She reports current alcohol  "use of about 1.0 standard drink of alcohol per week. She reports that she does not use drugs.    Home Medications:  Vitamin D, anastrozole, atorvastatin, lidocaine-prilocaine, losartan, venlafaxine XR, and vitamin B-12    Current Medications:    fentaNYL citrate (PF)    midazolam    sodium chloride    sodium chloride    sodium chloride     Allergies:  Allergies   Allergen Reactions    Codeine Hives    Gabapentin Hallucinations     Screaming nightmares    Sulfa Antibiotics Itching       Review of Systems    IR Procedure pertinent significant findings are mentioned in the PMH and PSH above.    Objective     Visit Vitals  /79 (BP Location: Left arm, Patient Position: Lying)   Pulse 70   Temp 97.1 °F (36.2 °C) (Tympanic)   Resp 18   Ht 165.1 cm (65\")   Wt 90.3 kg (199 lb)   LMP  (LMP Unknown)   SpO2 96%   BMI 33.12 kg/m²        Physical Exam    A&Ox3.   Able to communicate  No Apparent Distress  Average physique   CVS: VS as noted. Chart reviewed. Stable for the procedure.  Respiratory: Non labored breathing. No signs of respiratory compromise.    Result Review      I have personally reviewed the results from the time of this admission to 4/9/2024 12:02 EDT and agree with these findings.  [x]  Laboratory  []  Microbiology  [x]  Radiology  []  EKG/Telemetry   []  Cardiology/Vascular   []  Pathology  []  Old records  []  Other:    Most notable findings include: As noted:    Results from last 7 days   Lab Units 04/09/24  1101   INR  1.04   WBC 10*3/mm3 5.17   HEMOGLOBIN g/dL 13.4   HEMATOCRIT % 42.1   PLATELETS 10*3/mm3 235                   Estimated Creatinine Clearance: 67 mL/min (by C-G formula based on SCr of 0.83 mg/dL). No results found for: \"CREATININE\"    COVID19   Date Value Ref Range Status   07/19/2020 Not Detected Not Detected - Ref. Range Final        No results found for: \"PREGTESTUR\", \"PREGSERUM\", \"HCG\", \"HCGQUANT\"     ASA SCALE ASSESSMENT (applicable ONLY if sedation planned):   2     MALLAMPATI " CLASSIFICATION (applicable ONLY if sedation planned):   2    Assessment / Plan     Laura Churchill is a 73 y.o. female referred to the IR service with above problem.    Plan:   As above.    Notice: The note was created before the performance of the procedure. It might have been left in the pending status and signed off after the procedure. The time stamp on the note may be misleading.    REGINO Covarrubias   Vascular Interventional Radiology  04/09/24   12:02 PM EDT

## 2024-04-10 ENCOUNTER — TELEPHONE (OUTPATIENT)
Dept: INFUSION THERAPY | Facility: HOSPITAL | Age: 74
End: 2024-04-10
Payer: MEDICARE

## 2024-04-19 ENCOUNTER — HOSPITAL ENCOUNTER (OUTPATIENT)
Dept: SPEECH THERAPY | Facility: HOSPITAL | Age: 74
Setting detail: THERAPIES SERIES
Discharge: HOME | End: 2024-04-19
Payer: MEDICARE

## 2024-04-19 DIAGNOSIS — F09 COGNITIVE DYSFUNCTION: Primary | ICD-10-CM

## 2024-04-19 PROCEDURE — 92507 TX SP LANG VOICE COMM INDIV: CPT

## 2024-04-19 NOTE — THERAPY DISCHARGE NOTE
Outpatient Speech Language Pathology   Adult Speech Language Cognitive Treatment Note/Discharge Summary  ALAN Baez     Patient Name: Laura Churchill  : 1950  MRN: [id number]  Today's Date: 2024         Visit Date: 2024   Patient Active Problem List   Diagnosis    Essential hypertension    Hyperlipidemia    Obstructive sleep apnea syndrome    Osteoarthritis of knee    Osteoporosis    Lumbar spondylosis without myelopathy/Lumbar facet arthropathy    Displacement of lumbar intervertebral disc    Stenosis of lateral recess of lumbar spine    Physical deconditioning    Morbid obesity due to excess calories    Anxiety and depression    Sacroiliac joint dysfunction of right side    Greater trochanteric bursitis of right hip    Iliotibial band syndrome of right side    Spinal stenosis    Osteoarthritis    Obesity due to excess calories    Menopause    Mixed hyperlipidemia    Lumbosacral disc disease    Low back pain    Joint pain    Extremity pain    Chronic pain disorder    Back problem    Vaginal atrophy    Primary localized osteoarthrosis of shoulder region    Status post total left shoulder arthroplasty    Anxiety    Sciatica    Acquired spondylolisthesis    Spinal stenosis, lumbar region, with neurogenic claudication    L3-5 PLIF 2020    Primary osteoarthritis of left knee    Status post total left knee replacement    Leukocytosis, likely reactive    Acute blood loss anemia, mild, asymptomatic    Nonintractable episodic headache    Right thigh pain    Pseudoarthrosis of lumbar spine    Primary localized osteoarthritis of right knee    Status post right knee replacement    Obesity    Postoperative pain    Prediabetes    Malignant neoplasm of right breast in female, estrogen receptor positive    Encounter for care related to Port-a-Cath          Visit Dx:    ICD-10-CM ICD-9-CM   1. Cognitive dysfunction  F09 294.9        OP SLP Assessment/Plan - 24 1000          SLP Assessment     Clinical Impression: Speech Language-Adult/Congnition Cognitive Communication WFL  -    Functional Problems Comment Pt reports that she if functional and can think clearly again and complete hard tasks that she couldn't do before tx.  -HG    Clinical Impression Comments RBANS Total score of 114 places pt in the high average range.  -HG    Please refer to paper survey for additional self-reported information Yes  -HG    Please refer to items scanned into chart for additional diagnostic informaiton and handouts as provided by clinician Yes  -HG    SLP Diagnosis Fxnl Cog Comm skills  -HG       SLP Plan    Plan Comments D/C from skilled services at this time.  -HG              User Key  (r) = Recorded By, (t) = Taken By, (c) = Cosigned By      Initials Name Provider Type    HG Rosalina Oneal MS CCC-SLP Speech and Language Pathologist                                     SLP OP Goals       Row Name 04/19/24 1000          Goal Type Needed    Goal Type Needed Memory;Attention/Orientation;Other Adult Goals  -        Subjective Comments    Subjective Comments Pt alert, cooperative, feeling good about her current status.  -HG        Verbal Expression Goals    Patient will be able to use verbal expressive language skills to communicate effectively in all situations with unfamiliar listener 100%:;with cues  -HG     Status: Patient will be able to use verbal expressive language skills to communicate effectively in all situations with unfamiliar listener Achieved  -HG     Comments: Patient will be able to use verbal expressive language skills to communicate effectively in all situations with unfamiliar listener 4/19/24: RBANS Language score of 96 is improved from 90.   Pt reports that she is improved and able to communicate with min to no difficulties.  -HG     Patient will improve verbal expressive language skills by providing multiple definitions/meanings when given a word 90%:;with cues  -HG     Status: Patient will  improve verbal expressive language skills by providing multiple definitions/meanings when given a word Achieved  -HG     Comments: Patient will improve verbal expressive language skills by providing multiple definitions/meanings when given a word 4/2/24: multiple definitions and pt was 100% accurate.  -HG     Patient will improve verbal expressive language skills by describing single/multiple similarities and differences between two target items 90%:;with cues  -HG     Status: Patient will improve verbal expressive language skills by describing single/multiple similarities and differences between two target items Achieved  -HG     Comments: Patient will improve verbal expressive language skills by describing single/multiple similarities and differences between two target items 4/2/24: similarities and differences and pt was 100% accurate.  -HG     Patient will improve verbal expressive language skills by completing divergent naming tasks 90%:;with cues  -HG     Status: Patient will improve verbal expressive language skills by completing divergent naming tasks Achieved  -HG     Comments: Patient will improve verbal expressive language skills by completing divergent naming tasks 3/12/24: Naming items for the ABC game and pt was 100% accurate. 3/5/24: Completing divergent naming for a category matrix and pt was 100% accurate.  -HG     Patient will improve verbal expressive language skills by describing attributes, function, action and/or uses of an object/item 90%:;with cues  -HG     Status: Patient will improve verbal expressive language skills by describing attributes, function, action and/or uses of an object/item Achieved  -HG     Comments: Patient will improve verbal expressive language skills by describing attributes, function, action and/or uses of an object/item 3/5/24: The game of Heads Up and pt was 90% accurate.  -HG     Patient will improve verbal expressive language skills by providing simple/complex  target word when given its definition, meaning, attributes, function, or use 90%:;with cues  -HG     Status: Patient will improve verbal expressive language skills by providing simple/complex target word when given its definition, meaning, attributes, function, or use Achieved  -HG     Comments: Patient will improve verbal expressive language skills by providing simple/complex target word when given its definition, meaning, attributes, function, or use 3/28/24: The game of [employer]: pt was 80% accurate.  -HG        Memory Goals    Patient will be able to remember information needed to participate in avocational activities 100% accuracy with strategies in place.  -HG     Status: Patient will be able to remember information needed to participate in avocational activities Achieved  -HG     Comments: Patient will be able to remember information needed to participate in avocational activities 4/19/24: Pt is able to participate in community such as [employer] events and feed 160 people in the community.  -HG     Patient will demonstrate improved ability to recall information by immediately recalling a series of words 90%:;unrelated;with no delay;with cues  -HG     Status: Patient will demonstrate improved ability to recall information by immediately recalling a series of words Achieved  -HG     Comments: Patient will demonstrate improved ability to recall information by immediately recalling a series of words 4/19/24: RBANS Immediate recall score of 112 is improved from a previous 97. 3/12/24: Immediate recall for ABC game and pt was 90% accurate for delayed recall of 26 unrelated items. 3/5/24: Immediate recall of 5 names and faces and pt was 5/5. 2/26/24: Immediate recall of 10 paired un-related words and pt was 90% accurate.  -HG     Patient’s memory skills will be enhanced as reported by patient by utilizing internal memory strategies to recall up to 3 pieces of information after a 5- minute delay 90%:;with cues  -HG      Status: Patient’s memory skills will be enhanced as reported by patient by utilizing internal memory strategies to recall up to 3 pieces of information after a 5- minute delay Achieved  -HG     Comments: Patient’s memory skills will be enhanced as reported by patient by utilizing internal memory strategies to recall up to 3 pieces of information after a 5- minute delay 4/19/24: RBANS Delayed recall score of 117 is improved from previous 110. Delayed recall of 7 un-related words and pt was 7/7. 4/2/24: Delayed recalld 6 un-related words and pt was 6/6 with use of linking. 3/28/24: Delayed recall of 5 un-related words and pt was 5/5 x3.  3/12/24: Delayed recall of 8 related words and pt was 8/8. Delayed recall of 4 un-related words and pt was 4/4 x 2. 3/5/24: Delayed recall of 7 related words and pt was 100% accurate. Delayed recall of 8 related words and pt was 6/8. 2/26/24: Delayed recall of 6 related words and pt was 6/6. Delayed recall of 10 paired words and pt was 100% accurate.  -HG        Attention/Orientation Goals    Patient will be able to participate in the community safely Independently;With Cues  -HG     Status: Patient will be able to participate in the community safely Achieved  -HG     Comments: Patient will be able to participate in the community safely 4/19/24: RBANS Attention score of 122 remains the same as previous assessment.  Pt reports that she confidence to complete mod-high level games.  3/12/24: Pt reports playing SeoPult on her phone and finds it challenging yet enjoyable. 2/26/24: Reviewed Inaika and other brain games to target attention and speed.  -HG     Patient will improve attention skills by sustaining focus in order to actively hold and manipulate information provided (e.g., sequencing auditorily presented number series in ascending or descending order) 90%:;with cues  -HG     Status: Patient will improve attention skills by sustaining focus in order to actively hold and  manipulate information provided (e.g., sequencing auditorily presented number series in ascending or descending order) Achieved  -HG     Comments: Patient will improve attention skills by sustaining focus in order to actively hold and manipulate information provided (e.g., sequencing auditorily presented number series in ascending or descending order) 2/26/24: One Pile Card game and pt was 90% accurate.  -HG     Patient will improve attention skills by alternating or shifting focus between two different tasks in order to complete both tasks 90%:;with cues  -HG     Status: Patient will improve attention skills by alternating or shifting focus between two different tasks in order to complete both tasks Achieved  -HG     Comments: Patient will improve attention skills by alternating or shifting focus between two different tasks in order to complete both tasks 4/2/24: Alternating attention task and pt was 90% accurate for the game of card sorting based on suit and the letter N.  -HG     Patient will improve attention skills by sustaining focus to high-level cognitive tasks in order to complete task 90%:;with cues  -HG     Status: Patient will improve attention skills by sustaining focus to high-level cognitive tasks in order to complete task Progressing as expected;Achieved  -HG     Comments: Patient will improve attention skills by sustaining focus to high-level cognitive tasks in order to complete task 2/26/24: A game on Lumosity for divided attention and pt was 85% accurate.  -HG        Other Goals    Other Adult Goal- 1 Pt will complete reasoning and problem solving assessment with RECs to follow as indicated.  -HG     Status: Other Adult Goal- 1 Achieved  -HG     Comments: Other Adult Goal- 1 4/2/24: Pt is enjoying [employer] and has worked up to the middle level of difficulty. Pt completed deduction puzzle with 90% accuracy.  3/28/24: Pt discussed a murder mystery event when on vacay and pt enjoyed it and found it  challenging.  -HG     Other Adult Goal- 2 Pt will improve RBANS Total score to at least a 110 placing pt in the High Average range.  -HG     Status: Other Adult Goal- 2 Achieved  -HG     Comments: Other Adult Goal- 2 4/19/24: RBANS total score of 114 is improved to a high average.  -HG        SLP Time Calculation    SLP Goal Re-Cert Due Date 05/19/24  -HG               User Key  (r) = Recorded By, (t) = Taken By, (c) = Cosigned By      Initials Name Provider Type    Rosalina Payton MS CCC-SLP Speech and Language Pathologist                     OP SLP Education       Row Name 04/19/24 1000       Education    Education Comments Reviewed Home Exercise Tasks to continue cognitive stimulation.  -HG              User Key  (r) = Recorded By, (t) = Taken By, (c) = Cosigned By      Initials Name Effective Dates    Rosalina Payton MS CCC-SLP 06/16/21 -                              Time Calculation:   SLP Start Time: 1000  Untimed Charges  61688-NI Treatment/ST Modification Prosth Aug Alter : 60  Total Minutes  Untimed Charges Total Minutes: 60   Total Minutes: 60                      Rosalina Oneal MS CCC-SLP  4/19/2024

## 2024-04-22 ENCOUNTER — HOSPITAL ENCOUNTER (OUTPATIENT)
Dept: MAMMOGRAPHY | Facility: HOSPITAL | Age: 74
Discharge: HOME | End: 2024-04-22
Admitting: NURSE PRACTITIONER
Payer: MEDICARE

## 2024-04-22 DIAGNOSIS — R92.8 ABNORMAL MAMMOGRAM: ICD-10-CM

## 2024-04-22 PROCEDURE — G0279 TOMOSYNTHESIS, MAMMO: HCPCS

## 2024-04-22 PROCEDURE — G0279 TOMOSYNTHESIS, MAMMO: HCPCS | Performed by: RADIOLOGY

## 2024-04-22 PROCEDURE — 77066 DX MAMMO INCL CAD BI: CPT | Performed by: RADIOLOGY

## 2024-04-22 PROCEDURE — 77066 DX MAMMO INCL CAD BI: CPT

## 2024-04-23 ENCOUNTER — TRANSCRIBE ORDERS (OUTPATIENT)
Dept: ADMINISTRATIVE | Facility: HOSPITAL | Age: 74
End: 2024-04-23
Payer: MEDICARE

## 2024-04-23 DIAGNOSIS — R92.8 ABNORMAL MAMMOGRAM: Primary | ICD-10-CM

## 2024-05-22 ENCOUNTER — OFFICE VISIT (OUTPATIENT)
Dept: ONCOLOGY | Facility: CLINIC | Age: 74
End: 2024-05-22
Payer: MEDICARE

## 2024-05-22 ENCOUNTER — LAB (OUTPATIENT)
Dept: LAB | Facility: HOSPITAL | Age: 74
End: 2024-05-22
Payer: MEDICARE

## 2024-05-22 VITALS
RESPIRATION RATE: 18 BRPM | SYSTOLIC BLOOD PRESSURE: 142 MMHG | BODY MASS INDEX: 33.99 KG/M2 | DIASTOLIC BLOOD PRESSURE: 74 MMHG | WEIGHT: 204 LBS | TEMPERATURE: 97 F | HEIGHT: 65 IN | OXYGEN SATURATION: 97 % | HEART RATE: 80 BPM

## 2024-05-22 DIAGNOSIS — Z17.0 MALIGNANT NEOPLASM OF RIGHT BREAST IN FEMALE, ESTROGEN RECEPTOR POSITIVE, UNSPECIFIED SITE OF BREAST: ICD-10-CM

## 2024-05-22 DIAGNOSIS — C50.911 MALIGNANT NEOPLASM OF RIGHT BREAST IN FEMALE, ESTROGEN RECEPTOR POSITIVE, UNSPECIFIED SITE OF BREAST: Primary | ICD-10-CM

## 2024-05-22 DIAGNOSIS — C50.911 MALIGNANT NEOPLASM OF RIGHT BREAST IN FEMALE, ESTROGEN RECEPTOR POSITIVE, UNSPECIFIED SITE OF BREAST: ICD-10-CM

## 2024-05-22 DIAGNOSIS — Z17.0 MALIGNANT NEOPLASM OF RIGHT BREAST IN FEMALE, ESTROGEN RECEPTOR POSITIVE, UNSPECIFIED SITE OF BREAST: Primary | ICD-10-CM

## 2024-05-22 LAB
ALBUMIN SERPL-MCNC: 4 G/DL (ref 3.5–5.2)
ALBUMIN/GLOB SERPL: 1.5 G/DL
ALP SERPL-CCNC: 93 U/L (ref 39–117)
ALT SERPL W P-5'-P-CCNC: 9 U/L (ref 1–33)
ANION GAP SERPL CALCULATED.3IONS-SCNC: 7 MMOL/L (ref 5–15)
AST SERPL-CCNC: 22 U/L (ref 1–32)
BASOPHILS # BLD AUTO: 0.03 10*3/MM3 (ref 0–0.2)
BASOPHILS NFR BLD AUTO: 0.5 % (ref 0–1.5)
BILIRUB SERPL-MCNC: 0.3 MG/DL (ref 0–1.2)
BUN SERPL-MCNC: 19 MG/DL (ref 8–23)
BUN/CREAT SERPL: 22.4 (ref 7–25)
CALCIUM SPEC-SCNC: 9 MG/DL (ref 8.6–10.5)
CHLORIDE SERPL-SCNC: 108 MMOL/L (ref 98–107)
CO2 SERPL-SCNC: 27 MMOL/L (ref 22–29)
CREAT SERPL-MCNC: 0.85 MG/DL (ref 0.57–1)
DEPRECATED RDW RBC AUTO: 44.6 FL (ref 37–54)
EGFRCR SERPLBLD CKD-EPI 2021: 72.4 ML/MIN/1.73
EOSINOPHIL # BLD AUTO: 0.09 10*3/MM3 (ref 0–0.4)
EOSINOPHIL NFR BLD AUTO: 1.6 % (ref 0.3–6.2)
ERYTHROCYTE [DISTWIDTH] IN BLOOD BY AUTOMATED COUNT: 12.4 % (ref 12.3–15.4)
GLOBULIN UR ELPH-MCNC: 2.7 GM/DL
GLUCOSE SERPL-MCNC: 94 MG/DL (ref 65–99)
HCT VFR BLD AUTO: 39.4 % (ref 34–46.6)
HGB BLD-MCNC: 12.8 G/DL (ref 12–15.9)
IMM GRANULOCYTES # BLD AUTO: 0 10*3/MM3 (ref 0–0.05)
IMM GRANULOCYTES NFR BLD AUTO: 0 % (ref 0–0.5)
LYMPHOCYTES # BLD AUTO: 1.22 10*3/MM3 (ref 0.7–3.1)
LYMPHOCYTES NFR BLD AUTO: 21.6 % (ref 19.6–45.3)
MCH RBC QN AUTO: 31 PG (ref 26.6–33)
MCHC RBC AUTO-ENTMCNC: 32.5 G/DL (ref 31.5–35.7)
MCV RBC AUTO: 95.4 FL (ref 79–97)
MONOCYTES # BLD AUTO: 0.48 10*3/MM3 (ref 0.1–0.9)
MONOCYTES NFR BLD AUTO: 8.5 % (ref 5–12)
NEUTROPHILS NFR BLD AUTO: 3.84 10*3/MM3 (ref 1.7–7)
NEUTROPHILS NFR BLD AUTO: 67.8 % (ref 42.7–76)
PLATELET # BLD AUTO: 226 10*3/MM3 (ref 140–450)
PMV BLD AUTO: 9.1 FL (ref 6–12)
POTASSIUM SERPL-SCNC: 4.5 MMOL/L (ref 3.5–5.2)
PROT SERPL-MCNC: 6.7 G/DL (ref 6–8.5)
RBC # BLD AUTO: 4.13 10*6/MM3 (ref 3.77–5.28)
SODIUM SERPL-SCNC: 142 MMOL/L (ref 136–145)
WBC NRBC COR # BLD AUTO: 5.66 10*3/MM3 (ref 3.4–10.8)

## 2024-05-22 PROCEDURE — 99214 OFFICE O/P EST MOD 30 MIN: CPT | Performed by: INTERNAL MEDICINE

## 2024-05-22 PROCEDURE — 36415 COLL VENOUS BLD VENIPUNCTURE: CPT

## 2024-05-22 PROCEDURE — 80053 COMPREHEN METABOLIC PANEL: CPT

## 2024-05-22 PROCEDURE — 1126F AMNT PAIN NOTED NONE PRSNT: CPT | Performed by: INTERNAL MEDICINE

## 2024-05-22 PROCEDURE — 3078F DIAST BP <80 MM HG: CPT | Performed by: INTERNAL MEDICINE

## 2024-05-22 PROCEDURE — 85025 COMPLETE CBC W/AUTO DIFF WBC: CPT

## 2024-05-22 PROCEDURE — 3077F SYST BP >= 140 MM HG: CPT | Performed by: INTERNAL MEDICINE

## 2024-05-22 NOTE — PROGRESS NOTES
"      PROBLEM LIST:  1. [id number] ER+ (50%) VA negative Her2 positive (3+) Invasive ductal carcinoma of the right breast  A) biopsy of a 1.2 cm mass in the lower outer quadrant on 3/14/23 showed a high grade IDC.  No concerning lymph nodes on imaging.  B) neoadjuvant chemotherapy with Taxol and Herceptin started on 4/12/2023.  Last dose of maintenance Herceptin given 1/31/2024.  Stopped maintenance Herceptin early due to decrease in EF.  C) right breast lumpectomy on 8/4/23 showed no residual cancer in the breast or 1 SLN.  D) completed adjuvant radiation 10/4/2023.  Anastrozole started October 2023.  2. Hypertension  3. Depression  4. MOOKIE  5. Hyperlipidemia    Subjective     CHIEF COMPLAINT: breast cancer    HISTORY OF PRESENT ILLNESS:   Laura Churchill returns for follow-up.  She continues on anastrozole.  She is tolerating this well.  Overall she feels good.    She had her last dose of maintenance Herceptin 1/31/2024.            Objective      /74   Pulse 80   Temp 97 °F (36.1 °C)   Resp 18   Ht 165.1 cm (65\")   Wt 92.5 kg (204 lb)   LMP  (LMP Unknown)   SpO2 97%   BMI 33.95 kg/m²    Vitals:    05/22/24 1007   PainSc: 0-No pain                   General: well appearing female in no acute distress  Neuro: alert and oriented  HEENT: sclera anicteric, oropharynx clear  Cardiovascular: Regular rate and rhythm no murmurs  Lungs: Clear to auscultation bilaterally  Extremeties: no lower extremity edema  Skin: no rashes, lesions, bruising, or petechiae  Psych: mood and affect appropriate            RECENT LABS:  Lab Results   Component Value Date    WBC 5.66 05/22/2024    HGB 12.8 05/22/2024    HCT 39.4 05/22/2024    MCV 95.4 05/22/2024     05/22/2024       Lab Results   Component Value Date    GLUCOSE 99 03/20/2024    BUN 20 03/20/2024    CREATININE 0.83 03/20/2024    EGFRIFNONA 66 09/28/2021    BCR 24.1 03/20/2024    K 4.1 03/20/2024    CO2 27.0 03/20/2024    CALCIUM 9.5 03/20/2024    ALBUMIN 4.6 " 03/20/2024    AST 29 03/20/2024    ALT 13 03/20/2024       Mammo Diagnostic Digital Tomosynthesis Bilateral With CAD  Narrative: EXAM: MAMMO DIAGNOSTIC DIGITAL TOMOSYNTHESIS BILATERAL W CAD-     DATE:4/22/2024 2:10 PM     INDICATION: Patient is a 73-year-old who presents for postlumpectomy  protocol diagnostic right mammogram at time of annual. Patient had  biopsy-proven right breast malignancy in the 830/9:00 right breast.  Patient received neoadjuvant chemotherapy with post chemotherapy  lumpectomy performed in August 2023. Patient has no concerns or  complaints at today's exam.     COMPARISON: Comparison is made to prior studies dating back to  9/15/2020.     TECHNIQUE: 2D/3D CC and MLO views of each breast were obtained.     FINDINGS:     There are scattered fibroglandular densities.     Right breast: There are expected postoperative changes in the right  breast. No suspicious masses, calcifications, or areas of distortion are  seen.     Left breast: No suspicious masses, calcifications, or areas of  distortion are seen.     Impression: 1. Right breast: Expected postoperative changes from prior lumpectomy.  No suspicious abnormality identified. Recommendation at this time is for  6-month follow-up diagnostic right mammogram to ensure  stability/establish new baseline as per postlumpectomy protocol.  2. Left breast: No suspicious abnormality identified. Recommendation is  for continued routine annual screening mammogram.  3. Today's findings and recommendations were discussed with the patient  at the time of the examination by the breast care team.     OVERALL ASSESSMENT: BI-RADS Category 3: Probably benign. Recommend  short-term follow-up diagnostic right mammogram in 6 months.     ________________________________________________________________________  _______  Physicians Order     Diagnostic right mammogram in 6 months.     Diagnosis: Post lumpectomy protocol.     This report was finalized on 4/22/2024 2:12  PM by Meggan Mason MD.                 ASSESSMENT AND PLAN:     Laura Churchill is a 73 y.o. female with a [id number] Er+ Her2+ invasive ductal carcinoma of the right breast.    Her pathology showed a complete pathologic response.  She had her last dose of maintenance Herceptin 1/31/2024.  Herceptin was stopped 3 doses early due to a drop in her ejection fraction which has subsequently resolved.    She continues on anastrozole.  She is tolerating it well.  We will continue anastrozole for minimum of 5 years.    Osteopenia: Stable on DEXA scan October 2023..  We will check this again October 2025.    Mammogram 4/22/2024 was category 3.  She will need right diagnostic mammogram in October.    Follow-up in 3 months with labs.                          Sharonda Hopkins MD  [facility]    5/22/2024          CC:

## 2024-05-28 DIAGNOSIS — Z17.0 MALIGNANT NEOPLASM OF RIGHT BREAST IN FEMALE, ESTROGEN RECEPTOR POSITIVE, UNSPECIFIED SITE OF BREAST: Primary | ICD-10-CM

## 2024-05-28 DIAGNOSIS — C50.911 MALIGNANT NEOPLASM OF RIGHT BREAST IN FEMALE, ESTROGEN RECEPTOR POSITIVE, UNSPECIFIED SITE OF BREAST: Primary | ICD-10-CM

## 2024-06-26 ENCOUNTER — OFFICE VISIT (OUTPATIENT)
Dept: FAMILY MEDICINE CLINIC | Facility: CLINIC | Age: 74
End: 2024-06-26
Payer: MEDICARE

## 2024-06-26 VITALS
SYSTOLIC BLOOD PRESSURE: 110 MMHG | BODY MASS INDEX: 33.66 KG/M2 | WEIGHT: 202 LBS | OXYGEN SATURATION: 98 % | TEMPERATURE: 97 F | DIASTOLIC BLOOD PRESSURE: 68 MMHG | HEART RATE: 100 BPM | HEIGHT: 65 IN

## 2024-06-26 DIAGNOSIS — Z12.12 ENCOUNTER FOR COLORECTAL CANCER SCREENING: Primary | ICD-10-CM

## 2024-06-26 DIAGNOSIS — Z12.11 ENCOUNTER FOR COLORECTAL CANCER SCREENING: Primary | ICD-10-CM

## 2024-06-26 DIAGNOSIS — H91.90 HEARING LOSS, UNSPECIFIED HEARING LOSS TYPE, UNSPECIFIED LATERALITY: ICD-10-CM

## 2024-06-26 DIAGNOSIS — K58.9 SPASM OF BOWEL: ICD-10-CM

## 2024-06-26 PROCEDURE — 3078F DIAST BP <80 MM HG: CPT | Performed by: FAMILY MEDICINE

## 2024-06-26 PROCEDURE — 1126F AMNT PAIN NOTED NONE PRSNT: CPT | Performed by: FAMILY MEDICINE

## 2024-06-26 PROCEDURE — 99213 OFFICE O/P EST LOW 20 MIN: CPT | Performed by: FAMILY MEDICINE

## 2024-06-26 PROCEDURE — 3074F SYST BP LT 130 MM HG: CPT | Performed by: FAMILY MEDICINE

## 2024-06-26 RX ORDER — TIZANIDINE 4 MG/1
4 TABLET ORAL NIGHTLY PRN
Qty: 90 TABLET | Refills: 0 | Status: SHIPPED | OUTPATIENT
Start: 2024-06-26 | End: 2024-07-01 | Stop reason: SDUPTHER

## 2024-06-26 NOTE — PROGRESS NOTES
Follow Up Office Visit      Patient Name: Laura Churchill  : 1950   MRN: [id number]     Chief Complaint:    Chief Complaint   Patient presents with    Hearing Problem     Pt states its been getting much worse    Med Refill     Tizanidine 4mg       History of Present Illness: Laura Churchill is a 73 y.o. female who is here today to follow up with vertigo, hearing oss that is chronic and b/l, and and bowel spasms for which she uses tizanidine.  She is asking for tizanidine refill.  She says it works well and she uses it sparingly.  She is here today mostly because she says her hearing is getting worse.  Has bilateral hearing loss.  She has trouble hearing people when in crowds or when the TV is on.  Patient says the symptoms been going on for some time    Patient also needs a repeat Cologuard      Physical exam: Bilateral ear exam was normal without any bulging TM and without erythema.  No cerumen impaction noted      Subjective        I have reviewed and the following portions of the patient's history were updated as appropriate: past family history, past medical history, past social history, past surgical history and problem list.    Medications:     Current Outpatient Medications:     anastrozole (ARIMIDEX) 1 MG tablet, Take 1 tablet by mouth Daily., Disp: 90 tablet, Rfl: 3    atorvastatin (LIPITOR) 10 MG tablet, Take 1 tablet by mouth Daily., Disp: 90 tablet, Rfl: 3    losartan (Cozaar) 100 MG tablet, Take 1 tablet by mouth Daily., Disp: 90 tablet, Rfl: 3    venlafaxine XR (EFFEXOR-XR) 150 MG 24 hr capsule, Take 1 capsule by mouth Daily., Disp: 90 capsule, Rfl: 3    vitamin B-12 (CYANOCOBALAMIN) 1000 MCG tablet, Take 1 tablet by mouth Daily., Disp: , Rfl:     VITAMIN D PO, Take 5,000 Units by mouth Daily., Disp: , Rfl:     tiZANidine (ZANAFLEX) 4 MG tablet, Take 1 tablet by mouth At Night As Needed for Muscle Spasms., Disp: 90 tablet, Rfl: 0    Allergies:   Allergies   Allergen Reactions     "Codeine Hives    Gabapentin Hallucinations     Screaming nightmares    Sulfa Antibiotics Itching       Objective     Physical Exam: Please see above  Vital Signs:   Vitals:    06/26/24 1529   BP: 110/68   Pulse: 100   Temp: 97 °F (36.1 °C)   TempSrc: Infrared   SpO2: 98%   Weight: 91.6 kg (202 lb)   Height: 165.1 cm (65\")   PainSc: 0-No pain     Body mass index is 33.61 kg/m².          Assessment / Plan      Assessment/Plan:   Diagnoses and all orders for this visit:    1. Encounter for colorectal cancer screening (Primary)  -     Cologuard - Stool, Per Rectum; Future    2. Hearing loss, unspecified hearing loss type, unspecified laterality  -     Ambulatory Referral to ENT (Otolaryngology)    3. Spasm of bowel  -     tiZANidine (ZANAFLEX) 4 MG tablet; Take 1 tablet by mouth At Night As Needed for Muscle Spasms.  Dispense: 90 tablet; Refill: 0    Will refer to ENT for further evaluation and management of hearing loss.  Appreciate any recommendations and help with getting hearing aids    Continue tizanidine with spasms of the bowel.    Ordered Cologuard test for colorectal cancer screening.  Follow-up for Medicare wellness    Follow Up:   No follow-ups on file.    Julius Rivera DO  [facility] [place]   Answers submitted by the patient for this visit:  Other (Submitted on 6/24/2024)  Please describe your symptoms.: I am concerned about hearing loss.  Have you had these symptoms before?: Yes  How long have you been having these symptoms?: Greater than 2 weeks  Please describe any probable cause for these symptoms. : Age  Primary Reason for Visit (Submitted on 6/24/2024)  What is the primary reason for your visit?: Other    "

## 2024-06-27 DIAGNOSIS — K58.9 SPASM OF BOWEL: ICD-10-CM

## 2024-06-27 RX ORDER — TIZANIDINE 4 MG/1
4 TABLET ORAL NIGHTLY PRN
Qty: 90 TABLET | Refills: 0 | Status: CANCELLED | OUTPATIENT
Start: 2024-06-27

## 2024-06-27 NOTE — TELEPHONE ENCOUNTER
Caller: Laura Churchill    Relationship: Self    Best call back number: [phone]    Requested Prescriptions:   Requested Prescriptions     Pending Prescriptions Disp Refills    tiZANidine (ZANAFLEX) 4 MG tablet 90 tablet 0     Sig: Take 1 tablet by mouth At Night As Needed for Muscle Spasms.        Pharmacy where request should be sent: [facility] - [address] [phone] [facility] [phone]      Last office visit with prescribing clinician: 6/26/2024   Last telemedicine visit with prescribing clinician: Visit date not found   Next office visit with prescribing clinician: 11/20/2024     Additional details provided by patient: PT FORGOT TO ASK FOR THESE TO GO TO [facility] SHE IS CALLING HER LOCAL PHARMACY TO ASK THEM TO PUT THEM BACK SO SHE CAN GET THE RX THRU [facility] PLEASE SEND TO THEM ASAP    Does the patient have less than a 3 day supply:  [x] Yes  [] No    Would you like a call back once the refill request has been completed: [x] Yes [] No    If the office needs to give you a call back, can they leave a voicemail: [x] Yes [] No    Susan Keen Rep   06/27/24 16:18 EDT

## 2024-07-01 ENCOUNTER — TELEPHONE (OUTPATIENT)
Dept: FAMILY MEDICINE CLINIC | Facility: CLINIC | Age: 74
End: 2024-07-01
Payer: MEDICARE

## 2024-07-01 DIAGNOSIS — K58.9 SPASM OF BOWEL: ICD-10-CM

## 2024-07-01 RX ORDER — TIZANIDINE 4 MG/1
4 TABLET ORAL NIGHTLY PRN
Qty: 90 TABLET | Refills: 0 | Status: SHIPPED | OUTPATIENT
Start: 2024-07-01

## 2024-07-01 NOTE — TELEPHONE ENCOUNTER
Caller: Laura Churchill    Relationship: Self    Best call back number:     [phone] (Mobile)     Which medication are you concerned about:       tiZANidine (ZANAFLEX) 4 MG tablet     Who prescribed you this medication:     DR GARCIA    What are your concerns:     PATIENT STATED MEDICATION REFILL WAS FORWARDED TO THE WRONG PHARMACY    PATIENT ALSO STATED MEDICATION IS QUITE A BIT CHEAPER THROUGH [facility]    PATIENT REQUESTED REFILL BE SENT TO:    [facility]    TELEPHONE CONTACT:    [phone]

## 2024-07-31 ENCOUNTER — TELEPHONE (OUTPATIENT)
Dept: ONCOLOGY | Facility: CLINIC | Age: 74
End: 2024-07-31
Payer: MEDICARE

## 2024-07-31 DIAGNOSIS — C50.911 MALIGNANT NEOPLASM OF RIGHT BREAST IN FEMALE, ESTROGEN RECEPTOR POSITIVE, UNSPECIFIED SITE OF BREAST: Primary | ICD-10-CM

## 2024-07-31 DIAGNOSIS — Z17.0 MALIGNANT NEOPLASM OF RIGHT BREAST IN FEMALE, ESTROGEN RECEPTOR POSITIVE, UNSPECIFIED SITE OF BREAST: Primary | ICD-10-CM

## 2024-07-31 RX ORDER — ANASTROZOLE 1 MG/1
1 TABLET ORAL DAILY
Qty: 90 TABLET | Refills: 3 | Status: SHIPPED | OUTPATIENT
Start: 2024-07-31

## 2024-07-31 NOTE — TELEPHONE ENCOUNTER
Called and left voicemail for patient letting her know she is rescheduled for 8/13/24 at 10:30am.

## 2024-07-31 NOTE — TELEPHONE ENCOUNTER
Caller: Laura Churchill    Relationship to patient: Self    Best call back number: [phone]    Chief complaint: R/S    Type of visit: FOLLOW UP    Requested date: 8-12, 8-13 OR NEXT AVLIABLE IN SEPT    If rescheduling, when is the original appointment: 8-    Additional notes: PT OUT OF TOWN ON 8-22

## 2024-07-31 NOTE — TELEPHONE ENCOUNTER
Caller: Laura Churchill    Relationship: Self    Best call back number:     [phone]       Requested Prescriptions:   Requested Prescriptions     Pending Prescriptions Disp Refills    anastrozole (ARIMIDEX) 1 MG tablet 90 tablet 3     Sig: Take 1 tablet by mouth Daily.        Pharmacy where request should be sent: [facility] [address] [phone] [facility] [phone] [facility]     Last office visit with prescribing clinician: 5/22/2024   Last telemedicine visit with prescribing clinician: Visit date not found   Next office visit with prescribing clinician: Visit date not found     Additional details provided by patient: [facility] SENT THE PT A LETTER THAT THEY ARE GOING TO NEED A NEW RX SENT IN.     Does the patient have less than a 3 day supply:  [] Yes  [x] No    Would you like a call back once the refill request has been completed: [] Yes [x] No    If the office needs to give you a call back, can they leave a voicemail: [] Yes [x] No

## 2024-08-13 ENCOUNTER — LAB (OUTPATIENT)
Dept: LAB | Facility: HOSPITAL | Age: 74
End: 2024-08-13
Payer: MEDICARE

## 2024-08-13 ENCOUNTER — OFFICE VISIT (OUTPATIENT)
Dept: ONCOLOGY | Facility: CLINIC | Age: 74
End: 2024-08-13
Payer: MEDICARE

## 2024-08-13 VITALS
RESPIRATION RATE: 20 BRPM | SYSTOLIC BLOOD PRESSURE: 133 MMHG | BODY MASS INDEX: 34.66 KG/M2 | HEART RATE: 78 BPM | HEIGHT: 65 IN | DIASTOLIC BLOOD PRESSURE: 78 MMHG | TEMPERATURE: 96.9 F | OXYGEN SATURATION: 97 % | WEIGHT: 208 LBS

## 2024-08-13 DIAGNOSIS — Z17.0 MALIGNANT NEOPLASM OF RIGHT BREAST IN FEMALE, ESTROGEN RECEPTOR POSITIVE, UNSPECIFIED SITE OF BREAST: Primary | ICD-10-CM

## 2024-08-13 DIAGNOSIS — C50.911 MALIGNANT NEOPLASM OF RIGHT BREAST IN FEMALE, ESTROGEN RECEPTOR POSITIVE, UNSPECIFIED SITE OF BREAST: Primary | ICD-10-CM

## 2024-08-13 DIAGNOSIS — Z17.0 MALIGNANT NEOPLASM OF RIGHT BREAST IN FEMALE, ESTROGEN RECEPTOR POSITIVE, UNSPECIFIED SITE OF BREAST: ICD-10-CM

## 2024-08-13 DIAGNOSIS — C50.911 MALIGNANT NEOPLASM OF RIGHT BREAST IN FEMALE, ESTROGEN RECEPTOR POSITIVE, UNSPECIFIED SITE OF BREAST: ICD-10-CM

## 2024-08-13 LAB
ALBUMIN SERPL-MCNC: 4.5 G/DL (ref 3.5–5.2)
ALBUMIN/GLOB SERPL: 1.9 G/DL
ALP SERPL-CCNC: 103 U/L (ref 39–117)
ALT SERPL W P-5'-P-CCNC: 10 U/L (ref 1–33)
ANION GAP SERPL CALCULATED.3IONS-SCNC: 7 MMOL/L (ref 5–15)
AST SERPL-CCNC: 24 U/L (ref 1–32)
BASOPHILS # BLD AUTO: 0.02 10*3/MM3 (ref 0–0.2)
BASOPHILS NFR BLD AUTO: 0.3 % (ref 0–1.5)
BILIRUB SERPL-MCNC: 0.5 MG/DL (ref 0–1.2)
BUN SERPL-MCNC: 19 MG/DL (ref 8–23)
BUN/CREAT SERPL: 20.2 (ref 7–25)
CALCIUM SPEC-SCNC: 9.4 MG/DL (ref 8.6–10.5)
CHLORIDE SERPL-SCNC: 107 MMOL/L (ref 98–107)
CO2 SERPL-SCNC: 28 MMOL/L (ref 22–29)
CREAT SERPL-MCNC: 0.94 MG/DL (ref 0.57–1)
DEPRECATED RDW RBC AUTO: 43 FL (ref 37–54)
EGFRCR SERPLBLD CKD-EPI 2021: 64.2 ML/MIN/1.73
EOSINOPHIL # BLD AUTO: 0.11 10*3/MM3 (ref 0–0.4)
EOSINOPHIL NFR BLD AUTO: 1.8 % (ref 0.3–6.2)
ERYTHROCYTE [DISTWIDTH] IN BLOOD BY AUTOMATED COUNT: 12.3 % (ref 12.3–15.4)
GLOBULIN UR ELPH-MCNC: 2.4 GM/DL
GLUCOSE SERPL-MCNC: 102 MG/DL (ref 65–99)
HCT VFR BLD AUTO: 39.4 % (ref 34–46.6)
HGB BLD-MCNC: 13.2 G/DL (ref 12–15.9)
IMM GRANULOCYTES # BLD AUTO: 0.01 10*3/MM3 (ref 0–0.05)
IMM GRANULOCYTES NFR BLD AUTO: 0.2 % (ref 0–0.5)
LYMPHOCYTES # BLD AUTO: 1.32 10*3/MM3 (ref 0.7–3.1)
LYMPHOCYTES NFR BLD AUTO: 22 % (ref 19.6–45.3)
MCH RBC QN AUTO: 31.5 PG (ref 26.6–33)
MCHC RBC AUTO-ENTMCNC: 33.5 G/DL (ref 31.5–35.7)
MCV RBC AUTO: 94 FL (ref 79–97)
MONOCYTES # BLD AUTO: 0.55 10*3/MM3 (ref 0.1–0.9)
MONOCYTES NFR BLD AUTO: 9.2 % (ref 5–12)
NEUTROPHILS NFR BLD AUTO: 3.99 10*3/MM3 (ref 1.7–7)
NEUTROPHILS NFR BLD AUTO: 66.5 % (ref 42.7–76)
PLATELET # BLD AUTO: 224 10*3/MM3 (ref 140–450)
PMV BLD AUTO: 8.8 FL (ref 6–12)
POTASSIUM SERPL-SCNC: 5 MMOL/L (ref 3.5–5.2)
PROT SERPL-MCNC: 6.9 G/DL (ref 6–8.5)
RBC # BLD AUTO: 4.19 10*6/MM3 (ref 3.77–5.28)
SODIUM SERPL-SCNC: 142 MMOL/L (ref 136–145)
WBC NRBC COR # BLD AUTO: 6 10*3/MM3 (ref 3.4–10.8)

## 2024-08-13 PROCEDURE — 99213 OFFICE O/P EST LOW 20 MIN: CPT | Performed by: NURSE PRACTITIONER

## 2024-08-13 PROCEDURE — 36415 COLL VENOUS BLD VENIPUNCTURE: CPT

## 2024-08-13 PROCEDURE — 1159F MED LIST DOCD IN RCRD: CPT | Performed by: NURSE PRACTITIONER

## 2024-08-13 PROCEDURE — 80053 COMPREHEN METABOLIC PANEL: CPT

## 2024-08-13 PROCEDURE — 1160F RVW MEDS BY RX/DR IN RCRD: CPT | Performed by: NURSE PRACTITIONER

## 2024-08-13 PROCEDURE — 1126F AMNT PAIN NOTED NONE PRSNT: CPT | Performed by: NURSE PRACTITIONER

## 2024-08-13 PROCEDURE — 85025 COMPLETE CBC W/AUTO DIFF WBC: CPT

## 2024-08-13 PROCEDURE — 3075F SYST BP GE 130 - 139MM HG: CPT | Performed by: NURSE PRACTITIONER

## 2024-08-13 PROCEDURE — 3078F DIAST BP <80 MM HG: CPT | Performed by: NURSE PRACTITIONER

## 2024-08-13 NOTE — PROGRESS NOTES
"      PROBLEM LIST:  1. 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  No concerning lymph nodes on imaging.  B) neoadjuvant chemotherapy with Taxol and Herceptin started on 4/12/2023.  Last dose of maintenance Herceptin given 1/31/2024.  Stopped maintenance Herceptin early due to decrease in EF.  C) right breast lumpectomy on 8/4/23 showed no residual cancer in the breast or 1 SLN.  D) completed adjuvant radiation 10/4/2023.  Anastrozole started October 2023.  2. Hypertension  3. Depression  4. MOOKIE  5. Hyperlipidemia    Subjective     CHIEF COMPLAINT: breast cancer    HISTORY OF PRESENT ILLNESS:   Laura Churchill returns for follow-up.  She continues on anastrozole.  She is tolerating this well.  Overall she feels good.  She feels slightly overwhelmed because she is taking care of her sister Anita who requires help in the home.    She was stung by a yellow jacket 2 days ago and has pruritus and burning on the right upper arm. She has tried OTC topical products with no relief in symptoms.     She had her last dose of maintenance Herceptin 1/31/2024.      She saw Dr. Everett Keys, ENT 8/5/2024 for positional vertigo whenever she lays down.  She is scheduled to have a CT of the temporal bone and VNG testing.    She feels like working with the speech therapist has helped with her memory following chemotherapy.      Objective      /78 Comment: LUE  Pulse 78   Temp 96.9 °F (36.1 °C) (Infrared)   Resp 20   Ht 165.1 cm (65\")   Wt 94.3 kg (208 lb)   LMP  (LMP Unknown)   SpO2 97% Comment: RA  BMI 34.61 kg/m²    Vitals:    08/13/24 1018   PainSc: 0-No pain                     General: well appearing female in no acute distress  Neuro: alert and oriented  HEENT: sclera anicteric, oropharynx clear  Cardiovascular: Regular rate and rhythm no murmurs  Lungs: Clear to " auscultation bilaterally  Extremeties: no lower extremity edema. Right upper arm with a wheal surrounded by erythema   Skin: no rashes, lesions, bruising, or petechiae  Psych: mood and affect appropriate            RECENT LABS:  Lab Results   Component Value Date    WBC 5.66 05/22/2024    HGB 12.8 05/22/2024    HCT 39.4 05/22/2024    MCV 95.4 05/22/2024     05/22/2024       Lab Results   Component Value Date    GLUCOSE 94 05/22/2024    BUN 19 05/22/2024    CREATININE 0.85 05/22/2024    EGFRIFNONA 66 09/28/2021    BCR 22.4 05/22/2024    K 4.5 05/22/2024    CO2 27.0 05/22/2024    CALCIUM 9.0 05/22/2024    ALBUMIN 4.0 05/22/2024    AST 22 05/22/2024    ALT 9 05/22/2024       Mammo Diagnostic Digital Tomosynthesis Bilateral With CAD  Narrative: EXAM: MAMMO DIAGNOSTIC DIGITAL TOMOSYNTHESIS BILATERAL W CAD-     DATE:4/22/2024 2:10 PM     INDICATION: Patient is a 73-year-old who presents for postlumpectomy  protocol diagnostic right mammogram at time of annual. Patient had  biopsy-proven right breast malignancy in the 830/9:00 right breast.  Patient received neoadjuvant chemotherapy with post chemotherapy  lumpectomy performed in August 2023. Patient has no concerns or  complaints at today's exam.     COMPARISON: Comparison is made to prior studies dating back to  9/15/2020.     TECHNIQUE: 2D/3D CC and MLO views of each breast were obtained.     FINDINGS:     There are scattered fibroglandular densities.     Right breast: There are expected postoperative changes in the right  breast. No suspicious masses, calcifications, or areas of distortion are  seen.     Left breast: No suspicious masses, calcifications, or areas of  distortion are seen.     Impression: 1. Right breast: Expected postoperative changes from prior lumpectomy.  No suspicious abnormality identified. Recommendation at this time is for  6-month follow-up diagnostic right mammogram to ensure  stability/establish new baseline as per postlumpectomy  protocol.  2. Left breast: No suspicious abnormality identified. Recommendation is  for continued routine annual screening mammogram.  3. Today's findings and recommendations were discussed with the patient  at the time of the examination by the breast care team.     OVERALL ASSESSMENT: BI-RADS Category 3: Probably benign. Recommend  short-term follow-up diagnostic right mammogram in 6 months.     ________________________________________________________________________  _______  Physicians Order     Diagnostic right mammogram in 6 months.     Diagnosis: Post lumpectomy protocol.     This report was finalized on 4/22/2024 2:12 PM by Meggan Mason MD.                 ASSESSMENT AND PLAN:     Laura Churchill is a 73 y.o. female with a [id number] Er+ Her2+ invasive ductal carcinoma of the right breast.    Her pathology showed a complete pathologic response.  She had her last dose of maintenance Herceptin 1/31/2024.  Herceptin was stopped 3 doses early due to a drop in her ejection fraction which has subsequently resolved.    She continues on anastrozole.  She is tolerating it well.  We will continue anastrozole for minimum of 5 years.    Osteopenia: Stable on DEXA scan October 2023..  We will check this again October 2025.    Mammogram 4/22/2024 was category 3.   Right diagnostic mammogram is scheduled for 10/30/2024.     Positional Vertigo: Being followed by Dr. Everett Keys. She is going to have a CT of the temporal bone to rule out any type of posterior fossa lesion or retrocochlear lesion.  He is also obtaining a VNG test.    Bee sting: She denies any symptoms of anaphylaxis, no generalized urticaria, no angioedema, no flushing, no difficulty breathing, no wheezing.  She is using ice packs to the affected area as well as some over-the-counter topical products.  I will send in hydrocortisone cream 2.5% to be used 3 times a day as well as recommend use of topical Benadryl.  We discussed using oral Benadryl 25  mg every 4 hours as needed for pruritus.    Follow-up in 3 months with labs.                          Joanna Campos APRN  [facility]    8/13/2024          CC:

## 2024-10-04 DIAGNOSIS — F41.9 ANXIETY: ICD-10-CM

## 2024-10-04 DIAGNOSIS — E78.41 ELEVATED LIPOPROTEIN(A): ICD-10-CM

## 2024-10-04 DIAGNOSIS — I10 ESSENTIAL HYPERTENSION: ICD-10-CM

## 2024-10-04 NOTE — TELEPHONE ENCOUNTER
Caller: Laura Churchill    Relationship: Self    Best call back number: [phone]     Requested Prescriptions:   Requested Prescriptions     Pending Prescriptions Disp Refills    losartan (Cozaar) 100 MG tablet 90 tablet 3     Sig: Take 1 tablet by mouth Daily.    atorvastatin (LIPITOR) 10 MG tablet 90 tablet 3     Sig: Take 1 tablet by mouth Daily.    venlafaxine XR (EFFEXOR-XR) 150 MG 24 hr capsule 90 capsule 3     Sig: Take 1 capsule by mouth Daily.        Pharmacy where request should be sent: [facility] [address] [phone] [facility] [phone]      Last office visit with prescribing clinician: 6/26/2024   Last telemedicine visit with prescribing clinician: Visit date not found   Next office visit with prescribing clinician: 11/20/2024     Additional details provided by patient: PATIENT OUT OF MEDICATION     Does the patient have less than a 3 day supply:  [x] Yes  [] No    Would you like a call back once the refill request has been completed: [x] Yes [] No    If the office needs to give you a call back, can they leave a voicemail: [x] Yes [] No    Susan Betancourt Rep   10/04/24 14:35 EDT

## 2024-10-07 RX ORDER — LOSARTAN POTASSIUM 100 MG/1
100 TABLET ORAL DAILY
Qty: 90 TABLET | Refills: 3 | Status: SHIPPED | OUTPATIENT
Start: 2024-10-07

## 2024-10-07 RX ORDER — ATORVASTATIN CALCIUM 10 MG/1
10 TABLET, FILM COATED ORAL DAILY
Qty: 90 TABLET | Refills: 3 | Status: SHIPPED | OUTPATIENT
Start: 2024-10-07

## 2024-10-07 RX ORDER — VENLAFAXINE HYDROCHLORIDE 150 MG/1
150 CAPSULE, EXTENDED RELEASE ORAL DAILY
Qty: 90 CAPSULE | Refills: 3 | Status: SHIPPED | OUTPATIENT
Start: 2024-10-07

## 2024-10-30 ENCOUNTER — HOSPITAL ENCOUNTER (OUTPATIENT)
Dept: MAMMOGRAPHY | Facility: HOSPITAL | Age: 74
Discharge: HOME | End: 2024-10-30
Admitting: INTERNAL MEDICINE
Payer: MEDICARE

## 2024-10-30 DIAGNOSIS — R92.8 ABNORMAL MAMMOGRAM: ICD-10-CM

## 2024-10-30 PROCEDURE — 77065 DX MAMMO INCL CAD UNI: CPT

## 2024-11-13 ENCOUNTER — OFFICE VISIT (OUTPATIENT)
Dept: ONCOLOGY | Facility: CLINIC | Age: 74
End: 2024-11-13
Payer: MEDICARE

## 2024-11-13 ENCOUNTER — LAB (OUTPATIENT)
Dept: LAB | Facility: HOSPITAL | Age: 74
End: 2024-11-13
Payer: MEDICARE

## 2024-11-13 VITALS
SYSTOLIC BLOOD PRESSURE: 152 MMHG | TEMPERATURE: 97.4 F | WEIGHT: 201 LBS | HEART RATE: 87 BPM | DIASTOLIC BLOOD PRESSURE: 80 MMHG | BODY MASS INDEX: 33.49 KG/M2 | HEIGHT: 65 IN | RESPIRATION RATE: 18 BRPM | OXYGEN SATURATION: 95 %

## 2024-11-13 DIAGNOSIS — Z17.0 MALIGNANT NEOPLASM OF RIGHT BREAST IN FEMALE, ESTROGEN RECEPTOR POSITIVE, UNSPECIFIED SITE OF BREAST: Primary | ICD-10-CM

## 2024-11-13 DIAGNOSIS — Z17.0 MALIGNANT NEOPLASM OF RIGHT BREAST IN FEMALE, ESTROGEN RECEPTOR POSITIVE, UNSPECIFIED SITE OF BREAST: ICD-10-CM

## 2024-11-13 DIAGNOSIS — C50.911 MALIGNANT NEOPLASM OF RIGHT BREAST IN FEMALE, ESTROGEN RECEPTOR POSITIVE, UNSPECIFIED SITE OF BREAST: Primary | ICD-10-CM

## 2024-11-13 DIAGNOSIS — C50.911 MALIGNANT NEOPLASM OF RIGHT BREAST IN FEMALE, ESTROGEN RECEPTOR POSITIVE, UNSPECIFIED SITE OF BREAST: ICD-10-CM

## 2024-11-13 LAB
ALBUMIN SERPL-MCNC: 4.6 G/DL (ref 3.5–5.2)
ALBUMIN/GLOB SERPL: 1.6 G/DL
ALP SERPL-CCNC: 99 U/L (ref 39–117)
ALT SERPL W P-5'-P-CCNC: 10 U/L (ref 1–33)
ANION GAP SERPL CALCULATED.3IONS-SCNC: 12 MMOL/L (ref 5–15)
AST SERPL-CCNC: 20 U/L (ref 1–32)
BASOPHILS # BLD AUTO: 0.02 10*3/MM3 (ref 0–0.2)
BASOPHILS NFR BLD AUTO: 0.3 % (ref 0–1.5)
BILIRUB SERPL-MCNC: 0.4 MG/DL (ref 0–1.2)
BUN SERPL-MCNC: 18 MG/DL (ref 8–23)
BUN/CREAT SERPL: 18.4 (ref 7–25)
CALCIUM SPEC-SCNC: 9.1 MG/DL (ref 8.6–10.5)
CHLORIDE SERPL-SCNC: 104 MMOL/L (ref 98–107)
CO2 SERPL-SCNC: 26 MMOL/L (ref 22–29)
CREAT SERPL-MCNC: 0.98 MG/DL (ref 0.57–1)
DEPRECATED RDW RBC AUTO: 43.1 FL (ref 37–54)
EGFRCR SERPLBLD CKD-EPI 2021: 60.7 ML/MIN/1.73
EOSINOPHIL # BLD AUTO: 0.08 10*3/MM3 (ref 0–0.4)
EOSINOPHIL NFR BLD AUTO: 1.4 % (ref 0.3–6.2)
ERYTHROCYTE [DISTWIDTH] IN BLOOD BY AUTOMATED COUNT: 12.4 % (ref 12.3–15.4)
GLOBULIN UR ELPH-MCNC: 2.9 GM/DL
GLUCOSE SERPL-MCNC: 89 MG/DL (ref 65–99)
HCT VFR BLD AUTO: 41.4 % (ref 34–46.6)
HGB BLD-MCNC: 13.6 G/DL (ref 12–15.9)
IMM GRANULOCYTES # BLD AUTO: 0.01 10*3/MM3 (ref 0–0.05)
IMM GRANULOCYTES NFR BLD AUTO: 0.2 % (ref 0–0.5)
LYMPHOCYTES # BLD AUTO: 1.28 10*3/MM3 (ref 0.7–3.1)
LYMPHOCYTES NFR BLD AUTO: 22.2 % (ref 19.6–45.3)
MCH RBC QN AUTO: 30.7 PG (ref 26.6–33)
MCHC RBC AUTO-ENTMCNC: 32.9 G/DL (ref 31.5–35.7)
MCV RBC AUTO: 93.5 FL (ref 79–97)
MONOCYTES # BLD AUTO: 0.46 10*3/MM3 (ref 0.1–0.9)
MONOCYTES NFR BLD AUTO: 8 % (ref 5–12)
NEUTROPHILS NFR BLD AUTO: 3.92 10*3/MM3 (ref 1.7–7)
NEUTROPHILS NFR BLD AUTO: 67.9 % (ref 42.7–76)
PLATELET # BLD AUTO: 253 10*3/MM3 (ref 140–450)
PMV BLD AUTO: 9 FL (ref 6–12)
POTASSIUM SERPL-SCNC: 4.1 MMOL/L (ref 3.5–5.2)
PROT SERPL-MCNC: 7.5 G/DL (ref 6–8.5)
RBC # BLD AUTO: 4.43 10*6/MM3 (ref 3.77–5.28)
SODIUM SERPL-SCNC: 142 MMOL/L (ref 136–145)
WBC NRBC COR # BLD AUTO: 5.77 10*3/MM3 (ref 3.4–10.8)

## 2024-11-13 PROCEDURE — 80053 COMPREHEN METABOLIC PANEL: CPT

## 2024-11-13 PROCEDURE — 99214 OFFICE O/P EST MOD 30 MIN: CPT | Performed by: INTERNAL MEDICINE

## 2024-11-13 PROCEDURE — 1126F AMNT PAIN NOTED NONE PRSNT: CPT | Performed by: INTERNAL MEDICINE

## 2024-11-13 PROCEDURE — 3079F DIAST BP 80-89 MM HG: CPT | Performed by: INTERNAL MEDICINE

## 2024-11-13 PROCEDURE — 85025 COMPLETE CBC W/AUTO DIFF WBC: CPT

## 2024-11-13 PROCEDURE — 36415 COLL VENOUS BLD VENIPUNCTURE: CPT

## 2024-11-13 PROCEDURE — 3077F SYST BP >= 140 MM HG: CPT | Performed by: INTERNAL MEDICINE

## 2024-11-13 NOTE — PROGRESS NOTES
"      PROBLEM LIST:  1. [id number] ER+ (50%) ID negative Her2 positive (3+) Invasive ductal carcinoma of the right breast  A) biopsy of a 1.2 cm mass in the lower outer quadrant on 3/14/23 showed a high grade IDC.  No concerning lymph nodes on imaging.  B) neoadjuvant chemotherapy with Taxol and Herceptin started on 4/12/2023.  Last dose of maintenance Herceptin given 1/31/2024.  Stopped maintenance Herceptin early due to decrease in EF.  C) right breast lumpectomy on 8/4/23 showed no residual cancer in the breast or 1 SLN.  D) completed adjuvant radiation 10/4/2023.  Anastrozole started October 2023.  2. Hypertension  3. Depression  4. MOOKIE  5. Hyperlipidemia    Subjective     CHIEF COMPLAINT: breast cancer    HISTORY OF PRESENT ILLNESS:   Laura Churchill returns for follow-up.  She continues on anastrozole.  She is tolerating this well.  No significant side effects.  She continues to care for her sister which is a fair amount of stress.  Otherwise no complaints.      Objective      /80   Pulse 87   Temp 97.4 °F (36.3 °C)   Resp 18   Ht 165.1 cm (65\")   Wt 91.2 kg (201 lb)   LMP  (LMP Unknown)   SpO2 95%   BMI 33.45 kg/m²    Vitals:    11/13/24 1054   PainSc: 0-No pain         General: well appearing female in no acute distress  Neuro: alert and oriented  HEENT: sclera anicteric, oropharynx clear  Cardiovascular: Regular rate and rhythm no murmurs  Lymphatics: No cervical supraclavicular or axillary adenopathy  Lungs: Clear to auscultation bilaterally  Extremeties: no lower extremity edema.   Skin: no rashes, lesions, bruising, or petechiae  Psych: mood and affect appropriate            RECENT LABS:  Lab Results   Component Value Date    WBC 5.77 11/13/2024    HGB 13.6 11/13/2024    HCT 41.4 11/13/2024    MCV 93.5 11/13/2024     11/13/2024       Lab Results   Component Value Date    GLUCOSE 102 (H) 08/13/2024    BUN 19 08/13/2024    CREATININE 0.94 08/13/2024    EGFRIFNONA 66 09/28/2021    BCR " 20.2 08/13/2024    K 5.0 08/13/2024    CO2 28.0 08/13/2024    CALCIUM 9.4 08/13/2024    ALBUMIN 4.5 08/13/2024    AST 24 08/13/2024    ALT 10 08/13/2024       Mammo Diagnostic Right With CAD  Narrative: EXAMINATION:MAMMO DIAGNOSTIC RIGHT W CAD-     HISTORY: 74-year-old female status post right lumpectomy in August 2023.  Postlumpectomy protocol.     TECHNIQUE: 2D right MLO and cc views were obtained. CAD was utilized.     COMPARISON: Prior studies dating back to 1/4/2022.     FINDINGS: There are scattered areas of fibroglandular density. Stable  postlumpectomy changes are noted in the right breast. No worrisome  masses, calcifications, or architectural distortion is identified in the  right breast.     Impression: BI-RADS 3, probably benign findings.     RECOMMENDATION: 6-month follow-up right diagnostic mammogram is advised  per the postlumpectomy protocol. The patient will be due for her annual  left mammogram at that time.     The standard false-negative rate of mammography is between 10% and 25%.   Complex patterns or increased breast density will markedly elevate the  false-negative rate of mammography.       A results letter, in lay terminology, will be given to the patient at  the conclusion of the exam.     _____________________________________________________  Physician Order     Diagnostic Mammogram with Breast Ultrasound if needed.     Diagnosis:   Abnormal Mammogram     This report was finalized on 10/30/2024 3:13 PM by Dr. Magdy Elliott MD.                 ASSESSMENT AND PLAN:     Laura Churchill is a 74 y.o. female with a [id number] Er+ Her2+ invasive ductal carcinoma of the right breast.    Her pathology showed a complete pathologic response.  She had her last dose of maintenance Herceptin 1/31/2024.  Herceptin was stopped 3 doses early due to a drop in her ejection fraction which has subsequently resolved.    She continues on anastrozole.  She is tolerating it well.  We will continue anastrozole for  minimum of 5 years.    Osteopenia: Stable on DEXA scan October 2023.  We will check this again October 2025.    She is scheduled for bilateral mammogram in the spring.    Follow-up in 3 months with labs.  After that we will space out her visits to every 4 months.                          Sharonda Hopkins MD  [facility]    11/13/2024          CC:

## 2024-11-20 ENCOUNTER — OFFICE VISIT (OUTPATIENT)
Dept: FAMILY MEDICINE CLINIC | Facility: CLINIC | Age: 74
End: 2024-11-20
Payer: MEDICARE

## 2024-11-20 VITALS
OXYGEN SATURATION: 100 % | SYSTOLIC BLOOD PRESSURE: 128 MMHG | DIASTOLIC BLOOD PRESSURE: 90 MMHG | WEIGHT: 202 LBS | BODY MASS INDEX: 33.66 KG/M2 | HEART RATE: 80 BPM | HEIGHT: 65 IN | TEMPERATURE: 97.5 F

## 2024-11-20 DIAGNOSIS — E78.41 ELEVATED LIPOPROTEIN(A): ICD-10-CM

## 2024-11-20 DIAGNOSIS — Z00.00 MEDICARE ANNUAL WELLNESS VISIT, SUBSEQUENT: Primary | ICD-10-CM

## 2024-11-20 PROCEDURE — 3074F SYST BP LT 130 MM HG: CPT | Performed by: FAMILY MEDICINE

## 2024-11-20 PROCEDURE — 1126F AMNT PAIN NOTED NONE PRSNT: CPT | Performed by: FAMILY MEDICINE

## 2024-11-20 PROCEDURE — 1160F RVW MEDS BY RX/DR IN RCRD: CPT | Performed by: FAMILY MEDICINE

## 2024-11-20 PROCEDURE — G0439 PPPS, SUBSEQ VISIT: HCPCS | Performed by: FAMILY MEDICINE

## 2024-11-20 PROCEDURE — 1159F MED LIST DOCD IN RCRD: CPT | Performed by: FAMILY MEDICINE

## 2024-11-20 PROCEDURE — 3080F DIAST BP >= 90 MM HG: CPT | Performed by: FAMILY MEDICINE

## 2024-11-20 NOTE — PROGRESS NOTES
Subjective   The ABCs of the Annual Wellness Visit  Medicare Wellness Visit      Laura Churchill is a 74 y.o. patient who presents for a Medicare Wellness Visit.    The following portions of the patient's history were reviewed and   updated as appropriate: allergies, current medications, past family history, past medical history, past social history, past surgical history, and problem list.    Compared to one year ago, the patient's physical   health is better.  Compared to one year ago, the patient's mental   health is the same.    Recent Hospitalizations:  She was not admitted to the hospital during the last year.     Current Medical Providers:  Patient Care Team:  Julius Rivera DO as PCP - General (Family Medicine)  Carol Gruber APRN (Inactive) as Nurse Practitioner (Obstetrics and Gynecology)  Lucina Oseguera MD as Consulting Physician (Radiation Oncology)  Delfina Saleh MD as Referring Physician (General Surgery)  Sharonda Hopkins MD as Consulting Physician (Hematology and Oncology)  David Rider MD as Consulting Physician (Neurosurgery)  Houston Castano MD as Consulting Physician (Orthopedic Surgery)  Almas Miller MD as Consulting Physician (Orthopedic Surgery)    Outpatient Medications Prior to Visit   Medication Sig Dispense Refill    anastrozole (ARIMIDEX) 1 MG tablet Take 1 tablet by mouth Daily. 90 tablet 3    atorvastatin (LIPITOR) 10 MG tablet Take 1 tablet by mouth Daily. 90 tablet 3    losartan (Cozaar) 100 MG tablet Take 1 tablet by mouth Daily. 90 tablet 3    tiZANidine (ZANAFLEX) 4 MG tablet Take 1 tablet by mouth At Night As Needed for Muscle Spasms. 90 tablet 0    venlafaxine XR (EFFEXOR-XR) 150 MG 24 hr capsule Take 1 capsule by mouth Daily. 90 capsule 3    vitamin B-12 (CYANOCOBALAMIN) 1000 MCG tablet Take 1 tablet by mouth Daily.      VITAMIN D PO Take 5,000 Units by mouth Daily.      hydrocortisone 2.5 % cream Apply 1 Application topically  to the appropriate area as directed 3 (Three) Times a Day. 28 g 0     No facility-administered medications prior to visit.     No opioid medication identified on active medication list. I have reviewed chart for other potential  high risk medication/s and harmful drug interactions in the elderly.      Aspirin is not on active medication list.  Aspirin use is not indicated based on review of current medical condition/s. Risk of harm outweighs potential benefits.  .    Patient Active Problem List   Diagnosis    Essential hypertension    Hyperlipidemia    Obstructive sleep apnea syndrome    Osteoarthritis of knee    Osteoporosis    Lumbar spondylosis without myelopathy/Lumbar facet arthropathy    Displacement of lumbar intervertebral disc    Stenosis of lateral recess of lumbar spine    Physical deconditioning    Morbid obesity due to excess calories    Anxiety and depression    Sacroiliac joint dysfunction of right side    Greater trochanteric bursitis of right hip    Iliotibial band syndrome of right side    Spinal stenosis    Osteoarthritis    Obesity due to excess calories    Menopause    Mixed hyperlipidemia    Lumbosacral disc disease    Low back pain    Joint pain    Extremity pain    Chronic pain disorder    Back problem    Vaginal atrophy    Primary localized osteoarthrosis of shoulder region    Status post total left shoulder arthroplasty    Anxiety    Sciatica    Acquired spondylolisthesis    Spinal stenosis, lumbar region, with neurogenic claudication    L3-5 PLIF 7/2020    Primary osteoarthritis of left knee    Status post total left knee replacement    Leukocytosis, likely reactive    Acute blood loss anemia, mild, asymptomatic    Nonintractable episodic headache    Right thigh pain    Pseudoarthrosis of lumbar spine    Primary localized osteoarthritis of right knee    Status post right knee replacement    Obesity    Postoperative pain    Prediabetes    Malignant neoplasm of right breast in female,  "estrogen receptor positive    Encounter for care related to Port-a-Cath     Advance Care Planning Advance Directive is on file.  ACP discussion was held with the patient during this visit. Patient has an advance directive in EMR which is still valid.             Objective   Vitals:    24 1120   BP: 128/90   Pulse: 80   Temp: 97.5 °F (36.4 °C)   SpO2: 100%   Weight: 91.6 kg (202 lb)   Height: 165.1 cm (65\")   PainSc: 0-No pain       Estimated body mass index is 33.61 kg/m² as calculated from the following:    Height as of this encounter: 165.1 cm (65\").    Weight as of this encounter: 91.6 kg (202 lb).            Does the patient have evidence of cognitive impairment? Yes                                                                                              Health  Risk Assessment    Smoking Status:  Social History     Tobacco Use   Smoking Status Former    Current packs/day: 0.00    Average packs/day: 0.3 packs/day for 0.6 years (0.1 ttl pk-yrs)    Types: Cigarettes    Start date: 1977    Quit date: 1978    Years since quittin.9   Smokeless Tobacco Never     Alcohol Consumption:  Social History     Substance and Sexual Activity   Alcohol Use Yes    Alcohol/week: 1.0 standard drink of alcohol    Types: 1 Drinks containing 0.5 oz of alcohol per week    Comment: I seldom have a drink of any kind.       Fall Risk Screen  TAHIRADI Fall Risk Assessment was completed, and patient is at MODERATE risk for falls. Assessment completed on:2024    Depression Screening   Little interest or pleasure in doing things? Not at all   Feeling down, depressed, or hopeless? Not at all   PHQ-2 Total Score 0      Health Habits and Functional and Cognitive Screenin/14/2024     8:25 AM   Functional & Cognitive Status   Do you have difficulty preparing food and eating? No    Do you have difficulty bathing yourself, getting dressed or grooming yourself? No    Do you have difficulty using the toilet? No  "   Do you have difficulty moving around from place to place? No    Do you have trouble with steps or getting out of a bed or a chair? No    Current Diet Well Balanced Diet    Dental Exam Up to date    Eye Exam Up to date    Exercise (times per week) 2 times per week    Current Exercises Include House Cleaning;Walking    Do you need help using the phone?  No    Are you deaf or do you have serious difficulty hearing?  No    Do you need help to go to places out of walking distance? No    Do you need help shopping? No    Do you need help preparing meals?  No    Do you need help with housework?  No    Do you need help with laundry? No    Do you need help taking your medications? No    Do you need help managing money? No    Do you ever drive or ride in a car without wearing a seat belt? No    Have you felt unusual stress, anger or loneliness in the last month? No    Who do you live with? Spouse    If you need help, do you have trouble finding someone available to you? No    Have you been bothered in the last four weeks by sexual problems? No    Do you have difficulty concentrating, remembering or making decisions? No        Patient-reported           Age-appropriate Screening Schedule:  Refer to the list below for future screening recommendations based on patient's age, sex and/or medical conditions. Orders for these recommended tests are listed in the plan section. The patient has been provided with a written plan.    Health Maintenance List  Health Maintenance   Topic Date Due    LIPID PANEL  11/13/2024    COVID-19 Vaccine (8 - 2024-25 season) 12/07/2024    BMI FOLLOWUP  06/26/2025    DXA SCAN  10/30/2025    ANNUAL WELLNESS VISIT  11/20/2025    MAMMOGRAM  10/30/2026    COLORECTAL CANCER SCREENING  09/11/2027    HEPATITIS C SCREENING  Completed    INFLUENZA VACCINE  Completed    Pneumococcal Vaccine 65+  Completed    ZOSTER VACCINE  Completed    PAP SMEAR  Discontinued    TDAP/TD VACCINES  Discontinued                                                                                                                                                 CMS Preventative Services Quick Reference  Risk Factors Identified During Encounter  Inactivity/Sedentary: Patient was advised to exercise at least 150 minutes a week per CDC recommendations.    The above risks/problems have been discussed with the patient.  Pertinent information has been shared with the patient in the After Visit Summary.  An After Visit Summary and PPPS were made available to the patient.    Follow Up:   Next Medicare Wellness visit to be scheduled in 1 year.     Assessment & Plan  Medicare annual wellness visit, subsequent         Elevated lipoprotein(a)       Orders:    Lipid Panel; Future         Follow Up:   Return in about 1 year (around 11/20/2025) for Medicare Wellness, Labs today.

## 2025-02-11 ENCOUNTER — TELEPHONE (OUTPATIENT)
Dept: ONCOLOGY | Facility: CLINIC | Age: 75
End: 2025-02-11
Payer: MEDICARE

## 2025-02-11 NOTE — TELEPHONE ENCOUNTER
Caller: Laura Churchill    Relationship to patient: Self    Best call back number: [phone]    Type of visit: FU    Requested date: 2/24 AROUND 1  IF POSSIBLE     If rescheduling, when is the original appointment: 2/13     Additional notes:PT HAS A DIFFERENT APPT IN ED AND WOULD LIKE TO PAIR THEM UP

## 2025-02-24 ENCOUNTER — OFFICE VISIT (OUTPATIENT)
Dept: ONCOLOGY | Facility: CLINIC | Age: 75
End: 2025-02-24
Payer: MEDICARE

## 2025-02-24 ENCOUNTER — LAB (OUTPATIENT)
Dept: LAB | Facility: HOSPITAL | Age: 75
End: 2025-02-24
Payer: MEDICARE

## 2025-02-24 VITALS
HEIGHT: 65 IN | DIASTOLIC BLOOD PRESSURE: 84 MMHG | BODY MASS INDEX: 33.99 KG/M2 | OXYGEN SATURATION: 95 % | SYSTOLIC BLOOD PRESSURE: 127 MMHG | TEMPERATURE: 97.1 F | HEART RATE: 80 BPM | WEIGHT: 204 LBS

## 2025-02-24 DIAGNOSIS — Z17.0 MALIGNANT NEOPLASM OF RIGHT BREAST IN FEMALE, ESTROGEN RECEPTOR POSITIVE, UNSPECIFIED SITE OF BREAST: ICD-10-CM

## 2025-02-24 DIAGNOSIS — C50.911 MALIGNANT NEOPLASM OF RIGHT BREAST IN FEMALE, ESTROGEN RECEPTOR POSITIVE, UNSPECIFIED SITE OF BREAST: Primary | ICD-10-CM

## 2025-02-24 DIAGNOSIS — C50.911 MALIGNANT NEOPLASM OF RIGHT BREAST IN FEMALE, ESTROGEN RECEPTOR POSITIVE, UNSPECIFIED SITE OF BREAST: ICD-10-CM

## 2025-02-24 DIAGNOSIS — Z17.0 MALIGNANT NEOPLASM OF RIGHT BREAST IN FEMALE, ESTROGEN RECEPTOR POSITIVE, UNSPECIFIED SITE OF BREAST: Primary | ICD-10-CM

## 2025-02-24 LAB
ALBUMIN SERPL-MCNC: 4.6 G/DL (ref 3.5–5.2)
ALBUMIN/GLOB SERPL: 1.7 G/DL
ALP SERPL-CCNC: 94 U/L (ref 39–117)
ALT SERPL W P-5'-P-CCNC: 15 U/L (ref 1–33)
ANION GAP SERPL CALCULATED.3IONS-SCNC: 10 MMOL/L (ref 5–15)
AST SERPL-CCNC: 24 U/L (ref 1–32)
BASOPHILS # BLD AUTO: 0.03 10*3/MM3 (ref 0–0.2)
BASOPHILS NFR BLD AUTO: 0.5 % (ref 0–1.5)
BILIRUB SERPL-MCNC: 0.4 MG/DL (ref 0–1.2)
BUN SERPL-MCNC: 16 MG/DL (ref 8–23)
BUN/CREAT SERPL: 16.8 (ref 7–25)
CALCIUM SPEC-SCNC: 9.6 MG/DL (ref 8.6–10.5)
CHLORIDE SERPL-SCNC: 103 MMOL/L (ref 98–107)
CO2 SERPL-SCNC: 26 MMOL/L (ref 22–29)
CREAT SERPL-MCNC: 0.95 MG/DL (ref 0.57–1)
DEPRECATED RDW RBC AUTO: 45.2 FL (ref 37–54)
EGFRCR SERPLBLD CKD-EPI 2021: 63 ML/MIN/1.73
EOSINOPHIL # BLD AUTO: 0.1 10*3/MM3 (ref 0–0.4)
EOSINOPHIL NFR BLD AUTO: 1.6 % (ref 0.3–6.2)
ERYTHROCYTE [DISTWIDTH] IN BLOOD BY AUTOMATED COUNT: 12.8 % (ref 12.3–15.4)
GLOBULIN UR ELPH-MCNC: 2.7 GM/DL
GLUCOSE SERPL-MCNC: 93 MG/DL (ref 65–99)
HCT VFR BLD AUTO: 41 % (ref 34–46.6)
HGB BLD-MCNC: 13.4 G/DL (ref 12–15.9)
IMM GRANULOCYTES # BLD AUTO: 0.01 10*3/MM3 (ref 0–0.05)
IMM GRANULOCYTES NFR BLD AUTO: 0.2 % (ref 0–0.5)
LYMPHOCYTES # BLD AUTO: 1.46 10*3/MM3 (ref 0.7–3.1)
LYMPHOCYTES NFR BLD AUTO: 23.6 % (ref 19.6–45.3)
MCH RBC QN AUTO: 30.9 PG (ref 26.6–33)
MCHC RBC AUTO-ENTMCNC: 32.7 G/DL (ref 31.5–35.7)
MCV RBC AUTO: 94.5 FL (ref 79–97)
MONOCYTES # BLD AUTO: 0.56 10*3/MM3 (ref 0.1–0.9)
MONOCYTES NFR BLD AUTO: 9 % (ref 5–12)
NEUTROPHILS NFR BLD AUTO: 4.03 10*3/MM3 (ref 1.7–7)
NEUTROPHILS NFR BLD AUTO: 65.1 % (ref 42.7–76)
PLATELET # BLD AUTO: 232 10*3/MM3 (ref 140–450)
PMV BLD AUTO: 9 FL (ref 6–12)
POTASSIUM SERPL-SCNC: 4 MMOL/L (ref 3.5–5.2)
PROT SERPL-MCNC: 7.3 G/DL (ref 6–8.5)
RBC # BLD AUTO: 4.34 10*6/MM3 (ref 3.77–5.28)
SODIUM SERPL-SCNC: 139 MMOL/L (ref 136–145)
WBC NRBC COR # BLD AUTO: 6.19 10*3/MM3 (ref 3.4–10.8)

## 2025-02-24 PROCEDURE — 99213 OFFICE O/P EST LOW 20 MIN: CPT | Performed by: NURSE PRACTITIONER

## 2025-02-24 PROCEDURE — 36415 COLL VENOUS BLD VENIPUNCTURE: CPT

## 2025-02-24 PROCEDURE — 1126F AMNT PAIN NOTED NONE PRSNT: CPT | Performed by: NURSE PRACTITIONER

## 2025-02-24 PROCEDURE — 1159F MED LIST DOCD IN RCRD: CPT | Performed by: NURSE PRACTITIONER

## 2025-02-24 PROCEDURE — 80053 COMPREHEN METABOLIC PANEL: CPT

## 2025-02-24 PROCEDURE — 3074F SYST BP LT 130 MM HG: CPT | Performed by: NURSE PRACTITIONER

## 2025-02-24 PROCEDURE — 1160F RVW MEDS BY RX/DR IN RCRD: CPT | Performed by: NURSE PRACTITIONER

## 2025-02-24 PROCEDURE — 85025 COMPLETE CBC W/AUTO DIFF WBC: CPT

## 2025-02-24 PROCEDURE — 3079F DIAST BP 80-89 MM HG: CPT | Performed by: NURSE PRACTITIONER

## 2025-02-24 RX ORDER — ANASTROZOLE 1 MG/1
1 TABLET ORAL DAILY
Qty: 90 TABLET | Refills: 3 | Status: SHIPPED | OUTPATIENT
Start: 2025-02-24

## 2025-02-24 NOTE — PROGRESS NOTES
"      PROBLEM LIST:  1. [id number] ER+ (50%) MS negative Her2 positive (3+) Invasive ductal carcinoma of the right breast  A) biopsy of a 1.2 cm mass in the lower outer quadrant on 3/14/23 showed a high grade IDC.  No concerning lymph nodes on imaging.  B) neoadjuvant chemotherapy with Taxol and Herceptin started on 4/12/2023.  Last dose of maintenance Herceptin given 1/31/2024.  Stopped maintenance Herceptin early due to decrease in EF.  C) right breast lumpectomy on 8/4/23 showed no residual cancer in the breast or 1 SLN.  D) completed adjuvant radiation 10/4/2023.  Anastrozole started October 2023.  2. Hypertension  3. Depression  4. MOOKIE  5. Hyperlipidemia    Subjective     CHIEF COMPLAINT: breast cancer    HISTORY OF PRESENT ILLNESS:   Laura Churchill returns for follow-up.  She continues on anastrozole.  She is tolerating this well.  No significant side effects. She started alpha lipoic acid supplement in November 2024 and feels like it has helped improve her memory. She is maintaining her usual daily activity.       Objective      /84   Pulse 80   Temp 97.1 °F (36.2 °C) (Infrared)   Ht 165.1 cm (65\")   Wt 92.5 kg (204 lb)   LMP  (LMP Unknown)   SpO2 95%   BMI 33.95 kg/m²    Vitals:    02/24/25 1256   PainSc: 0-No pain           General: well appearing female in no acute distress  Neuro: alert and oriented  HEENT: sclera anicteric, oropharynx clear  Cardiovascular: Regular rate and rhythm no murmurs  Lymphatics: No cervical supraclavicular or axillary adenopathy  Lungs: Clear to auscultation bilaterally  Extremeties: no lower extremity edema.   Skin: no rashes, lesions, bruising, or petechiae  Psych: mood and affect appropriate            RECENT LABS:  Lab Results   Component Value Date    WBC 6.19 02/24/2025    HGB 13.4 02/24/2025    HCT 41.0 02/24/2025    MCV 94.5 02/24/2025     02/24/2025       Lab Results   Component Value Date    GLUCOSE 89 11/13/2024    BUN 18 11/13/2024    CREATININE " 0.98 11/13/2024    EGFRIFNONA 66 09/28/2021    BCR 18.4 11/13/2024    K 4.1 11/13/2024    CO2 26.0 11/13/2024    CALCIUM 9.1 11/13/2024    ALBUMIN 4.6 11/13/2024    AST 20 11/13/2024    ALT 10 11/13/2024       Mammo Diagnostic Right With CAD  Narrative: EXAMINATION:MAMMO DIAGNOSTIC RIGHT W CAD-     HISTORY: 74-year-old female status post right lumpectomy in August 2023.  Postlumpectomy protocol.     TECHNIQUE: 2D right MLO and cc views were obtained. CAD was utilized.     COMPARISON: Prior studies dating back to 1/4/2022.     FINDINGS: There are scattered areas of fibroglandular density. Stable  postlumpectomy changes are noted in the right breast. No worrisome  masses, calcifications, or architectural distortion is identified in the  right breast.     Impression: BI-RADS 3, probably benign findings.     RECOMMENDATION: 6-month follow-up right diagnostic mammogram is advised  per the postlumpectomy protocol. The patient will be due for her annual  left mammogram at that time.     The standard false-negative rate of mammography is between 10% and 25%.   Complex patterns or increased breast density will markedly elevate the  false-negative rate of mammography.       A results letter, in lay terminology, will be given to the patient at  the conclusion of the exam.     _____________________________________________________  Physician Order     Diagnostic Mammogram with Breast Ultrasound if needed.     Diagnosis:   Abnormal Mammogram     This report was finalized on 10/30/2024 3:13 PM by Dr. Magdy Elliott MD.                 ASSESSMENT AND PLAN:     Laura Churchill is a 74 y.o. female with a [id number] Er+ Her2+ invasive ductal carcinoma of the right breast.    Her pathology showed a complete pathologic response.  She had her last dose of maintenance Herceptin 1/31/2024.  Herceptin was stopped 3 doses early due to a drop in her ejection fraction which has subsequently resolved.  Clinically she is doing well with no  evidence of disease recurrence.  CBC from today was reviewed and is normal.     She continues on anastrozole.  She is tolerating it well.  We will continue anastrozole for minimum of 5 years.    Osteopenia: Stable on DEXA scan October 2023.  We will check this again October 2025.    She is scheduled for bilateral diagnostic mammogram 4/30/2025.     Follow-up in 4 months with labs.                       Joanna Campos APRN  [facility]    2/24/2025          CC:

## 2025-03-14 ENCOUNTER — TELEPHONE (OUTPATIENT)
Dept: FAMILY MEDICINE CLINIC | Facility: CLINIC | Age: 75
End: 2025-03-14
Payer: MEDICARE

## 2025-03-14 ENCOUNTER — TELEMEDICINE (OUTPATIENT)
Dept: FAMILY MEDICINE CLINIC | Facility: TELEHEALTH | Age: 75
End: 2025-03-14
Payer: MEDICARE

## 2025-03-14 DIAGNOSIS — U07.1 COVID: Primary | ICD-10-CM

## 2025-03-14 RX ORDER — PREDNISONE 10 MG/1
TABLET ORAL
Qty: 30 TABLET | Refills: 0 | Status: SHIPPED | OUTPATIENT
Start: 2025-03-14

## 2025-03-14 NOTE — TELEPHONE ENCOUNTER
Caller: Laura Churchill    Relationship: Self    Best call back number: [phone]     What medication are you requesting: COVID MEDICATION    What are your current symptoms: NOSE RUNNING, COUGHING, POSITIVE COVID TEST 3/14/2025    How long have you been experiencing symptoms: 12 HOURS    If a prescription is needed, what is your preferred pharmacy and phone number: [facility] [id number] - [address] & [facility] - [phone] [facility] [phone]      Additional notes: PATIENT WOULD LIKE COVID MEDICATION. HE  IS ALSO POSITIVE AND IS GETTING MEDICATION ELSEWHERE.

## 2025-03-14 NOTE — TELEPHONE ENCOUNTER
I would prefer patient schedule a visit whether it be in office or telehealth so we can discuss when her symptoms began, her symptoms, when they began, possible medication interactions with Paxlovid, possible side effects, etc.  Can schedule her for a telehealth at the end of the day on my schedule if we need to

## 2025-03-14 NOTE — TELEPHONE ENCOUNTER
Patient notified of providers response message and verbalized understanding. She can not do telehealth this afternoon but she will try to do one with [facility].

## 2025-03-15 NOTE — PROGRESS NOTES
You have chosen to receive care through a telehealth visit.  Do you consent to use a video/audio connection for your medical care today? Yes     CHIEF COMPLAINT  Chief Complaint   Patient presents with    Eye Problem    covid positive         HPI  Laura Churchill is a 74 y.o. female  presents with complaint of testing positive for COVID today.  She states that her symptoms of nasal congestion, sore throat, fatigue started 2 days ago.    Review of Systems   Constitutional:  Positive for fatigue.   HENT:  Positive for congestion, rhinorrhea and sore throat.    Musculoskeletal:  Positive for myalgias.   All other systems reviewed and are negative.      Past Medical History:   Diagnosis Date    Allergic     Anesthesia     low BP with spinal surgery in 2020, patient was hospitalized a few extra days due to low Bp    Anxiety     Atrophic vaginitis     Bilateral hip pain     Breast cancer 03/2023    right breast    Cancer     breast CA    Cholelithiasis not sure    Depression     History of radiation therapy 10/04/2023    right breast    HL (hearing loss) just noticing difficulties    Hypertension     Low back pain     Malignant neoplasm of right breast in female, estrogen receptor positive 03/22/2023    Mixed hyperlipidemia     Muscle spasm     Obesity due to excess calories     MOOKIE (obstructive sleep apnea)     NO CPAP USE    Osteoarthritis     PONV (postoperative nausea and vomiting)     preprocedural meds help and are needed    Tendinitis of right shoulder     Vertigo        Family History   Problem Relation Age of Onset    Alzheimer's disease Mother     Bone cancer Mother     Melanoma Father     Prostate cancer Father     Diabetes Father         started in his late 70's    Heart failure Father         congestive    Arthritis Father     Cancer Father         Prostate cancer    Hyperlipidemia Father     Kidney disease Father     Arthritis Sister     Depression Sister     Hyperlipidemia Sister     Ovarian cancer  Maternal Aunt 75    Breast cancer Neg Hx        Social History     Socioeconomic History    Marital status:    Tobacco Use    Smoking status: Former     Current packs/day: 0.00     Average packs/day: 0.3 packs/day for 0.6 years (0.1 ttl pk-yrs)     Types: Cigarettes     Start date: 1977     Quit date: 1978     Years since quittin.2    Smokeless tobacco: Never   Vaping Use    Vaping status: Never Used   Substance and Sexual Activity    Alcohol use: Yes     Alcohol/week: 1.0 standard drink of alcohol     Types: 1 Drinks containing 0.5 oz of alcohol per week     Comment: I seldom have a drink of any kind.    Drug use: No    Sexual activity: Yes     Partners: Male     Birth control/protection: Post-menopausal       Laura Churchill  reports that she quit smoking about 47 years ago. Her smoking use included cigarettes. She started smoking about 47 years ago. She has a 0.1 pack-year smoking history. She has never used smokeless tobacco.        LMP  (LMP Unknown)     PHYSICAL EXAM  Physical Exam   Constitutional: She appears well-developed and well-nourished.   HENT:   Head: Normocephalic.   Eyes: Pupils are equal, round, and reactive to light.   Pulmonary/Chest: Effort normal.   Musculoskeletal: Normal range of motion.   Neurological: She is alert.   Psychiatric: She has a normal mood and affect.       Results for orders placed or performed in visit on 25   Comprehensive Metabolic Panel    Collection Time: 25 12:36 PM    Specimen: Blood   Result Value Ref Range    Glucose 93 65 - 99 mg/dL    BUN 16 8 - 23 mg/dL    Creatinine 0.95 0.57 - 1.00 mg/dL    Sodium 139 136 - 145 mmol/L    Potassium 4.0 3.5 - 5.2 mmol/L    Chloride 103 98 - 107 mmol/L    CO2 26.0 22.0 - 29.0 mmol/L    Calcium 9.6 8.6 - 10.5 mg/dL    Total Protein 7.3 6.0 - 8.5 g/dL    Albumin 4.6 3.5 - 5.2 g/dL    ALT (SGPT) 15 1 - 33 U/L    AST (SGOT) 24 1 - 32 U/L    Alkaline Phosphatase 94 39 - 117 U/L    Total Bilirubin 0.4  0.0 - 1.2 mg/dL    Globulin 2.7 gm/dL    A/G Ratio 1.7 g/dL    BUN/Creatinine Ratio 16.8 7.0 - 25.0    Anion Gap 10.0 5.0 - 15.0 mmol/L    eGFR 63.0 >60.0 mL/min/1.73   CBC Auto Differential    Collection Time: 02/24/25 12:36 PM    Specimen: Blood   Result Value Ref Range    WBC 6.19 3.40 - 10.80 10*3/mm3    RBC 4.34 3.77 - 5.28 10*6/mm3    Hemoglobin 13.4 12.0 - 15.9 g/dL    Hematocrit 41.0 34.0 - 46.6 %    MCV 94.5 79.0 - 97.0 fL    MCH 30.9 26.6 - 33.0 pg    MCHC 32.7 31.5 - 35.7 g/dL    RDW 12.8 12.3 - 15.4 %    RDW-SD 45.2 37.0 - 54.0 fl    MPV 9.0 6.0 - 12.0 fL    Platelets 232 140 - 450 10*3/mm3    Neutrophil % 65.1 42.7 - 76.0 %    Lymphocyte % 23.6 19.6 - 45.3 %    Monocyte % 9.0 5.0 - 12.0 %    Eosinophil % 1.6 0.3 - 6.2 %    Basophil % 0.5 0.0 - 1.5 %    Immature Grans % 0.2 0.0 - 0.5 %    Neutrophils, Absolute 4.03 1.70 - 7.00 10*3/mm3    Lymphocytes, Absolute 1.46 0.70 - 3.10 10*3/mm3    Monocytes, Absolute 0.56 0.10 - 0.90 10*3/mm3    Eosinophils, Absolute 0.10 0.00 - 0.40 10*3/mm3    Basophils, Absolute 0.03 0.00 - 0.20 10*3/mm3    Immature Grans, Absolute 0.01 0.00 - 0.05 10*3/mm3       Diagnoses and all orders for this visit:    1. COVID (Primary)  -     Nirmatrelvir & Ritonavir, 300mg/100mg, (PAXLOVID); Take 3 tablets by mouth 2 (Two) Times a Day for 5 days. Indications: COVID-19 Confirmed Infection  Dispense: 30 tablet; Refill: 0  -     predniSONE (DELTASONE) 10 MG tablet; 5 po for 2 days, 4 po for 2 days, 3 po for 2 days, 2 po for 2 days, 1 po for 2 days  Dispense: 30 tablet; Refill: 0          FOLLOW-UP  As discussed during visit with PCP/[facility] Care if no improvement or Urgent Care/Emergency Department if worsening of symptoms    Patient verbalizes understanding of medication dosage, comfort measures, instructions for treatment and follow-up.    Kaylen Muñiz, APRN  03/14/2025  20:10 EDT    Mode of Visit: Video  Location of patient: -HOME-  Location of provider: +HOME+  You have chosen to  receive care through a telehealth visit.  The patient has signed the video visit consent form.  The visit included audio and video interaction. No technical issues occurred during this visit.      The use of a video visit has been reviewed with the patient and verbal informed consent has been obtained. Myself and Laura Churchill participated in this visit. The patient is located in [place] [id number].   I am located in [place], [place]. [employer] and [employer] Mobile Video Client were utilized. I spent 10 minutes in the patient's chart for this visit.         Note Disclaimer: At [facility], we believe that sharing information builds trust and better   relationships. You are receiving this note because you recently visited [facility]. It is possible you   will see health information before a provider has talked with you about it. This kind of information can   be easy to misunderstand. To help you fully understand what it means for your health, we urge you to   discuss this note with your provider.

## 2025-04-19 LAB — NCCN CRITERIA FLAG: ABNORMAL

## 2025-04-21 ENCOUNTER — DOCUMENTATION (OUTPATIENT)
Dept: GENETICS | Facility: HOSPITAL | Age: 75
End: 2025-04-21
Payer: MEDICARE

## 2025-04-21 ENCOUNTER — RESULTS FOLLOW-UP (OUTPATIENT)
Facility: HOSPITAL | Age: 75
End: 2025-04-21
Payer: MEDICARE

## 2025-04-21 NOTE — PROGRESS NOTES
This patient recently completed the CARE risk assessment for a mammogram appointment. Based on the patient's responses, NCCN criteria for genetic testing was met. At the time of the assessment, the patient was provided with both written and video educational materials regarding genetic testing.    Navigator follow-up:   The patient had [facility]'s CancerNext w/ [employer] genetic testing in 2023 and the results were negative.  Additional testing is not indicated at this time.

## 2025-05-01 ENCOUNTER — HOSPITAL ENCOUNTER (OUTPATIENT)
Dept: MAMMOGRAPHY | Facility: HOSPITAL | Age: 75
Discharge: HOME | End: 2025-05-01
Admitting: RADIOLOGY
Payer: MEDICARE

## 2025-05-01 DIAGNOSIS — R92.8 ABNORMAL MAMMOGRAM: ICD-10-CM

## 2025-05-01 PROCEDURE — 77066 DX MAMMO INCL CAD BI: CPT

## 2025-05-01 PROCEDURE — G0279 TOMOSYNTHESIS, MAMMO: HCPCS

## 2025-05-12 ENCOUNTER — OFFICE VISIT (OUTPATIENT)
Dept: FAMILY MEDICINE CLINIC | Facility: CLINIC | Age: 75
End: 2025-05-12
Payer: MEDICARE

## 2025-05-12 VITALS
SYSTOLIC BLOOD PRESSURE: 152 MMHG | BODY MASS INDEX: 35.79 KG/M2 | OXYGEN SATURATION: 99 % | TEMPERATURE: 98.2 F | HEART RATE: 82 BPM | HEIGHT: 65 IN | DIASTOLIC BLOOD PRESSURE: 82 MMHG | WEIGHT: 214.8 LBS

## 2025-05-12 DIAGNOSIS — S92.502A: ICD-10-CM

## 2025-05-12 DIAGNOSIS — M79.675 PAIN IN LEFT TOE(S): ICD-10-CM

## 2025-05-12 DIAGNOSIS — M79.672 LEFT FOOT PAIN: Primary | ICD-10-CM

## 2025-05-12 PROCEDURE — 99213 OFFICE O/P EST LOW 20 MIN: CPT | Performed by: PHYSICIAN ASSISTANT

## 2025-05-12 PROCEDURE — 1159F MED LIST DOCD IN RCRD: CPT | Performed by: PHYSICIAN ASSISTANT

## 2025-05-12 PROCEDURE — 1125F AMNT PAIN NOTED PAIN PRSNT: CPT | Performed by: PHYSICIAN ASSISTANT

## 2025-05-12 PROCEDURE — 3079F DIAST BP 80-89 MM HG: CPT | Performed by: PHYSICIAN ASSISTANT

## 2025-05-12 PROCEDURE — 1160F RVW MEDS BY RX/DR IN RCRD: CPT | Performed by: PHYSICIAN ASSISTANT

## 2025-05-12 PROCEDURE — 3077F SYST BP >= 140 MM HG: CPT | Performed by: PHYSICIAN ASSISTANT

## 2025-05-12 NOTE — PROGRESS NOTES
Follow Up Office Visit      Patient Name: Laura Churchill  : 1950   MRN: [id number]     Chief Complaint:    Chief Complaint   Patient presents with    Pain     Toes on left foot; states she thinks she broke two of her toes.        History of Present Illness  74-year-old female presents with complaint of pain of left 2nd and 3rd toes.    Approximately 1.5 weeks ago, she sustained an injury to her 2nd and 3rd toes during a nighttime bathroom visit, suspecting a fracture due to the persistent pain. The third toe appears to be more severely affected than the second. She has been experiencing significant discomfort, particularly when moving the toes, with the most intense pain localized at the base of her third toe. Despite the pain, she retains the ability to move her toes. She has observed swelling on the top of her foot but reports no associated pain. She has attempted to manage the pain with Tylenol and Motrin and has made efforts to elevate the foot while at home.She has attempted buddy taping the two toes, which provided some relief but was not entirely effective. 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      Subjective      I have reviewed and the following portions of the patient's history were updated as appropriate: past family history, past medical history, past social history, past surgical history and problem list.    Medications:     Current Outpatient Medications:     anastrozole (ARIMIDEX) 1 MG tablet, Take 1 tablet by mouth Daily., Disp: 90 tablet, Rfl: 3    atorvastatin (LIPITOR) 10 MG tablet, Take 1 tablet by mouth Daily., Disp: 90 tablet, Rfl: 3    losartan (Cozaar) 100 MG tablet, Take 1 tablet by mouth Daily., Disp: 90 tablet, Rfl: 3    tiZANidine (ZANAFLEX) 4 MG tablet, Take 1 tablet by mouth At Night As Needed for Muscle Spasms., Disp: 90 tablet, Rfl: 0    venlafaxine XR (EFFEXOR-XR) 150 MG 24 hr capsule, Take 1  "capsule by mouth Daily., Disp: 90 capsule, Rfl: 3    vitamin B-12 (CYANOCOBALAMIN) 1000 MCG tablet, Take 1 tablet by mouth Daily., Disp: , Rfl:     VITAMIN D PO, Take 5,000 Units by mouth Daily., Disp: , Rfl:     Allergies:   Allergies   Allergen Reactions    Codeine Hives    Gabapentin Hallucinations     Screaming nightmares    Sulfa Antibiotics Itching       Objective     Physical Exam:   Physical Exam  Constitutional:       General: She is not in acute distress.     Appearance: She is obese. She is not ill-appearing.   HENT:      Head: Normocephalic.   Cardiovascular:      Rate and Rhythm: Normal rate and regular rhythm.   Pulmonary:      Effort: Pulmonary effort is normal. No respiratory distress.   Musculoskeletal:         General: Normal range of motion.      Left foot: Decreased range of motion (2/2 pain). Swelling and tenderness (Mostly localized to base of third toe and along 2nd and 3rd metatarsal bones) present.      Comments: Bilateral lower extremities neurovascular intact, sensation intact, strength intact   Skin:     General: Skin is warm.   Neurological:      General: No focal deficit present.      Mental Status: She is alert.   Psychiatric:         Mood and Affect: Mood normal.         Vital Signs:   Vitals:    05/12/25 1526   BP: 152/82   Pulse: 82   Temp: 98.2 °F (36.8 °C)   TempSrc: Infrared   SpO2: 99%   Weight: 97.4 kg (214 lb 12.8 oz)   Height: 165.1 cm (65\")   PainSc: 9    PainLoc: Toe     Body mass index is 35.74 kg/m².         Procedures    Assessment / Plan         Assessment and Plan     Diagnoses and all orders for this visit:    1. Left foot pain (Primary)  -     XR Foot 3+ View Left; Future    2. Pain in left toe(s)  -     XR Foot 3+ View Left; Future    3. Fracture of phalanx of left third toe  -     Ambulatory Referral to Orthopedic Surgery    Physical exam reveals tenderness to palpation at base of third toe and along the 2nd and 3rd metatarsals of left foot.  Exam concerning for " possible fracture of foot, as well as toe fracture.  X-rays obtained today which reveals fracture of proximal phalanx of third toe per my interpretation.  [facility] orthopedic referral sent and patient was encouraged to obtain postop shoe while awaiting follow-up with Ortho.  Ongoing supportive and symptomatic treatment discussed.       Follow Up:   Return if symptoms worsen or fail to improve, for Next scheduled follow up.    Patient or patient representative verbalized consent for the use of Ambient Listening during the visit with  Claudia Sapp PA-C for chart documentation. 5/12/2025  15:34 EDT    Claudia Sapp PA-C    [facility]

## 2025-06-26 ENCOUNTER — OFFICE VISIT (OUTPATIENT)
Dept: ONCOLOGY | Facility: CLINIC | Age: 75
End: 2025-06-26
Payer: MEDICARE

## 2025-06-26 ENCOUNTER — LAB (OUTPATIENT)
Dept: LAB | Facility: HOSPITAL | Age: 75
End: 2025-06-26
Payer: MEDICARE

## 2025-06-26 VITALS
DIASTOLIC BLOOD PRESSURE: 82 MMHG | TEMPERATURE: 97.1 F | RESPIRATION RATE: 16 BRPM | HEIGHT: 65 IN | HEART RATE: 85 BPM | SYSTOLIC BLOOD PRESSURE: 148 MMHG | BODY MASS INDEX: 35.45 KG/M2 | OXYGEN SATURATION: 96 % | WEIGHT: 212.8 LBS

## 2025-06-26 DIAGNOSIS — C50.911 MALIGNANT NEOPLASM OF RIGHT BREAST IN FEMALE, ESTROGEN RECEPTOR POSITIVE, UNSPECIFIED SITE OF BREAST: ICD-10-CM

## 2025-06-26 DIAGNOSIS — C50.911 MALIGNANT NEOPLASM OF RIGHT BREAST IN FEMALE, ESTROGEN RECEPTOR POSITIVE, UNSPECIFIED SITE OF BREAST: Primary | ICD-10-CM

## 2025-06-26 DIAGNOSIS — Z17.0 MALIGNANT NEOPLASM OF RIGHT BREAST IN FEMALE, ESTROGEN RECEPTOR POSITIVE, UNSPECIFIED SITE OF BREAST: ICD-10-CM

## 2025-06-26 DIAGNOSIS — Z17.0 MALIGNANT NEOPLASM OF RIGHT BREAST IN FEMALE, ESTROGEN RECEPTOR POSITIVE, UNSPECIFIED SITE OF BREAST: Primary | ICD-10-CM

## 2025-06-26 LAB
ALBUMIN SERPL-MCNC: 4.5 G/DL (ref 3.5–5.2)
ALBUMIN/GLOB SERPL: 1.9 G/DL
ALP SERPL-CCNC: 87 U/L (ref 39–117)
ALT SERPL W P-5'-P-CCNC: 20 U/L (ref 1–33)
ANION GAP SERPL CALCULATED.3IONS-SCNC: 10.4 MMOL/L (ref 5–15)
AST SERPL-CCNC: 28 U/L (ref 1–32)
BASOPHILS # BLD AUTO: 0.03 10*3/MM3 (ref 0–0.2)
BASOPHILS NFR BLD AUTO: 0.4 % (ref 0–1.5)
BILIRUB SERPL-MCNC: 0.3 MG/DL (ref 0–1.2)
BUN SERPL-MCNC: 19.9 MG/DL (ref 8–23)
BUN/CREAT SERPL: 19.5 (ref 7–25)
CALCIUM SPEC-SCNC: 9.6 MG/DL (ref 8.6–10.5)
CHLORIDE SERPL-SCNC: 106 MMOL/L (ref 98–107)
CO2 SERPL-SCNC: 24.6 MMOL/L (ref 22–29)
CREAT SERPL-MCNC: 1.02 MG/DL (ref 0.57–1)
DEPRECATED RDW RBC AUTO: 44.8 FL (ref 37–54)
EGFRCR SERPLBLD CKD-EPI 2021: 57.8 ML/MIN/1.73
EOSINOPHIL # BLD AUTO: 0.09 10*3/MM3 (ref 0–0.4)
EOSINOPHIL NFR BLD AUTO: 1.1 % (ref 0.3–6.2)
ERYTHROCYTE [DISTWIDTH] IN BLOOD BY AUTOMATED COUNT: 12.6 % (ref 12.3–15.4)
GLOBULIN UR ELPH-MCNC: 2.4 GM/DL
GLUCOSE SERPL-MCNC: 78 MG/DL (ref 65–99)
HCT VFR BLD AUTO: 40.7 % (ref 34–46.6)
HGB BLD-MCNC: 13 G/DL (ref 12–15.9)
IMM GRANULOCYTES # BLD AUTO: 0.02 10*3/MM3 (ref 0–0.05)
IMM GRANULOCYTES NFR BLD AUTO: 0.2 % (ref 0–0.5)
LYMPHOCYTES # BLD AUTO: 1.66 10*3/MM3 (ref 0.7–3.1)
LYMPHOCYTES NFR BLD AUTO: 20.4 % (ref 19.6–45.3)
MCH RBC QN AUTO: 30.7 PG (ref 26.6–33)
MCHC RBC AUTO-ENTMCNC: 31.9 G/DL (ref 31.5–35.7)
MCV RBC AUTO: 96 FL (ref 79–97)
MONOCYTES # BLD AUTO: 0.77 10*3/MM3 (ref 0.1–0.9)
MONOCYTES NFR BLD AUTO: 9.5 % (ref 5–12)
NEUTROPHILS NFR BLD AUTO: 5.56 10*3/MM3 (ref 1.7–7)
NEUTROPHILS NFR BLD AUTO: 68.4 % (ref 42.7–76)
PLATELET # BLD AUTO: 214 10*3/MM3 (ref 140–450)
PMV BLD AUTO: 8.9 FL (ref 6–12)
POTASSIUM SERPL-SCNC: 4.5 MMOL/L (ref 3.5–5.2)
PROT SERPL-MCNC: 6.9 G/DL (ref 6–8.5)
RBC # BLD AUTO: 4.24 10*6/MM3 (ref 3.77–5.28)
SODIUM SERPL-SCNC: 141 MMOL/L (ref 136–145)
WBC NRBC COR # BLD AUTO: 8.13 10*3/MM3 (ref 3.4–10.8)

## 2025-06-26 PROCEDURE — 99214 OFFICE O/P EST MOD 30 MIN: CPT | Performed by: INTERNAL MEDICINE

## 2025-06-26 PROCEDURE — 80053 COMPREHEN METABOLIC PANEL: CPT

## 2025-06-26 PROCEDURE — 1125F AMNT PAIN NOTED PAIN PRSNT: CPT | Performed by: INTERNAL MEDICINE

## 2025-06-26 PROCEDURE — 3079F DIAST BP 80-89 MM HG: CPT | Performed by: INTERNAL MEDICINE

## 2025-06-26 PROCEDURE — 36415 COLL VENOUS BLD VENIPUNCTURE: CPT

## 2025-06-26 PROCEDURE — 3077F SYST BP >= 140 MM HG: CPT | Performed by: INTERNAL MEDICINE

## 2025-06-26 PROCEDURE — 85025 COMPLETE CBC W/AUTO DIFF WBC: CPT

## 2025-06-26 NOTE — PROGRESS NOTES
"      PROBLEM LIST:  1. [id number] ER+ (50%) IN negative Her2 positive (3+) Invasive ductal carcinoma of the right breast  A) biopsy of a 1.2 cm mass in the lower outer quadrant on 3/14/23 showed a high grade IDC.  No concerning lymph nodes on imaging.  B) neoadjuvant chemotherapy with Taxol and Herceptin started on 4/12/2023.  Last dose of maintenance Herceptin given 1/31/2024.  Stopped maintenance Herceptin early due to decrease in EF.  C) right breast lumpectomy on 8/4/23 showed no residual cancer in the breast or 1 SLN.  D) completed adjuvant radiation 10/4/2023.  Anastrozole started October 2023.  2. Hypertension  3. Depression  4. MOOKIE  5. Hyperlipidemia    Subjective     CHIEF COMPLAINT: breast cancer    HISTORY OF PRESENT ILLNESS:   Laura Churchill returns for follow-up.  She continues on anastrozole.  She is tolerating this well.  She has been under extra stress recently because she is taking care of her sister, and also her  who has been having some neck pain and is not able to do any lifting or help around the house.  She reports having some headaches, most days.  No vision changes or nausea.  Blood pressure is usually pretty good.    Objective      /82   Pulse 85   Temp 97.1 °F (36.2 °C) (Temporal)   Resp 16   Ht 165.1 cm (65\")   Wt 96.5 kg (212 lb 12.8 oz)   LMP  (LMP Unknown)   SpO2 96%   BMI 35.41 kg/m²    Vitals:    06/26/25 1355   PainSc: 1    PainLoc: Comment: headaches           General: well appearing female in no acute distress  Neuro: alert and oriented  HEENT: sclera anicteric, oropharynx clear  Lymphatics: No cervical supraclavicular or axillary adenopathy  Extremeties: no lower extremity edema.   Skin: no rashes, lesions, bruising, or petechiae  Psych: mood and affect appropriate            RECENT LABS:  Lab Results   Component Value Date    WBC 8.13 06/26/2025    HGB 13.0 06/26/2025    HCT 40.7 06/26/2025    MCV 96.0 06/26/2025     06/26/2025       Lab Results "   Component Value Date    GLUCOSE 93 02/24/2025    BUN 16 02/24/2025    CREATININE 0.95 02/24/2025    EGFRIFNONA 66 09/28/2021    BCR 16.8 02/24/2025    K 4.0 02/24/2025    CO2 26.0 02/24/2025    CALCIUM 9.6 02/24/2025    ALBUMIN 4.6 02/24/2025    AST 24 02/24/2025    ALT 15 02/24/2025                   ASSESSMENT AND PLAN:     Laura Churchill is a 74 y.o. female with a [id number] Er+ Her2+ invasive ductal carcinoma of the right breast.    Her pathology showed a complete pathologic response.  She had her last dose of maintenance Herceptin 1/31/2024.  Herceptin was stopped 3 doses early due to a drop in her ejection fraction which has subsequently resolved.  Clinically she is doing well with no evidence of disease recurrence.  CBC from today was reviewed and is normal.  CMP is pending.    She continues on anastrozole.  She is tolerating it well.  We will continue anastrozole for minimum of 5 years.    Osteopenia: Stable on DEXA scan October 2023.  We will check this again October 2025.    Mammogram May 2025 was category 3.  She is scheduled for follow-up in November, as well as a breast MRI next month.    Headaches: Recommended that she try to increase her fluid intake as dehydration can be a common cause of headaches and is more common during the hot summer months.  If the headaches worsen or do not improve, she is to let us know and we will do a scan of her brain.    Follow-up in 4 months with labs.                       Sharonda Hopkins MD  [facility]    6/26/2025          CC:

## 2025-07-14 ENCOUNTER — HOSPITAL ENCOUNTER (OUTPATIENT)
Dept: MRI IMAGING | Facility: HOSPITAL | Age: 75
Discharge: HOME | End: 2025-07-14
Admitting: SURGERY
Payer: MEDICARE

## 2025-07-14 DIAGNOSIS — C50.511 CARCINOMA OF LOWER-OUTER QUADRANT OF FEMALE BREAST, RIGHT: ICD-10-CM

## 2025-07-14 PROCEDURE — C8937 CAD BREAST MRI: HCPCS

## 2025-07-14 PROCEDURE — C8908 MRI W/O FOL W/CONT, BREAST,: HCPCS

## 2025-07-14 PROCEDURE — A9577 INJ MULTIHANCE: HCPCS | Performed by: SURGERY

## 2025-07-14 PROCEDURE — 25510000002 GADOBENATE DIMEGLUMINE 529 MG/ML SOLUTION: Performed by: SURGERY

## 2025-07-14 RX ADMIN — GADOBENATE DIMEGLUMINE 19 ML: 529 INJECTION, SOLUTION INTRAVENOUS at 11:52

## 2025-07-15 PROCEDURE — 77049 MRI BREAST C-+ W/CAD BI: CPT | Performed by: RADIOLOGY

## (undated) DEVICE — SYS CLS SKIN PREMIERPRO EXOFINFUSION 22CM

## (undated) DEVICE — TRAP,MUCUS SPECIMEN,40CC: Brand: MEDLINE

## (undated) DEVICE — APPL CHLORAPREP TINTED 26ML TEAL

## (undated) DEVICE — UNDERCAST PADDING: Brand: DEROYAL

## (undated) DEVICE — ENCORE® LATEX MICRO SIZE 6.5, STERILE LATEX POWDER-FREE SURGICAL GLOVE: Brand: ENCORE

## (undated) DEVICE — PATIENT RETURN ELECTRODE, SINGLE-USE, CONTACT QUALITY MONITORING, ADULT, WITH 9FT CORD, FOR PATIENTS WEIGING OVER 33LBS. (15KG): Brand: MEGADYNE

## (undated) DEVICE — T4 PULLOVER TOGA, LARGE

## (undated) DEVICE — 3M™ TEGADERM™ IV ADVANCED SECUREMENT DRESSING 4 INCHES X 4 3/4 INCHES 50 DRESSINGS/CARTON 4 CARTONS/CASE 1688: Brand: 3M™ TEGADERM™

## (undated) DEVICE — KT PUMP PAIN ONQ CBLOC SELCTAFLO 400ML

## (undated) DEVICE — BNDG ELAS W/CLIP 6IN 10YD LF STRL

## (undated) DEVICE — STERILE PVP: Brand: MEDLINE INDUSTRIES, INC.

## (undated) DEVICE — TBG PENCL TELESCP MEGADYNE SMOKE EVAC 10FT

## (undated) DEVICE — GLV SURG TRIUMPH CLASSIC PF LTX 7.5 STRL

## (undated) DEVICE — PAD,NON-ADHERENT,2X3,STERILE,LF,1/PK: Brand: MEDLINE

## (undated) DEVICE — SYR CONTRL LUERLOK 10CC

## (undated) DEVICE — INTENDED USE FOR SURGICAL MARKING ON INTACT SKIN, ALSO PROVIDES A PERMANENT METHOD OF IDENTIFYING OBJECTS IN THE OPERATING ROOM: Brand: WRITESITE® REGULAR TIP SKIN MARKER

## (undated) DEVICE — SENSR O2 OXIMAX FNGR A/ 18IN NONSTR

## (undated) DEVICE — PK KN TOTL 10

## (undated) DEVICE — 3M™ TEGADERM™ CHG DRESSING 25/CARTON 4 CARTONS/CASE 1658: Brand: TEGADERM™

## (undated) DEVICE — SPNG GZ WOVN 4X4IN 12PLY 10/BX STRL

## (undated) DEVICE — STRYKER PERFORMANCE SERIES SAGITTAL BLADE: Brand: STRYKER PERFORMANCE SERIES

## (undated) DEVICE — DISPOSABLE BIPOLAR FORCEPS 7 3/4" (19.7CM) SCOVILLE BAYONET, INSULATED, 1.5MM TIP AND 12 FT. (3.6M) CABLE: Brand: KIRWAN

## (undated) DEVICE — 3 BONE CEMENT MIXER: Brand: MIXEVAC

## (undated) DEVICE — SUT VIC 0 TIES 18IN J912G

## (undated) DEVICE — ELECTRD BLD EXT EDGE/INSUL 1P 4IN

## (undated) DEVICE — NEURO SPONGES: Brand: DEROYAL

## (undated) DEVICE — PROXIMATE RH ROTATING HEAD SKIN STAPLERS (35 WIDE) CONTAINS 35 STAINLESS STEEL STAPLES: Brand: PROXIMATE

## (undated) DEVICE — ANTIBACTERIAL UNDYED BRAIDED (POLYGLACTIN 910), SYNTHETIC ABSORBABLE SUTURE: Brand: COATED VICRYL

## (undated) DEVICE — GLV SURG PREMIERPRO MIC LTX PF SZ8 BRN

## (undated) DEVICE — Device

## (undated) DEVICE — TB SXN FRAZIER 12F STRL

## (undated) DEVICE — TB SXN FRAZIER 10F STRL

## (undated) DEVICE — PACK,UNIVERSAL,NO GOWNS: Brand: MEDLINE

## (undated) DEVICE — SYR LUERLOK 30CC

## (undated) DEVICE — AIRWY 90MM NO9

## (undated) DEVICE — NDL HYPO ECLPS SFTY 25G 1 1/2IN

## (undated) DEVICE — APPL CHLORAPREP W/TINT 26ML BLU

## (undated) DEVICE — TOOL 14BA60 LEGEND 14CM 6MM: Brand: MIDAS REX ™

## (undated) DEVICE — CANNULA,ADULT,SOFT-TOUCH,7TUBE,SC: Brand: MEDLINE

## (undated) DEVICE — ENCORE® LATEX MICRO SIZE 7, STERILE LATEX POWDER-FREE SURGICAL GLOVE: Brand: ENCORE

## (undated) DEVICE — SST TWIST DRILL, STANDARD, 2.4MM DIA. X 127MM: Brand: MICROAIRE®

## (undated) DEVICE — GLV SURG BIOGEL LTX PF 8

## (undated) DEVICE — 2963 MEDIPORE SOFT CLOTH TAPE 3 IN X 10 YD 12 RLS/CS: Brand: 3M™ MEDIPORE™

## (undated) DEVICE — DRSNG SURG AQUACEL AG 9X25CM

## (undated) DEVICE — FLTR HME STR UNIV W/SMPL PORT

## (undated) DEVICE — POSTN HD UNIV

## (undated) DEVICE — DRAPE,SHOULDER,BEACH CHAIR,STERILE: Brand: MEDLINE

## (undated) DEVICE — KT PUMP INFUBLOCK MDL 2100 PMKITSOLIS

## (undated) DEVICE — SUT VICYL 2/0 CR8 18IN DYED J726D

## (undated) DEVICE — SPNG LAP PREWSH SFTPK 18X18IN STRL PK/5

## (undated) DEVICE — GLV SURG PREMIERPRO MIC LTX PF SZ7 BRN

## (undated) DEVICE — ADAPT ST INFUS ADMIN SYR 70IN

## (undated) DEVICE — GLV SURG SIGNATURE TOUCH PF LTX 8 STRL BX/50

## (undated) DEVICE — ELECTRD BLD EDGE/INSUL1P 2.4X5.1MM STRL

## (undated) DEVICE — NEEDLE, QUINCKE 22GX3.5": Brand: MEDLINE INDUSTRIES, INC.

## (undated) DEVICE — PK MAJ SHLDR SPLT 10

## (undated) DEVICE — SPHR MARKR STEALTH STATION

## (undated) DEVICE — MEDI-VAC NON-CONDUCTIVE SUCTION TUBING: Brand: CARDINAL HEALTH

## (undated) DEVICE — SNAP KOVER: Brand: UNBRANDED

## (undated) DEVICE — GENESIS TROCHLEAR PIN 1/8 X 3: Brand: GENESIS

## (undated) DEVICE — CVR HNDL LT SURG ACCSSRY BLU STRL

## (undated) DEVICE — NERVE BLOCK SUPPORT KIT/BLUE: Brand: MEDLINE INDUSTRIES, INC.

## (undated) DEVICE — DRSNG PAD ABD 8X10IN STRL

## (undated) DEVICE — ENCORE® LATEX MICRO SIZE 8, STERILE LATEX POWDER-FREE SURGICAL GLOVE: Brand: ENCORE

## (undated) DEVICE — STRAP POSTN KN/BDY FM 5X72IN DISP

## (undated) DEVICE — JACKSON-PRATT 100CC BULB RESERVOIR: Brand: CARDINAL HEALTH

## (undated) DEVICE — SYS SKIN CLS DERMABOND PRINEO W/22CM MESH TP

## (undated) DEVICE — NDL HYPO ECLPS SFTY 18G 1 1/2IN

## (undated) DEVICE — SHEET,DRAPE,40X58,STERILE: Brand: MEDLINE

## (undated) DEVICE — PUMP PAIN AUTOFUSER AUTO SELCT NOBOLUS 1TO14ML/HR 550ML DISP

## (undated) DEVICE — SOL POVIDONE IODINE 10PCT 3/4OZ STRL

## (undated) DEVICE — ST NERV BLCK CONT CONTIPLEX ECHO CLSD 18G 2IN

## (undated) DEVICE — SKIN AFFIX SURG ADHESIVE 72/CS 0.55ML: Brand: MEDLINE

## (undated) DEVICE — BLANKT WARM UPPR/BDY ARM/OUT 57X196CM

## (undated) DEVICE — 3M™ WARMING BLANKET, UPPER BODY, 10 PER CASE, 42268: Brand: BAIR HUGGER™

## (undated) DEVICE — PK NEURO DISC 10

## (undated) DEVICE — GOWN,NON-REINFORCED,SIRUS,SET IN SLV,XXL: Brand: MEDLINE

## (undated) DEVICE — JP PERF DRN SIL FLT 10MM FULL: Brand: CARDINAL HEALTH

## (undated) DEVICE — BIPOLAR SEALER 23-112-1 AQM 6.0: Brand: AQUAMANTYS™

## (undated) DEVICE — NDL SPINE 18G 31/2IN PNK

## (undated) DEVICE — SUT MONOCRYL PLS ANTIB UND 3/0  PS1 27IN

## (undated) DEVICE — DRP CLEARVIEWTEARAWAY O/ARM

## (undated) DEVICE — PAD ARMBRD SURG CONVOL 7.5X20X2IN

## (undated) DEVICE — TRY EPID SFTY 18G 3.5IN 1T7680

## (undated) DEVICE — COVER,MAYO STAND,STERILE: Brand: MEDLINE

## (undated) DEVICE — NEEDLE, QUINCKE, 18GX3.5": Brand: MEDLINE

## (undated) DEVICE — 3M™ STERI-DRAPE™ INSTRUMENT POUCH 1018: Brand: STERI-DRAPE™

## (undated) DEVICE — SOL LR 1000ML

## (undated) DEVICE — SYR CATH/TIP 50ML 2OZ STRL 1P/U

## (undated) DEVICE — MAGNETIC DRAPE: Brand: DEVON

## (undated) DEVICE — TRAP FLD MINIVAC MEGADYNE 100ML

## (undated) DEVICE — TOOL 14MH30 LEGEND 14CM 3MM: Brand: MIDAS REX ™

## (undated) DEVICE — DRSNG SURESITE WNDW 4X4.5

## (undated) DEVICE — NDL SPINE 22G 31/2IN BLK

## (undated) DEVICE — MEDI-VAC YANKAUER SUCTION HANDLE W/BULBOUS TIP: Brand: CARDINAL HEALTH

## (undated) DEVICE — GLV SURG PREMIERPRO MIC LTX PF SZ6.5 BRN